# Patient Record
Sex: MALE | Race: WHITE | NOT HISPANIC OR LATINO | Employment: OTHER | URBAN - METROPOLITAN AREA
[De-identification: names, ages, dates, MRNs, and addresses within clinical notes are randomized per-mention and may not be internally consistent; named-entity substitution may affect disease eponyms.]

---

## 2017-01-27 ENCOUNTER — TELEPHONE (OUTPATIENT)
Dept: UROLOGY | Facility: CLINIC | Age: 67
End: 2017-01-27

## 2017-01-27 NOTE — TELEPHONE ENCOUNTER
----- Message from Martha Wolf sent at 1/27/2017 10:32 AM CST -----  Contact: Patient  Patient needs authorization on prescription for his Flomax sent to [facility] on [address]. Any questions call patient at [phone].

## 2017-01-30 RX ORDER — TAMSULOSIN HYDROCHLORIDE 0.4 MG/1
CAPSULE ORAL
Qty: 30 CAPSULE | Refills: 0 | Status: SHIPPED | OUTPATIENT
Start: 2017-01-30 | End: 2017-03-05 | Stop reason: SDUPTHER

## 2017-03-06 RX ORDER — TAMSULOSIN HYDROCHLORIDE 0.4 MG/1
CAPSULE ORAL
Qty: 30 CAPSULE | Refills: 0 | Status: SHIPPED | OUTPATIENT
Start: 2017-03-06 | End: 2017-04-05 | Stop reason: SDUPTHER

## 2017-04-06 RX ORDER — TAMSULOSIN HYDROCHLORIDE 0.4 MG/1
CAPSULE ORAL
Qty: 30 CAPSULE | Refills: 0 | Status: SHIPPED | OUTPATIENT
Start: 2017-04-06 | End: 2017-05-07 | Stop reason: SDUPTHER

## 2017-05-08 RX ORDER — TAMSULOSIN HYDROCHLORIDE 0.4 MG/1
CAPSULE ORAL
Qty: 30 CAPSULE | Refills: 0 | Status: SHIPPED | OUTPATIENT
Start: 2017-05-08 | End: 2017-06-06 | Stop reason: SDUPTHER

## 2017-05-29 ENCOUNTER — OFFICE VISIT (OUTPATIENT)
Dept: UROLOGY | Facility: CLINIC | Age: 67
End: 2017-05-29
Payer: COMMERCIAL

## 2017-05-29 VITALS
HEIGHT: 73 IN | WEIGHT: 254 LBS | SYSTOLIC BLOOD PRESSURE: 169 MMHG | DIASTOLIC BLOOD PRESSURE: 93 MMHG | TEMPERATURE: 99 F | BODY MASS INDEX: 33.66 KG/M2 | HEART RATE: 65 BPM

## 2017-05-29 DIAGNOSIS — N50.819 TESTICLE PAIN: ICD-10-CM

## 2017-05-29 DIAGNOSIS — R82.90 CLOUDY URINE: ICD-10-CM

## 2017-05-29 DIAGNOSIS — N30.01 ACUTE CYSTITIS WITH HEMATURIA: Primary | ICD-10-CM

## 2017-05-29 DIAGNOSIS — R30.0 DYSURIA: ICD-10-CM

## 2017-05-29 LAB
BILIRUB SERPL-MCNC: ABNORMAL MG/DL
BLOOD URINE, POC: ABNORMAL
COLOR, POC UA: ABNORMAL
GLUCOSE UR QL STRIP: ABNORMAL
KETONES UR QL STRIP: ABNORMAL
LEUKOCYTE ESTERASE URINE, POC: ABNORMAL
NITRITE, POC UA: POSITIVE
PH, POC UA: 7
PROTEIN, POC: POSITIVE
SPECIFIC GRAVITY, POC UA: 1.01
UROBILINOGEN, POC UA: ABNORMAL

## 2017-05-29 PROCEDURE — 87086 URINE CULTURE/COLONY COUNT: CPT

## 2017-05-29 PROCEDURE — 99999 PR PBB SHADOW E&M-EST. PATIENT-LVL IV: CPT | Mod: PBBFAC,,, | Performed by: NURSE PRACTITIONER

## 2017-05-29 PROCEDURE — 99214 OFFICE O/P EST MOD 30 MIN: CPT | Mod: 25,S$GLB,, | Performed by: NURSE PRACTITIONER

## 2017-05-29 PROCEDURE — 81001 URINALYSIS AUTO W/SCOPE: CPT | Mod: S$GLB,,, | Performed by: NURSE PRACTITIONER

## 2017-05-29 RX ORDER — SULFAMETHOXAZOLE AND TRIMETHOPRIM 800; 160 MG/1; MG/1
1 TABLET ORAL 2 TIMES DAILY
Qty: 56 TABLET | Refills: 0 | Status: SHIPPED | OUTPATIENT
Start: 2017-05-29 | End: 2017-06-26

## 2017-05-29 RX ORDER — SULFAMETHOXAZOLE AND TRIMETHOPRIM 800; 160 MG/1; MG/1
1 TABLET ORAL 2 TIMES DAILY
Qty: 56 TABLET | Refills: 0 | Status: SHIPPED | OUTPATIENT
Start: 2017-05-29 | End: 2017-05-29 | Stop reason: CLARIF

## 2017-05-29 NOTE — PROGRESS NOTES
[facility] Note  Staff: HEATH Rahman      Chief Complaint: Urinary tract infection    Subjective:        HPI: Ezequiel Escudero is a 66 y.o. male presents with complaints of   cloudy urine, urinary frequency, testicle swelling, pus in urine which began on 05/25/2017 and has progressively worsened.    The patient was last seen by Dr. Sellers on 10/05/2016 for a Cystoscopy procedure for gross hematuria.  Findings were as follows:  FINDINGS:  URETHRA: open with no strictures  PROSTATE: 4 cm with GLASGOW. SP TURP with prostate regrowth   TRABEC: grade 2  BLADDER: severe hemorraghic cystitis all over the bladder wall.  URETERAL ORIFICES : NSSp clear efflux   OTHER FINDINGS: none     Post Procedure Diagnosis: BPH with GLASGOW, severe hemorraghic cystitis    Last PSA Screening:   Lab Results   Component Value Date    PSA 7.50 (H) 10/11/2012     REVIEW OF SYSTEMS:  Review of Systems   Constitutional: Negative for chills, diaphoresis, fever and weight loss.   HENT: Negative for congestion, hearing loss, nosebleeds and sore throat.    Eyes: Negative for blurred vision and pain.   Respiratory: Negative for cough and wheezing.    Cardiovascular: Negative for chest pain, palpitations and leg swelling.   Gastrointestinal: Negative for abdominal pain, heartburn, nausea and vomiting.   Genitourinary: Positive for dysuria, frequency and urgency. Negative for flank pain and hematuria.        Testicle pain   Musculoskeletal: Negative for back pain, joint pain, myalgias and neck pain.   Skin: Negative for itching and rash.   Neurological: Negative for dizziness, tremors, sensory change, seizures, loss of consciousness, weakness and headaches.   Endo/Heme/Allergies: Does not bruise/bleed easily.   Psychiatric/Behavioral: Negative for depression and suicidal ideas. The patient is not nervous/anxious.      Physical Exam    PMHx:  Past Medical History:   Diagnosis Date    A-fib     BPH (benign prostatic hyperplasia)      Rojas catheter in place     Kidney stone     PONV (postoperative nausea and vomiting)     Wears glasses      PSHx:  Past Surgical History:   Procedure Laterality Date    ANKLE DEBRIDEMENT      CARDIOVERSION      THYROIDECTOMY  1970's    TONSILLECTOMY      TONSILLECTOMY, ADENOIDECTOMY       Allergies:  Review of patient's allergies indicates no known allergies.    Medications: reviewed   Objective:     Vitals:    05/29/17 1423   BP: (!) 169/93   Pulse: 65   Temp: 98.7 °F (37.1 °C)     General:WDWN in NAD  Eyes: PERRLA, normal conjunctiva  Respiratory: no increased work on breathing, clear to auscultation  Cardiovascular: regular rate and rhythm. No obvious extremity edema.  GI: palpation of masses. No tenderness. No hepatosplenomegaly to palpation.  Musculoskeletal: normal range of motion of bilateral upper extremities. Normal muscle strength and tone.  Skin: no obvious rashes or lesions. No tightening of skin noted.  Neurologic: CN grossly normal. Normal sensation.   Psychiatric: awake, alert and oriented x 3. Mood and affect normal. Cooperative.    LABS REVIEW:  UA today:  Color, UA  yellow cloudy   Spec Grav UA 1.010   pH, UA 7   WBC, UA  ++ positive   Nitrite, UA  positive   Protein  positive   Glucose, UA neg   Ketones, UA neg   Urobilinogen, UA neg   Bilirubin neg   Blood, UA  ++ Positive     Cr:   Lab Results   Component Value Date    CREATININE 1.4 10/10/2016     Assessment:       1. Acute cystitis with hematuria    2. Testicle pain    3. Cloudy urine    4. Dysuria          Plan:     We will send urine for culture testing today.  In the meantime I will prescribe him Bactrim DS 1 po BID x 28 days.  Pt will continue the Flomax as previously indicated.    F/up:  Pt was instructed that once we treat the current infection that if the testicle pain remains after treatment, to contact the office and we will order a ultrasound for further evaluation.    HEATH Hennessy

## 2017-05-29 NOTE — PATIENT INSTRUCTIONS
Understanding Urinary Tract Infections (UTIs)  Most UTIs are caused by bacteria, although they may also be caused by viruses or fungi. Bacteria from the bowel are the most common source of infection. The infection may begin because of any of the following:  · Sexual activity. During sex, germs can travel from the penis, vagina, or rectum into the urethra.   · Germs on the skin outside the rectum may travel into the urethra. This is more common in women since the rectum and urethra are closer to each other than in men. Wiping from front to back after using the toilet and keeping the area clean can help prevent germs from getting to the urethra.  · Blockage of urine flow through the urinary tract. If urine sits too long, germs may begin to grow out of control.      Parts of the urinary tract  The infection can occur in any part of the urinary tract.  · The kidneys collect and store urine.  · The ureters carry urine from the kidneys to the bladder.  · The bladder holds urine until you are ready to let it out.  · The urethra carries urine from the bladder out of the body. It is shorter in women, so bacteria can move through it more easily. The urethra is longer in men, so a UTI is less likely to reach the bladder or kidneys in men.  Date Last Reviewed: 9/8/2014  © 8724-7809 [employer]. [address] [place] [address], [place] [address]. All rights reserved. This information is not intended as a substitute for professional medical care. Always follow your healthcare professional's instructions.        Anatomy of the Male Urinary Tract  Your urinary tract helps to get rid of your bodys liquid waste. The kidneys constantly filter the blood to collect unneeded chemicals and water, making urine. Urine travels through the ureters to the bladder. The bladder fills with urine, holding it until youre ready to release it. Signals from the brain tell the sphincter (muscles around the opening of the bladder) when to let  urine flow out of the bladder. The urethra is the tube that carries urine from the bladder out of the body. In men, the prostate gland wraps around the urethra near the bladder.     Front view of male urinary tract.     Date Last Reviewed: 8/27/2014  © 0938-9484 [employer]. [address] [place] [address]. All rights reserved. This information is not intended as a substitute for professional medical care. Always follow your healthcare professional's instructions.

## 2017-05-31 LAB — BACTERIA UR CULT: NO GROWTH

## 2017-06-08 RX ORDER — TAMSULOSIN HYDROCHLORIDE 0.4 MG/1
CAPSULE ORAL
Qty: 30 CAPSULE | Refills: 0 | Status: SHIPPED | OUTPATIENT
Start: 2017-06-08 | End: 2017-07-08 | Stop reason: SDUPTHER

## 2017-07-10 RX ORDER — TAMSULOSIN HYDROCHLORIDE 0.4 MG/1
CAPSULE ORAL
Qty: 30 CAPSULE | Refills: 0 | Status: SHIPPED | OUTPATIENT
Start: 2017-07-10 | End: 2017-08-06 | Stop reason: SDUPTHER

## 2017-08-07 RX ORDER — TAMSULOSIN HYDROCHLORIDE 0.4 MG/1
CAPSULE ORAL
Qty: 30 CAPSULE | Refills: 0 | Status: SHIPPED | OUTPATIENT
Start: 2017-08-07 | End: 2017-09-11 | Stop reason: SDUPTHER

## 2017-09-11 RX ORDER — TAMSULOSIN HYDROCHLORIDE 0.4 MG/1
CAPSULE ORAL
Qty: 30 CAPSULE | Refills: 0 | Status: SHIPPED | OUTPATIENT
Start: 2017-09-11 | End: 2017-10-12 | Stop reason: SDUPTHER

## 2017-10-12 RX ORDER — TAMSULOSIN HYDROCHLORIDE 0.4 MG/1
CAPSULE ORAL
Qty: 30 CAPSULE | Refills: 0 | Status: SHIPPED | OUTPATIENT
Start: 2017-10-12 | End: 2019-10-31

## 2017-10-16 RX ORDER — TAMSULOSIN HYDROCHLORIDE 0.4 MG/1
1 CAPSULE ORAL NIGHTLY
Qty: 90 CAPSULE | Refills: 3 | Status: SHIPPED | OUTPATIENT
Start: 2017-10-16 | End: 2018-11-01 | Stop reason: SDUPTHER

## 2018-04-30 ENCOUNTER — TELEPHONE (OUTPATIENT)
Dept: UROLOGY | Facility: CLINIC | Age: 68
End: 2018-04-30

## 2018-04-30 NOTE — TELEPHONE ENCOUNTER
----- Message from Frieda Hong sent at 4/30/2018  4:41 PM CDT -----  Contact: Patient  Patient called and stated that Dr. Clay was supposed to have sent a referral for him to see Dr. Sellers. It was supposed to be sent over on 4/23/18. He is checking to see if it was received. Please call him at [phone].    Thanks,  Frieda

## 2018-10-21 ENCOUNTER — HOSPITAL ENCOUNTER (EMERGENCY)
Facility: HOSPITAL | Age: 68
Discharge: HOME | End: 2018-10-21
Attending: EMERGENCY MEDICINE
Payer: COMMERCIAL

## 2018-10-21 VITALS
WEIGHT: 225 LBS | DIASTOLIC BLOOD PRESSURE: 87 MMHG | SYSTOLIC BLOOD PRESSURE: 172 MMHG | BODY MASS INDEX: 29.82 KG/M2 | RESPIRATION RATE: 18 BRPM | OXYGEN SATURATION: 96 % | HEIGHT: 73 IN | HEART RATE: 64 BPM | TEMPERATURE: 99 F

## 2018-10-21 DIAGNOSIS — S22.42XA CLOSED FRACTURE OF MULTIPLE RIBS OF LEFT SIDE, INITIAL ENCOUNTER: Primary | ICD-10-CM
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
## 2018-10-21 PROCEDURE — 63600175 PHARM REV CODE 636 W HCPCS: Performed by: EMERGENCY MEDICINE

## 2018-10-21 PROCEDURE — 96372 THER/PROPH/DIAG INJ SC/IM: CPT

## 2018-10-21 PROCEDURE — 99284 EMERGENCY DEPT VISIT MOD MDM: CPT | Mod: 25

## 2018-10-21 PROCEDURE — 94799 UNLISTED PULMONARY SVC/PX: CPT

## 2018-10-21 RX ORDER — KETOROLAC TROMETHAMINE 30 MG/ML
10 INJECTION, SOLUTION INTRAMUSCULAR; INTRAVENOUS
Status: COMPLETED | OUTPATIENT
Start: 2018-10-21 | End: 2018-10-21

## 2018-10-21 RX ORDER — HYDROCODONE BITARTRATE AND ACETAMINOPHEN 10; 325 MG/1; MG/1
1 TABLET ORAL EVERY 4 HOURS PRN
Qty: 18 TABLET | Refills: 0 | Status: SHIPPED | OUTPATIENT
Start: 2018-10-21 | End: 2018-10-24 | Stop reason: SDUPTHER

## 2018-10-21 RX ADMIN — KETOROLAC TROMETHAMINE 10 MG: 30 INJECTION, SOLUTION INTRAMUSCULAR at 09:10

## 2018-10-21 NOTE — PLAN OF CARE
10/21/18 0908   Patient Assessment/Suction   Level of Consciousness (AVPU) alert   Respiratory Effort Normal;Unlabored   Incentive Spirometer   $ Incentive Spirometer Charges done with encouragement   Predicted Level (mL) (Incentive Spirometer) 3400   Administration (Incentive Spirometer) done with encouragement   Number of Repetitions (Incentive Spirometer) 12   Level (mL) (Incentive Spirometer) 2500   Patient Tolerance good   Instructed pt to use IS Q4.

## 2018-10-21 NOTE — ED PROVIDER NOTES
"Encounter Date: 10/21/2018    SCRIBE #1 NOTE: I, Milagros Mcgill, am scribing for, and in the presence of, Dr. Lazaro Thomas.       History     Chief Complaint   Patient presents with    Fall     co lt side pain with movement       Time seen by provider: 8:21 AM on 10/21/2018    Ezequiel Escudero is a 67 y.o. male who presents to the ED with complaints of left-sided rib pain s/p a mechanical fall that occurred last night, approximately x13 hours ago. Patient states he "slipped and fell" onto his left side. He endorses taking 2 Ibuprofen's last night after the fall and "slept in my recliner", but woke up with worsening pain. Pain worsens with deep breaths and movements. He states, " it feels like a rib is moving when I move and I have to catch my breath". Patient has no other medical concerns or complaints at this moment. He denies onset of any other new symptoms. PMHx includes kidney stone, BPH, and A-fib. SHx includes thyroidectomy and cardioversion.       The history is provided by the patient.     Review of patient's allergies indicates:  No Known Allergies  Past Medical History:   Diagnosis Date    A-fib     BPH (benign prostatic hyperplasia)     Rojas catheter in place     Kidney stone     PONV (postoperative nausea and vomiting)     Wears glasses      Past Surgical History:   Procedure Laterality Date    ANKLE DEBRIDEMENT      CARDIOVERSION      CYSTOSCOPY N/A 10/5/2016    [facility]    THYROIDECTOMY  1970's    TONSILLECTOMY      TONSILLECTOMY, ADENOIDECTOMY      TURP, USING GREEN LIGHT LASER N/A 11/27/2012    [facility]     Family History   Problem Relation Age of Onset    Benign prostatic hyperplasia Brother     Heart disease Mother     Heart disease Father     Kidney cancer Neg Hx     Prostate cancer Neg Hx     Urolithiasis Neg Hx      Social History     Tobacco Use    Smoking status: Never Smoker    Smokeless tobacco: " Never Used   Substance Use Topics    Alcohol use: Yes     Comment: OCC    Drug use: No     Review of Systems   Constitutional: Negative for fever.   HENT: Negative for facial swelling and trouble swallowing.    Respiratory: Positive for shortness of breath (secondary to pain). Negative for chest tightness and wheezing.    Cardiovascular: Negative for chest pain.   Gastrointestinal: Negative for nausea and vomiting.   Genitourinary: Negative for difficulty urinating.   Musculoskeletal: Negative for arthralgias, back pain, joint swelling, myalgias, neck pain and neck stiffness.        + left-sided rib pain; worsens with deep breaths and movement   Skin: Negative for color change, pallor, rash and wound.   Neurological: Negative for syncope, weakness and numbness.   Hematological: Does not bruise/bleed easily.   Psychiatric/Behavioral: The patient is not nervous/anxious.        Physical Exam     Initial Vitals [10/21/18 0803]   BP Pulse Resp Temp SpO2   (!) 172/87 64 18 98.6 °F (37 °C) 96 %      MAP       --         Physical Exam    Nursing note and vitals reviewed.  Constitutional: He appears well-developed and well-nourished. He is not diaphoretic.  Non-toxic appearance. He does not have a sickly appearance. He does not appear ill. No distress.   HENT:   Head: Normocephalic and atraumatic.   Eyes: EOM are normal. Pupils are equal, round, and reactive to light.   Neck: Normal range of motion. No neck rigidity.   Cardiovascular: Normal rate, regular rhythm, normal heart sounds and intact distal pulses. Exam reveals no gallop and no friction rub.    No murmur heard.  Pulmonary/Chest: Breath sounds normal. No respiratory distress. He has no decreased breath sounds. He has no wheezes. He has no rhonchi. He has no rales.   Abdominal: There is no rigidity.   Musculoskeletal: Normal range of motion. He exhibits no edema.   8/9th rib tenderness over posterior axillary. No bruising or crepitus noted.    Neurological: He is  alert and oriented to person, place, and time. He has normal strength and normal reflexes. No cranial nerve deficit or sensory deficit. He exhibits normal muscle tone. Coordination normal. GCS eye subscore is 4. GCS verbal subscore is 5. GCS motor subscore is 6.   Skin: Skin is warm and dry. Capillary refill takes less than 2 seconds.   Psychiatric: He has a normal mood and affect. His speech is normal and behavior is normal. He is not actively hallucinating.         ED Course   Procedures  Labs Reviewed - No data to display       Imaging Results          X-Ray Ribs 2 View Left (Final result)     Abnormal  Result time 10/21/18 08:48:03    Final result by Adithya Curtis MD (10/21/18 08:48:03)                 Impression:      There are mildly displaced fractures of the left 5th, 6th and 7th ribs.    This report was flagged in Epic as abnormal.      Electronically signed by: Adithya Curtis MD  Date:    10/21/2018  Time:    08:48             Narrative:    EXAMINATION:  XR RIBS 2 VIEW LEFT    CLINICAL HISTORY:  Unspecified fall, initial encounter    TECHNIQUE:  Two views of the left ribs were performed.    COMPARISON:  None.    FINDINGS:  There are mildly displaced fractures of the left 5th, 6th and 7th ribs.  There is no detectable pneumothorax or pleural effusion.                                 Medical Decision Making:   History:   Old Medical Records: I decided to obtain old medical records.  Initial Assessment:   Patient is a 67-year-old man with a slip and fall complaining of left-sided chest wall pain which is worse with breathing or movement.  Equal lung sounds bilaterally.  Chest x-ray shows no evidence of pneumothorax but does reveal rib fractures that are mildly displaced of the left 5th 6th and 7th ribs.  No flail segments.  Patient given IM Toradol in the ED and will be prescribed Norco.  Patient instructed on incentive spirometry.  PCP follow-up.  Return precautions discussed.  Discharged improved  in no acute distress.    Clinical Tests:   Radiological Study: Ordered and Reviewed            Scribe Attestation:   Scribe #1: I performed the above scribed service and the documentation accurately describes the services I performed. I attest to the accuracy of the note.      I, William Amor, personally performed the services described in this documentation. All medical record entries made by the scribe were at my direction and in my presence.  I have reviewed the chart and agree that the record reflects my personal performance and is accurate and complete. Lazaro Thomas MD.  9:00 AM 10/21/2018               Clinical Impression:   The primary encounter diagnosis was Closed fracture of multiple ribs of left side, initial encounter. A diagnosis of Fall was also pertinent to this visit.      Disposition:   Disposition: Discharged  Condition: Stable                        Lazaro Thomas MD  10/21/18 0901

## 2018-10-24 ENCOUNTER — HOSPITAL ENCOUNTER (EMERGENCY)
Facility: HOSPITAL | Age: 68
Discharge: HOME | End: 2018-10-24
Attending: EMERGENCY MEDICINE
Payer: COMMERCIAL

## 2018-10-24 VITALS
DIASTOLIC BLOOD PRESSURE: 71 MMHG | HEART RATE: 63 BPM | BODY MASS INDEX: 31.15 KG/M2 | HEIGHT: 72 IN | WEIGHT: 230 LBS | OXYGEN SATURATION: 96 % | RESPIRATION RATE: 18 BRPM | SYSTOLIC BLOOD PRESSURE: 140 MMHG

## 2018-10-24 DIAGNOSIS — R07.89 LEFT-SIDED CHEST WALL PAIN: ICD-10-CM

## 2018-10-24 DIAGNOSIS — S22.42XA CLOSED FRACTURE OF MULTIPLE RIBS OF LEFT SIDE, INITIAL ENCOUNTER: Primary | ICD-10-CM

## 2018-10-24 PROCEDURE — 25000003 PHARM REV CODE 250: Performed by: EMERGENCY MEDICINE

## 2018-10-24 PROCEDURE — 99284 EMERGENCY DEPT VISIT MOD MDM: CPT | Mod: 25

## 2018-10-24 RX ORDER — LIDOCAINE 50 MG/G
1 PATCH TOPICAL
Status: DISCONTINUED | OUTPATIENT
Start: 2018-10-24 | End: 2018-10-25 | Stop reason: HOSPADM

## 2018-10-24 RX ORDER — HYDRALAZINE HYDROCHLORIDE 100 MG/1
100 TABLET, FILM COATED ORAL 2 TIMES DAILY
COMMUNITY

## 2018-10-24 RX ORDER — DIAZEPAM 5 MG/1
5 TABLET ORAL
Status: COMPLETED | OUTPATIENT
Start: 2018-10-24 | End: 2018-10-24

## 2018-10-24 RX ORDER — MECLOFENAMATE SODIUM 50 MG/1
50 CAPSULE ORAL EVERY 6 HOURS
COMMUNITY
End: 2023-03-28

## 2018-10-24 RX ORDER — DIAZEPAM 5 MG/1
5 TABLET ORAL EVERY 8 HOURS PRN
Qty: 12 TABLET | Refills: 0 | Status: SHIPPED | OUTPATIENT
Start: 2018-10-24 | End: 2021-02-10 | Stop reason: ALTCHOICE

## 2018-10-24 RX ORDER — FUROSEMIDE 20 MG/1
20 TABLET ORAL 2 TIMES DAILY
Status: ON HOLD | COMMUNITY
End: 2021-02-03 | Stop reason: HOSPADM

## 2018-10-24 RX ORDER — HYDROCODONE BITARTRATE AND ACETAMINOPHEN 10; 325 MG/1; MG/1
1 TABLET ORAL
Status: COMPLETED | OUTPATIENT
Start: 2018-10-24 | End: 2018-10-24

## 2018-10-24 RX ORDER — HYDROCODONE BITARTRATE AND ACETAMINOPHEN 10; 325 MG/1; MG/1
1 TABLET ORAL EVERY 6 HOURS PRN
Qty: 18 TABLET | Refills: 0 | Status: SHIPPED | OUTPATIENT
Start: 2018-10-24 | End: 2021-02-10 | Stop reason: ALTCHOICE

## 2018-10-24 RX ORDER — LIDOCAINE 50 MG/G
1 PATCH TOPICAL DAILY
Qty: 10 PATCH | Refills: 0 | Status: SHIPPED | OUTPATIENT
Start: 2018-10-24 | End: 2018-11-03

## 2018-10-24 RX ORDER — LOSARTAN POTASSIUM 100 MG/1
100 TABLET ORAL DAILY
Status: ON HOLD | COMMUNITY
End: 2021-02-03 | Stop reason: HOSPADM

## 2018-10-24 RX ADMIN — LIDOCAINE 1 PATCH: 50 PATCH TOPICAL at 05:10

## 2018-10-24 RX ADMIN — DIAZEPAM 5 MG: 5 TABLET ORAL at 05:10

## 2018-10-24 RX ADMIN — HYDROCODONE BITARTRATE AND ACETAMINOPHEN 1 TABLET: 10; 325 TABLET ORAL at 05:10

## 2018-10-25 NOTE — ED PROVIDER NOTES
Encounter Date: 10/24/2018       History     Chief Complaint   Patient presents with    Rib Injury     Seen here Sunday for fx ribs - having muscle spasms since 2 AM today.      68-year-old male presenting to the emergency department complaints of left sided chest pain.  He reports he was seen in our ER within the past 10 days secondary to a fall where he was noted to have 3 rib fractures on the left.  He reports his pain has been somewhat controlled at home with oral analgesic medication but that he has recently run out of these medications.  He denies accompanying shortness of breath, fever, productive cough, frontal chest pain, overlying skin changes.  He denies additional falls.  He denies he has followed up with his primary care doctor.          Review of patient's allergies indicates:  No Known Allergies  Past Medical History:   Diagnosis Date    A-fib     BPH (benign prostatic hyperplasia)     Rojas catheter in place     Kidney stone     PONV (postoperative nausea and vomiting)     Wears glasses      Past Surgical History:   Procedure Laterality Date    ANKLE DEBRIDEMENT      CARDIOVERSION      CYSTOSCOPY N/A 10/5/2016    [facility] at [facility] OR    THYROIDECTOMY  1970's    TONSILLECTOMY      TONSILLECTOMY, ADENOIDECTOMY      TURP, USING GREEN LIGHT LASER N/A 11/27/2012    [facility] at [facility] OR     Family History   Problem Relation Age of Onset    Benign prostatic hyperplasia Brother     Heart disease Mother     Heart disease Father     Kidney cancer Neg Hx     Prostate cancer Neg Hx     Urolithiasis Neg Hx      Social History     Tobacco Use    Smoking status: Never Smoker    Smokeless tobacco: Never Used   Substance Use Topics    Alcohol use: Yes     Comment: OCC    Drug use: No     Review of Systems   Constitutional: Negative for fever.   Respiratory: Negative for shortness of breath.    Cardiovascular: Positive for chest pain.    Gastrointestinal: Negative for abdominal pain.   Musculoskeletal: Negative for back pain.   Skin: Negative for rash.       Physical Exam     Initial Vitals [10/24/18 0536]   BP Pulse Resp Temp SpO2   (!) 140/71 63 18 -- 96 %      MAP       --         Physical Exam    Nursing note and vitals reviewed.  Constitutional: He appears well-nourished.   HENT:   Head: Normocephalic and atraumatic.   Eyes: Conjunctivae and EOM are normal.   Neck: Normal range of motion. Neck supple.   Cardiovascular: Normal rate and regular rhythm.   Pulmonary/Chest: No respiratory distress. He has no wheezes. He exhibits tenderness.   Tenderness with palpation of the lateral ribs without overlying skin changes or flail segment   Abdominal: He exhibits no distension. There is no tenderness.         ED Course   Procedures  Labs Reviewed - No data to display       Imaging Results          X-Ray Chest AP Portable (Final result)  Result time 10/24/18 08:59:15    Final result by Quoc Desai MD (10/24/18 08:59:15)                 Impression:      Left rib fractures better characterized on recent dedicated radiographs.  Bibasilar atelectasis.      Electronically signed by: Quoc Desai  Date:    10/24/2018  Time:    08:59             Narrative:    EXAMINATION:  XR CHEST AP PORTABLE    CLINICAL HISTORY:  Other chest pain    TECHNIQUE:  Single frontal view of the chest was performed.    COMPARISON:  10/21/2018 and 10/09/2016    FINDINGS:  Linear airspace disease in the left midlung zone and both lower lung zones.  Low lung volumes.  Cardiac silhouette is prominent although likely accentuated by technique.  No large pleural effusion.  Left lateral rib fractures.                                 Medical Decision Making:   ED Management:  Patient was interviewed and examined emergently.  Initial vital signs are stable. There are no additional concerning physical examination findings associated with progressing pneumonia or pulmonary  contusion.  Radiograph does not indicate developing infiltrates.  Patient had significant improvement with antispasmodic medication for his left chest wall pain, specifically spasm type pain that runs along the affected ribs.  He additionally was provided a Lidoderm patch.  He will be discharged with these medications but is asked to rest while taking them as they may cause drowsiness and can induce a fall.  He is asked to follow up with his primary care doctor soon as possible regarding expected improvement.  He is asked to return to the ER for any new, concerning, or worsening symptoms. Patient was agreeable with this plan for follow-up and was discharged in stable condition with his son as a .                      Clinical Impression:   The primary encounter diagnosis was Closed fracture of multiple ribs of left side, initial encounter. A diagnosis of Left-sided chest wall pain was also pertinent to this visit.      Disposition:   Disposition: Discharged  Condition: Stable                        Bradley Metcalf MD  10/25/18 0618

## 2018-11-02 RX ORDER — TAMSULOSIN HYDROCHLORIDE 0.4 MG/1
CAPSULE ORAL
Qty: 90 CAPSULE | Refills: 3 | Status: SHIPPED | OUTPATIENT
Start: 2018-11-02 | End: 2019-10-30 | Stop reason: SDUPTHER

## 2019-10-31 RX ORDER — TAMSULOSIN HYDROCHLORIDE 0.4 MG/1
1 CAPSULE ORAL NIGHTLY
Qty: 30 CAPSULE | Refills: 0 | Status: SHIPPED | OUTPATIENT
Start: 2019-10-31 | End: 2021-08-26

## 2019-10-31 NOTE — TELEPHONE ENCOUNTER
This pt is requesting a refill on Flomax 0.4 mg daily for hx of BPH with LUTS.    He is a patient of Dr. Zaldivar.  We have not seen him since 2017.  Therefore I gave him a one month's supply and until he can get in to get re-established.    Thanks.

## 2019-10-31 NOTE — TELEPHONE ENCOUNTER
Phoned patient informed him of recommendations. Patient verbally voiced understanding and states he will call back to schedule

## 2021-01-27 ENCOUNTER — HOSPITAL ENCOUNTER (INPATIENT)
Facility: HOSPITAL | Age: 71
LOS: 7 days | Discharge: HOME HEALTH | DRG: 674 | End: 2021-02-03
Attending: EMERGENCY MEDICINE | Admitting: INTERNAL MEDICINE
Payer: MEDICARE

## 2021-01-27 DIAGNOSIS — R33.9 URINARY RETENTION: ICD-10-CM

## 2021-01-27 DIAGNOSIS — I95.9 HYPOTENSION: ICD-10-CM

## 2021-01-27 DIAGNOSIS — N17.9 ACUTE RENAL FAILURE: ICD-10-CM

## 2021-01-27 DIAGNOSIS — L97.213 VENOUS STASIS ULCER OF RIGHT CALF WITH NECROSIS OF MUSCLE WITHOUT VARICOSE VEINS: Primary | ICD-10-CM

## 2021-01-27 DIAGNOSIS — I87.2 VENOUS STASIS ULCER OF RIGHT CALF WITH NECROSIS OF MUSCLE WITHOUT VARICOSE VEINS: Primary | ICD-10-CM

## 2021-01-27 DIAGNOSIS — R07.9 CHEST PAIN: ICD-10-CM

## 2021-01-27 DIAGNOSIS — N17.0 ACUTE RENAL FAILURE WITH TUBULAR NECROSIS: ICD-10-CM

## 2021-01-27 PROBLEM — E87.20 METABOLIC ACIDOSIS: Status: ACTIVE | Noted: 2021-01-27

## 2021-01-27 PROBLEM — I95.0 IDIOPATHIC HYPOTENSION: Status: ACTIVE | Noted: 2021-01-27

## 2021-01-27 PROBLEM — N18.2 STAGE 2 CHRONIC KIDNEY DISEASE: Status: ACTIVE | Noted: 2021-01-27

## 2021-01-27 PROBLEM — Z71.89 GOALS OF CARE, COUNSELING/DISCUSSION: Status: ACTIVE | Noted: 2021-01-27

## 2021-01-27 PROBLEM — L03.90 CELLULITIS: Status: ACTIVE | Noted: 2021-01-27

## 2021-01-27 PROBLEM — E87.5 HYPERKALEMIA: Status: ACTIVE | Noted: 2021-01-27

## 2021-01-27 LAB
ALBUMIN SERPL BCP-MCNC: 2.8 G/DL (ref 3.5–5.2)
ALBUMIN SERPL BCP-MCNC: 3.3 G/DL (ref 3.5–5.2)
ALP SERPL-CCNC: 103 U/L (ref 55–135)
ALT SERPL W/O P-5'-P-CCNC: 21 U/L (ref 10–44)
ANION GAP SERPL CALC-SCNC: 17 MMOL/L (ref 8–16)
ANION GAP SERPL CALC-SCNC: 20 MMOL/L (ref 8–16)
AST SERPL-CCNC: 14 U/L (ref 10–40)
BASOPHILS # BLD AUTO: 0.08 K/UL (ref 0–0.2)
BASOPHILS NFR BLD: 0.5 % (ref 0–1.9)
BILIRUB SERPL-MCNC: 0.6 MG/DL (ref 0.1–1)
BILIRUB UR QL STRIP: NEGATIVE
BUN SERPL-MCNC: 164 MG/DL (ref 8–23)
BUN SERPL-MCNC: 176 MG/DL (ref 8–23)
CALCIUM SERPL-MCNC: 8.4 MG/DL (ref 8.7–10.5)
CALCIUM SERPL-MCNC: 9.5 MG/DL (ref 8.7–10.5)
CHLORIDE SERPL-SCNC: 92 MMOL/L (ref 95–110)
CHLORIDE SERPL-SCNC: 98 MMOL/L (ref 95–110)
CK MB SERPL-MCNC: 1.8 NG/ML (ref 0.1–6.5)
CK MB SERPL-RTO: 4.9 % (ref 0–5)
CK SERPL-CCNC: 37 U/L (ref 20–200)
CK SERPL-CCNC: 37 U/L (ref 20–200)
CLARITY UR: CLEAR
CO2 SERPL-SCNC: 15 MMOL/L (ref 23–29)
CO2 SERPL-SCNC: 16 MMOL/L (ref 23–29)
COLOR UR: YELLOW
CREAT SERPL-MCNC: 4.7 MG/DL (ref 0.5–1.4)
CREAT SERPL-MCNC: 5.6 MG/DL (ref 0.5–1.4)
DIFFERENTIAL METHOD: ABNORMAL
EOSINOPHIL # BLD AUTO: 0.2 K/UL (ref 0–0.5)
EOSINOPHIL NFR BLD: 1.1 % (ref 0–8)
ERYTHROCYTE [DISTWIDTH] IN BLOOD BY AUTOMATED COUNT: 13 % (ref 11.5–14.5)
EST. GFR  (AFRICAN AMERICAN): 11 ML/MIN/1.73 M^2
EST. GFR  (AFRICAN AMERICAN): 14 ML/MIN/1.73 M^2
EST. GFR  (NON AFRICAN AMERICAN): 10 ML/MIN/1.73 M^2
EST. GFR  (NON AFRICAN AMERICAN): 12 ML/MIN/1.73 M^2
GLUCOSE SERPL-MCNC: 105 MG/DL (ref 70–110)
GLUCOSE SERPL-MCNC: 114 MG/DL (ref 70–110)
GLUCOSE UR QL STRIP: NEGATIVE
HCT VFR BLD AUTO: 40.9 % (ref 40–54)
HGB BLD-MCNC: 14 G/DL (ref 14–18)
HGB UR QL STRIP: NEGATIVE
IMM GRANULOCYTES # BLD AUTO: 0.23 K/UL (ref 0–0.04)
IMM GRANULOCYTES NFR BLD AUTO: 1.4 % (ref 0–0.5)
KETONES UR QL STRIP: NEGATIVE
LACTATE SERPL-SCNC: 0.5 MMOL/L (ref 0.5–2.2)
LEUKOCYTE ESTERASE UR QL STRIP: NEGATIVE
LYMPHOCYTES # BLD AUTO: 1 K/UL (ref 1–4.8)
LYMPHOCYTES NFR BLD: 6.4 % (ref 18–48)
MCH RBC QN AUTO: 30 PG (ref 27–31)
MCHC RBC AUTO-ENTMCNC: 34.2 G/DL (ref 32–36)
MCV RBC AUTO: 88 FL (ref 82–98)
MONOCYTES # BLD AUTO: 1.6 K/UL (ref 0.3–1)
MONOCYTES NFR BLD: 9.9 % (ref 4–15)
NEUTROPHILS # BLD AUTO: 12.8 K/UL (ref 1.8–7.7)
NEUTROPHILS NFR BLD: 80.7 % (ref 38–73)
NITRITE UR QL STRIP: NEGATIVE
NRBC BLD-RTO: 0 /100 WBC
PH UR STRIP: 6 [PH] (ref 5–8)
PHOSPHATE SERPL-MCNC: 9.1 MG/DL (ref 2.7–4.5)
PLATELET # BLD AUTO: 339 K/UL (ref 150–350)
PMV BLD AUTO: 9.9 FL (ref 9.2–12.9)
POCT GLUCOSE: 138 MG/DL (ref 70–110)
POTASSIUM SERPL-SCNC: 5.2 MMOL/L (ref 3.5–5.1)
POTASSIUM SERPL-SCNC: 5.6 MMOL/L (ref 3.5–5.1)
PROT SERPL-MCNC: 7.3 G/DL (ref 6–8.4)
PROT UR QL STRIP: NEGATIVE
RBC # BLD AUTO: 4.66 M/UL (ref 4.6–6.2)
SARS-COV-2 RDRP RESP QL NAA+PROBE: NEGATIVE
SODIUM SERPL-SCNC: 128 MMOL/L (ref 136–145)
SODIUM SERPL-SCNC: 130 MMOL/L (ref 136–145)
SP GR UR STRIP: 1.02 (ref 1–1.03)
TROPONIN I SERPL DL<=0.01 NG/ML-MCNC: 0.01 NG/ML (ref 0–0.03)
URN SPEC COLLECT METH UR: NORMAL
UROBILINOGEN UR STRIP-ACNC: NEGATIVE EU/DL
WBC # BLD AUTO: 15.93 K/UL (ref 3.9–12.7)

## 2021-01-27 PROCEDURE — 80053 COMPREHEN METABOLIC PANEL: CPT

## 2021-01-27 PROCEDURE — 99285 EMERGENCY DEPT VISIT HI MDM: CPT | Mod: 25

## 2021-01-27 PROCEDURE — U0002 COVID-19 LAB TEST NON-CDC: HCPCS

## 2021-01-27 PROCEDURE — 81003 URINALYSIS AUTO W/O SCOPE: CPT

## 2021-01-27 PROCEDURE — 80069 RENAL FUNCTION PANEL: CPT

## 2021-01-27 PROCEDURE — 84484 ASSAY OF TROPONIN QUANT: CPT

## 2021-01-27 PROCEDURE — 96365 THER/PROPH/DIAG IV INF INIT: CPT

## 2021-01-27 PROCEDURE — 51702 INSERT TEMP BLADDER CATH: CPT

## 2021-01-27 PROCEDURE — 36415 COLL VENOUS BLD VENIPUNCTURE: CPT

## 2021-01-27 PROCEDURE — 82962 GLUCOSE BLOOD TEST: CPT

## 2021-01-27 PROCEDURE — 25000003 PHARM REV CODE 250: Performed by: INTERNAL MEDICINE

## 2021-01-27 PROCEDURE — 85025 COMPLETE CBC W/AUTO DIFF WBC: CPT

## 2021-01-27 PROCEDURE — 82550 ASSAY OF CK (CPK): CPT

## 2021-01-27 PROCEDURE — 25000003 PHARM REV CODE 250: Performed by: EMERGENCY MEDICINE

## 2021-01-27 PROCEDURE — 83605 ASSAY OF LACTIC ACID: CPT

## 2021-01-27 PROCEDURE — 82553 CREATINE MB FRACTION: CPT

## 2021-01-27 PROCEDURE — 12000002 HC ACUTE/MED SURGE SEMI-PRIVATE ROOM

## 2021-01-27 PROCEDURE — 96361 HYDRATE IV INFUSION ADD-ON: CPT

## 2021-01-27 RX ORDER — GLUCAGON 1 MG
1 KIT INJECTION
Status: DISCONTINUED | OUTPATIENT
Start: 2021-01-27 | End: 2021-02-03 | Stop reason: HOSPADM

## 2021-01-27 RX ORDER — LANOLIN ALCOHOL/MO/W.PET/CERES
800 CREAM (GRAM) TOPICAL
Status: DISCONTINUED | OUTPATIENT
Start: 2021-01-27 | End: 2021-02-03 | Stop reason: HOSPADM

## 2021-01-27 RX ORDER — ACETAMINOPHEN 325 MG/1
650 TABLET ORAL EVERY 4 HOURS PRN
Status: DISCONTINUED | OUTPATIENT
Start: 2021-01-27 | End: 2021-02-03 | Stop reason: HOSPADM

## 2021-01-27 RX ORDER — MUPIROCIN 20 MG/G
OINTMENT TOPICAL 2 TIMES DAILY
Status: COMPLETED | OUTPATIENT
Start: 2021-01-27 | End: 2021-02-01

## 2021-01-27 RX ORDER — SODIUM CHLORIDE 0.9 % (FLUSH) 0.9 %
10 SYRINGE (ML) INJECTION
Status: DISCONTINUED | OUTPATIENT
Start: 2021-01-27 | End: 2021-02-03 | Stop reason: HOSPADM

## 2021-01-27 RX ORDER — ONDANSETRON 2 MG/ML
4 INJECTION INTRAMUSCULAR; INTRAVENOUS EVERY 8 HOURS PRN
Status: DISCONTINUED | OUTPATIENT
Start: 2021-01-27 | End: 2021-02-03 | Stop reason: HOSPADM

## 2021-01-27 RX ORDER — ACETAMINOPHEN 325 MG/1
650 TABLET ORAL EVERY 6 HOURS PRN
Status: DISCONTINUED | OUTPATIENT
Start: 2021-01-27 | End: 2021-02-03 | Stop reason: HOSPADM

## 2021-01-27 RX ORDER — CLINDAMYCIN PHOSPHATE 900 MG/50ML
900 INJECTION, SOLUTION INTRAVENOUS
Status: COMPLETED | OUTPATIENT
Start: 2021-01-27 | End: 2021-01-27

## 2021-01-27 RX ORDER — SODIUM CHLORIDE 9 MG/ML
INJECTION, SOLUTION INTRAVENOUS CONTINUOUS
Status: DISCONTINUED | OUTPATIENT
Start: 2021-01-27 | End: 2021-01-27

## 2021-01-27 RX ORDER — HYDROCODONE BITARTRATE AND ACETAMINOPHEN 5; 325 MG/1; MG/1
1 TABLET ORAL EVERY 6 HOURS PRN
Status: DISCONTINUED | OUTPATIENT
Start: 2021-01-27 | End: 2021-02-03 | Stop reason: HOSPADM

## 2021-01-27 RX ORDER — PANTOPRAZOLE SODIUM 40 MG/1
40 TABLET, DELAYED RELEASE ORAL DAILY
Status: DISCONTINUED | OUTPATIENT
Start: 2021-01-28 | End: 2021-02-03 | Stop reason: HOSPADM

## 2021-01-27 RX ORDER — SODIUM,POTASSIUM PHOSPHATES 280-250MG
2 POWDER IN PACKET (EA) ORAL
Status: DISCONTINUED | OUTPATIENT
Start: 2021-01-27 | End: 2021-02-03 | Stop reason: HOSPADM

## 2021-01-27 RX ORDER — IBUPROFEN 200 MG
16 TABLET ORAL
Status: DISCONTINUED | OUTPATIENT
Start: 2021-01-27 | End: 2021-02-03 | Stop reason: HOSPADM

## 2021-01-27 RX ORDER — SODIUM CHLORIDE 9 MG/ML
INJECTION, SOLUTION INTRAVENOUS CONTINUOUS
Status: DISCONTINUED | OUTPATIENT
Start: 2021-01-27 | End: 2021-01-28

## 2021-01-27 RX ORDER — IBUPROFEN 200 MG
24 TABLET ORAL
Status: DISCONTINUED | OUTPATIENT
Start: 2021-01-27 | End: 2021-02-03 | Stop reason: HOSPADM

## 2021-01-27 RX ADMIN — SODIUM CHLORIDE 1000 ML: 0.9 INJECTION, SOLUTION INTRAVENOUS at 04:01

## 2021-01-27 RX ADMIN — SODIUM CHLORIDE: 0.9 INJECTION, SOLUTION INTRAVENOUS at 05:01

## 2021-01-27 RX ADMIN — SODIUM CHLORIDE 500 ML: 0.9 INJECTION, SOLUTION INTRAVENOUS at 02:01

## 2021-01-27 RX ADMIN — HYDROCODONE BITARTRATE AND ACETAMINOPHEN 1 TABLET: 5; 325 TABLET ORAL at 08:01

## 2021-01-27 RX ADMIN — CLINDAMYCIN IN 5 PERCENT DEXTROSE 900 MG: 18 INJECTION, SOLUTION INTRAVENOUS at 04:01

## 2021-01-28 LAB
ALBUMIN SERPL BCP-MCNC: 2.8 G/DL (ref 3.5–5.2)
ALBUMIN SERPL BCP-MCNC: 2.8 G/DL (ref 3.5–5.2)
ALP SERPL-CCNC: 95 U/L (ref 55–135)
ALT SERPL W/O P-5'-P-CCNC: 18 U/L (ref 10–44)
ANION GAP SERPL CALC-SCNC: 18 MMOL/L (ref 8–16)
ANION GAP SERPL CALC-SCNC: 18 MMOL/L (ref 8–16)
AORTIC ROOT ANNULUS: 3.78 CM
AORTIC VALVE CUSP SEPERATION: 2.23 CM
APTT BLDCRRT: 33.5 SEC (ref 21–32)
AST SERPL-CCNC: 14 U/L (ref 10–40)
AV INDEX (PROSTH): 0.73
AV MEAN GRADIENT: 9 MMHG
AV PEAK GRADIENT: 15 MMHG
AV VALVE AREA: 2.52 CM2
AV VELOCITY RATIO: 0.8
BASOPHILS # BLD AUTO: 0.05 K/UL (ref 0–0.2)
BASOPHILS NFR BLD: 0.4 % (ref 0–1.9)
BILIRUB SERPL-MCNC: 0.5 MG/DL (ref 0.1–1)
BSA FOR ECHO PROCEDURE: 2.49 M2
BUN SERPL-MCNC: 162 MG/DL (ref 8–23)
BUN SERPL-MCNC: 162 MG/DL (ref 8–23)
C3 SERPL-MCNC: 113 MG/DL (ref 50–180)
C4 SERPL-MCNC: 37 MG/DL (ref 11–44)
CALCIUM SERPL-MCNC: 8.6 MG/DL (ref 8.7–10.5)
CALCIUM SERPL-MCNC: 8.6 MG/DL (ref 8.7–10.5)
CHLORIDE SERPL-SCNC: 99 MMOL/L (ref 95–110)
CHLORIDE SERPL-SCNC: 99 MMOL/L (ref 95–110)
CHOLEST SERPL-MCNC: 123 MG/DL (ref 120–199)
CHOLEST/HDLC SERPL: 5.3 {RATIO} (ref 2–5)
CK SERPL-CCNC: 74 U/L (ref 20–200)
CO2 SERPL-SCNC: 15 MMOL/L (ref 23–29)
CO2 SERPL-SCNC: 15 MMOL/L (ref 23–29)
CREAT SERPL-MCNC: 3.7 MG/DL (ref 0.5–1.4)
CREAT SERPL-MCNC: 3.7 MG/DL (ref 0.5–1.4)
CV ECHO LV RWT: 0.31 CM
DIFFERENTIAL METHOD: ABNORMAL
DOP CALC AO PEAK VEL: 1.92 M/S
DOP CALC AO VTI: 43.83 CM
DOP CALC LVOT AREA: 3.5 CM2
DOP CALC LVOT DIAMETER: 2.1 CM
DOP CALC LVOT PEAK VEL: 1.54 M/S
DOP CALC LVOT STROKE VOLUME: 110.54 CM3
DOP CALCLVOT PEAK VEL VTI: 31.93 CM
E WAVE DECELERATION TIME: 260.11 MSEC
E/A RATIO: 0.9
E/E' RATIO: 12.3 M/S
ECHO LV POSTERIOR WALL: 1.05 CM (ref 0.6–1.1)
EOSINOPHIL # BLD AUTO: 0.3 K/UL (ref 0–0.5)
EOSINOPHIL NFR BLD: 2 % (ref 0–8)
ERYTHROCYTE [DISTWIDTH] IN BLOOD BY AUTOMATED COUNT: 12.9 % (ref 11.5–14.5)
ERYTHROCYTE [SEDIMENTATION RATE] IN BLOOD BY WESTERGREN METHOD: 67 MM/HR (ref 0–10)
EST. GFR  (AFRICAN AMERICAN): 18 ML/MIN/1.73 M^2
EST. GFR  (AFRICAN AMERICAN): 18 ML/MIN/1.73 M^2
EST. GFR  (NON AFRICAN AMERICAN): 16 ML/MIN/1.73 M^2
EST. GFR  (NON AFRICAN AMERICAN): 16 ML/MIN/1.73 M^2
FRACTIONAL SHORTENING: 26 % (ref 28–44)
GLUCOSE SERPL-MCNC: 103 MG/DL (ref 70–110)
GLUCOSE SERPL-MCNC: 103 MG/DL (ref 70–110)
HCT VFR BLD AUTO: 36.9 % (ref 40–54)
HDLC SERPL-MCNC: 23 MG/DL (ref 40–75)
HDLC SERPL: 18.7 % (ref 20–50)
HGB BLD-MCNC: 12.6 G/DL (ref 14–18)
IMM GRANULOCYTES # BLD AUTO: 0.13 K/UL (ref 0–0.04)
IMM GRANULOCYTES NFR BLD AUTO: 1.1 % (ref 0–0.5)
INR PPP: 1.2 (ref 0.8–1.2)
INTERVENTRICULAR SEPTUM: 0.97 CM (ref 0.6–1.1)
IVRT: 102.76 MSEC
LDLC SERPL CALC-MCNC: 72.8 MG/DL (ref 63–159)
LEFT ATRIUM SIZE: 4.11 CM
LEFT INTERNAL DIMENSION IN SYSTOLE: 5.05 CM (ref 2.1–4)
LEFT VENTRICLE DIASTOLIC VOLUME INDEX: 99.21 ML/M2
LEFT VENTRICLE DIASTOLIC VOLUME: 240.1 ML
LEFT VENTRICLE MASS INDEX: 128 G/M2
LEFT VENTRICLE SYSTOLIC VOLUME INDEX: 49.9 ML/M2
LEFT VENTRICLE SYSTOLIC VOLUME: 120.73 ML
LEFT VENTRICULAR INTERNAL DIMENSION IN DIASTOLE: 6.81 CM (ref 3.5–6)
LEFT VENTRICULAR MASS: 310.64 G
LV LATERAL E/E' RATIO: 12.3 M/S
LV SEPTAL E/E' RATIO: 12.3 M/S
LYMPHOCYTES # BLD AUTO: 1 K/UL (ref 1–4.8)
LYMPHOCYTES NFR BLD: 8.2 % (ref 18–48)
MAGNESIUM SERPL-MCNC: 2.1 MG/DL (ref 1.6–2.6)
MCH RBC QN AUTO: 30.3 PG (ref 27–31)
MCHC RBC AUTO-ENTMCNC: 34.1 G/DL (ref 32–36)
MCV RBC AUTO: 89 FL (ref 82–98)
MONOCYTES # BLD AUTO: 1.4 K/UL (ref 0.3–1)
MONOCYTES NFR BLD: 11.2 % (ref 4–15)
MV PEAK A VEL: 1.36 M/S
MV PEAK E VEL: 1.23 M/S
NEUTROPHILS # BLD AUTO: 9.4 K/UL (ref 1.8–7.7)
NEUTROPHILS NFR BLD: 77.1 % (ref 38–73)
NONHDLC SERPL-MCNC: 100 MG/DL
NRBC BLD-RTO: 0 /100 WBC
PHOSPHATE SERPL-MCNC: 8.8 MG/DL (ref 2.7–4.5)
PHOSPHATE SERPL-MCNC: 8.8 MG/DL (ref 2.7–4.5)
PISA MRMAX VEL: 0.05 M/S
PLATELET # BLD AUTO: 324 K/UL (ref 150–350)
PMV BLD AUTO: 10.2 FL (ref 9.2–12.9)
POCT GLUCOSE: 168 MG/DL (ref 70–110)
POTASSIUM SERPL-SCNC: 4.7 MMOL/L (ref 3.5–5.1)
POTASSIUM SERPL-SCNC: 4.7 MMOL/L (ref 3.5–5.1)
PROT SERPL-MCNC: 6.2 G/DL (ref 6–8.4)
PROTHROMBIN TIME: 12.6 SEC (ref 9–12.5)
PV PEAK VELOCITY: 1.01 CM/S
RA PRESSURE: 3 MMHG
RBC # BLD AUTO: 4.16 M/UL (ref 4.6–6.2)
RIGHT VENTRICULAR END-DIASTOLIC DIMENSION: 3.28 CM
SODIUM SERPL-SCNC: 132 MMOL/L (ref 136–145)
SODIUM SERPL-SCNC: 132 MMOL/L (ref 136–145)
TDI LATERAL: 0.1 M/S
TDI SEPTAL: 0.1 M/S
TDI: 0.1 M/S
TRIGL SERPL-MCNC: 136 MG/DL (ref 30–150)
TSH SERPL DL<=0.005 MIU/L-ACNC: 1.56 UIU/ML (ref 0.4–4)
WBC # BLD AUTO: 12.21 K/UL (ref 3.9–12.7)

## 2021-01-28 PROCEDURE — 80069 RENAL FUNCTION PANEL: CPT

## 2021-01-28 PROCEDURE — 25000003 PHARM REV CODE 250: Performed by: NURSE PRACTITIONER

## 2021-01-28 PROCEDURE — 84165 PROTEIN E-PHORESIS SERUM: CPT

## 2021-01-28 PROCEDURE — 86160 COMPLEMENT ANTIGEN: CPT | Mod: 59

## 2021-01-28 PROCEDURE — 85651 RBC SED RATE NONAUTOMATED: CPT

## 2021-01-28 PROCEDURE — 84165 PATHOLOGIST INTERPRETATION SPE: ICD-10-PCS | Mod: 26,,, | Performed by: PATHOLOGY

## 2021-01-28 PROCEDURE — 85730 THROMBOPLASTIN TIME PARTIAL: CPT

## 2021-01-28 PROCEDURE — 25000003 PHARM REV CODE 250: Performed by: INTERNAL MEDICINE

## 2021-01-28 PROCEDURE — 36415 COLL VENOUS BLD VENIPUNCTURE: CPT

## 2021-01-28 PROCEDURE — 12000002 HC ACUTE/MED SURGE SEMI-PRIVATE ROOM

## 2021-01-28 PROCEDURE — 80053 COMPREHEN METABOLIC PANEL: CPT

## 2021-01-28 PROCEDURE — 86160 COMPLEMENT ANTIGEN: CPT

## 2021-01-28 PROCEDURE — 83735 ASSAY OF MAGNESIUM: CPT

## 2021-01-28 PROCEDURE — 80061 LIPID PANEL: CPT

## 2021-01-28 PROCEDURE — 85610 PROTHROMBIN TIME: CPT

## 2021-01-28 PROCEDURE — 84165 PROTEIN E-PHORESIS SERUM: CPT | Mod: 26,,, | Performed by: PATHOLOGY

## 2021-01-28 PROCEDURE — 82550 ASSAY OF CK (CPK): CPT

## 2021-01-28 PROCEDURE — 84443 ASSAY THYROID STIM HORMONE: CPT

## 2021-01-28 PROCEDURE — 85025 COMPLETE CBC W/AUTO DIFF WBC: CPT

## 2021-01-28 RX ORDER — SODIUM CHLORIDE 9 MG/ML
INJECTION, SOLUTION INTRAVENOUS CONTINUOUS
Status: DISCONTINUED | OUTPATIENT
Start: 2021-01-28 | End: 2021-02-02

## 2021-01-28 RX ORDER — SODIUM BICARBONATE 650 MG/1
650 TABLET ORAL 2 TIMES DAILY
Status: DISCONTINUED | OUTPATIENT
Start: 2021-01-28 | End: 2021-01-29

## 2021-01-28 RX ORDER — TALC
6 POWDER (GRAM) TOPICAL NIGHTLY PRN
Status: DISCONTINUED | OUTPATIENT
Start: 2021-01-28 | End: 2021-02-03 | Stop reason: HOSPADM

## 2021-01-28 RX ADMIN — MELATONIN TAB 3 MG 6 MG: 3 TAB at 09:01

## 2021-01-28 RX ADMIN — SODIUM CHLORIDE: 0.9 INJECTION, SOLUTION INTRAVENOUS at 11:01

## 2021-01-28 RX ADMIN — MUPIROCIN: 20 OINTMENT TOPICAL at 09:01

## 2021-01-28 RX ADMIN — PANTOPRAZOLE SODIUM 40 MG: 40 TABLET, DELAYED RELEASE ORAL at 09:01

## 2021-01-28 RX ADMIN — COLLAGENASE SANTYL: 250 OINTMENT TOPICAL at 11:01

## 2021-01-28 RX ADMIN — DEXTROSE 450 MG: 50 INJECTION, SOLUTION INTRAVENOUS at 09:01

## 2021-01-28 RX ADMIN — SODIUM CHLORIDE: 0.9 INJECTION, SOLUTION INTRAVENOUS at 12:01

## 2021-01-28 RX ADMIN — MELATONIN TAB 3 MG 6 MG: 3 TAB at 02:01

## 2021-01-28 RX ADMIN — DEXTROSE 450 MG: 50 INJECTION, SOLUTION INTRAVENOUS at 12:01

## 2021-01-28 RX ADMIN — SODIUM CHLORIDE: 0.9 INJECTION, SOLUTION INTRAVENOUS at 10:01

## 2021-01-28 RX ADMIN — RIVAROXABAN 15 MG: 15 TABLET, FILM COATED ORAL at 05:01

## 2021-01-28 RX ADMIN — SODIUM BICARBONATE 650 MG TABLET 650 MG: at 09:01

## 2021-01-28 RX ADMIN — SODIUM BICARBONATE 650 MG TABLET 650 MG: at 10:01

## 2021-01-28 RX ADMIN — HYDROCODONE BITARTRATE AND ACETAMINOPHEN 1 TABLET: 5; 325 TABLET ORAL at 02:01

## 2021-01-28 RX ADMIN — DEXTROSE 450 MG: 50 INJECTION, SOLUTION INTRAVENOUS at 05:01

## 2021-01-28 RX ADMIN — MUPIROCIN: 20 OINTMENT TOPICAL at 12:01

## 2021-01-29 LAB
ALBUMIN SERPL BCP-MCNC: 2.8 G/DL (ref 3.5–5.2)
ALP SERPL-CCNC: 93 U/L (ref 55–135)
ALT SERPL W/O P-5'-P-CCNC: 18 U/L (ref 10–44)
ANION GAP SERPL CALC-SCNC: 9 MMOL/L (ref 8–16)
AST SERPL-CCNC: 14 U/L (ref 10–40)
BASOPHILS # BLD AUTO: 0.03 K/UL (ref 0–0.2)
BASOPHILS NFR BLD: 0.2 % (ref 0–1.9)
BILIRUB SERPL-MCNC: 0.6 MG/DL (ref 0.1–1)
BUN SERPL-MCNC: 110 MG/DL (ref 8–23)
CALCIUM SERPL-MCNC: 8.6 MG/DL (ref 8.7–10.5)
CHLORIDE SERPL-SCNC: 109 MMOL/L (ref 95–110)
CO2 SERPL-SCNC: 20 MMOL/L (ref 23–29)
CREAT SERPL-MCNC: 1.7 MG/DL (ref 0.5–1.4)
DIFFERENTIAL METHOD: ABNORMAL
EOSINOPHIL # BLD AUTO: 0.2 K/UL (ref 0–0.5)
EOSINOPHIL NFR BLD: 1.4 % (ref 0–8)
ERYTHROCYTE [DISTWIDTH] IN BLOOD BY AUTOMATED COUNT: 13 % (ref 11.5–14.5)
EST. GFR  (AFRICAN AMERICAN): 47 ML/MIN/1.73 M^2
EST. GFR  (NON AFRICAN AMERICAN): 41 ML/MIN/1.73 M^2
GLUCOSE SERPL-MCNC: 158 MG/DL (ref 70–110)
HCT VFR BLD AUTO: 32.5 % (ref 40–54)
HGB BLD-MCNC: 11.1 G/DL (ref 14–18)
IMM GRANULOCYTES # BLD AUTO: 0.1 K/UL (ref 0–0.04)
IMM GRANULOCYTES NFR BLD AUTO: 0.8 % (ref 0–0.5)
LYMPHOCYTES # BLD AUTO: 0.7 K/UL (ref 1–4.8)
LYMPHOCYTES NFR BLD: 5.3 % (ref 18–48)
MAGNESIUM SERPL-MCNC: 1.7 MG/DL (ref 1.6–2.6)
MCH RBC QN AUTO: 30.4 PG (ref 27–31)
MCHC RBC AUTO-ENTMCNC: 34.2 G/DL (ref 32–36)
MCV RBC AUTO: 89 FL (ref 82–98)
MONOCYTES # BLD AUTO: 1.3 K/UL (ref 0.3–1)
MONOCYTES NFR BLD: 10 % (ref 4–15)
NEUTROPHILS # BLD AUTO: 10.6 K/UL (ref 1.8–7.7)
NEUTROPHILS NFR BLD: 82.3 % (ref 38–73)
NRBC BLD-RTO: 0 /100 WBC
PHOSPHATE SERPL-MCNC: 4 MG/DL (ref 2.7–4.5)
PLATELET # BLD AUTO: 294 K/UL (ref 150–350)
PMV BLD AUTO: 10.2 FL (ref 9.2–12.9)
POCT GLUCOSE: 183 MG/DL (ref 70–110)
POTASSIUM SERPL-SCNC: 4.2 MMOL/L (ref 3.5–5.1)
PROT SERPL-MCNC: 6.5 G/DL (ref 6–8.4)
RBC # BLD AUTO: 3.65 M/UL (ref 4.6–6.2)
SODIUM SERPL-SCNC: 138 MMOL/L (ref 136–145)
WBC # BLD AUTO: 12.87 K/UL (ref 3.9–12.7)

## 2021-01-29 PROCEDURE — 25000003 PHARM REV CODE 250: Performed by: NURSE PRACTITIONER

## 2021-01-29 PROCEDURE — 25000003 PHARM REV CODE 250: Performed by: INTERNAL MEDICINE

## 2021-01-29 PROCEDURE — 97116 GAIT TRAINING THERAPY: CPT

## 2021-01-29 PROCEDURE — 80053 COMPREHEN METABOLIC PANEL: CPT

## 2021-01-29 PROCEDURE — 97162 PT EVAL MOD COMPLEX 30 MIN: CPT

## 2021-01-29 PROCEDURE — 84100 ASSAY OF PHOSPHORUS: CPT

## 2021-01-29 PROCEDURE — 36415 COLL VENOUS BLD VENIPUNCTURE: CPT

## 2021-01-29 PROCEDURE — 99222 1ST HOSP IP/OBS MODERATE 55: CPT | Mod: ,,, | Performed by: FAMILY MEDICINE

## 2021-01-29 PROCEDURE — 85025 COMPLETE CBC W/AUTO DIFF WBC: CPT

## 2021-01-29 PROCEDURE — 83735 ASSAY OF MAGNESIUM: CPT

## 2021-01-29 PROCEDURE — 97530 THERAPEUTIC ACTIVITIES: CPT

## 2021-01-29 PROCEDURE — 12000002 HC ACUTE/MED SURGE SEMI-PRIVATE ROOM

## 2021-01-29 PROCEDURE — 99222 PR INITIAL HOSPITAL CARE,LEVL II: ICD-10-PCS | Mod: ,,, | Performed by: FAMILY MEDICINE

## 2021-01-29 RX ORDER — SODIUM BICARBONATE 650 MG/1
1300 TABLET ORAL 2 TIMES DAILY
Status: DISCONTINUED | OUTPATIENT
Start: 2021-01-29 | End: 2021-02-03 | Stop reason: HOSPADM

## 2021-01-29 RX ORDER — LIDOCAINE HYDROCHLORIDE 20 MG/ML
JELLY TOPICAL
Status: DISCONTINUED | OUTPATIENT
Start: 2021-01-29 | End: 2021-02-03 | Stop reason: HOSPADM

## 2021-01-29 RX ADMIN — SODIUM BICARBONATE 650 MG TABLET 1300 MG: at 08:01

## 2021-01-29 RX ADMIN — RIVAROXABAN 15 MG: 15 TABLET, FILM COATED ORAL at 04:01

## 2021-01-29 RX ADMIN — HYDROCODONE BITARTRATE AND ACETAMINOPHEN 1 TABLET: 5; 325 TABLET ORAL at 03:01

## 2021-01-29 RX ADMIN — DEXTROSE 450 MG: 50 INJECTION, SOLUTION INTRAVENOUS at 02:01

## 2021-01-29 RX ADMIN — SODIUM CHLORIDE: 0.9 INJECTION, SOLUTION INTRAVENOUS at 06:01

## 2021-01-29 RX ADMIN — SODIUM CHLORIDE: 0.9 INJECTION, SOLUTION INTRAVENOUS at 11:01

## 2021-01-29 RX ADMIN — DEXTROSE 450 MG: 50 INJECTION, SOLUTION INTRAVENOUS at 04:01

## 2021-01-29 RX ADMIN — DEXTROSE 450 MG: 50 INJECTION, SOLUTION INTRAVENOUS at 09:01

## 2021-01-29 RX ADMIN — HYDROCODONE BITARTRATE AND ACETAMINOPHEN 1 TABLET: 5; 325 TABLET ORAL at 10:01

## 2021-01-29 RX ADMIN — PANTOPRAZOLE SODIUM 40 MG: 40 TABLET, DELAYED RELEASE ORAL at 09:01

## 2021-01-29 RX ADMIN — SODIUM BICARBONATE 650 MG TABLET 1300 MG: at 09:01

## 2021-01-29 RX ADMIN — MUPIROCIN: 20 OINTMENT TOPICAL at 09:01

## 2021-01-29 RX ADMIN — MELATONIN TAB 3 MG 6 MG: 3 TAB at 08:01

## 2021-01-29 RX ADMIN — COLLAGENASE SANTYL: 250 OINTMENT TOPICAL at 02:01

## 2021-01-29 RX ADMIN — MUPIROCIN: 20 OINTMENT TOPICAL at 08:01

## 2021-01-30 LAB
ALBUMIN SERPL BCP-MCNC: 2.4 G/DL (ref 3.5–5.2)
ALP SERPL-CCNC: 80 U/L (ref 55–135)
ALT SERPL W/O P-5'-P-CCNC: 17 U/L (ref 10–44)
ANION GAP SERPL CALC-SCNC: 10 MMOL/L (ref 8–16)
AST SERPL-CCNC: 17 U/L (ref 10–40)
BASOPHILS # BLD AUTO: 0.08 K/UL (ref 0–0.2)
BASOPHILS NFR BLD: 0.6 % (ref 0–1.9)
BILIRUB SERPL-MCNC: 0.7 MG/DL (ref 0.1–1)
BUN SERPL-MCNC: 69 MG/DL (ref 8–23)
CALCIUM SERPL-MCNC: 8.5 MG/DL (ref 8.7–10.5)
CHLORIDE SERPL-SCNC: 111 MMOL/L (ref 95–110)
CO2 SERPL-SCNC: 20 MMOL/L (ref 23–29)
CREAT SERPL-MCNC: 1.4 MG/DL (ref 0.5–1.4)
DIFFERENTIAL METHOD: ABNORMAL
EOSINOPHIL # BLD AUTO: 0.2 K/UL (ref 0–0.5)
EOSINOPHIL NFR BLD: 1.6 % (ref 0–8)
ERYTHROCYTE [DISTWIDTH] IN BLOOD BY AUTOMATED COUNT: 13.5 % (ref 11.5–14.5)
EST. GFR  (AFRICAN AMERICAN): 59 ML/MIN/1.73 M^2
EST. GFR  (NON AFRICAN AMERICAN): 51 ML/MIN/1.73 M^2
GLUCOSE SERPL-MCNC: 110 MG/DL (ref 70–110)
HCT VFR BLD AUTO: 37.1 % (ref 40–54)
HGB BLD-MCNC: 11.8 G/DL (ref 14–18)
IMM GRANULOCYTES # BLD AUTO: 0.08 K/UL (ref 0–0.04)
IMM GRANULOCYTES NFR BLD AUTO: 0.6 % (ref 0–0.5)
LYMPHOCYTES # BLD AUTO: 1.3 K/UL (ref 1–4.8)
LYMPHOCYTES NFR BLD: 9.5 % (ref 18–48)
MAGNESIUM SERPL-MCNC: 1.4 MG/DL (ref 1.6–2.6)
MCH RBC QN AUTO: 30.3 PG (ref 27–31)
MCHC RBC AUTO-ENTMCNC: 31.8 G/DL (ref 32–36)
MCV RBC AUTO: 95 FL (ref 82–98)
MONOCYTES # BLD AUTO: 1.9 K/UL (ref 0.3–1)
MONOCYTES NFR BLD: 14.5 % (ref 4–15)
NEUTROPHILS # BLD AUTO: 9.6 K/UL (ref 1.8–7.7)
NEUTROPHILS NFR BLD: 73.2 % (ref 38–73)
NRBC BLD-RTO: 0 /100 WBC
PHOSPHATE SERPL-MCNC: 3.4 MG/DL (ref 2.7–4.5)
PHOSPHATE SERPL-MCNC: 3.4 MG/DL (ref 2.7–4.5)
PLATELET # BLD AUTO: 264 K/UL (ref 150–350)
PMV BLD AUTO: 10.8 FL (ref 9.2–12.9)
POCT GLUCOSE: 144 MG/DL (ref 70–110)
POCT GLUCOSE: 166 MG/DL (ref 70–110)
POTASSIUM SERPL-SCNC: 4.9 MMOL/L (ref 3.5–5.1)
PROT SERPL-MCNC: 6.4 G/DL (ref 6–8.4)
RBC # BLD AUTO: 3.9 M/UL (ref 4.6–6.2)
SODIUM SERPL-SCNC: 141 MMOL/L (ref 136–145)
WBC # BLD AUTO: 13.11 K/UL (ref 3.9–12.7)

## 2021-01-30 PROCEDURE — 25000003 PHARM REV CODE 250: Performed by: HOSPITALIST

## 2021-01-30 PROCEDURE — 97535 SELF CARE MNGMENT TRAINING: CPT

## 2021-01-30 PROCEDURE — 25000003 PHARM REV CODE 250: Performed by: INTERNAL MEDICINE

## 2021-01-30 PROCEDURE — 25000003 PHARM REV CODE 250: Performed by: NURSE PRACTITIONER

## 2021-01-30 PROCEDURE — 83735 ASSAY OF MAGNESIUM: CPT

## 2021-01-30 PROCEDURE — 84100 ASSAY OF PHOSPHORUS: CPT

## 2021-01-30 PROCEDURE — 97110 THERAPEUTIC EXERCISES: CPT

## 2021-01-30 PROCEDURE — 97165 OT EVAL LOW COMPLEX 30 MIN: CPT

## 2021-01-30 PROCEDURE — 97116 GAIT TRAINING THERAPY: CPT | Mod: CQ

## 2021-01-30 PROCEDURE — 97530 THERAPEUTIC ACTIVITIES: CPT | Mod: CQ

## 2021-01-30 PROCEDURE — 80053 COMPREHEN METABOLIC PANEL: CPT

## 2021-01-30 PROCEDURE — 85025 COMPLETE CBC W/AUTO DIFF WBC: CPT

## 2021-01-30 PROCEDURE — 36415 COLL VENOUS BLD VENIPUNCTURE: CPT

## 2021-01-30 PROCEDURE — 12000002 HC ACUTE/MED SURGE SEMI-PRIVATE ROOM

## 2021-01-30 PROCEDURE — 99232 SBSQ HOSP IP/OBS MODERATE 35: CPT | Mod: ,,, | Performed by: HOSPITALIST

## 2021-01-30 PROCEDURE — 99232 PR SUBSEQUENT HOSPITAL CARE,LEVL II: ICD-10-PCS | Mod: ,,, | Performed by: HOSPITALIST

## 2021-01-30 RX ORDER — L. ACIDOPHILUS/L.BULGARICUS 100MM CELL
1 GRANULES IN PACKET (EA) ORAL 2 TIMES DAILY
Status: DISCONTINUED | OUTPATIENT
Start: 2021-01-30 | End: 2021-02-03 | Stop reason: HOSPADM

## 2021-01-30 RX ORDER — TAMSULOSIN HYDROCHLORIDE 0.4 MG/1
1 CAPSULE ORAL NIGHTLY
Status: DISCONTINUED | OUTPATIENT
Start: 2021-01-30 | End: 2021-02-03 | Stop reason: HOSPADM

## 2021-01-30 RX ADMIN — SODIUM BICARBONATE 650 MG TABLET 1300 MG: at 09:01

## 2021-01-30 RX ADMIN — PANTOPRAZOLE SODIUM 40 MG: 40 TABLET, DELAYED RELEASE ORAL at 09:01

## 2021-01-30 RX ADMIN — DEXTROSE 450 MG: 50 INJECTION, SOLUTION INTRAVENOUS at 01:01

## 2021-01-30 RX ADMIN — CLINDAMYCIN HYDROCHLORIDE 450 MG: 300 CAPSULE ORAL at 09:01

## 2021-01-30 RX ADMIN — MUPIROCIN: 20 OINTMENT TOPICAL at 09:01

## 2021-01-30 RX ADMIN — DEXTROSE 450 MG: 50 INJECTION, SOLUTION INTRAVENOUS at 09:01

## 2021-01-30 RX ADMIN — SODIUM CHLORIDE: 0.9 INJECTION, SOLUTION INTRAVENOUS at 06:01

## 2021-01-30 RX ADMIN — COLLAGENASE SANTYL: 250 OINTMENT TOPICAL at 02:01

## 2021-01-30 RX ADMIN — HYDROCODONE BITARTRATE AND ACETAMINOPHEN 1 TABLET: 5; 325 TABLET ORAL at 01:01

## 2021-01-30 RX ADMIN — TAMSULOSIN HYDROCHLORIDE 0.4 MG: 0.4 CAPSULE ORAL at 08:01

## 2021-01-30 RX ADMIN — HYDROCODONE BITARTRATE AND ACETAMINOPHEN 1 TABLET: 5; 325 TABLET ORAL at 08:01

## 2021-01-30 RX ADMIN — MUPIROCIN: 20 OINTMENT TOPICAL at 08:01

## 2021-01-30 RX ADMIN — CLINDAMYCIN HYDROCHLORIDE 450 MG: 300 CAPSULE ORAL at 02:01

## 2021-01-30 RX ADMIN — RIVAROXABAN 15 MG: 15 TABLET, FILM COATED ORAL at 05:01

## 2021-01-30 RX ADMIN — ACETAMINOPHEN 650 MG: 325 TABLET ORAL at 09:01

## 2021-01-30 RX ADMIN — SODIUM BICARBONATE 650 MG TABLET 1300 MG: at 08:01

## 2021-01-30 RX ADMIN — LACTOBACILLUS ACIDOPHILUS / LACTOBACILLUS BULGARICUS 1 EACH: 100 MILLION CFU STRENGTH GRANULES at 08:01

## 2021-01-31 LAB
ALBUMIN SERPL BCP-MCNC: 2.3 G/DL (ref 3.5–5.2)
ALP SERPL-CCNC: 81 U/L (ref 55–135)
ALT SERPL W/O P-5'-P-CCNC: 19 U/L (ref 10–44)
ANION GAP SERPL CALC-SCNC: 10 MMOL/L (ref 8–16)
AST SERPL-CCNC: 16 U/L (ref 10–40)
BASOPHILS # BLD AUTO: 0.08 K/UL (ref 0–0.2)
BASOPHILS NFR BLD: 0.6 % (ref 0–1.9)
BILIRUB SERPL-MCNC: 0.7 MG/DL (ref 0.1–1)
BUN SERPL-MCNC: 42 MG/DL (ref 8–23)
CALCIUM SERPL-MCNC: 8.9 MG/DL (ref 8.7–10.5)
CHLORIDE SERPL-SCNC: 107 MMOL/L (ref 95–110)
CO2 SERPL-SCNC: 24 MMOL/L (ref 23–29)
CREAT SERPL-MCNC: 1.2 MG/DL (ref 0.5–1.4)
CRP SERPL-MCNC: 205.2 MG/L (ref 0–8.2)
DIFFERENTIAL METHOD: ABNORMAL
EOSINOPHIL # BLD AUTO: 0.3 K/UL (ref 0–0.5)
EOSINOPHIL NFR BLD: 2.2 % (ref 0–8)
ERYTHROCYTE [DISTWIDTH] IN BLOOD BY AUTOMATED COUNT: 13.2 % (ref 11.5–14.5)
ERYTHROCYTE [SEDIMENTATION RATE] IN BLOOD BY WESTERGREN METHOD: 84 MM/HR (ref 0–10)
EST. GFR  (AFRICAN AMERICAN): >60 ML/MIN/1.73 M^2
EST. GFR  (NON AFRICAN AMERICAN): >60 ML/MIN/1.73 M^2
GLUCOSE SERPL-MCNC: 112 MG/DL (ref 70–110)
HCT VFR BLD AUTO: 36 % (ref 40–54)
HGB BLD-MCNC: 11.8 G/DL (ref 14–18)
IMM GRANULOCYTES # BLD AUTO: 0.09 K/UL (ref 0–0.04)
IMM GRANULOCYTES NFR BLD AUTO: 0.7 % (ref 0–0.5)
LYMPHOCYTES # BLD AUTO: 1 K/UL (ref 1–4.8)
LYMPHOCYTES NFR BLD: 7.8 % (ref 18–48)
MAGNESIUM SERPL-MCNC: 1.3 MG/DL (ref 1.6–2.6)
MCH RBC QN AUTO: 29.6 PG (ref 27–31)
MCHC RBC AUTO-ENTMCNC: 32.8 G/DL (ref 32–36)
MCV RBC AUTO: 91 FL (ref 82–98)
MONOCYTES # BLD AUTO: 1.6 K/UL (ref 0.3–1)
MONOCYTES NFR BLD: 12.1 % (ref 4–15)
NEUTROPHILS # BLD AUTO: 9.8 K/UL (ref 1.8–7.7)
NEUTROPHILS NFR BLD: 76.6 % (ref 38–73)
NRBC BLD-RTO: 0 /100 WBC
PHOSPHATE SERPL-MCNC: 2.5 MG/DL (ref 2.7–4.5)
PLATELET # BLD AUTO: 298 K/UL (ref 150–350)
PMV BLD AUTO: 10.3 FL (ref 9.2–12.9)
POCT GLUCOSE: 118 MG/DL (ref 70–110)
POTASSIUM SERPL-SCNC: 4 MMOL/L (ref 3.5–5.1)
PROCALCITONIN SERPL IA-MCNC: 0.17 NG/ML
PROT SERPL-MCNC: 6.1 G/DL (ref 6–8.4)
RBC # BLD AUTO: 3.98 M/UL (ref 4.6–6.2)
SODIUM SERPL-SCNC: 141 MMOL/L (ref 136–145)
WBC # BLD AUTO: 12.79 K/UL (ref 3.9–12.7)

## 2021-01-31 PROCEDURE — 84100 ASSAY OF PHOSPHORUS: CPT

## 2021-01-31 PROCEDURE — 97116 GAIT TRAINING THERAPY: CPT | Mod: CQ

## 2021-01-31 PROCEDURE — 63600175 PHARM REV CODE 636 W HCPCS: Performed by: INTERNAL MEDICINE

## 2021-01-31 PROCEDURE — 25000003 PHARM REV CODE 250: Performed by: NURSE PRACTITIONER

## 2021-01-31 PROCEDURE — 25000003 PHARM REV CODE 250: Performed by: HOSPITALIST

## 2021-01-31 PROCEDURE — 97530 THERAPEUTIC ACTIVITIES: CPT | Mod: CQ

## 2021-01-31 PROCEDURE — 12000002 HC ACUTE/MED SURGE SEMI-PRIVATE ROOM

## 2021-01-31 PROCEDURE — 99233 PR SUBSEQUENT HOSPITAL CARE,LEVL III: ICD-10-PCS | Mod: ,,, | Performed by: HOSPITALIST

## 2021-01-31 PROCEDURE — 25000003 PHARM REV CODE 250: Performed by: INTERNAL MEDICINE

## 2021-01-31 PROCEDURE — 85025 COMPLETE CBC W/AUTO DIFF WBC: CPT

## 2021-01-31 PROCEDURE — 99233 SBSQ HOSP IP/OBS HIGH 50: CPT | Mod: ,,, | Performed by: HOSPITALIST

## 2021-01-31 PROCEDURE — 86140 C-REACTIVE PROTEIN: CPT

## 2021-01-31 PROCEDURE — 83735 ASSAY OF MAGNESIUM: CPT

## 2021-01-31 PROCEDURE — 85651 RBC SED RATE NONAUTOMATED: CPT

## 2021-01-31 PROCEDURE — 84145 PROCALCITONIN (PCT): CPT

## 2021-01-31 PROCEDURE — 80053 COMPREHEN METABOLIC PANEL: CPT

## 2021-01-31 PROCEDURE — 36415 COLL VENOUS BLD VENIPUNCTURE: CPT

## 2021-01-31 RX ORDER — LINEZOLID 2 MG/ML
600 INJECTION, SOLUTION INTRAVENOUS
Status: DISCONTINUED | OUTPATIENT
Start: 2021-01-31 | End: 2021-02-03 | Stop reason: HOSPADM

## 2021-01-31 RX ORDER — HYOSCYAMINE SULFATE 0.12 MG/1
0.12 TABLET SUBLINGUAL EVERY 4 HOURS PRN
Status: DISCONTINUED | OUTPATIENT
Start: 2021-01-31 | End: 2021-02-03 | Stop reason: HOSPADM

## 2021-01-31 RX ADMIN — LINEZOLID 600 MG: 600 INJECTION, SOLUTION INTRAVENOUS at 11:01

## 2021-01-31 RX ADMIN — SODIUM BICARBONATE 650 MG TABLET 1300 MG: at 08:01

## 2021-01-31 RX ADMIN — SODIUM CHLORIDE: 0.9 INJECTION, SOLUTION INTRAVENOUS at 08:01

## 2021-01-31 RX ADMIN — HYDROCODONE BITARTRATE AND ACETAMINOPHEN 1 TABLET: 5; 325 TABLET ORAL at 08:01

## 2021-01-31 RX ADMIN — PANTOPRAZOLE SODIUM 40 MG: 40 TABLET, DELAYED RELEASE ORAL at 08:01

## 2021-01-31 RX ADMIN — PIPERACILLIN AND TAZOBACTAM 4.5 G: 4; .5 INJECTION, POWDER, LYOPHILIZED, FOR SOLUTION INTRAVENOUS; PARENTERAL at 08:01

## 2021-01-31 RX ADMIN — RIVAROXABAN 15 MG: 15 TABLET, FILM COATED ORAL at 04:01

## 2021-01-31 RX ADMIN — LACTOBACILLUS ACIDOPHILUS / LACTOBACILLUS BULGARICUS 1 EACH: 100 MILLION CFU STRENGTH GRANULES at 08:01

## 2021-01-31 RX ADMIN — MUPIROCIN: 20 OINTMENT TOPICAL at 06:01

## 2021-01-31 RX ADMIN — TAMSULOSIN HYDROCHLORIDE 0.4 MG: 0.4 CAPSULE ORAL at 08:01

## 2021-01-31 RX ADMIN — MUPIROCIN: 20 OINTMENT TOPICAL at 08:01

## 2021-01-31 RX ADMIN — CLINDAMYCIN HYDROCHLORIDE 450 MG: 300 CAPSULE ORAL at 03:01

## 2021-01-31 RX ADMIN — CLINDAMYCIN HYDROCHLORIDE 450 MG: 300 CAPSULE ORAL at 05:01

## 2021-01-31 RX ADMIN — HYOSCYAMINE SULFATE 0.12 MG: 0.12 TABLET ORAL; SUBLINGUAL at 04:01

## 2021-02-01 ENCOUNTER — ANESTHESIA EVENT (OUTPATIENT)
Dept: SURGERY | Facility: HOSPITAL | Age: 71
DRG: 674 | End: 2021-02-01
Payer: MEDICARE

## 2021-02-01 ENCOUNTER — ANESTHESIA (OUTPATIENT)
Dept: SURGERY | Facility: HOSPITAL | Age: 71
DRG: 674 | End: 2021-02-01
Payer: MEDICARE

## 2021-02-01 ENCOUNTER — OUTSIDE PLACE OF SERVICE (OUTPATIENT)
Dept: INFECTIOUS DISEASES | Facility: CLINIC | Age: 71
End: 2021-02-01

## 2021-02-01 LAB
ALBUMIN SERPL BCP-MCNC: 2.1 G/DL (ref 3.5–5.2)
ALBUMIN SERPL ELPH-MCNC: 2.91 G/DL (ref 3.35–5.55)
ALP SERPL-CCNC: 83 U/L (ref 55–135)
ALPHA1 GLOB SERPL ELPH-MCNC: 0.37 G/DL (ref 0.17–0.41)
ALPHA2 GLOB SERPL ELPH-MCNC: 0.89 G/DL (ref 0.43–0.99)
ALT SERPL W/O P-5'-P-CCNC: 22 U/L (ref 10–44)
ANION GAP SERPL CALC-SCNC: 9 MMOL/L (ref 8–16)
AST SERPL-CCNC: 19 U/L (ref 10–40)
B-GLOBULIN SERPL ELPH-MCNC: 0.67 G/DL (ref 0.5–1.1)
BASOPHILS # BLD AUTO: 0.06 K/UL (ref 0–0.2)
BASOPHILS NFR BLD: 0.5 % (ref 0–1.9)
BILIRUB SERPL-MCNC: 0.7 MG/DL (ref 0.1–1)
BUN SERPL-MCNC: 30 MG/DL (ref 8–23)
CALCIUM SERPL-MCNC: 8.6 MG/DL (ref 8.7–10.5)
CHLORIDE SERPL-SCNC: 108 MMOL/L (ref 95–110)
CO2 SERPL-SCNC: 24 MMOL/L (ref 23–29)
CREAT SERPL-MCNC: 1.1 MG/DL (ref 0.5–1.4)
DIFFERENTIAL METHOD: ABNORMAL
EOSINOPHIL # BLD AUTO: 0.5 K/UL (ref 0–0.5)
EOSINOPHIL NFR BLD: 4.2 % (ref 0–8)
ERYTHROCYTE [DISTWIDTH] IN BLOOD BY AUTOMATED COUNT: 13.2 % (ref 11.5–14.5)
EST. GFR  (AFRICAN AMERICAN): >60 ML/MIN/1.73 M^2
EST. GFR  (NON AFRICAN AMERICAN): >60 ML/MIN/1.73 M^2
GAMMA GLOB SERPL ELPH-MCNC: 0.97 G/DL (ref 0.67–1.58)
GLUCOSE SERPL-MCNC: 136 MG/DL (ref 70–110)
HCT VFR BLD AUTO: 35.4 % (ref 40–54)
HGB BLD-MCNC: 11.4 G/DL (ref 14–18)
IMM GRANULOCYTES # BLD AUTO: 0.08 K/UL (ref 0–0.04)
IMM GRANULOCYTES NFR BLD AUTO: 0.7 % (ref 0–0.5)
LYMPHOCYTES # BLD AUTO: 1.2 K/UL (ref 1–4.8)
LYMPHOCYTES NFR BLD: 10 % (ref 18–48)
MAGNESIUM SERPL-MCNC: 1.3 MG/DL (ref 1.6–2.6)
MCH RBC QN AUTO: 29.8 PG (ref 27–31)
MCHC RBC AUTO-ENTMCNC: 32.2 G/DL (ref 32–36)
MCV RBC AUTO: 92 FL (ref 82–98)
MONOCYTES # BLD AUTO: 1.2 K/UL (ref 0.3–1)
MONOCYTES NFR BLD: 10 % (ref 4–15)
NEUTROPHILS # BLD AUTO: 8.8 K/UL (ref 1.8–7.7)
NEUTROPHILS NFR BLD: 74.6 % (ref 38–73)
NRBC BLD-RTO: 0 /100 WBC
PATHOLOGIST INTERPRETATION SPE: NORMAL
PHOSPHATE SERPL-MCNC: 2.8 MG/DL (ref 2.7–4.5)
PHOSPHATE SERPL-MCNC: 2.8 MG/DL (ref 2.7–4.5)
PLATELET # BLD AUTO: 286 K/UL (ref 150–350)
PMV BLD AUTO: 10 FL (ref 9.2–12.9)
POTASSIUM SERPL-SCNC: 3.8 MMOL/L (ref 3.5–5.1)
PROT SERPL-MCNC: 5.8 G/DL (ref 6–8.4)
PROT SERPL-MCNC: 5.9 G/DL (ref 6–8.4)
RBC # BLD AUTO: 3.83 M/UL (ref 4.6–6.2)
SODIUM SERPL-SCNC: 141 MMOL/L (ref 136–145)
WBC # BLD AUTO: 11.79 K/UL (ref 3.9–12.7)

## 2021-02-01 PROCEDURE — 87040 BLOOD CULTURE FOR BACTERIA: CPT

## 2021-02-01 PROCEDURE — 87186 SC STD MICRODIL/AGAR DIL: CPT | Mod: 59

## 2021-02-01 PROCEDURE — 25000003 PHARM REV CODE 250: Performed by: REGISTERED NURSE

## 2021-02-01 PROCEDURE — D9220A PRA ANESTHESIA: Mod: CRNA,,, | Performed by: REGISTERED NURSE

## 2021-02-01 PROCEDURE — 88312 SPECIAL STAINS GROUP 1: CPT | Performed by: DERMATOLOGY

## 2021-02-01 PROCEDURE — 37000008 HC ANESTHESIA 1ST 15 MINUTES: Performed by: SURGERY

## 2021-02-01 PROCEDURE — 25000003 PHARM REV CODE 250: Performed by: ANESTHESIOLOGY

## 2021-02-01 PROCEDURE — 83735 ASSAY OF MAGNESIUM: CPT

## 2021-02-01 PROCEDURE — 88312 PR  SPECIAL STAINS,GROUP I: ICD-10-PCS | Mod: 26,,, | Performed by: DERMATOLOGY

## 2021-02-01 PROCEDURE — 63600175 PHARM REV CODE 636 W HCPCS: Performed by: NURSE PRACTITIONER

## 2021-02-01 PROCEDURE — 87070 CULTURE OTHR SPECIMN AEROBIC: CPT

## 2021-02-01 PROCEDURE — 80053 COMPREHEN METABOLIC PANEL: CPT

## 2021-02-01 PROCEDURE — 97530 THERAPEUTIC ACTIVITIES: CPT | Mod: CQ

## 2021-02-01 PROCEDURE — 71000039 HC RECOVERY, EACH ADD'L HOUR: Performed by: SURGERY

## 2021-02-01 PROCEDURE — 99900103 DSU ONLY-NO CHARGE-INITIAL HR (STAT): Performed by: SURGERY

## 2021-02-01 PROCEDURE — 63600175 PHARM REV CODE 636 W HCPCS: Performed by: INTERNAL MEDICINE

## 2021-02-01 PROCEDURE — 84100 ASSAY OF PHOSPHORUS: CPT

## 2021-02-01 PROCEDURE — 99231 PR SUBSEQUENT HOSPITAL CARE,LEVL I: ICD-10-PCS | Mod: S$GLB,,, | Performed by: INTERNAL MEDICINE

## 2021-02-01 PROCEDURE — 25000003 PHARM REV CODE 250: Performed by: SURGERY

## 2021-02-01 PROCEDURE — 63600175 PHARM REV CODE 636 W HCPCS: Performed by: SURGERY

## 2021-02-01 PROCEDURE — D9220A PRA ANESTHESIA: Mod: ANES,,, | Performed by: ANESTHESIOLOGY

## 2021-02-01 PROCEDURE — 88305 TISSUE EXAM BY PATHOLOGIST: CPT | Mod: 26,,, | Performed by: DERMATOLOGY

## 2021-02-01 PROCEDURE — 36000707: Performed by: SURGERY

## 2021-02-01 PROCEDURE — 99231 SBSQ HOSP IP/OBS SF/LOW 25: CPT | Mod: S$GLB,,, | Performed by: INTERNAL MEDICINE

## 2021-02-01 PROCEDURE — 11042 PR DEBRIDEMENT, SKIN, SUB-Q TISSUE,=<20 SQ CM: ICD-10-PCS | Mod: ,,, | Performed by: SURGERY

## 2021-02-01 PROCEDURE — 71000033 HC RECOVERY, INTIAL HOUR: Performed by: SURGERY

## 2021-02-01 PROCEDURE — 63600175 PHARM REV CODE 636 W HCPCS: Performed by: REGISTERED NURSE

## 2021-02-01 PROCEDURE — 85025 COMPLETE CBC W/AUTO DIFF WBC: CPT

## 2021-02-01 PROCEDURE — 11042 DBRDMT SUBQ TIS 1ST 20SQCM/<: CPT | Mod: ,,, | Performed by: SURGERY

## 2021-02-01 PROCEDURE — 87077 CULTURE AEROBIC IDENTIFY: CPT

## 2021-02-01 PROCEDURE — 25000003 PHARM REV CODE 250: Performed by: INTERNAL MEDICINE

## 2021-02-01 PROCEDURE — 88305 TISSUE EXAM BY PATHOLOGIST: CPT | Performed by: DERMATOLOGY

## 2021-02-01 PROCEDURE — 88312 SPECIAL STAINS GROUP 1: CPT | Mod: 26,,, | Performed by: DERMATOLOGY

## 2021-02-01 PROCEDURE — 97116 GAIT TRAINING THERAPY: CPT | Mod: CQ

## 2021-02-01 PROCEDURE — 87075 CULTR BACTERIA EXCEPT BLOOD: CPT

## 2021-02-01 PROCEDURE — 99223 1ST HOSP IP/OBS HIGH 75: CPT | Mod: ,,, | Performed by: SURGERY

## 2021-02-01 PROCEDURE — D9220A PRA ANESTHESIA: ICD-10-PCS | Mod: ANES,,, | Performed by: ANESTHESIOLOGY

## 2021-02-01 PROCEDURE — 36415 COLL VENOUS BLD VENIPUNCTURE: CPT

## 2021-02-01 PROCEDURE — 12000002 HC ACUTE/MED SURGE SEMI-PRIVATE ROOM

## 2021-02-01 PROCEDURE — 36000706: Performed by: SURGERY

## 2021-02-01 PROCEDURE — 37000009 HC ANESTHESIA EA ADD 15 MINS: Performed by: SURGERY

## 2021-02-01 PROCEDURE — 87076 CULTURE ANAEROBE IDENT EACH: CPT

## 2021-02-01 PROCEDURE — D9220A PRA ANESTHESIA: ICD-10-PCS | Mod: CRNA,,, | Performed by: REGISTERED NURSE

## 2021-02-01 PROCEDURE — 88305 TISSUE EXAM BY PATHOLOGIST: ICD-10-PCS | Mod: 26,,, | Performed by: DERMATOLOGY

## 2021-02-01 PROCEDURE — 99223 PR INITIAL HOSPITAL CARE,LEVL III: ICD-10-PCS | Mod: ,,, | Performed by: SURGERY

## 2021-02-01 RX ORDER — OXYCODONE HYDROCHLORIDE 5 MG/1
5 TABLET ORAL ONCE AS NEEDED
Status: COMPLETED | OUTPATIENT
Start: 2021-02-01 | End: 2021-02-01

## 2021-02-01 RX ORDER — FENTANYL CITRATE 50 UG/ML
INJECTION, SOLUTION INTRAMUSCULAR; INTRAVENOUS
Status: DISCONTINUED | OUTPATIENT
Start: 2021-02-01 | End: 2021-02-01

## 2021-02-01 RX ORDER — KETAMINE HYDROCHLORIDE 10 MG/ML
INJECTION, SOLUTION INTRAMUSCULAR; INTRAVENOUS
Status: DISCONTINUED | OUTPATIENT
Start: 2021-02-01 | End: 2021-02-01

## 2021-02-01 RX ORDER — MIDAZOLAM HYDROCHLORIDE 1 MG/ML
INJECTION, SOLUTION INTRAMUSCULAR; INTRAVENOUS
Status: DISCONTINUED | OUTPATIENT
Start: 2021-02-01 | End: 2021-02-01

## 2021-02-01 RX ORDER — MAGNESIUM SULFATE HEPTAHYDRATE 40 MG/ML
2 INJECTION, SOLUTION INTRAVENOUS ONCE
Status: COMPLETED | OUTPATIENT
Start: 2021-02-01 | End: 2021-02-01

## 2021-02-01 RX ORDER — LIDOCAINE HYDROCHLORIDE 20 MG/ML
INJECTION INTRAVENOUS
Status: DISCONTINUED | OUTPATIENT
Start: 2021-02-01 | End: 2021-02-01

## 2021-02-01 RX ORDER — PROPOFOL 10 MG/ML
VIAL (ML) INTRAVENOUS
Status: DISCONTINUED | OUTPATIENT
Start: 2021-02-01 | End: 2021-02-01

## 2021-02-01 RX ORDER — FENTANYL CITRATE 50 UG/ML
25 INJECTION, SOLUTION INTRAMUSCULAR; INTRAVENOUS EVERY 5 MIN PRN
Status: DISCONTINUED | OUTPATIENT
Start: 2021-02-01 | End: 2021-02-01 | Stop reason: HOSPADM

## 2021-02-01 RX ADMIN — FENTANYL CITRATE 50 MCG: 0.05 INJECTION, SOLUTION INTRAMUSCULAR; INTRAVENOUS at 02:02

## 2021-02-01 RX ADMIN — PROPOFOL 50 MG: 10 INJECTION, EMULSION INTRAVENOUS at 02:02

## 2021-02-01 RX ADMIN — PIPERACILLIN AND TAZOBACTAM 4.5 G: 4; .5 INJECTION, POWDER, LYOPHILIZED, FOR SOLUTION INTRAVENOUS; PARENTERAL at 02:02

## 2021-02-01 RX ADMIN — SODIUM BICARBONATE 650 MG TABLET 1300 MG: at 08:02

## 2021-02-01 RX ADMIN — LIDOCAINE HYDROCHLORIDE 50 MG: 20 INJECTION, SOLUTION INTRAVENOUS at 02:02

## 2021-02-01 RX ADMIN — HYDROCODONE BITARTRATE AND ACETAMINOPHEN 1 TABLET: 5; 325 TABLET ORAL at 08:02

## 2021-02-01 RX ADMIN — SODIUM CHLORIDE: 0.9 INJECTION, SOLUTION INTRAVENOUS at 11:02

## 2021-02-01 RX ADMIN — TAMSULOSIN HYDROCHLORIDE 0.4 MG: 0.4 CAPSULE ORAL at 08:02

## 2021-02-01 RX ADMIN — KETAMINE HYDROCHLORIDE 30 MG: 10 INJECTION, SOLUTION INTRAMUSCULAR; INTRAVENOUS at 02:02

## 2021-02-01 RX ADMIN — LACTOBACILLUS ACIDOPHILUS / LACTOBACILLUS BULGARICUS 1 EACH: 100 MILLION CFU STRENGTH GRANULES at 08:02

## 2021-02-01 RX ADMIN — SODIUM CHLORIDE, SODIUM GLUCONATE, SODIUM ACETATE, POTASSIUM CHLORIDE, MAGNESIUM CHLORIDE, SODIUM PHOSPHATE, DIBASIC, AND POTASSIUM PHOSPHATE: .53; .5; .37; .037; .03; .012; .00082 INJECTION, SOLUTION INTRAVENOUS at 02:02

## 2021-02-01 RX ADMIN — GLYCOPYRROLATE 0.2 MCG: 0.2 INJECTION, SOLUTION INTRAMUSCULAR; INTRAVITREAL at 02:02

## 2021-02-01 RX ADMIN — RIVAROXABAN 15 MG: 15 TABLET, FILM COATED ORAL at 06:02

## 2021-02-01 RX ADMIN — OXYCODONE HYDROCHLORIDE 5 MG: 5 TABLET ORAL at 03:02

## 2021-02-01 RX ADMIN — PIPERACILLIN AND TAZOBACTAM 4.5 G: 4; .5 INJECTION, POWDER, LYOPHILIZED, FOR SOLUTION INTRAVENOUS; PARENTERAL at 11:02

## 2021-02-01 RX ADMIN — MELATONIN TAB 3 MG 6 MG: 3 TAB at 08:02

## 2021-02-01 RX ADMIN — PIPERACILLIN AND TAZOBACTAM 4.5 G: 4; .5 INJECTION, POWDER, LYOPHILIZED, FOR SOLUTION INTRAVENOUS; PARENTERAL at 04:02

## 2021-02-01 RX ADMIN — MIDAZOLAM 2 MG: 1 INJECTION INTRAMUSCULAR; INTRAVENOUS at 02:02

## 2021-02-01 RX ADMIN — MUPIROCIN: 20 OINTMENT TOPICAL at 09:02

## 2021-02-01 RX ADMIN — MAGNESIUM SULFATE 2 G: 2 INJECTION INTRAVENOUS at 11:02

## 2021-02-01 RX ADMIN — LINEZOLID 600 MG: 600 INJECTION, SOLUTION INTRAVENOUS at 02:02

## 2021-02-02 LAB
ALBUMIN SERPL BCP-MCNC: 2 G/DL (ref 3.5–5.2)
ALBUMIN SERPL BCP-MCNC: 2 G/DL (ref 3.5–5.2)
ALP SERPL-CCNC: 87 U/L (ref 55–135)
ALT SERPL W/O P-5'-P-CCNC: 20 U/L (ref 10–44)
ANION GAP SERPL CALC-SCNC: 6 MMOL/L (ref 8–16)
ANION GAP SERPL CALC-SCNC: 6 MMOL/L (ref 8–16)
AST SERPL-CCNC: 22 U/L (ref 10–40)
BASOPHILS # BLD AUTO: 0.05 K/UL (ref 0–0.2)
BASOPHILS NFR BLD: 0.5 % (ref 0–1.9)
BILIRUB SERPL-MCNC: 0.8 MG/DL (ref 0.1–1)
BUN SERPL-MCNC: 24 MG/DL (ref 8–23)
BUN SERPL-MCNC: 24 MG/DL (ref 8–23)
CALCIUM SERPL-MCNC: 8.3 MG/DL (ref 8.7–10.5)
CALCIUM SERPL-MCNC: 8.3 MG/DL (ref 8.7–10.5)
CHLORIDE SERPL-SCNC: 107 MMOL/L (ref 95–110)
CHLORIDE SERPL-SCNC: 107 MMOL/L (ref 95–110)
CO2 SERPL-SCNC: 26 MMOL/L (ref 23–29)
CO2 SERPL-SCNC: 26 MMOL/L (ref 23–29)
CREAT SERPL-MCNC: 1.1 MG/DL (ref 0.5–1.4)
CREAT SERPL-MCNC: 1.1 MG/DL (ref 0.5–1.4)
DIFFERENTIAL METHOD: ABNORMAL
EOSINOPHIL # BLD AUTO: 0.6 K/UL (ref 0–0.5)
EOSINOPHIL NFR BLD: 6.2 % (ref 0–8)
ERYTHROCYTE [DISTWIDTH] IN BLOOD BY AUTOMATED COUNT: 13.2 % (ref 11.5–14.5)
EST. GFR  (AFRICAN AMERICAN): >60 ML/MIN/1.73 M^2
EST. GFR  (AFRICAN AMERICAN): >60 ML/MIN/1.73 M^2
EST. GFR  (NON AFRICAN AMERICAN): >60 ML/MIN/1.73 M^2
EST. GFR  (NON AFRICAN AMERICAN): >60 ML/MIN/1.73 M^2
GLUCOSE SERPL-MCNC: 127 MG/DL (ref 70–110)
GLUCOSE SERPL-MCNC: 127 MG/DL (ref 70–110)
HCT VFR BLD AUTO: 34.9 % (ref 40–54)
HGB BLD-MCNC: 11.2 G/DL (ref 14–18)
IMM GRANULOCYTES # BLD AUTO: 0.05 K/UL (ref 0–0.04)
IMM GRANULOCYTES NFR BLD AUTO: 0.5 % (ref 0–0.5)
LYMPHOCYTES # BLD AUTO: 1 K/UL (ref 1–4.8)
LYMPHOCYTES NFR BLD: 9.7 % (ref 18–48)
MAGNESIUM SERPL-MCNC: 1.6 MG/DL (ref 1.6–2.6)
MCH RBC QN AUTO: 30.2 PG (ref 27–31)
MCHC RBC AUTO-ENTMCNC: 32.1 G/DL (ref 32–36)
MCV RBC AUTO: 94 FL (ref 82–98)
MONOCYTES # BLD AUTO: 0.8 K/UL (ref 0.3–1)
MONOCYTES NFR BLD: 8.2 % (ref 4–15)
NEUTROPHILS # BLD AUTO: 7.6 K/UL (ref 1.8–7.7)
NEUTROPHILS NFR BLD: 74.9 % (ref 38–73)
NRBC BLD-RTO: 0 /100 WBC
PHOSPHATE SERPL-MCNC: 2.9 MG/DL (ref 2.7–4.5)
PHOSPHATE SERPL-MCNC: 2.9 MG/DL (ref 2.7–4.5)
PLATELET # BLD AUTO: 293 K/UL (ref 150–350)
PMV BLD AUTO: 9.4 FL (ref 9.2–12.9)
POTASSIUM SERPL-SCNC: 4.1 MMOL/L (ref 3.5–5.1)
POTASSIUM SERPL-SCNC: 4.1 MMOL/L (ref 3.5–5.1)
PROT SERPL-MCNC: 5.7 G/DL (ref 6–8.4)
RBC # BLD AUTO: 3.71 M/UL (ref 4.6–6.2)
SODIUM SERPL-SCNC: 139 MMOL/L (ref 136–145)
SODIUM SERPL-SCNC: 139 MMOL/L (ref 136–145)
WBC # BLD AUTO: 10.09 K/UL (ref 3.9–12.7)

## 2021-02-02 PROCEDURE — 25000003 PHARM REV CODE 250: Performed by: SURGERY

## 2021-02-02 PROCEDURE — 99231 SBSQ HOSP IP/OBS SF/LOW 25: CPT | Mod: S$GLB,,, | Performed by: INTERNAL MEDICINE

## 2021-02-02 PROCEDURE — 12000002 HC ACUTE/MED SURGE SEMI-PRIVATE ROOM

## 2021-02-02 PROCEDURE — 99231 PR SUBSEQUENT HOSPITAL CARE,LEVL I: ICD-10-PCS | Mod: S$GLB,,, | Performed by: INTERNAL MEDICINE

## 2021-02-02 PROCEDURE — 63600175 PHARM REV CODE 636 W HCPCS: Performed by: SURGERY

## 2021-02-02 PROCEDURE — 97116 GAIT TRAINING THERAPY: CPT | Mod: CQ

## 2021-02-02 PROCEDURE — 84100 ASSAY OF PHOSPHORUS: CPT

## 2021-02-02 PROCEDURE — 80053 COMPREHEN METABOLIC PANEL: CPT

## 2021-02-02 PROCEDURE — 97530 THERAPEUTIC ACTIVITIES: CPT | Mod: CQ

## 2021-02-02 PROCEDURE — 36415 COLL VENOUS BLD VENIPUNCTURE: CPT

## 2021-02-02 PROCEDURE — 99232 PR SUBSEQUENT HOSPITAL CARE,LEVL II: ICD-10-PCS | Mod: ,,, | Performed by: FAMILY MEDICINE

## 2021-02-02 PROCEDURE — 83735 ASSAY OF MAGNESIUM: CPT

## 2021-02-02 PROCEDURE — 99232 SBSQ HOSP IP/OBS MODERATE 35: CPT | Mod: ,,, | Performed by: FAMILY MEDICINE

## 2021-02-02 PROCEDURE — 85025 COMPLETE CBC W/AUTO DIFF WBC: CPT

## 2021-02-02 RX ORDER — HYDRALAZINE HYDROCHLORIDE 25 MG/1
25 TABLET, FILM COATED ORAL EVERY 8 HOURS PRN
Status: DISCONTINUED | OUTPATIENT
Start: 2021-02-02 | End: 2021-02-03 | Stop reason: HOSPADM

## 2021-02-02 RX ADMIN — PIPERACILLIN AND TAZOBACTAM 4.5 G: 4; .5 INJECTION, POWDER, LYOPHILIZED, FOR SOLUTION INTRAVENOUS; PARENTERAL at 11:02

## 2021-02-02 RX ADMIN — SODIUM BICARBONATE 650 MG TABLET 1300 MG: at 09:02

## 2021-02-02 RX ADMIN — LINEZOLID 600 MG: 600 INJECTION, SOLUTION INTRAVENOUS at 02:02

## 2021-02-02 RX ADMIN — COLLAGENASE SANTYL: 250 OINTMENT TOPICAL at 08:02

## 2021-02-02 RX ADMIN — COLLAGENASE SANTYL: 250 OINTMENT TOPICAL at 09:02

## 2021-02-02 RX ADMIN — LACTOBACILLUS ACIDOPHILUS / LACTOBACILLUS BULGARICUS 1 EACH: 100 MILLION CFU STRENGTH GRANULES at 09:02

## 2021-02-02 RX ADMIN — TAMSULOSIN HYDROCHLORIDE 0.4 MG: 0.4 CAPSULE ORAL at 09:02

## 2021-02-02 RX ADMIN — HYDROCODONE BITARTRATE AND ACETAMINOPHEN 1 TABLET: 5; 325 TABLET ORAL at 02:02

## 2021-02-02 RX ADMIN — PIPERACILLIN AND TAZOBACTAM 4.5 G: 4; .5 INJECTION, POWDER, LYOPHILIZED, FOR SOLUTION INTRAVENOUS; PARENTERAL at 05:02

## 2021-02-02 RX ADMIN — PIPERACILLIN AND TAZOBACTAM 4.5 G: 4; .5 INJECTION, POWDER, LYOPHILIZED, FOR SOLUTION INTRAVENOUS; PARENTERAL at 06:02

## 2021-02-02 RX ADMIN — PANTOPRAZOLE SODIUM 40 MG: 40 TABLET, DELAYED RELEASE ORAL at 08:02

## 2021-02-02 RX ADMIN — SODIUM BICARBONATE 650 MG TABLET 1300 MG: at 08:02

## 2021-02-02 RX ADMIN — LACTOBACILLUS ACIDOPHILUS / LACTOBACILLUS BULGARICUS 1 EACH: 100 MILLION CFU STRENGTH GRANULES at 08:02

## 2021-02-02 RX ADMIN — RIVAROXABAN 15 MG: 15 TABLET, FILM COATED ORAL at 05:02

## 2021-02-02 RX ADMIN — HYOSCYAMINE SULFATE 0.12 MG: 0.12 TABLET ORAL; SUBLINGUAL at 10:02

## 2021-02-02 RX ADMIN — SODIUM CHLORIDE: 0.9 INJECTION, SOLUTION INTRAVENOUS at 05:02

## 2021-02-02 RX ADMIN — MELATONIN TAB 3 MG 6 MG: 3 TAB at 09:02

## 2021-02-02 RX ADMIN — HYDROCODONE BITARTRATE AND ACETAMINOPHEN 1 TABLET: 5; 325 TABLET ORAL at 09:02

## 2021-02-03 VITALS
TEMPERATURE: 98 F | OXYGEN SATURATION: 95 % | RESPIRATION RATE: 18 BRPM | SYSTOLIC BLOOD PRESSURE: 135 MMHG | WEIGHT: 270.06 LBS | HEART RATE: 77 BPM | HEIGHT: 72 IN | BODY MASS INDEX: 36.58 KG/M2 | DIASTOLIC BLOOD PRESSURE: 65 MMHG

## 2021-02-03 LAB
ALBUMIN SERPL BCP-MCNC: 2 G/DL (ref 3.5–5.2)
ALBUMIN SERPL BCP-MCNC: 2 G/DL (ref 3.5–5.2)
ALP SERPL-CCNC: 97 U/L (ref 55–135)
ALT SERPL W/O P-5'-P-CCNC: 24 U/L (ref 10–44)
ANION GAP SERPL CALC-SCNC: 9 MMOL/L (ref 8–16)
ANION GAP SERPL CALC-SCNC: 9 MMOL/L (ref 8–16)
AST SERPL-CCNC: 22 U/L (ref 10–40)
BASOPHILS # BLD AUTO: 0.05 K/UL (ref 0–0.2)
BASOPHILS NFR BLD: 0.6 % (ref 0–1.9)
BILIRUB SERPL-MCNC: 0.7 MG/DL (ref 0.1–1)
BUN SERPL-MCNC: 18 MG/DL (ref 8–23)
BUN SERPL-MCNC: 18 MG/DL (ref 8–23)
CALCIUM SERPL-MCNC: 8.2 MG/DL (ref 8.7–10.5)
CALCIUM SERPL-MCNC: 8.2 MG/DL (ref 8.7–10.5)
CHLORIDE SERPL-SCNC: 107 MMOL/L (ref 95–110)
CHLORIDE SERPL-SCNC: 107 MMOL/L (ref 95–110)
CO2 SERPL-SCNC: 24 MMOL/L (ref 23–29)
CO2 SERPL-SCNC: 24 MMOL/L (ref 23–29)
CREAT SERPL-MCNC: 1.1 MG/DL (ref 0.5–1.4)
CREAT SERPL-MCNC: 1.1 MG/DL (ref 0.5–1.4)
DIFFERENTIAL METHOD: ABNORMAL
EOSINOPHIL # BLD AUTO: 0.6 K/UL (ref 0–0.5)
EOSINOPHIL NFR BLD: 7.3 % (ref 0–8)
ERYTHROCYTE [DISTWIDTH] IN BLOOD BY AUTOMATED COUNT: 13.2 % (ref 11.5–14.5)
EST. GFR  (AFRICAN AMERICAN): >60 ML/MIN/1.73 M^2
EST. GFR  (AFRICAN AMERICAN): >60 ML/MIN/1.73 M^2
EST. GFR  (NON AFRICAN AMERICAN): >60 ML/MIN/1.73 M^2
EST. GFR  (NON AFRICAN AMERICAN): >60 ML/MIN/1.73 M^2
GLUCOSE SERPL-MCNC: 141 MG/DL (ref 70–110)
GLUCOSE SERPL-MCNC: 141 MG/DL (ref 70–110)
HCT VFR BLD AUTO: 34.4 % (ref 40–54)
HGB BLD-MCNC: 11.1 G/DL (ref 14–18)
IMM GRANULOCYTES # BLD AUTO: 0.05 K/UL (ref 0–0.04)
IMM GRANULOCYTES NFR BLD AUTO: 0.6 % (ref 0–0.5)
LYMPHOCYTES # BLD AUTO: 1 K/UL (ref 1–4.8)
LYMPHOCYTES NFR BLD: 10.8 % (ref 18–48)
MAGNESIUM SERPL-MCNC: 1.5 MG/DL (ref 1.6–2.6)
MCH RBC QN AUTO: 30 PG (ref 27–31)
MCHC RBC AUTO-ENTMCNC: 32.3 G/DL (ref 32–36)
MCV RBC AUTO: 93 FL (ref 82–98)
MONOCYTES # BLD AUTO: 0.8 K/UL (ref 0.3–1)
MONOCYTES NFR BLD: 8.8 % (ref 4–15)
NEUTROPHILS # BLD AUTO: 6.3 K/UL (ref 1.8–7.7)
NEUTROPHILS NFR BLD: 71.9 % (ref 38–73)
NRBC BLD-RTO: 0 /100 WBC
PHOSPHATE SERPL-MCNC: 2.8 MG/DL (ref 2.7–4.5)
PHOSPHATE SERPL-MCNC: 2.8 MG/DL (ref 2.7–4.5)
PLATELET # BLD AUTO: 262 K/UL (ref 150–350)
PMV BLD AUTO: 9.9 FL (ref 9.2–12.9)
POCT GLUCOSE: 131 MG/DL (ref 70–110)
POTASSIUM SERPL-SCNC: 3.8 MMOL/L (ref 3.5–5.1)
POTASSIUM SERPL-SCNC: 3.8 MMOL/L (ref 3.5–5.1)
PROT SERPL-MCNC: 5.6 G/DL (ref 6–8.4)
RBC # BLD AUTO: 3.7 M/UL (ref 4.6–6.2)
SODIUM SERPL-SCNC: 140 MMOL/L (ref 136–145)
SODIUM SERPL-SCNC: 140 MMOL/L (ref 136–145)
WBC # BLD AUTO: 8.8 K/UL (ref 3.9–12.7)

## 2021-02-03 PROCEDURE — 25000003 PHARM REV CODE 250: Performed by: SURGERY

## 2021-02-03 PROCEDURE — 80053 COMPREHEN METABOLIC PANEL: CPT

## 2021-02-03 PROCEDURE — 63600175 PHARM REV CODE 636 W HCPCS: Performed by: SURGERY

## 2021-02-03 PROCEDURE — 84100 ASSAY OF PHOSPHORUS: CPT

## 2021-02-03 PROCEDURE — 85025 COMPLETE CBC W/AUTO DIFF WBC: CPT

## 2021-02-03 PROCEDURE — 97116 GAIT TRAINING THERAPY: CPT | Mod: CQ

## 2021-02-03 PROCEDURE — 36415 COLL VENOUS BLD VENIPUNCTURE: CPT

## 2021-02-03 PROCEDURE — 83735 ASSAY OF MAGNESIUM: CPT

## 2021-02-03 RX ORDER — DOXYCYCLINE HYCLATE 100 MG
100 TABLET ORAL EVERY 12 HOURS
Qty: 20 TABLET | Refills: 0 | Status: SHIPPED | OUTPATIENT
Start: 2021-02-03 | End: 2021-02-13

## 2021-02-03 RX ORDER — OXYCODONE AND ACETAMINOPHEN 5; 325 MG/1; MG/1
1 TABLET ORAL EVERY 8 HOURS PRN
Qty: 6 EACH | Refills: 0 | Status: SHIPPED | OUTPATIENT
Start: 2021-02-03 | End: 2021-02-05

## 2021-02-03 RX ADMIN — PANTOPRAZOLE SODIUM 40 MG: 40 TABLET, DELAYED RELEASE ORAL at 07:02

## 2021-02-03 RX ADMIN — LINEZOLID 600 MG: 600 INJECTION, SOLUTION INTRAVENOUS at 03:02

## 2021-02-03 RX ADMIN — SODIUM BICARBONATE 650 MG TABLET 1300 MG: at 07:02

## 2021-02-03 RX ADMIN — PIPERACILLIN AND TAZOBACTAM 4.5 G: 4; .5 INJECTION, POWDER, LYOPHILIZED, FOR SOLUTION INTRAVENOUS; PARENTERAL at 06:02

## 2021-02-03 RX ADMIN — LACTOBACILLUS ACIDOPHILUS / LACTOBACILLUS BULGARICUS 1 EACH: 100 MILLION CFU STRENGTH GRANULES at 07:02

## 2021-02-04 ENCOUNTER — PATIENT OUTREACH (OUTPATIENT)
Dept: ADMINISTRATIVE | Facility: CLINIC | Age: 71
End: 2021-02-04

## 2021-02-04 ENCOUNTER — TELEPHONE (OUTPATIENT)
Dept: WOUND CARE | Facility: CLINIC | Age: 71
End: 2021-02-04

## 2021-02-04 PROCEDURE — G0180 PR HOME HEALTH MD CERTIFICATION: ICD-10-PCS | Mod: ,,, | Performed by: SURGERY

## 2021-02-04 PROCEDURE — G0180 MD CERTIFICATION HHA PATIENT: HCPCS | Mod: ,,, | Performed by: SURGERY

## 2021-02-06 LAB
BACTERIA BLD CULT: NORMAL
BACTERIA SPEC AEROBE CULT: ABNORMAL

## 2021-02-08 ENCOUNTER — TELEPHONE (OUTPATIENT)
Dept: WOUND CARE | Facility: CLINIC | Age: 71
End: 2021-02-08

## 2021-02-08 LAB — BACTERIA SPEC ANAEROBE CULT: ABNORMAL

## 2021-02-10 ENCOUNTER — HOSPITAL ENCOUNTER (OUTPATIENT)
Dept: RADIOLOGY | Facility: HOSPITAL | Age: 71
Discharge: HOME | End: 2021-02-10
Attending: UROLOGY
Payer: MEDICARE

## 2021-02-10 ENCOUNTER — OFFICE VISIT (OUTPATIENT)
Dept: UROLOGY | Facility: CLINIC | Age: 71
End: 2021-02-10
Payer: MEDICARE

## 2021-02-10 VITALS
RESPIRATION RATE: 18 BRPM | HEIGHT: 72 IN | DIASTOLIC BLOOD PRESSURE: 72 MMHG | BODY MASS INDEX: 36.58 KG/M2 | HEART RATE: 85 BPM | WEIGHT: 270.06 LBS | SYSTOLIC BLOOD PRESSURE: 133 MMHG

## 2021-02-10 DIAGNOSIS — N40.1 BPH WITH URINARY OBSTRUCTION: ICD-10-CM

## 2021-02-10 DIAGNOSIS — N13.5 UPJ OBSTRUCTION, ACQUIRED: ICD-10-CM

## 2021-02-10 DIAGNOSIS — R33.9 URINARY RETENTION: ICD-10-CM

## 2021-02-10 DIAGNOSIS — N28.89 RENAL MASS: ICD-10-CM

## 2021-02-10 DIAGNOSIS — N13.8 BPH WITH URINARY OBSTRUCTION: ICD-10-CM

## 2021-02-10 DIAGNOSIS — N28.89 RENAL MASS: Primary | ICD-10-CM

## 2021-02-10 PROCEDURE — 1101F PR PT FALLS ASSESS DOC 0-1 FALLS W/OUT INJ PAST YR: ICD-10-PCS | Mod: S$GLB,,, | Performed by: UROLOGY

## 2021-02-10 PROCEDURE — 99205 PR OFFICE/OUTPT VISIT, NEW, LEVL V, 60-74 MIN: ICD-10-PCS | Mod: S$GLB,,, | Performed by: UROLOGY

## 2021-02-10 PROCEDURE — 3288F PR FALLS RISK ASSESSMENT DOCUMENTED: ICD-10-PCS | Mod: S$GLB,,, | Performed by: UROLOGY

## 2021-02-10 PROCEDURE — 3288F FALL RISK ASSESSMENT DOCD: CPT | Mod: S$GLB,,, | Performed by: UROLOGY

## 2021-02-10 PROCEDURE — 1126F PR PAIN SEVERITY QUANTIFIED, NO PAIN PRESENT: ICD-10-PCS | Mod: S$GLB,,, | Performed by: UROLOGY

## 2021-02-10 PROCEDURE — 3008F PR BODY MASS INDEX (BMI) DOCUMENTED: ICD-10-PCS | Mod: S$GLB,,, | Performed by: UROLOGY

## 2021-02-10 PROCEDURE — 99999 PR PBB SHADOW E&M-EST. PATIENT-LVL III: CPT | Mod: PBBFAC,,, | Performed by: UROLOGY

## 2021-02-10 PROCEDURE — 1159F PR MEDICATION LIST DOCUMENTED IN MEDICAL RECORD: ICD-10-PCS | Mod: S$GLB,,, | Performed by: UROLOGY

## 2021-02-10 PROCEDURE — 99999 PR PBB SHADOW E&M-EST. PATIENT-LVL III: ICD-10-PCS | Mod: PBBFAC,,, | Performed by: UROLOGY

## 2021-02-10 PROCEDURE — 74178 CT ABD&PLV WO CNTR FLWD CNTR: CPT | Mod: TC

## 2021-02-10 PROCEDURE — 1126F AMNT PAIN NOTED NONE PRSNT: CPT | Mod: S$GLB,,, | Performed by: UROLOGY

## 2021-02-10 PROCEDURE — 74178 CT ABDOMEN PELVIS W WO CONTRAST: ICD-10-PCS | Mod: 26,,, | Performed by: RADIOLOGY

## 2021-02-10 PROCEDURE — 25500020 PHARM REV CODE 255

## 2021-02-10 PROCEDURE — 1101F PT FALLS ASSESS-DOCD LE1/YR: CPT | Mod: S$GLB,,, | Performed by: UROLOGY

## 2021-02-10 PROCEDURE — 74178 CT ABD&PLV WO CNTR FLWD CNTR: CPT | Mod: 26,,, | Performed by: RADIOLOGY

## 2021-02-10 PROCEDURE — 3008F BODY MASS INDEX DOCD: CPT | Mod: S$GLB,,, | Performed by: UROLOGY

## 2021-02-10 PROCEDURE — 99205 OFFICE O/P NEW HI 60 MIN: CPT | Mod: S$GLB,,, | Performed by: UROLOGY

## 2021-02-10 PROCEDURE — 1159F MED LIST DOCD IN RCRD: CPT | Mod: S$GLB,,, | Performed by: UROLOGY

## 2021-02-10 RX ORDER — HYDROCODONE BITARTRATE AND ACETAMINOPHEN 5; 325 MG/1; MG/1
1 TABLET ORAL EVERY 6 HOURS PRN
COMMUNITY
End: 2021-02-23

## 2021-02-10 RX ORDER — SULFAMETHOXAZOLE AND TRIMETHOPRIM 800; 160 MG/1; MG/1
1 TABLET ORAL 2 TIMES DAILY
Qty: 30 TABLET | Refills: 0 | Status: ON HOLD | OUTPATIENT
Start: 2021-02-10 | End: 2021-03-01 | Stop reason: ALTCHOICE

## 2021-02-10 RX ADMIN — IOHEXOL 100 ML: 350 INJECTION, SOLUTION INTRAVENOUS at 01:02

## 2021-02-11 ENCOUNTER — EXTERNAL HOME HEALTH (OUTPATIENT)
Dept: HOME HEALTH SERVICES | Facility: HOSPITAL | Age: 71
End: 2021-02-11
Payer: MEDICARE

## 2021-02-11 DIAGNOSIS — N40.0 BENIGN PROSTATIC HYPERPLASIA WITHOUT LOWER URINARY TRACT SYMPTOMS: Primary | ICD-10-CM

## 2021-02-11 DIAGNOSIS — Z01.818 PRE-OP TESTING: ICD-10-CM

## 2021-02-11 LAB
COMMENT: NORMAL
FINAL PATHOLOGIC DIAGNOSIS: NORMAL
GROSS: NORMAL
Lab: NORMAL
MICROSCOPIC EXAM: NORMAL

## 2021-02-12 ENCOUNTER — TELEPHONE (OUTPATIENT)
Dept: UROLOGY | Facility: CLINIC | Age: 71
End: 2021-02-12

## 2021-02-17 ENCOUNTER — TELEPHONE (OUTPATIENT)
Dept: WOUND CARE | Facility: CLINIC | Age: 71
End: 2021-02-17

## 2021-02-23 ENCOUNTER — HOSPITAL ENCOUNTER (OUTPATIENT)
Dept: PREADMISSION TESTING | Facility: HOSPITAL | Age: 71
Discharge: HOME | End: 2021-02-23
Attending: UROLOGY
Payer: MEDICARE

## 2021-02-23 VITALS — HEIGHT: 72 IN | BODY MASS INDEX: 36.57 KG/M2 | WEIGHT: 270 LBS

## 2021-02-23 DIAGNOSIS — Z01.818 PREOP TESTING: Primary | ICD-10-CM

## 2021-02-23 DIAGNOSIS — Z01.818 PRE-OP EXAM: ICD-10-CM

## 2021-02-23 LAB
ALBUMIN SERPL BCP-MCNC: 3 G/DL (ref 3.5–5.2)
ALP SERPL-CCNC: 111 U/L (ref 55–135)
ALT SERPL W/O P-5'-P-CCNC: 14 U/L (ref 10–44)
ANION GAP SERPL CALC-SCNC: 9 MMOL/L (ref 8–16)
APTT BLDCRRT: 38.4 SEC (ref 21–32)
AST SERPL-CCNC: 13 U/L (ref 10–40)
BASOPHILS # BLD AUTO: 0.03 K/UL (ref 0–0.2)
BASOPHILS NFR BLD: 0.4 % (ref 0–1.9)
BILIRUB SERPL-MCNC: 0.6 MG/DL (ref 0.1–1)
BUN SERPL-MCNC: 17 MG/DL (ref 8–23)
CALCIUM SERPL-MCNC: 9 MG/DL (ref 8.7–10.5)
CHLORIDE SERPL-SCNC: 103 MMOL/L (ref 95–110)
CO2 SERPL-SCNC: 24 MMOL/L (ref 23–29)
CREAT SERPL-MCNC: 1.3 MG/DL (ref 0.5–1.4)
DIFFERENTIAL METHOD: ABNORMAL
EOSINOPHIL # BLD AUTO: 0.2 K/UL (ref 0–0.5)
EOSINOPHIL NFR BLD: 2.4 % (ref 0–8)
ERYTHROCYTE [DISTWIDTH] IN BLOOD BY AUTOMATED COUNT: 15.2 % (ref 11.5–14.5)
EST. GFR  (AFRICAN AMERICAN): >60 ML/MIN/1.73 M^2
EST. GFR  (NON AFRICAN AMERICAN): 56 ML/MIN/1.73 M^2
GLUCOSE SERPL-MCNC: 121 MG/DL (ref 70–110)
HCT VFR BLD AUTO: 38.9 % (ref 40–54)
HGB BLD-MCNC: 12.2 G/DL (ref 14–18)
IMM GRANULOCYTES # BLD AUTO: 0.07 K/UL (ref 0–0.04)
IMM GRANULOCYTES NFR BLD AUTO: 0.8 % (ref 0–0.5)
INR PPP: 1.3 (ref 0.8–1.2)
LYMPHOCYTES # BLD AUTO: 0.8 K/UL (ref 1–4.8)
LYMPHOCYTES NFR BLD: 9.9 % (ref 18–48)
MCH RBC QN AUTO: 29.4 PG (ref 27–31)
MCHC RBC AUTO-ENTMCNC: 31.4 G/DL (ref 32–36)
MCV RBC AUTO: 94 FL (ref 82–98)
MONOCYTES # BLD AUTO: 0.9 K/UL (ref 0.3–1)
MONOCYTES NFR BLD: 10.3 % (ref 4–15)
NEUTROPHILS # BLD AUTO: 6.4 K/UL (ref 1.8–7.7)
NEUTROPHILS NFR BLD: 76.2 % (ref 38–73)
NRBC BLD-RTO: 0 /100 WBC
PLATELET # BLD AUTO: 299 K/UL (ref 150–350)
PMV BLD AUTO: 10 FL (ref 9.2–12.9)
POTASSIUM SERPL-SCNC: 4.8 MMOL/L (ref 3.5–5.1)
PROT SERPL-MCNC: 6.5 G/DL (ref 6–8.4)
PROTHROMBIN TIME: 13.7 SEC (ref 9–12.5)
RBC # BLD AUTO: 4.15 M/UL (ref 4.6–6.2)
SODIUM SERPL-SCNC: 136 MMOL/L (ref 136–145)
WBC # BLD AUTO: 8.36 K/UL (ref 3.9–12.7)

## 2021-02-23 PROCEDURE — 99900103 DSU ONLY-NO CHARGE-INITIAL HR (STAT)

## 2021-02-23 PROCEDURE — 85730 THROMBOPLASTIN TIME PARTIAL: CPT | Mod: GA

## 2021-02-23 PROCEDURE — 93010 ELECTROCARDIOGRAM REPORT: CPT | Mod: ,,, | Performed by: SPECIALIST

## 2021-02-23 PROCEDURE — 36415 COLL VENOUS BLD VENIPUNCTURE: CPT

## 2021-02-23 PROCEDURE — 85610 PROTHROMBIN TIME: CPT | Mod: GA

## 2021-02-23 PROCEDURE — 80053 COMPREHEN METABOLIC PANEL: CPT

## 2021-02-23 PROCEDURE — 99900104 DSU ONLY-NO CHARGE-EA ADD'L HR (STAT)

## 2021-02-23 PROCEDURE — 93010 EKG 12-LEAD: ICD-10-PCS | Mod: ,,, | Performed by: SPECIALIST

## 2021-02-23 PROCEDURE — 85025 COMPLETE CBC W/AUTO DIFF WBC: CPT

## 2021-02-23 PROCEDURE — 93005 ELECTROCARDIOGRAM TRACING: CPT

## 2021-02-24 DIAGNOSIS — R33.9 URINARY RETENTION: Primary | ICD-10-CM

## 2021-02-25 ENCOUNTER — ANESTHESIA EVENT (OUTPATIENT)
Dept: SURGERY | Facility: HOSPITAL | Age: 71
End: 2021-02-25
Payer: MEDICARE

## 2021-02-25 ENCOUNTER — TELEPHONE (OUTPATIENT)
Dept: UROLOGY | Facility: CLINIC | Age: 71
End: 2021-02-25

## 2021-02-26 ENCOUNTER — LAB VISIT (OUTPATIENT)
Dept: PRIMARY CARE CLINIC | Facility: CLINIC | Age: 71
End: 2021-02-26
Payer: MEDICARE

## 2021-02-26 DIAGNOSIS — Z01.818 PRE-OP TESTING: ICD-10-CM

## 2021-02-26 PROCEDURE — U0003 INFECTIOUS AGENT DETECTION BY NUCLEIC ACID (DNA OR RNA); SEVERE ACUTE RESPIRATORY SYNDROME CORONAVIRUS 2 (SARS-COV-2) (CORONAVIRUS DISEASE [COVID-19]), AMPLIFIED PROBE TECHNIQUE, MAKING USE OF HIGH THROUGHPUT TECHNOLOGIES AS DESCRIBED BY CMS-2020-01-R: HCPCS

## 2021-02-26 PROCEDURE — U0005 INFEC AGEN DETEC AMPLI PROBE: HCPCS

## 2021-02-27 LAB — SARS-COV-2 RNA RESP QL NAA+PROBE: NOT DETECTED

## 2021-03-01 ENCOUNTER — HOSPITAL ENCOUNTER (OUTPATIENT)
Facility: HOSPITAL | Age: 71
Discharge: HOME | End: 2021-03-01
Attending: UROLOGY | Admitting: UROLOGY
Payer: MEDICARE

## 2021-03-01 ENCOUNTER — ANESTHESIA (OUTPATIENT)
Dept: SURGERY | Facility: HOSPITAL | Age: 71
End: 2021-03-01
Payer: MEDICARE

## 2021-03-01 DIAGNOSIS — Z01.818 PRE-OP EXAM: ICD-10-CM

## 2021-03-01 DIAGNOSIS — R33.9 URINARY RETENTION: ICD-10-CM

## 2021-03-01 LAB
APTT BLDCRRT: 29.6 SEC (ref 21–32)
INR PPP: 1 (ref 0.8–1.2)
PROTHROMBIN TIME: 10.7 SEC (ref 9–12.5)

## 2021-03-01 PROCEDURE — D9220A PRA ANESTHESIA: ICD-10-PCS | Mod: ANES,,, | Performed by: ANESTHESIOLOGY

## 2021-03-01 PROCEDURE — D9220A PRA ANESTHESIA: Mod: CRNA,,, | Performed by: NURSE ANESTHETIST, CERTIFIED REGISTERED

## 2021-03-01 PROCEDURE — 71000016 HC POSTOP RECOV ADDL HR: Performed by: UROLOGY

## 2021-03-01 PROCEDURE — 88305 TISSUE EXAM BY PATHOLOGIST: ICD-10-PCS | Mod: 26,,, | Performed by: PATHOLOGY

## 2021-03-01 PROCEDURE — 37000008 HC ANESTHESIA 1ST 15 MINUTES: Performed by: UROLOGY

## 2021-03-01 PROCEDURE — 74420 UROGRAPHY RTRGR +-KUB: CPT | Mod: 26,,, | Performed by: UROLOGY

## 2021-03-01 PROCEDURE — 99900103 DSU ONLY-NO CHARGE-INITIAL HR (STAT): Performed by: UROLOGY

## 2021-03-01 PROCEDURE — 85610 PROTHROMBIN TIME: CPT

## 2021-03-01 PROCEDURE — C1758 CATHETER, URETERAL: HCPCS | Performed by: UROLOGY

## 2021-03-01 PROCEDURE — 88305 TISSUE EXAM BY PATHOLOGIST: CPT | Performed by: PATHOLOGY

## 2021-03-01 PROCEDURE — 25000003 PHARM REV CODE 250: Performed by: NURSE ANESTHETIST, CERTIFIED REGISTERED

## 2021-03-01 PROCEDURE — 63600175 PHARM REV CODE 636 W HCPCS: Performed by: UROLOGY

## 2021-03-01 PROCEDURE — 71000033 HC RECOVERY, INTIAL HOUR: Performed by: UROLOGY

## 2021-03-01 PROCEDURE — 37000009 HC ANESTHESIA EA ADD 15 MINS: Performed by: UROLOGY

## 2021-03-01 PROCEDURE — 51798 US URINE CAPACITY MEASURE: CPT | Performed by: UROLOGY

## 2021-03-01 PROCEDURE — D9220A PRA ANESTHESIA: ICD-10-PCS | Mod: CRNA,,, | Performed by: NURSE ANESTHETIST, CERTIFIED REGISTERED

## 2021-03-01 PROCEDURE — 63600175 PHARM REV CODE 636 W HCPCS: Performed by: NURSE ANESTHETIST, CERTIFIED REGISTERED

## 2021-03-01 PROCEDURE — 71000015 HC POSTOP RECOV 1ST HR: Performed by: UROLOGY

## 2021-03-01 PROCEDURE — 52005 CYSTO W/URTRL CATHJ: CPT | Mod: 59,,, | Performed by: UROLOGY

## 2021-03-01 PROCEDURE — 25000003 PHARM REV CODE 250: Performed by: ANESTHESIOLOGY

## 2021-03-01 PROCEDURE — 88305 TISSUE EXAM BY PATHOLOGIST: CPT | Mod: 26,,, | Performed by: PATHOLOGY

## 2021-03-01 PROCEDURE — 52204 PR CYSTOURETHROSCOPY,BIOPSY: ICD-10-PCS | Mod: ,,, | Performed by: UROLOGY

## 2021-03-01 PROCEDURE — 99900104 DSU ONLY-NO CHARGE-EA ADD'L HR (STAT): Performed by: UROLOGY

## 2021-03-01 PROCEDURE — 27201204 HC TRAY CATH URET (IN & OUT): Performed by: UROLOGY

## 2021-03-01 PROCEDURE — 36000707: Performed by: UROLOGY

## 2021-03-01 PROCEDURE — D9220A PRA ANESTHESIA: Mod: ANES,,, | Performed by: ANESTHESIOLOGY

## 2021-03-01 PROCEDURE — 52204 CYSTOSCOPY W/BIOPSY(S): CPT | Mod: ,,, | Performed by: UROLOGY

## 2021-03-01 PROCEDURE — 85730 THROMBOPLASTIN TIME PARTIAL: CPT

## 2021-03-01 PROCEDURE — 52005 PR CYSTOURETHROSCOPY,URETER CATHETER: ICD-10-PCS | Mod: 59,,, | Performed by: UROLOGY

## 2021-03-01 PROCEDURE — 36415 COLL VENOUS BLD VENIPUNCTURE: CPT

## 2021-03-01 PROCEDURE — 74420 PR  X-RAY RETROGRADE PYELOGRAM: ICD-10-PCS | Mod: 26,,, | Performed by: UROLOGY

## 2021-03-01 PROCEDURE — 36000706: Performed by: UROLOGY

## 2021-03-01 PROCEDURE — 25500020 PHARM REV CODE 255: Performed by: UROLOGY

## 2021-03-01 PROCEDURE — 71000039 HC RECOVERY, EACH ADD'L HOUR: Performed by: UROLOGY

## 2021-03-01 PROCEDURE — 25000003 PHARM REV CODE 250: Performed by: UROLOGY

## 2021-03-01 RX ORDER — PHENAZOPYRIDINE HYDROCHLORIDE 100 MG/1
200 TABLET, FILM COATED ORAL 3 TIMES DAILY PRN
Qty: 20 TABLET | Refills: 0 | Status: SHIPPED | OUTPATIENT
Start: 2021-03-01 | End: 2021-08-26

## 2021-03-01 RX ORDER — PROPOFOL 10 MG/ML
VIAL (ML) INTRAVENOUS
Status: DISCONTINUED | OUTPATIENT
Start: 2021-03-01 | End: 2021-03-01

## 2021-03-01 RX ORDER — HYDROMORPHONE HYDROCHLORIDE 2 MG/ML
0.2 INJECTION, SOLUTION INTRAMUSCULAR; INTRAVENOUS; SUBCUTANEOUS EVERY 5 MIN PRN
Status: ACTIVE | OUTPATIENT
Start: 2021-03-01

## 2021-03-01 RX ORDER — LIDOCAINE HCL/PF 100 MG/5ML
SYRINGE (ML) INTRAVENOUS
Status: DISCONTINUED | OUTPATIENT
Start: 2021-03-01 | End: 2021-03-01

## 2021-03-01 RX ORDER — OXYCODONE HYDROCHLORIDE 5 MG/1
5 TABLET ORAL
Status: DISCONTINUED | OUTPATIENT
Start: 2021-03-01 | End: 2023-03-31 | Stop reason: SDUPTHER

## 2021-03-01 RX ORDER — CEFAZOLIN SODIUM 2 G/50ML
2 SOLUTION INTRAVENOUS
Status: COMPLETED | OUTPATIENT
Start: 2021-03-01 | End: 2021-03-01

## 2021-03-01 RX ORDER — DEXAMETHASONE SODIUM PHOSPHATE 4 MG/ML
INJECTION, SOLUTION INTRA-ARTICULAR; INTRALESIONAL; INTRAMUSCULAR; INTRAVENOUS; SOFT TISSUE
Status: DISCONTINUED | OUTPATIENT
Start: 2021-03-01 | End: 2021-03-01

## 2021-03-01 RX ORDER — LIDOCAINE HYDROCHLORIDE 10 MG/ML
1 INJECTION, SOLUTION EPIDURAL; INFILTRATION; INTRACAUDAL; PERINEURAL ONCE
Status: ACTIVE | OUTPATIENT
Start: 2021-03-01

## 2021-03-01 RX ORDER — SULFAMETHOXAZOLE AND TRIMETHOPRIM 800; 160 MG/1; MG/1
1 TABLET ORAL 2 TIMES DAILY
Qty: 20 TABLET | Refills: 0 | Status: SHIPPED | OUTPATIENT
Start: 2021-03-01 | End: 2021-03-11

## 2021-03-01 RX ORDER — HYDROCODONE BITARTRATE AND ACETAMINOPHEN 5; 325 MG/1; MG/1
1 TABLET ORAL EVERY 4 HOURS PRN
Status: DISCONTINUED | OUTPATIENT
Start: 2021-03-01 | End: 2021-03-01 | Stop reason: HOSPADM

## 2021-03-01 RX ORDER — FENTANYL CITRATE 50 UG/ML
INJECTION, SOLUTION INTRAMUSCULAR; INTRAVENOUS
Status: DISCONTINUED | OUTPATIENT
Start: 2021-03-01 | End: 2021-03-01

## 2021-03-01 RX ORDER — PHENAZOPYRIDINE HYDROCHLORIDE 200 MG/1
200 TABLET, FILM COATED ORAL ONCE
Status: COMPLETED | OUTPATIENT
Start: 2021-03-01 | End: 2021-03-01

## 2021-03-01 RX ORDER — LIDOCAINE HYDROCHLORIDE 20 MG/ML
JELLY TOPICAL
Status: DISCONTINUED | OUTPATIENT
Start: 2021-03-01 | End: 2021-03-01 | Stop reason: HOSPADM

## 2021-03-01 RX ORDER — FENTANYL CITRATE 50 UG/ML
25 INJECTION, SOLUTION INTRAMUSCULAR; INTRAVENOUS EVERY 5 MIN PRN
Status: ACTIVE | OUTPATIENT
Start: 2021-03-01

## 2021-03-01 RX ORDER — ONDANSETRON HYDROCHLORIDE 2 MG/ML
INJECTION, SOLUTION INTRAMUSCULAR; INTRAVENOUS
Status: DISCONTINUED | OUTPATIENT
Start: 2021-03-01 | End: 2021-03-01

## 2021-03-01 RX ADMIN — GLYCOPYRROLATE 0.2 MG: 0.2 INJECTION, SOLUTION INTRAMUSCULAR; INTRAVITREAL at 02:03

## 2021-03-01 RX ADMIN — LIDOCAINE HYDROCHLORIDE 100 MG: 20 INJECTION, SOLUTION INTRAVENOUS at 02:03

## 2021-03-01 RX ADMIN — PROPOFOL 200 MG: 10 INJECTION, EMULSION INTRAVENOUS at 02:03

## 2021-03-01 RX ADMIN — FENTANYL CITRATE 50 MCG: 50 INJECTION, SOLUTION INTRAMUSCULAR; INTRAVENOUS at 02:03

## 2021-03-01 RX ADMIN — DEXAMETHASONE SODIUM PHOSPHATE 4 MG: 4 INJECTION, SOLUTION INTRA-ARTICULAR; INTRALESIONAL; INTRAMUSCULAR; INTRAVENOUS; SOFT TISSUE at 02:03

## 2021-03-01 RX ADMIN — PHENAZOPYRIDINE HYDROCHLORIDE 200 MG: 200 TABLET ORAL at 03:03

## 2021-03-01 RX ADMIN — SODIUM CHLORIDE, SODIUM GLUCONATE, SODIUM ACETATE, POTASSIUM CHLORIDE, MAGNESIUM CHLORIDE, SODIUM PHOSPHATE, DIBASIC, AND POTASSIUM PHOSPHATE: .53; .5; .37; .037; .03; .012; .00082 INJECTION, SOLUTION INTRAVENOUS at 02:03

## 2021-03-01 RX ADMIN — ONDANSETRON 4 MG: 2 INJECTION, SOLUTION INTRAMUSCULAR; INTRAVENOUS at 02:03

## 2021-03-01 RX ADMIN — CEFAZOLIN SODIUM 2 G: 2 SOLUTION INTRAVENOUS at 02:03

## 2021-03-02 VITALS
HEIGHT: 73 IN | HEART RATE: 82 BPM | OXYGEN SATURATION: 98 % | RESPIRATION RATE: 16 BRPM | DIASTOLIC BLOOD PRESSURE: 83 MMHG | WEIGHT: 220 LBS | SYSTOLIC BLOOD PRESSURE: 151 MMHG | TEMPERATURE: 98 F | BODY MASS INDEX: 29.16 KG/M2

## 2021-03-03 ENCOUNTER — TELEPHONE (OUTPATIENT)
Dept: UROLOGY | Facility: CLINIC | Age: 71
End: 2021-03-03

## 2021-03-03 LAB
FINAL PATHOLOGIC DIAGNOSIS: NORMAL
GROSS: NORMAL
Lab: NORMAL

## 2021-03-04 ENCOUNTER — TELEPHONE (OUTPATIENT)
Dept: WOUND CARE | Facility: CLINIC | Age: 71
End: 2021-03-04

## 2021-03-04 ENCOUNTER — OFFICE VISIT (OUTPATIENT)
Dept: WOUND CARE | Facility: CLINIC | Age: 71
End: 2021-03-04
Payer: MEDICARE

## 2021-03-04 VITALS — BODY MASS INDEX: 29.16 KG/M2 | RESPIRATION RATE: 20 BRPM | HEIGHT: 73 IN | WEIGHT: 220 LBS

## 2021-03-04 DIAGNOSIS — I87.2 VENOUS STASIS DERMATITIS OF BOTH LOWER EXTREMITIES: Primary | ICD-10-CM

## 2021-03-04 DIAGNOSIS — I87.2 VENOUS STASIS ULCER OF RIGHT CALF WITH NECROSIS OF MUSCLE WITHOUT VARICOSE VEINS: ICD-10-CM

## 2021-03-04 DIAGNOSIS — L97.213 VENOUS STASIS ULCER OF RIGHT CALF WITH NECROSIS OF MUSCLE WITHOUT VARICOSE VEINS: ICD-10-CM

## 2021-03-04 DIAGNOSIS — I87.8 VENOUS STASIS: ICD-10-CM

## 2021-03-04 PROCEDURE — 1101F PT FALLS ASSESS-DOCD LE1/YR: CPT | Mod: S$GLB,,, | Performed by: FAMILY MEDICINE

## 2021-03-04 PROCEDURE — 1159F MED LIST DOCD IN RCRD: CPT | Mod: S$GLB,,, | Performed by: FAMILY MEDICINE

## 2021-03-04 PROCEDURE — 99213 OFFICE O/P EST LOW 20 MIN: CPT | Mod: S$GLB,,, | Performed by: FAMILY MEDICINE

## 2021-03-04 PROCEDURE — 1126F AMNT PAIN NOTED NONE PRSNT: CPT | Mod: S$GLB,,, | Performed by: FAMILY MEDICINE

## 2021-03-04 PROCEDURE — 3288F PR FALLS RISK ASSESSMENT DOCUMENTED: ICD-10-PCS | Mod: S$GLB,,, | Performed by: FAMILY MEDICINE

## 2021-03-04 PROCEDURE — 1126F PR PAIN SEVERITY QUANTIFIED, NO PAIN PRESENT: ICD-10-PCS | Mod: S$GLB,,, | Performed by: FAMILY MEDICINE

## 2021-03-04 PROCEDURE — 99213 PR OFFICE/OUTPT VISIT, EST, LEVL III, 20-29 MIN: ICD-10-PCS | Mod: S$GLB,,, | Performed by: FAMILY MEDICINE

## 2021-03-04 PROCEDURE — 3008F PR BODY MASS INDEX (BMI) DOCUMENTED: ICD-10-PCS | Mod: S$GLB,,, | Performed by: FAMILY MEDICINE

## 2021-03-04 PROCEDURE — 3288F FALL RISK ASSESSMENT DOCD: CPT | Mod: S$GLB,,, | Performed by: FAMILY MEDICINE

## 2021-03-04 PROCEDURE — 1101F PR PT FALLS ASSESS DOC 0-1 FALLS W/OUT INJ PAST YR: ICD-10-PCS | Mod: S$GLB,,, | Performed by: FAMILY MEDICINE

## 2021-03-04 PROCEDURE — 3008F BODY MASS INDEX DOCD: CPT | Mod: S$GLB,,, | Performed by: FAMILY MEDICINE

## 2021-03-04 PROCEDURE — 1159F PR MEDICATION LIST DOCUMENTED IN MEDICAL RECORD: ICD-10-PCS | Mod: S$GLB,,, | Performed by: FAMILY MEDICINE

## 2021-03-04 PROCEDURE — 99999 PR PBB SHADOW E&M-EST. PATIENT-LVL IV: CPT | Mod: PBBFAC,,, | Performed by: FAMILY MEDICINE

## 2021-03-04 PROCEDURE — 99999 PR PBB SHADOW E&M-EST. PATIENT-LVL IV: ICD-10-PCS | Mod: PBBFAC,,, | Performed by: FAMILY MEDICINE

## 2021-03-04 RX ORDER — COLLAGENASE SANTYL 250 [ARB'U]/G
OINTMENT TOPICAL DAILY
Qty: 30 G | Refills: 2 | Status: SHIPPED | OUTPATIENT
Start: 2021-03-04 | End: 2023-03-28

## 2021-03-05 ENCOUNTER — DOCUMENT SCAN (OUTPATIENT)
Dept: HOME HEALTH SERVICES | Facility: HOSPITAL | Age: 71
End: 2021-03-05
Payer: MEDICARE

## 2021-03-08 ENCOUNTER — TELEPHONE (OUTPATIENT)
Dept: WOUND CARE | Facility: CLINIC | Age: 71
End: 2021-03-08

## 2021-03-09 ENCOUNTER — OFFICE VISIT (OUTPATIENT)
Dept: UROLOGY | Facility: CLINIC | Age: 71
End: 2021-03-09
Payer: MEDICARE

## 2021-03-09 VITALS
BODY MASS INDEX: 29.16 KG/M2 | WEIGHT: 220 LBS | DIASTOLIC BLOOD PRESSURE: 70 MMHG | RESPIRATION RATE: 18 BRPM | HEART RATE: 63 BPM | HEIGHT: 73 IN | SYSTOLIC BLOOD PRESSURE: 135 MMHG

## 2021-03-09 DIAGNOSIS — R33.9 INCOMPLETE EMPTYING OF BLADDER: ICD-10-CM

## 2021-03-09 DIAGNOSIS — N40.0 BENIGN PROSTATIC HYPERPLASIA WITHOUT LOWER URINARY TRACT SYMPTOMS: ICD-10-CM

## 2021-03-09 DIAGNOSIS — R33.9 URINARY RETENTION: Primary | ICD-10-CM

## 2021-03-09 PROCEDURE — 99214 PR OFFICE/OUTPT VISIT, EST, LEVL IV, 30-39 MIN: ICD-10-PCS | Mod: 25,S$GLB,, | Performed by: UROLOGY

## 2021-03-09 PROCEDURE — 51798 US URINE CAPACITY MEASURE: CPT | Mod: S$GLB,,, | Performed by: UROLOGY

## 2021-03-09 PROCEDURE — 99214 OFFICE O/P EST MOD 30 MIN: CPT | Mod: 25,S$GLB,, | Performed by: UROLOGY

## 2021-03-09 PROCEDURE — 99999 PR PBB SHADOW E&M-EST. PATIENT-LVL III: CPT | Mod: PBBFAC,,, | Performed by: UROLOGY

## 2021-03-09 PROCEDURE — 3008F PR BODY MASS INDEX (BMI) DOCUMENTED: ICD-10-PCS | Mod: S$GLB,,, | Performed by: UROLOGY

## 2021-03-09 PROCEDURE — 3008F BODY MASS INDEX DOCD: CPT | Mod: S$GLB,,, | Performed by: UROLOGY

## 2021-03-09 PROCEDURE — 1159F PR MEDICATION LIST DOCUMENTED IN MEDICAL RECORD: ICD-10-PCS | Mod: S$GLB,,, | Performed by: UROLOGY

## 2021-03-09 PROCEDURE — 1126F AMNT PAIN NOTED NONE PRSNT: CPT | Mod: S$GLB,,, | Performed by: UROLOGY

## 2021-03-09 PROCEDURE — 99999 PR PBB SHADOW E&M-EST. PATIENT-LVL III: ICD-10-PCS | Mod: PBBFAC,,, | Performed by: UROLOGY

## 2021-03-09 PROCEDURE — 1126F PR PAIN SEVERITY QUANTIFIED, NO PAIN PRESENT: ICD-10-PCS | Mod: S$GLB,,, | Performed by: UROLOGY

## 2021-03-09 PROCEDURE — 51798 PR MEAS,POST-VOID RES,US,NON-IMAGING: ICD-10-PCS | Mod: S$GLB,,, | Performed by: UROLOGY

## 2021-03-09 PROCEDURE — 1159F MED LIST DOCD IN RCRD: CPT | Mod: S$GLB,,, | Performed by: UROLOGY

## 2021-03-09 PROCEDURE — 1101F PR PT FALLS ASSESS DOC 0-1 FALLS W/OUT INJ PAST YR: ICD-10-PCS | Mod: S$GLB,,, | Performed by: UROLOGY

## 2021-03-09 PROCEDURE — 3288F FALL RISK ASSESSMENT DOCD: CPT | Mod: S$GLB,,, | Performed by: UROLOGY

## 2021-03-09 PROCEDURE — 1101F PT FALLS ASSESS-DOCD LE1/YR: CPT | Mod: S$GLB,,, | Performed by: UROLOGY

## 2021-03-09 PROCEDURE — 3288F PR FALLS RISK ASSESSMENT DOCUMENTED: ICD-10-PCS | Mod: S$GLB,,, | Performed by: UROLOGY

## 2021-03-09 RX ORDER — ALFUZOSIN HYDROCHLORIDE 10 MG/1
10 TABLET, EXTENDED RELEASE ORAL
Qty: 30 TABLET | Refills: 11 | Status: SHIPPED | OUTPATIENT
Start: 2021-03-09 | End: 2021-08-26 | Stop reason: SDUPTHER

## 2021-03-11 ENCOUNTER — OFFICE VISIT (OUTPATIENT)
Dept: WOUND CARE | Facility: CLINIC | Age: 71
End: 2021-03-11
Payer: MEDICARE

## 2021-03-11 VITALS — SYSTOLIC BLOOD PRESSURE: 117 MMHG | HEART RATE: 69 BPM | RESPIRATION RATE: 20 BRPM | DIASTOLIC BLOOD PRESSURE: 70 MMHG

## 2021-03-11 DIAGNOSIS — I87.2 VENOUS STASIS ULCER OF RIGHT CALF WITH NECROSIS OF MUSCLE WITHOUT VARICOSE VEINS: Primary | ICD-10-CM

## 2021-03-11 DIAGNOSIS — L97.213 VENOUS STASIS ULCER OF RIGHT CALF WITH NECROSIS OF MUSCLE WITHOUT VARICOSE VEINS: Primary | ICD-10-CM

## 2021-03-11 DIAGNOSIS — I87.2 VENOUS STASIS DERMATITIS OF BOTH LOWER EXTREMITIES: ICD-10-CM

## 2021-03-11 PROCEDURE — 99999 PR PBB SHADOW E&M-EST. PATIENT-LVL IV: CPT | Mod: PBBFAC,,, | Performed by: FAMILY MEDICINE

## 2021-03-11 PROCEDURE — 1126F AMNT PAIN NOTED NONE PRSNT: CPT | Mod: S$GLB,,, | Performed by: FAMILY MEDICINE

## 2021-03-11 PROCEDURE — 99213 OFFICE O/P EST LOW 20 MIN: CPT | Mod: S$GLB,,, | Performed by: FAMILY MEDICINE

## 2021-03-11 PROCEDURE — 1159F MED LIST DOCD IN RCRD: CPT | Mod: S$GLB,,, | Performed by: FAMILY MEDICINE

## 2021-03-11 PROCEDURE — 99213 PR OFFICE/OUTPT VISIT, EST, LEVL III, 20-29 MIN: ICD-10-PCS | Mod: S$GLB,,, | Performed by: FAMILY MEDICINE

## 2021-03-11 PROCEDURE — 1159F PR MEDICATION LIST DOCUMENTED IN MEDICAL RECORD: ICD-10-PCS | Mod: S$GLB,,, | Performed by: FAMILY MEDICINE

## 2021-03-11 PROCEDURE — 1126F PR PAIN SEVERITY QUANTIFIED, NO PAIN PRESENT: ICD-10-PCS | Mod: S$GLB,,, | Performed by: FAMILY MEDICINE

## 2021-03-11 PROCEDURE — 99999 PR PBB SHADOW E&M-EST. PATIENT-LVL IV: ICD-10-PCS | Mod: PBBFAC,,, | Performed by: FAMILY MEDICINE

## 2021-03-18 ENCOUNTER — TELEPHONE (OUTPATIENT)
Dept: UROLOGY | Facility: CLINIC | Age: 71
End: 2021-03-18

## 2021-03-23 ENCOUNTER — OFFICE VISIT (OUTPATIENT)
Dept: WOUND CARE | Facility: CLINIC | Age: 71
End: 2021-03-23
Payer: MEDICARE

## 2021-03-23 VITALS — SYSTOLIC BLOOD PRESSURE: 134 MMHG | HEART RATE: 65 BPM | DIASTOLIC BLOOD PRESSURE: 69 MMHG | RESPIRATION RATE: 20 BRPM

## 2021-03-23 DIAGNOSIS — I87.2 VENOUS STASIS ULCER OF RIGHT CALF WITH NECROSIS OF MUSCLE WITHOUT VARICOSE VEINS: Primary | ICD-10-CM

## 2021-03-23 DIAGNOSIS — L97.213 VENOUS STASIS ULCER OF RIGHT CALF WITH NECROSIS OF MUSCLE WITHOUT VARICOSE VEINS: Primary | ICD-10-CM

## 2021-03-23 PROCEDURE — 99999 PR PBB SHADOW E&M-EST. PATIENT-LVL III: CPT | Mod: PBBFAC,,, | Performed by: FAMILY MEDICINE

## 2021-03-23 PROCEDURE — 1126F AMNT PAIN NOTED NONE PRSNT: CPT | Mod: S$GLB,,, | Performed by: FAMILY MEDICINE

## 2021-03-23 PROCEDURE — 1126F PR PAIN SEVERITY QUANTIFIED, NO PAIN PRESENT: ICD-10-PCS | Mod: S$GLB,,, | Performed by: FAMILY MEDICINE

## 2021-03-23 PROCEDURE — 99213 OFFICE O/P EST LOW 20 MIN: CPT | Mod: S$GLB,,, | Performed by: FAMILY MEDICINE

## 2021-03-23 PROCEDURE — 1159F PR MEDICATION LIST DOCUMENTED IN MEDICAL RECORD: ICD-10-PCS | Mod: S$GLB,,, | Performed by: FAMILY MEDICINE

## 2021-03-23 PROCEDURE — 99999 PR PBB SHADOW E&M-EST. PATIENT-LVL III: ICD-10-PCS | Mod: PBBFAC,,, | Performed by: FAMILY MEDICINE

## 2021-03-23 PROCEDURE — 99213 PR OFFICE/OUTPT VISIT, EST, LEVL III, 20-29 MIN: ICD-10-PCS | Mod: S$GLB,,, | Performed by: FAMILY MEDICINE

## 2021-03-23 PROCEDURE — 1159F MED LIST DOCD IN RCRD: CPT | Mod: S$GLB,,, | Performed by: FAMILY MEDICINE

## 2021-03-24 ENCOUNTER — TELEPHONE (OUTPATIENT)
Dept: UROLOGY | Facility: CLINIC | Age: 71
End: 2021-03-24

## 2021-03-24 ENCOUNTER — CLINICAL SUPPORT (OUTPATIENT)
Dept: UROLOGY | Facility: CLINIC | Age: 71
End: 2021-03-24
Payer: MEDICARE

## 2021-03-24 DIAGNOSIS — R33.9 INCOMPLETE EMPTYING OF BLADDER: Primary | ICD-10-CM

## 2021-03-24 LAB — POC RESIDUAL URINE VOLUME: 155 ML (ref 0–100)

## 2021-03-24 PROCEDURE — 99499 NO LOS: ICD-10-PCS | Mod: S$GLB,,, | Performed by: STUDENT IN AN ORGANIZED HEALTH CARE EDUCATION/TRAINING PROGRAM

## 2021-03-24 PROCEDURE — 51798 POCT BLADDER SCAN: ICD-10-PCS | Mod: S$GLB,,, | Performed by: STUDENT IN AN ORGANIZED HEALTH CARE EDUCATION/TRAINING PROGRAM

## 2021-03-24 PROCEDURE — 51798 US URINE CAPACITY MEASURE: CPT | Mod: S$GLB,,, | Performed by: STUDENT IN AN ORGANIZED HEALTH CARE EDUCATION/TRAINING PROGRAM

## 2021-03-24 PROCEDURE — 99499 UNLISTED E&M SERVICE: CPT | Mod: S$GLB,,, | Performed by: STUDENT IN AN ORGANIZED HEALTH CARE EDUCATION/TRAINING PROGRAM

## 2021-04-05 PROCEDURE — G0179 MD RECERTIFICATION HHA PT: HCPCS | Mod: ,,, | Performed by: SURGERY

## 2021-04-05 PROCEDURE — G0179 PR HOME HEALTH MD RECERTIFICATION: ICD-10-PCS | Mod: ,,, | Performed by: SURGERY

## 2021-04-08 ENCOUNTER — EXTERNAL HOME HEALTH (OUTPATIENT)
Dept: HOME HEALTH SERVICES | Facility: HOSPITAL | Age: 71
End: 2021-04-08
Payer: MEDICARE

## 2021-04-14 ENCOUNTER — OFFICE VISIT (OUTPATIENT)
Dept: WOUND CARE | Facility: CLINIC | Age: 71
End: 2021-04-14
Payer: MEDICARE

## 2021-04-14 VITALS
HEART RATE: 66 BPM | DIASTOLIC BLOOD PRESSURE: 64 MMHG | TEMPERATURE: 99 F | SYSTOLIC BLOOD PRESSURE: 163 MMHG | RESPIRATION RATE: 20 BRPM

## 2021-04-14 DIAGNOSIS — I87.2 VENOUS STASIS ULCER OF RIGHT CALF WITH NECROSIS OF MUSCLE WITHOUT VARICOSE VEINS: Primary | ICD-10-CM

## 2021-04-14 DIAGNOSIS — I87.8 VENOUS STASIS: ICD-10-CM

## 2021-04-14 DIAGNOSIS — I87.2 VENOUS STASIS DERMATITIS OF BOTH LOWER EXTREMITIES: ICD-10-CM

## 2021-04-14 DIAGNOSIS — L97.213 VENOUS STASIS ULCER OF RIGHT CALF WITH NECROSIS OF MUSCLE WITHOUT VARICOSE VEINS: Primary | ICD-10-CM

## 2021-04-14 PROCEDURE — 99213 PR OFFICE/OUTPT VISIT, EST, LEVL III, 20-29 MIN: ICD-10-PCS | Mod: S$GLB,,, | Performed by: FAMILY MEDICINE

## 2021-04-14 PROCEDURE — 99999 PR PBB SHADOW E&M-EST. PATIENT-LVL III: ICD-10-PCS | Mod: PBBFAC,,, | Performed by: FAMILY MEDICINE

## 2021-04-14 PROCEDURE — 99213 OFFICE O/P EST LOW 20 MIN: CPT | Mod: S$GLB,,, | Performed by: FAMILY MEDICINE

## 2021-04-14 PROCEDURE — 1159F PR MEDICATION LIST DOCUMENTED IN MEDICAL RECORD: ICD-10-PCS | Mod: S$GLB,,, | Performed by: FAMILY MEDICINE

## 2021-04-14 PROCEDURE — 99999 PR PBB SHADOW E&M-EST. PATIENT-LVL III: CPT | Mod: PBBFAC,,, | Performed by: FAMILY MEDICINE

## 2021-04-14 PROCEDURE — 1126F AMNT PAIN NOTED NONE PRSNT: CPT | Mod: S$GLB,,, | Performed by: FAMILY MEDICINE

## 2021-04-14 PROCEDURE — 1126F PR PAIN SEVERITY QUANTIFIED, NO PAIN PRESENT: ICD-10-PCS | Mod: S$GLB,,, | Performed by: FAMILY MEDICINE

## 2021-04-14 PROCEDURE — 1159F MED LIST DOCD IN RCRD: CPT | Mod: S$GLB,,, | Performed by: FAMILY MEDICINE

## 2021-05-05 ENCOUNTER — TELEPHONE (OUTPATIENT)
Dept: WOUND CARE | Facility: CLINIC | Age: 71
End: 2021-05-05

## 2021-05-12 ENCOUNTER — OFFICE VISIT (OUTPATIENT)
Dept: WOUND CARE | Facility: CLINIC | Age: 71
End: 2021-05-12
Payer: MEDICARE

## 2021-05-12 VITALS
DIASTOLIC BLOOD PRESSURE: 78 MMHG | RESPIRATION RATE: 20 BRPM | TEMPERATURE: 99 F | HEART RATE: 73 BPM | SYSTOLIC BLOOD PRESSURE: 143 MMHG

## 2021-05-12 DIAGNOSIS — I87.2 VENOUS STASIS DERMATITIS OF BOTH LOWER EXTREMITIES: ICD-10-CM

## 2021-05-12 DIAGNOSIS — I87.2 VENOUS STASIS ULCER OF RIGHT CALF WITH NECROSIS OF MUSCLE WITHOUT VARICOSE VEINS: Primary | ICD-10-CM

## 2021-05-12 DIAGNOSIS — L97.213 VENOUS STASIS ULCER OF RIGHT CALF WITH NECROSIS OF MUSCLE WITHOUT VARICOSE VEINS: Primary | ICD-10-CM

## 2021-05-12 DIAGNOSIS — I87.8 VENOUS STASIS: ICD-10-CM

## 2021-05-12 PROCEDURE — 1126F PR PAIN SEVERITY QUANTIFIED, NO PAIN PRESENT: ICD-10-PCS | Mod: S$GLB,,, | Performed by: FAMILY MEDICINE

## 2021-05-12 PROCEDURE — 1159F PR MEDICATION LIST DOCUMENTED IN MEDICAL RECORD: ICD-10-PCS | Mod: S$GLB,,, | Performed by: FAMILY MEDICINE

## 2021-05-12 PROCEDURE — 1101F PT FALLS ASSESS-DOCD LE1/YR: CPT | Mod: S$GLB,,, | Performed by: FAMILY MEDICINE

## 2021-05-12 PROCEDURE — 1126F AMNT PAIN NOTED NONE PRSNT: CPT | Mod: S$GLB,,, | Performed by: FAMILY MEDICINE

## 2021-05-12 PROCEDURE — 99999 PR PBB SHADOW E&M-EST. PATIENT-LVL III: CPT | Mod: PBBFAC,,, | Performed by: FAMILY MEDICINE

## 2021-05-12 PROCEDURE — 99213 PR OFFICE/OUTPT VISIT, EST, LEVL III, 20-29 MIN: ICD-10-PCS | Mod: S$GLB,,, | Performed by: FAMILY MEDICINE

## 2021-05-12 PROCEDURE — 1159F MED LIST DOCD IN RCRD: CPT | Mod: S$GLB,,, | Performed by: FAMILY MEDICINE

## 2021-05-12 PROCEDURE — 3288F PR FALLS RISK ASSESSMENT DOCUMENTED: ICD-10-PCS | Mod: S$GLB,,, | Performed by: FAMILY MEDICINE

## 2021-05-12 PROCEDURE — 99213 OFFICE O/P EST LOW 20 MIN: CPT | Mod: S$GLB,,, | Performed by: FAMILY MEDICINE

## 2021-05-12 PROCEDURE — 3288F FALL RISK ASSESSMENT DOCD: CPT | Mod: S$GLB,,, | Performed by: FAMILY MEDICINE

## 2021-05-12 PROCEDURE — 1101F PR PT FALLS ASSESS DOC 0-1 FALLS W/OUT INJ PAST YR: ICD-10-PCS | Mod: S$GLB,,, | Performed by: FAMILY MEDICINE

## 2021-05-12 PROCEDURE — 99999 PR PBB SHADOW E&M-EST. PATIENT-LVL III: ICD-10-PCS | Mod: PBBFAC,,, | Performed by: FAMILY MEDICINE

## 2021-05-26 ENCOUNTER — DOCUMENT SCAN (OUTPATIENT)
Dept: HOME HEALTH SERVICES | Facility: HOSPITAL | Age: 71
End: 2021-05-26
Payer: MEDICARE

## 2021-06-04 PROCEDURE — G0179 PR HOME HEALTH MD RECERTIFICATION: ICD-10-PCS | Mod: ,,, | Performed by: SURGERY

## 2021-06-04 PROCEDURE — G0179 MD RECERTIFICATION HHA PT: HCPCS | Mod: ,,, | Performed by: SURGERY

## 2021-06-08 ENCOUNTER — OFFICE VISIT (OUTPATIENT)
Dept: WOUND CARE | Facility: CLINIC | Age: 71
End: 2021-06-08
Payer: MEDICARE

## 2021-06-08 VITALS
HEART RATE: 60 BPM | TEMPERATURE: 98 F | RESPIRATION RATE: 20 BRPM | DIASTOLIC BLOOD PRESSURE: 80 MMHG | SYSTOLIC BLOOD PRESSURE: 155 MMHG

## 2021-06-08 DIAGNOSIS — L97.213 VENOUS STASIS ULCER OF RIGHT CALF WITH NECROSIS OF MUSCLE WITHOUT VARICOSE VEINS: Primary | ICD-10-CM

## 2021-06-08 DIAGNOSIS — I87.2 VENOUS STASIS DERMATITIS OF BOTH LOWER EXTREMITIES: ICD-10-CM

## 2021-06-08 DIAGNOSIS — I87.8 VENOUS STASIS: ICD-10-CM

## 2021-06-08 DIAGNOSIS — I87.2 VENOUS STASIS ULCER OF RIGHT CALF WITH NECROSIS OF MUSCLE WITHOUT VARICOSE VEINS: Primary | ICD-10-CM

## 2021-06-08 PROCEDURE — 3288F PR FALLS RISK ASSESSMENT DOCUMENTED: ICD-10-PCS | Mod: S$GLB,,, | Performed by: FAMILY MEDICINE

## 2021-06-08 PROCEDURE — 3288F FALL RISK ASSESSMENT DOCD: CPT | Mod: S$GLB,,, | Performed by: FAMILY MEDICINE

## 2021-06-08 PROCEDURE — 1159F MED LIST DOCD IN RCRD: CPT | Mod: S$GLB,,, | Performed by: FAMILY MEDICINE

## 2021-06-08 PROCEDURE — 1126F PR PAIN SEVERITY QUANTIFIED, NO PAIN PRESENT: ICD-10-PCS | Mod: S$GLB,,, | Performed by: FAMILY MEDICINE

## 2021-06-08 PROCEDURE — 1126F AMNT PAIN NOTED NONE PRSNT: CPT | Mod: S$GLB,,, | Performed by: FAMILY MEDICINE

## 2021-06-08 PROCEDURE — 99999 PR PBB SHADOW E&M-EST. PATIENT-LVL IV: CPT | Mod: PBBFAC,,, | Performed by: FAMILY MEDICINE

## 2021-06-08 PROCEDURE — 1101F PT FALLS ASSESS-DOCD LE1/YR: CPT | Mod: S$GLB,,, | Performed by: FAMILY MEDICINE

## 2021-06-08 PROCEDURE — 99213 OFFICE O/P EST LOW 20 MIN: CPT | Mod: S$GLB,,, | Performed by: FAMILY MEDICINE

## 2021-06-08 PROCEDURE — 99999 PR PBB SHADOW E&M-EST. PATIENT-LVL IV: ICD-10-PCS | Mod: PBBFAC,,, | Performed by: FAMILY MEDICINE

## 2021-06-08 PROCEDURE — 1101F PR PT FALLS ASSESS DOC 0-1 FALLS W/OUT INJ PAST YR: ICD-10-PCS | Mod: S$GLB,,, | Performed by: FAMILY MEDICINE

## 2021-06-08 PROCEDURE — 1159F PR MEDICATION LIST DOCUMENTED IN MEDICAL RECORD: ICD-10-PCS | Mod: S$GLB,,, | Performed by: FAMILY MEDICINE

## 2021-06-08 PROCEDURE — 99213 PR OFFICE/OUTPT VISIT, EST, LEVL III, 20-29 MIN: ICD-10-PCS | Mod: S$GLB,,, | Performed by: FAMILY MEDICINE

## 2021-06-09 ENCOUNTER — EXTERNAL HOME HEALTH (OUTPATIENT)
Dept: HOME HEALTH SERVICES | Facility: HOSPITAL | Age: 71
End: 2021-06-09
Payer: MEDICARE

## 2021-06-29 ENCOUNTER — OFFICE VISIT (OUTPATIENT)
Dept: WOUND CARE | Facility: CLINIC | Age: 71
End: 2021-06-29
Payer: MEDICARE

## 2021-06-29 VITALS
SYSTOLIC BLOOD PRESSURE: 159 MMHG | DIASTOLIC BLOOD PRESSURE: 83 MMHG | RESPIRATION RATE: 20 BRPM | TEMPERATURE: 98 F | HEART RATE: 64 BPM

## 2021-06-29 DIAGNOSIS — I87.2 VENOUS STASIS DERMATITIS OF BOTH LOWER EXTREMITIES: ICD-10-CM

## 2021-06-29 DIAGNOSIS — L97.213 VENOUS STASIS ULCER OF RIGHT CALF WITH NECROSIS OF MUSCLE WITHOUT VARICOSE VEINS: ICD-10-CM

## 2021-06-29 DIAGNOSIS — I87.2 VENOUS STASIS ULCER OF RIGHT CALF WITH NECROSIS OF MUSCLE WITHOUT VARICOSE VEINS: ICD-10-CM

## 2021-06-29 DIAGNOSIS — I87.8 VENOUS STASIS: Primary | ICD-10-CM

## 2021-06-29 PROCEDURE — 99999 PR PBB SHADOW E&M-EST. PATIENT-LVL III: CPT | Mod: PBBFAC,,, | Performed by: FAMILY MEDICINE

## 2021-06-29 PROCEDURE — 99213 OFFICE O/P EST LOW 20 MIN: CPT | Mod: S$GLB,,, | Performed by: FAMILY MEDICINE

## 2021-06-29 PROCEDURE — 1101F PT FALLS ASSESS-DOCD LE1/YR: CPT | Mod: S$GLB,,, | Performed by: FAMILY MEDICINE

## 2021-06-29 PROCEDURE — 1159F MED LIST DOCD IN RCRD: CPT | Mod: S$GLB,,, | Performed by: FAMILY MEDICINE

## 2021-06-29 PROCEDURE — 3288F PR FALLS RISK ASSESSMENT DOCUMENTED: ICD-10-PCS | Mod: S$GLB,,, | Performed by: FAMILY MEDICINE

## 2021-06-29 PROCEDURE — 1126F AMNT PAIN NOTED NONE PRSNT: CPT | Mod: S$GLB,,, | Performed by: FAMILY MEDICINE

## 2021-06-29 PROCEDURE — 1126F PR PAIN SEVERITY QUANTIFIED, NO PAIN PRESENT: ICD-10-PCS | Mod: S$GLB,,, | Performed by: FAMILY MEDICINE

## 2021-06-29 PROCEDURE — 1101F PR PT FALLS ASSESS DOC 0-1 FALLS W/OUT INJ PAST YR: ICD-10-PCS | Mod: S$GLB,,, | Performed by: FAMILY MEDICINE

## 2021-06-29 PROCEDURE — 3288F FALL RISK ASSESSMENT DOCD: CPT | Mod: S$GLB,,, | Performed by: FAMILY MEDICINE

## 2021-06-29 PROCEDURE — 99999 PR PBB SHADOW E&M-EST. PATIENT-LVL III: ICD-10-PCS | Mod: PBBFAC,,, | Performed by: FAMILY MEDICINE

## 2021-06-29 PROCEDURE — 99213 PR OFFICE/OUTPT VISIT, EST, LEVL III, 20-29 MIN: ICD-10-PCS | Mod: S$GLB,,, | Performed by: FAMILY MEDICINE

## 2021-06-29 PROCEDURE — 1159F PR MEDICATION LIST DOCUMENTED IN MEDICAL RECORD: ICD-10-PCS | Mod: S$GLB,,, | Performed by: FAMILY MEDICINE

## 2021-06-29 RX ORDER — TRIAMCINOLONE ACETONIDE 1 MG/G
CREAM TOPICAL 2 TIMES DAILY
Qty: 80 G | Refills: 3 | Status: SHIPPED | OUTPATIENT
Start: 2021-06-29 | End: 2023-03-28

## 2021-07-26 ENCOUNTER — OFFICE VISIT (OUTPATIENT)
Dept: WOUND CARE | Facility: HOSPITAL | Age: 71
End: 2021-07-26
Attending: FAMILY MEDICINE
Payer: MEDICARE

## 2021-07-26 DIAGNOSIS — I87.2 VENOUS STASIS ULCER OF RIGHT CALF WITH NECROSIS OF MUSCLE WITHOUT VARICOSE VEINS: ICD-10-CM

## 2021-07-26 DIAGNOSIS — L97.213 VENOUS STASIS ULCER OF RIGHT CALF WITH NECROSIS OF MUSCLE WITHOUT VARICOSE VEINS: ICD-10-CM

## 2021-07-26 DIAGNOSIS — L97.919 IDIOPATHIC CHRONIC VENOUS HYPERTENSION OF RIGHT LOWER EXTREMITY WITH ULCER: ICD-10-CM

## 2021-07-26 DIAGNOSIS — I87.311 IDIOPATHIC CHRONIC VENOUS HYPERTENSION OF RIGHT LOWER EXTREMITY WITH ULCER: ICD-10-CM

## 2021-07-26 DIAGNOSIS — L97.812 NON-PRESSURE CHRONIC ULCER OF OTHER PART OF RIGHT LOWER LEG WITH FAT LAYER EXPOSED: ICD-10-CM

## 2021-07-26 DIAGNOSIS — I87.2 VENOUS STASIS DERMATITIS OF BOTH LOWER EXTREMITIES: Primary | ICD-10-CM

## 2021-07-26 PROCEDURE — 99213 OFFICE O/P EST LOW 20 MIN: CPT | Mod: 25,PN | Performed by: FAMILY MEDICINE

## 2021-07-26 PROCEDURE — 11042 DBRDMT SUBQ TIS 1ST 20SQCM/<: CPT | Mod: ,,, | Performed by: FAMILY MEDICINE

## 2021-07-26 PROCEDURE — 1160F RVW MEDS BY RX/DR IN RCRD: CPT | Mod: ,,, | Performed by: FAMILY MEDICINE

## 2021-07-26 PROCEDURE — 99499 UNLISTED E&M SERVICE: CPT | Mod: ,,, | Performed by: FAMILY MEDICINE

## 2021-07-26 PROCEDURE — 11042 PR DEBRIDEMENT, SKIN, SUB-Q TISSUE,=<20 SQ CM: ICD-10-PCS | Mod: ,,, | Performed by: FAMILY MEDICINE

## 2021-07-26 PROCEDURE — 1160F PR REVIEW ALL MEDS BY PRESCRIBER/CLIN PHARMACIST DOCUMENTED: ICD-10-PCS | Mod: ,,, | Performed by: FAMILY MEDICINE

## 2021-07-26 PROCEDURE — 99499 NO LOS: ICD-10-PCS | Mod: ,,, | Performed by: FAMILY MEDICINE

## 2021-07-26 PROCEDURE — 1159F MED LIST DOCD IN RCRD: CPT | Mod: ,,, | Performed by: FAMILY MEDICINE

## 2021-07-26 PROCEDURE — 1159F PR MEDICATION LIST DOCUMENTED IN MEDICAL RECORD: ICD-10-PCS | Mod: ,,, | Performed by: FAMILY MEDICINE

## 2021-07-26 PROCEDURE — 11042 DBRDMT SUBQ TIS 1ST 20SQCM/<: CPT | Mod: PN | Performed by: FAMILY MEDICINE

## 2021-08-03 PROCEDURE — G0179 PR HOME HEALTH MD RECERTIFICATION: ICD-10-PCS | Mod: ,,, | Performed by: SURGERY

## 2021-08-03 PROCEDURE — G0179 MD RECERTIFICATION HHA PT: HCPCS | Mod: ,,, | Performed by: SURGERY

## 2021-08-11 ENCOUNTER — EXTERNAL HOME HEALTH (OUTPATIENT)
Dept: HOME HEALTH SERVICES | Facility: HOSPITAL | Age: 71
End: 2021-08-11
Payer: MEDICARE

## 2021-08-23 ENCOUNTER — TELEPHONE (OUTPATIENT)
Dept: UROLOGY | Facility: CLINIC | Age: 71
End: 2021-08-23

## 2021-08-26 ENCOUNTER — OFFICE VISIT (OUTPATIENT)
Dept: UROLOGY | Facility: CLINIC | Age: 71
End: 2021-08-26
Payer: MEDICARE

## 2021-08-26 VITALS — TEMPERATURE: 99 F | HEART RATE: 73 BPM | DIASTOLIC BLOOD PRESSURE: 79 MMHG | SYSTOLIC BLOOD PRESSURE: 163 MMHG

## 2021-08-26 DIAGNOSIS — N32.0 BLADDER OUTLET OBSTRUCTION: ICD-10-CM

## 2021-08-26 DIAGNOSIS — R33.9 INCOMPLETE EMPTYING OF BLADDER: ICD-10-CM

## 2021-08-26 DIAGNOSIS — R31.9 HEMATURIA, UNSPECIFIED TYPE: Primary | ICD-10-CM

## 2021-08-26 DIAGNOSIS — N40.1 BENIGN PROSTATIC HYPERPLASIA WITH LOWER URINARY TRACT SYMPTOMS, SYMPTOM DETAILS UNSPECIFIED: ICD-10-CM

## 2021-08-26 LAB
BILIRUB SERPL-MCNC: NEGATIVE MG/DL
BLOOD URINE, POC: ABNORMAL
CLARITY, POC UA: CLEAR
COLOR, POC UA: YELLOW
GLUCOSE UR QL STRIP: NEGATIVE
KETONES UR QL STRIP: NEGATIVE
LEUKOCYTE ESTERASE URINE, POC: NEGATIVE
MICROSCOPIC COMMENT: ABNORMAL
NITRITE, POC UA: NEGATIVE
PH, POC UA: 6
POC RESIDUAL URINE VOLUME: 470 ML (ref 0–100)
PROTEIN, POC: ABNORMAL
RBC #/AREA URNS HPF: 10 /HPF (ref 0–4)
SPECIFIC GRAVITY, POC UA: 1.02
UROBILINOGEN, POC UA: 1

## 2021-08-26 PROCEDURE — 99999 PR PBB SHADOW E&M-EST. PATIENT-LVL IV: ICD-10-PCS | Mod: PBBFAC,,, | Performed by: NURSE PRACTITIONER

## 2021-08-26 PROCEDURE — 1126F AMNT PAIN NOTED NONE PRSNT: CPT | Mod: S$GLB,,, | Performed by: NURSE PRACTITIONER

## 2021-08-26 PROCEDURE — 1126F PR PAIN SEVERITY QUANTIFIED, NO PAIN PRESENT: ICD-10-PCS | Mod: S$GLB,,, | Performed by: NURSE PRACTITIONER

## 2021-08-26 PROCEDURE — 1159F MED LIST DOCD IN RCRD: CPT | Mod: S$GLB,,, | Performed by: NURSE PRACTITIONER

## 2021-08-26 PROCEDURE — 3077F PR MOST RECENT SYSTOLIC BLOOD PRESSURE >= 140 MM HG: ICD-10-PCS | Mod: S$GLB,,, | Performed by: NURSE PRACTITIONER

## 2021-08-26 PROCEDURE — 81000 URINALYSIS NONAUTO W/SCOPE: CPT | Performed by: NURSE PRACTITIONER

## 2021-08-26 PROCEDURE — 99214 OFFICE O/P EST MOD 30 MIN: CPT | Mod: 25,S$GLB,, | Performed by: NURSE PRACTITIONER

## 2021-08-26 PROCEDURE — 81002 URINALYSIS NONAUTO W/O SCOPE: CPT | Mod: S$GLB,,, | Performed by: NURSE PRACTITIONER

## 2021-08-26 PROCEDURE — 1160F RVW MEDS BY RX/DR IN RCRD: CPT | Mod: S$GLB,,, | Performed by: NURSE PRACTITIONER

## 2021-08-26 PROCEDURE — 81002 POCT URINE DIPSTICK WITHOUT MICROSCOPE: ICD-10-PCS | Mod: S$GLB,,, | Performed by: NURSE PRACTITIONER

## 2021-08-26 PROCEDURE — 99999 PR PBB SHADOW E&M-EST. PATIENT-LVL IV: CPT | Mod: PBBFAC,,, | Performed by: NURSE PRACTITIONER

## 2021-08-26 PROCEDURE — 3077F SYST BP >= 140 MM HG: CPT | Mod: S$GLB,,, | Performed by: NURSE PRACTITIONER

## 2021-08-26 PROCEDURE — 3078F DIAST BP <80 MM HG: CPT | Mod: S$GLB,,, | Performed by: NURSE PRACTITIONER

## 2021-08-26 PROCEDURE — 87086 URINE CULTURE/COLONY COUNT: CPT | Performed by: NURSE PRACTITIONER

## 2021-08-26 PROCEDURE — 51798 US URINE CAPACITY MEASURE: CPT | Mod: S$GLB,,, | Performed by: NURSE PRACTITIONER

## 2021-08-26 PROCEDURE — 51798 PR MEAS,POST-VOID RES,US,NON-IMAGING: ICD-10-PCS | Mod: S$GLB,,, | Performed by: NURSE PRACTITIONER

## 2021-08-26 PROCEDURE — 99214 PR OFFICE/OUTPT VISIT, EST, LEVL IV, 30-39 MIN: ICD-10-PCS | Mod: 25,S$GLB,, | Performed by: NURSE PRACTITIONER

## 2021-08-26 PROCEDURE — 3078F PR MOST RECENT DIASTOLIC BLOOD PRESSURE < 80 MM HG: ICD-10-PCS | Mod: S$GLB,,, | Performed by: NURSE PRACTITIONER

## 2021-08-26 PROCEDURE — 1159F PR MEDICATION LIST DOCUMENTED IN MEDICAL RECORD: ICD-10-PCS | Mod: S$GLB,,, | Performed by: NURSE PRACTITIONER

## 2021-08-26 PROCEDURE — 1160F PR REVIEW ALL MEDS BY PRESCRIBER/CLIN PHARMACIST DOCUMENTED: ICD-10-PCS | Mod: S$GLB,,, | Performed by: NURSE PRACTITIONER

## 2021-08-26 RX ORDER — ALFUZOSIN HYDROCHLORIDE 10 MG/1
10 TABLET, EXTENDED RELEASE ORAL
Qty: 30 TABLET | Refills: 11 | Status: SHIPPED | OUTPATIENT
Start: 2021-08-26 | End: 2021-08-26

## 2021-08-26 RX ORDER — FINASTERIDE 5 MG/1
5 TABLET, FILM COATED ORAL DAILY
Qty: 30 TABLET | Refills: 12 | Status: SHIPPED | OUTPATIENT
Start: 2021-08-26 | End: 2022-09-01 | Stop reason: SDUPTHER

## 2021-08-26 RX ORDER — TAMSULOSIN HYDROCHLORIDE 0.4 MG/1
0.8 CAPSULE ORAL DAILY
Qty: 180 CAPSULE | Refills: 3 | Status: SHIPPED | OUTPATIENT
Start: 2021-08-26 | End: 2022-08-23

## 2021-08-27 LAB — BACTERIA UR CULT: NO GROWTH

## 2021-09-13 ENCOUNTER — OFFICE VISIT (OUTPATIENT)
Dept: WOUND CARE | Facility: HOSPITAL | Age: 71
End: 2021-09-13
Attending: FAMILY MEDICINE
Payer: MEDICARE

## 2021-09-13 VITALS
RESPIRATION RATE: 18 BRPM | TEMPERATURE: 98 F | DIASTOLIC BLOOD PRESSURE: 84 MMHG | SYSTOLIC BLOOD PRESSURE: 151 MMHG | HEART RATE: 66 BPM

## 2021-09-13 DIAGNOSIS — L97.812 NON-PRESSURE CHRONIC ULCER OF OTHER PART OF RIGHT LOWER LEG WITH FAT LAYER EXPOSED: ICD-10-CM

## 2021-09-13 DIAGNOSIS — I87.2 VENOUS STASIS DERMATITIS OF BOTH LOWER EXTREMITIES: Primary | ICD-10-CM

## 2021-09-13 PROCEDURE — 1159F PR MEDICATION LIST DOCUMENTED IN MEDICAL RECORD: ICD-10-PCS | Mod: ,,, | Performed by: FAMILY MEDICINE

## 2021-09-13 PROCEDURE — 3079F DIAST BP 80-89 MM HG: CPT | Mod: ,,, | Performed by: FAMILY MEDICINE

## 2021-09-13 PROCEDURE — 1159F MED LIST DOCD IN RCRD: CPT | Mod: ,,, | Performed by: FAMILY MEDICINE

## 2021-09-13 PROCEDURE — 3077F PR MOST RECENT SYSTOLIC BLOOD PRESSURE >= 140 MM HG: ICD-10-PCS | Mod: ,,, | Performed by: FAMILY MEDICINE

## 2021-09-13 PROCEDURE — 1160F RVW MEDS BY RX/DR IN RCRD: CPT | Mod: ,,, | Performed by: FAMILY MEDICINE

## 2021-09-13 PROCEDURE — 3077F SYST BP >= 140 MM HG: CPT | Mod: ,,, | Performed by: FAMILY MEDICINE

## 2021-09-13 PROCEDURE — 1160F PR REVIEW ALL MEDS BY PRESCRIBER/CLIN PHARMACIST DOCUMENTED: ICD-10-PCS | Mod: ,,, | Performed by: FAMILY MEDICINE

## 2021-09-13 PROCEDURE — 99213 PR OFFICE/OUTPT VISIT, EST, LEVL III, 20-29 MIN: ICD-10-PCS | Mod: ,,, | Performed by: FAMILY MEDICINE

## 2021-09-13 PROCEDURE — 1126F PR PAIN SEVERITY QUANTIFIED, NO PAIN PRESENT: ICD-10-PCS | Mod: ,,, | Performed by: FAMILY MEDICINE

## 2021-09-13 PROCEDURE — 1126F AMNT PAIN NOTED NONE PRSNT: CPT | Mod: ,,, | Performed by: FAMILY MEDICINE

## 2021-09-13 PROCEDURE — 99213 OFFICE O/P EST LOW 20 MIN: CPT | Mod: PN | Performed by: FAMILY MEDICINE

## 2021-09-13 PROCEDURE — 99213 OFFICE O/P EST LOW 20 MIN: CPT | Mod: ,,, | Performed by: FAMILY MEDICINE

## 2021-09-13 PROCEDURE — 3079F PR MOST RECENT DIASTOLIC BLOOD PRESSURE 80-89 MM HG: ICD-10-PCS | Mod: ,,, | Performed by: FAMILY MEDICINE

## 2021-09-13 RX ORDER — DOXYCYCLINE HYCLATE 100 MG
100 TABLET ORAL 2 TIMES DAILY
Qty: 20 TABLET | Refills: 0 | Status: SHIPPED | OUTPATIENT
Start: 2021-09-13 | End: 2021-09-23

## 2021-09-16 ENCOUNTER — DOCUMENT SCAN (OUTPATIENT)
Dept: HOME HEALTH SERVICES | Facility: HOSPITAL | Age: 71
End: 2021-09-16
Payer: MEDICARE

## 2021-10-02 PROCEDURE — G0179 MD RECERTIFICATION HHA PT: HCPCS | Mod: ,,, | Performed by: SURGERY

## 2021-10-02 PROCEDURE — G0179 PR HOME HEALTH MD RECERTIFICATION: ICD-10-PCS | Mod: ,,, | Performed by: SURGERY

## 2021-10-08 ENCOUNTER — DOCUMENT SCAN (OUTPATIENT)
Dept: HOME HEALTH SERVICES | Facility: HOSPITAL | Age: 71
End: 2021-10-08
Payer: MEDICARE

## 2021-10-11 ENCOUNTER — EXTERNAL HOME HEALTH (OUTPATIENT)
Dept: HOME HEALTH SERVICES | Facility: HOSPITAL | Age: 71
End: 2021-10-11
Payer: MEDICARE

## 2021-10-12 ENCOUNTER — TELEPHONE (OUTPATIENT)
Dept: UROLOGY | Facility: CLINIC | Age: 71
End: 2021-10-12

## 2021-10-12 ENCOUNTER — TELEPHONE (OUTPATIENT)
Dept: VASCULAR SURGERY | Facility: CLINIC | Age: 71
End: 2021-10-12

## 2021-10-16 ENCOUNTER — DOCUMENT SCAN (OUTPATIENT)
Dept: HOME HEALTH SERVICES | Facility: HOSPITAL | Age: 71
End: 2021-10-16
Payer: MEDICARE

## 2021-11-01 ENCOUNTER — OFFICE VISIT (OUTPATIENT)
Dept: WOUND CARE | Facility: HOSPITAL | Age: 71
End: 2021-11-01
Attending: FAMILY MEDICINE
Payer: MEDICARE

## 2021-11-01 VITALS
WEIGHT: 242 LBS | TEMPERATURE: 99 F | SYSTOLIC BLOOD PRESSURE: 153 MMHG | DIASTOLIC BLOOD PRESSURE: 84 MMHG | BODY MASS INDEX: 32.78 KG/M2 | HEART RATE: 64 BPM | HEIGHT: 72 IN | RESPIRATION RATE: 18 BRPM

## 2021-11-01 DIAGNOSIS — L97.812 NON-PRESSURE CHRONIC ULCER OF OTHER PART OF RIGHT LOWER LEG WITH FAT LAYER EXPOSED: Primary | ICD-10-CM

## 2021-11-01 DIAGNOSIS — I87.8 VENOUS STASIS: ICD-10-CM

## 2021-11-01 PROCEDURE — 1160F RVW MEDS BY RX/DR IN RCRD: CPT | Mod: ,,, | Performed by: FAMILY MEDICINE

## 2021-11-01 PROCEDURE — 99499 UNLISTED E&M SERVICE: CPT | Mod: ,,, | Performed by: FAMILY MEDICINE

## 2021-11-01 PROCEDURE — 3079F DIAST BP 80-89 MM HG: CPT | Mod: ,,, | Performed by: FAMILY MEDICINE

## 2021-11-01 PROCEDURE — 3077F PR MOST RECENT SYSTOLIC BLOOD PRESSURE >= 140 MM HG: ICD-10-PCS | Mod: ,,, | Performed by: FAMILY MEDICINE

## 2021-11-01 PROCEDURE — 3077F SYST BP >= 140 MM HG: CPT | Mod: ,,, | Performed by: FAMILY MEDICINE

## 2021-11-01 PROCEDURE — 1159F PR MEDICATION LIST DOCUMENTED IN MEDICAL RECORD: ICD-10-PCS | Mod: ,,, | Performed by: FAMILY MEDICINE

## 2021-11-01 PROCEDURE — 99499 NO LOS: ICD-10-PCS | Mod: ,,, | Performed by: FAMILY MEDICINE

## 2021-11-01 PROCEDURE — 1159F MED LIST DOCD IN RCRD: CPT | Mod: ,,, | Performed by: FAMILY MEDICINE

## 2021-11-01 PROCEDURE — 1126F PR PAIN SEVERITY QUANTIFIED, NO PAIN PRESENT: ICD-10-PCS | Mod: ,,, | Performed by: FAMILY MEDICINE

## 2021-11-01 PROCEDURE — 1160F PR REVIEW ALL MEDS BY PRESCRIBER/CLIN PHARMACIST DOCUMENTED: ICD-10-PCS | Mod: ,,, | Performed by: FAMILY MEDICINE

## 2021-11-01 PROCEDURE — 3008F BODY MASS INDEX DOCD: CPT | Mod: ,,, | Performed by: FAMILY MEDICINE

## 2021-11-01 PROCEDURE — 3008F PR BODY MASS INDEX (BMI) DOCUMENTED: ICD-10-PCS | Mod: ,,, | Performed by: FAMILY MEDICINE

## 2021-11-01 PROCEDURE — 99213 PR OFFICE/OUTPT VISIT, EST, LEVL III, 20-29 MIN: ICD-10-PCS | Mod: ,,, | Performed by: FAMILY MEDICINE

## 2021-11-01 PROCEDURE — 3079F PR MOST RECENT DIASTOLIC BLOOD PRESSURE 80-89 MM HG: ICD-10-PCS | Mod: ,,, | Performed by: FAMILY MEDICINE

## 2021-11-01 PROCEDURE — 99213 OFFICE O/P EST LOW 20 MIN: CPT | Mod: PN | Performed by: FAMILY MEDICINE

## 2021-11-01 PROCEDURE — 99213 OFFICE O/P EST LOW 20 MIN: CPT | Mod: ,,, | Performed by: FAMILY MEDICINE

## 2021-11-01 PROCEDURE — 1126F AMNT PAIN NOTED NONE PRSNT: CPT | Mod: ,,, | Performed by: FAMILY MEDICINE

## 2021-12-01 PROCEDURE — G0179 MD RECERTIFICATION HHA PT: HCPCS | Mod: ,,, | Performed by: SURGERY

## 2021-12-01 PROCEDURE — G0179 PR HOME HEALTH MD RECERTIFICATION: ICD-10-PCS | Mod: ,,, | Performed by: SURGERY

## 2021-12-03 ENCOUNTER — EXTERNAL HOME HEALTH (OUTPATIENT)
Dept: HOME HEALTH SERVICES | Facility: HOSPITAL | Age: 71
End: 2021-12-03
Payer: MEDICARE

## 2021-12-30 ENCOUNTER — OFFICE VISIT (OUTPATIENT)
Dept: WOUND CARE | Facility: HOSPITAL | Age: 71
End: 2021-12-30
Attending: FAMILY MEDICINE
Payer: MEDICARE

## 2021-12-30 VITALS
TEMPERATURE: 99 F | RESPIRATION RATE: 17 BRPM | DIASTOLIC BLOOD PRESSURE: 77 MMHG | HEART RATE: 93 BPM | SYSTOLIC BLOOD PRESSURE: 160 MMHG

## 2021-12-30 DIAGNOSIS — L97.812 NON-PRESSURE CHRONIC ULCER OF OTHER PART OF RIGHT LOWER LEG WITH FAT LAYER EXPOSED: Primary | ICD-10-CM

## 2021-12-30 PROCEDURE — 1160F RVW MEDS BY RX/DR IN RCRD: CPT | Mod: CPTII,,, | Performed by: FAMILY MEDICINE

## 2021-12-30 PROCEDURE — 1159F MED LIST DOCD IN RCRD: CPT | Mod: CPTII,,, | Performed by: FAMILY MEDICINE

## 2021-12-30 PROCEDURE — 1126F AMNT PAIN NOTED NONE PRSNT: CPT | Mod: CPTII,,, | Performed by: FAMILY MEDICINE

## 2021-12-30 PROCEDURE — 3077F PR MOST RECENT SYSTOLIC BLOOD PRESSURE >= 140 MM HG: ICD-10-PCS | Mod: CPTII,,, | Performed by: FAMILY MEDICINE

## 2021-12-30 PROCEDURE — 3078F PR MOST RECENT DIASTOLIC BLOOD PRESSURE < 80 MM HG: ICD-10-PCS | Mod: CPTII,,, | Performed by: FAMILY MEDICINE

## 2021-12-30 PROCEDURE — 99212 OFFICE O/P EST SF 10 MIN: CPT | Mod: ,,, | Performed by: FAMILY MEDICINE

## 2021-12-30 PROCEDURE — 99213 OFFICE O/P EST LOW 20 MIN: CPT | Performed by: FAMILY MEDICINE

## 2021-12-30 PROCEDURE — 1160F PR REVIEW ALL MEDS BY PRESCRIBER/CLIN PHARMACIST DOCUMENTED: ICD-10-PCS | Mod: CPTII,,, | Performed by: FAMILY MEDICINE

## 2021-12-30 PROCEDURE — 1126F PR PAIN SEVERITY QUANTIFIED, NO PAIN PRESENT: ICD-10-PCS | Mod: CPTII,,, | Performed by: FAMILY MEDICINE

## 2021-12-30 PROCEDURE — 1159F PR MEDICATION LIST DOCUMENTED IN MEDICAL RECORD: ICD-10-PCS | Mod: CPTII,,, | Performed by: FAMILY MEDICINE

## 2021-12-30 PROCEDURE — 99212 PR OFFICE/OUTPT VISIT, EST, LEVL II, 10-19 MIN: ICD-10-PCS | Mod: ,,, | Performed by: FAMILY MEDICINE

## 2021-12-30 PROCEDURE — 3078F DIAST BP <80 MM HG: CPT | Mod: CPTII,,, | Performed by: FAMILY MEDICINE

## 2021-12-30 PROCEDURE — 3077F SYST BP >= 140 MM HG: CPT | Mod: CPTII,,, | Performed by: FAMILY MEDICINE

## 2022-08-23 RX ORDER — TAMSULOSIN HYDROCHLORIDE 0.4 MG/1
0.8 CAPSULE ORAL DAILY
Qty: 60 CAPSULE | Refills: 0 | Status: SHIPPED | OUTPATIENT
Start: 2022-08-23 | End: 2022-09-01 | Stop reason: SDUPTHER

## 2022-08-23 NOTE — TELEPHONE ENCOUNTER
Please call and advise pt of the following:    I have sent in a 30-day supply to his pharmacy this morning regarding his Flomax (Tamsulosin) prescription.  However pt needs to schedule an annual visit in order to get anymore prescriptions in the future.  Has not been evaluated in one year and cancelled 2 scheduled office visits with Dr. Chahal.    Thanks.

## 2022-09-01 ENCOUNTER — OFFICE VISIT (OUTPATIENT)
Dept: UROLOGY | Facility: CLINIC | Age: 72
End: 2022-09-01
Payer: MEDICARE

## 2022-09-01 VITALS
HEIGHT: 72 IN | HEART RATE: 65 BPM | WEIGHT: 242.06 LBS | DIASTOLIC BLOOD PRESSURE: 74 MMHG | SYSTOLIC BLOOD PRESSURE: 151 MMHG | BODY MASS INDEX: 32.79 KG/M2

## 2022-09-01 DIAGNOSIS — N40.1 BENIGN PROSTATIC HYPERPLASIA WITH LOWER URINARY TRACT SYMPTOMS, SYMPTOM DETAILS UNSPECIFIED: Primary | ICD-10-CM

## 2022-09-01 DIAGNOSIS — N39.0 URINARY TRACT INFECTION WITHOUT HEMATURIA, SITE UNSPECIFIED: ICD-10-CM

## 2022-09-01 LAB
BILIRUB SERPL-MCNC: ABNORMAL MG/DL
BLOOD URINE, POC: ABNORMAL
CLARITY, POC UA: ABNORMAL
COLOR, POC UA: YELLOW
GLUCOSE UR QL STRIP: ABNORMAL
KETONES UR QL STRIP: ABNORMAL
LEUKOCYTE ESTERASE URINE, POC: ABNORMAL
NITRITE, POC UA: POSITIVE
PH, POC UA: 8.5
POC RESIDUAL URINE VOLUME: 0 ML (ref 0–100)
PROTEIN, POC: 100
SPECIFIC GRAVITY, POC UA: 1.01
UROBILINOGEN, POC UA: 1

## 2022-09-01 PROCEDURE — 87086 URINE CULTURE/COLONY COUNT: CPT | Performed by: NURSE PRACTITIONER

## 2022-09-01 PROCEDURE — 1159F PR MEDICATION LIST DOCUMENTED IN MEDICAL RECORD: ICD-10-PCS | Mod: CPTII,S$GLB,, | Performed by: NURSE PRACTITIONER

## 2022-09-01 PROCEDURE — 99999 PR PBB SHADOW E&M-EST. PATIENT-LVL III: ICD-10-PCS | Mod: PBBFAC,,, | Performed by: NURSE PRACTITIONER

## 2022-09-01 PROCEDURE — 87088 URINE BACTERIA CULTURE: CPT | Performed by: NURSE PRACTITIONER

## 2022-09-01 PROCEDURE — 99214 OFFICE O/P EST MOD 30 MIN: CPT | Mod: S$GLB,,, | Performed by: NURSE PRACTITIONER

## 2022-09-01 PROCEDURE — 51798 POCT BLADDER SCAN: ICD-10-PCS | Mod: S$GLB,,, | Performed by: NURSE PRACTITIONER

## 2022-09-01 PROCEDURE — 81002 POCT URINE DIPSTICK WITHOUT MICROSCOPE: ICD-10-PCS | Mod: S$GLB,,, | Performed by: NURSE PRACTITIONER

## 2022-09-01 PROCEDURE — 51798 US URINE CAPACITY MEASURE: CPT | Mod: S$GLB,,, | Performed by: NURSE PRACTITIONER

## 2022-09-01 PROCEDURE — 3078F DIAST BP <80 MM HG: CPT | Mod: CPTII,S$GLB,, | Performed by: NURSE PRACTITIONER

## 2022-09-01 PROCEDURE — 99214 PR OFFICE/OUTPT VISIT, EST, LEVL IV, 30-39 MIN: ICD-10-PCS | Mod: S$GLB,,, | Performed by: NURSE PRACTITIONER

## 2022-09-01 PROCEDURE — 3008F PR BODY MASS INDEX (BMI) DOCUMENTED: ICD-10-PCS | Mod: CPTII,S$GLB,, | Performed by: NURSE PRACTITIONER

## 2022-09-01 PROCEDURE — 3078F PR MOST RECENT DIASTOLIC BLOOD PRESSURE < 80 MM HG: ICD-10-PCS | Mod: CPTII,S$GLB,, | Performed by: NURSE PRACTITIONER

## 2022-09-01 PROCEDURE — 1160F RVW MEDS BY RX/DR IN RCRD: CPT | Mod: CPTII,S$GLB,, | Performed by: NURSE PRACTITIONER

## 2022-09-01 PROCEDURE — 99999 PR PBB SHADOW E&M-EST. PATIENT-LVL III: CPT | Mod: PBBFAC,,, | Performed by: NURSE PRACTITIONER

## 2022-09-01 PROCEDURE — 3077F PR MOST RECENT SYSTOLIC BLOOD PRESSURE >= 140 MM HG: ICD-10-PCS | Mod: CPTII,S$GLB,, | Performed by: NURSE PRACTITIONER

## 2022-09-01 PROCEDURE — 81002 URINALYSIS NONAUTO W/O SCOPE: CPT | Mod: S$GLB,,, | Performed by: NURSE PRACTITIONER

## 2022-09-01 PROCEDURE — 1159F MED LIST DOCD IN RCRD: CPT | Mod: CPTII,S$GLB,, | Performed by: NURSE PRACTITIONER

## 2022-09-01 PROCEDURE — 3008F BODY MASS INDEX DOCD: CPT | Mod: CPTII,S$GLB,, | Performed by: NURSE PRACTITIONER

## 2022-09-01 PROCEDURE — 3077F SYST BP >= 140 MM HG: CPT | Mod: CPTII,S$GLB,, | Performed by: NURSE PRACTITIONER

## 2022-09-01 PROCEDURE — 1160F PR REVIEW ALL MEDS BY PRESCRIBER/CLIN PHARMACIST DOCUMENTED: ICD-10-PCS | Mod: CPTII,S$GLB,, | Performed by: NURSE PRACTITIONER

## 2022-09-01 RX ORDER — DOXYCYCLINE 100 MG/1
100 CAPSULE ORAL 2 TIMES DAILY
Qty: 42 CAPSULE | Refills: 0 | Status: SHIPPED | OUTPATIENT
Start: 2022-09-01 | End: 2022-09-22

## 2022-09-01 RX ORDER — FINASTERIDE 5 MG/1
5 TABLET, FILM COATED ORAL DAILY
Qty: 90 TABLET | Refills: 4 | Status: SHIPPED | OUTPATIENT
Start: 2022-09-01 | End: 2022-09-21

## 2022-09-01 RX ORDER — TAMSULOSIN HYDROCHLORIDE 0.4 MG/1
0.8 CAPSULE ORAL DAILY
Qty: 180 CAPSULE | Refills: 4 | Status: SHIPPED | OUTPATIENT
Start: 2022-09-01 | End: 2024-03-26

## 2022-09-01 RX ORDER — MECLIZINE HYDROCHLORIDE 25 MG/1
25 TABLET ORAL 3 TIMES DAILY PRN
COMMUNITY
Start: 2022-08-24

## 2022-09-01 NOTE — PROGRESS NOTES
[facility] Note  Staff: PIETER Rahman-ISMAEL    PCP: MD Abiel    Chief Complaint: Annual F/UP    Subjective:        HPI: Ezequiel Escudero is a 71 y.o. male presents today for annual follow-up.  Pt with hx of BPH with LUTS, Hx of GH, renal cysts.    Pt was last evaluated by me on 8/26/2021 for intermittent GH x several weeks prior to last office visit.  After last ov with me pt was scheduled to f/up and establish with Dr. Dolly Chahal for further intervention, but all appointments were cancelled by pt.    The pt was last evaluated by Dr. Flores on 3/9/2021.  This 70-year-old male returns routine recheck.  He had undergone cystoscopy in 03/01/2021 for history of urinary retention, BPH and questionable right renal pelvic cyst versus lesion which proved to be a renal cyst.  He was found have trilobar prostatic hyperplasia and evidence of having undergone a TURP SLV in the past (2012) which he had undergone approximately 6 years ago by Dr. Sellers.  It appears he has a regrowth of the BPH as of last procedure noted by MD.     Current  meds:   Uroxatral 10 mg one tablet daily.   Pt does take Xarelto 20 mg daily for hx atrial fib.    OV 08/2021:  PVR by bladder scan performed:  470 mL*  Pt refused to be cath'd during OV.  Benefits and risks in regards to this were reviewed with pt during ov today and he accepts the risks in regards to not having catheter placed at this time.     Past Imaging:  CT Abdomen Pelvis with and without Contrast  2/10/2021:  IMPRESSION:  1. Two simple cysts in the right kidney, including a large lesion correlating to findings on recent ultrasound.  2. Atrophy of the left kidney with Bosniak class 2 cyst at the midpole.  3. Urinary bladder wall thickening could relate to under distension or cystitis.  4. Prostatomegaly.  Correlate with PSA.  5. Cholelithiasis.     TODAY:  UA in office showed abnormal findings.  PVR today was 0 mL  Pt denies gross hematuria/dysuria  at this time.      PMHx:  Past Medical History:   Diagnosis Date    A-fib     Anticoagulant long-term use     BPH (benign prostatic hyperplasia)     Rojas catheter in place     Kidney stone     PONV (postoperative nausea and vomiting)     Thyroid disease     Wears glasses      PSHx:  Past Surgical History:   Procedure Laterality Date    ANKLE DEBRIDEMENT      CARDIOVERSION      CYSTOSCOPY W/ RETROGRADES Bilateral 3/1/2021    Procedure: CYSTOSCOPY, WITH RETROGRADE PYELOGRAM;  Surgeon: Lior Flores MD;  Location: [facility] OR;  Service: Urology;  Laterality: Bilateral;    CYSTOSCOPY WITH BIOPSY OF BLADDER N/A 3/1/2021    Procedure: CYSTOSCOPY, WITH BLADDER BIOPSY, WITH FULGURATION IF INDICATED;  Surgeon: Lior Flores MD;  Location: [facility] OR;  Service: Urology;  Laterality: N/A;    DEBRIDEMENT OF LOWER EXTREMITY Right 2/1/2021    Procedure: DEBRIDEMENT, LOWER EXTREMITY;  Surgeon: Pete Cardenas MD;  Location: [facility] OR;  Service: General;  Laterality: Right;    INCISION AND DRAINAGE Right 2/1/2021    Procedure: Incision and Drainage;  Surgeon: Pete Cardenas MD;  Location: [facility] OR;  Service: General;  Laterality: Right;    THYROIDECTOMY  1970's    TONSILLECTOMY      TONSILLECTOMY, ADENOIDECTOMY       Allergies:  Patient has no known allergies.    Medications: reviewed   Objective:     Vitals:    09/01/22 1324   BP: (!) 151/74   Pulse: 65     General:WDWN in NAD  Eyes: PERRLA, normal conjunctiva  Respiratory: no increased work on breathing, clear to auscultation  Cardiovascular: regular rate and rhythm. No obvious extremity edema.  GI: palpation of masses. No tenderness. No hepatosplenomegaly to palpation.  Musculoskeletal: normal range of motion of bilateral upper extremities. Normal muscle strength and tone.  Skin: no obvious rashes or lesions. No tightening ofrinter skin noted.  Neurologic: CN grossly normal. Normal sensation.   Psychiatric: awake, alert and oriented x 3. Mood and affect normal.  Cooperative.    Assessment:       1. Benign prostatic hyperplasia with lower urinary tract symptoms, symptom details unspecified    2. Urinary tract infection without hematuria, site unspecified          Plan:   BPH with LUTS; UTI-abnormal urine today:    Urine culture to be processed today.  In the meantime will prescribe Vibra-tabs 100 mg BID.  Pt will continue to take Finasteride 5 mg daily in the am and Flomax 0.8 mg daily in the pm.    Discussed conservative measures to control urgency and frequency including avoiding bladder irritants, timed voiding, not postponing voiding, and bowel regimen (as distended bowel has extrinsic compressive effect on bladder. Discussed bladder irritants include coffe (even decaf), tea, alcohol, soda, spicy foods, acidic juices (orange, tomato), vinegar, and artificial sweeteners.     F/u-Our office will contact this pt after we receive and review ALL results to determine best POC for this patient.  Pt verbalized understanding at conclusion of appointment today.  Otherwise f/up in one year for recheck annual.    Vivian Fulton, JOSE LUISC

## 2022-09-03 LAB — BACTERIA UR CULT: ABNORMAL

## 2022-10-11 LAB — PSA SERPL-MCNC: 0.9 NG/ML (ref 0–4)

## 2022-10-25 ENCOUNTER — CLINICAL SUPPORT (OUTPATIENT)
Dept: UROLOGY | Facility: CLINIC | Age: 72
End: 2022-10-25
Payer: MEDICARE

## 2022-10-25 DIAGNOSIS — N39.0 URINARY TRACT INFECTION WITHOUT HEMATURIA, SITE UNSPECIFIED: Primary | ICD-10-CM

## 2022-10-25 PROCEDURE — 99499 NO LOS: ICD-10-PCS | Mod: S$GLB,,, | Performed by: NURSE PRACTITIONER

## 2022-10-25 PROCEDURE — 99499 UNLISTED E&M SERVICE: CPT | Mod: S$GLB,,, | Performed by: NURSE PRACTITIONER

## 2022-10-25 PROCEDURE — 87086 URINE CULTURE/COLONY COUNT: CPT | Performed by: NURSE PRACTITIONER

## 2022-10-25 NOTE — PROGRESS NOTES
Pt arrived in clinic for repeat culture.  Pt voided 75ml clear yellow urine in specimen cup. Specimen cup labeled and sent to lab for testing. Pt left clinic in satisfactory condition.

## 2022-10-26 LAB — BACTERIA UR CULT: NORMAL

## 2023-03-25 ENCOUNTER — HOSPITAL ENCOUNTER (EMERGENCY)
Facility: HOSPITAL | Age: 73
Discharge: HOME | End: 2023-03-25
Attending: EMERGENCY MEDICINE
Payer: MEDICARE

## 2023-03-25 VITALS
BODY MASS INDEX: 32.78 KG/M2 | HEIGHT: 72 IN | SYSTOLIC BLOOD PRESSURE: 126 MMHG | RESPIRATION RATE: 20 BRPM | DIASTOLIC BLOOD PRESSURE: 79 MMHG | TEMPERATURE: 98 F | HEART RATE: 85 BPM | WEIGHT: 242 LBS | OXYGEN SATURATION: 95 %

## 2023-03-25 DIAGNOSIS — I87.2 VENOUS STASIS ULCER OF RIGHT CALF WITH NECROSIS OF MUSCLE WITHOUT VARICOSE VEINS: ICD-10-CM
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
## 2023-03-25 DIAGNOSIS — I87.2 VENOUS STASIS DERMATITIS OF BOTH LOWER EXTREMITIES: Primary | ICD-10-CM

## 2023-03-25 DIAGNOSIS — L97.209 VENOUS STASIS ULCER OF CALF, UNSPECIFIED LATERALITY, UNSPECIFIED ULCER STAGE, UNSPECIFIED WHETHER VARICOSE VEINS PRESENT: ICD-10-CM

## 2023-03-25 DIAGNOSIS — I83.002 VENOUS STASIS ULCER OF CALF, UNSPECIFIED LATERALITY, UNSPECIFIED ULCER STAGE, UNSPECIFIED WHETHER VARICOSE VEINS PRESENT: ICD-10-CM

## 2023-03-25 DIAGNOSIS — I87.8 VENOUS STASIS: ICD-10-CM

## 2023-03-25 DIAGNOSIS — L97.213 VENOUS STASIS ULCER OF RIGHT CALF WITH NECROSIS OF MUSCLE WITHOUT VARICOSE VEINS: ICD-10-CM

## 2023-03-25 PROCEDURE — 99284 EMERGENCY DEPT VISIT MOD MDM: CPT

## 2023-03-25 NOTE — PLAN OF CARE
In basket message sent to Dr Chaidez's staff requesting appt.  Pt was referred to [facility] NP at home and outpatient CM.  Pt was accepted with [facility] for home health.   CM to inform pt of above and sign off

## 2023-03-25 NOTE — ED PROVIDER NOTES
Encounter Date: 3/25/2023    SCRIBE #1 NOTE: IPrice am scribing for, and in the presence of,  Quoc Parmar MD.     History     Chief Complaint   Patient presents with    Fall    Cellulitis     Time seen by provider: 10:40 AM on 03/25/2023    Ezequiel Escudero is a 72 y.o. male who presents to the ED via EMS with an onset of ulcers and skin loss on the legs since 2 weeks ago and instability of his right hip PTA. The patient states that he was standing while brushing his teeth when he felt instability in his right hip. The patient reports sitting down before any fall could happen. The patient states his right leg is sore. The patient denies any injuries. The patient reports having blisters on his legs. The patient describes nonpainful raw tissue being exposed when the blisters pop. The patient last saw Gilberto Chaidez DO for wound care on 12/20/2021. The patient reports always having redness in his legs. The patient states he can eat and drink normally. The patient denies pain, fever, N/V, SOB, abdominal pain, or any other symptoms at this time. The patient has a PMHx of venous stasis dermatitis, frequent falls, venous ulcer, chronic A-fib, chronic kidney disease, and HTN. The patient has a PSHx of ankle debridement and debridement of right lower extremity.    The history is provided by the patient.   Review of patient's allergies indicates:  No Known Allergies  Past Medical History:   Diagnosis Date    A-fib     Anticoagulant long-term use     BPH (benign prostatic hyperplasia)     Rojas catheter in place     Kidney stone     PONV (postoperative nausea and vomiting)     Thyroid disease     Wears glasses      Past Surgical History:   Procedure Laterality Date    ANKLE DEBRIDEMENT      CARDIOVERSION      CYSTOSCOPY W/ RETROGRADES Bilateral 3/1/2021    Procedure: CYSTOSCOPY, WITH RETROGRADE PYELOGRAM;  Surgeon: Lior Flores MD;  [facility]  Service: Urology;  Laterality: Bilateral;     CYSTOSCOPY WITH BIOPSY OF BLADDER N/A 3/1/2021    Procedure: CYSTOSCOPY, WITH BLADDER BIOPSY, WITH FULGURATION IF INDICATED;  Surgeon: Lior Flores MD;  Location: [facility] OR;  Service: Urology;  Laterality: N/A;    DEBRIDEMENT OF LOWER EXTREMITY Right 2/1/2021    Procedure: DEBRIDEMENT, LOWER EXTREMITY;  Surgeon: Pete Cardenas MD;  Location: [facility] OR;  Service: General;  Laterality: Right;    INCISION AND DRAINAGE Right 2/1/2021    Procedure: Incision and Drainage;  Surgeon: Pete Cardenas MD;  Location: [facility] OR;  Service: General;  Laterality: Right;    THYROIDECTOMY  1970's    TONSILLECTOMY      TONSILLECTOMY, ADENOIDECTOMY       Family History   Problem Relation Age of Onset    Benign prostatic hyperplasia Brother     Heart disease Mother     Heart disease Father     Kidney cancer Neg Hx     Prostate cancer Neg Hx     Urolithiasis Neg Hx      Social History     Tobacco Use    Smoking status: Never    Smokeless tobacco: Never   Substance Use Topics    Alcohol use: Yes     Comment: OCC    Drug use: No     Review of Systems   Constitutional:  Negative for fever.   HENT:  Negative for sore throat.    Respiratory:  Negative for shortness of breath.    Cardiovascular:  Negative for chest pain.   Gastrointestinal:  Negative for abdominal pain, nausea and vomiting.   Genitourinary:  Negative for dysuria.   Musculoskeletal:  Negative for back pain.        Positive for instability of right hip.   Skin:  Positive for color change (redness of legs) and wound (blisters to legs). Negative for rash.   Neurological:  Negative for weakness.   Hematological:  Does not bruise/bleed easily.     Physical Exam     Initial Vitals [03/25/23 1037]   BP Pulse Resp Temp SpO2   (!) 154/74 93 20 97.7 °F (36.5 °C) 96 %      MAP       --         Physical Exam    Nursing note and vitals reviewed.  Constitutional: He appears well-developed and well-nourished. He is not diaphoretic. No distress.   HENT:   Head: Normocephalic and atraumatic.    Mouth/Throat: Oropharynx is clear and moist.   Eyes: Conjunctivae are normal.   Neck: Neck supple.   Cardiovascular:  Normal rate, regular rhythm, normal heart sounds and intact distal pulses.     Exam reveals no gallop and no friction rub.       No murmur heard.  Pulses:       Dorsalis pedis pulses are 1+ on the right side and 1+ on the left side.   Pulmonary/Chest: Breath sounds normal. He has no wheezes. He has no rhonchi. He has no rales.   Abdominal: Abdomen is soft. He exhibits no distension. There is no abdominal tenderness.   Musculoskeletal:         General: Normal range of motion.      Cervical back: Neck supple.      Right lower leg: No tenderness. 1+ Pitting Edema present.      Left lower leg: No tenderness. 1+ Pitting Edema present.      Comments: Both legs are beefy and non tender with erythema. No leg tenderness to palpation. Large superficial ulcers to anterior tibial regions as shown in the images.     Neurological: He is alert and oriented to person, place, and time. He has normal strength.   5/5 strength of legs distally.   Skin: No rash noted. No erythema.                 ED Course   Procedures  Labs Reviewed - No data to display       Imaging Results              X-Ray Hip 2 or 3 views Right (with Pelvis when performed) (Final result)  Result time 03/25/23 11:38:42      Final result by Ezequiel Sher Jr., MD (03/25/23 11:38:42)                   Impression:      Status post bilateral hip arthroplasties.  On the right fracture dislocation or lucency around the prosthesis is not seen.      Electronically signed by: Ezequiel Sher MD  Date:    03/25/2023  Time:    11:38               Narrative:    EXAMINATION:  XR HIP WITH PELVIS WHEN PERFORMED, 2 OR 3  VIEWS RIGHT    CLINICAL HISTORY:  R leg pain;    TECHNIQUE:  AP view of the pelvis and frog leg lateral view of the right hip were performed.    COMPARISON:  None    FINDINGS:  The patient has undergone a right hip arthroplasty with metallic  acetabular and femoral components in position.  Fracture or dislocation is not seen.  Lucency around the prosthesis consistent with osteomyelitis or loosening is not seen.  The patient has also had a left hip arthroplasty without lucency around the prosthesis.                                  (Rad read)     Medications - No data to display  Medical Decision Making:   History:   Old Medical Records: I decided to obtain old medical records.  Clinical Tests:   Radiological Study: Ordered and Reviewed        Scribe Attestation:   Scribe #1: I performed the above scribed service and the documentation accurately describes the services I performed. I attest to the accuracy of the note.                 I, Dr. Quoc Parmar, personally performed the services described in this documentation. All medical record entries made by the scribe were at my direction and in my presence.  I have reviewed the chart and agree that the record reflects my personal performance and is accurate and complete. Quoc Parmar MD.  1:25 PM 03/25/2023    Ezequiel Escudero is a 72 y.o. male presenting with chronic venous stasis dermatitis with recurrent venous stasis ulcers for several weeks.  I do think the patient would benefit from reinitiating home health and wound care.  Referrals for both placed.  I have very low suspicion for cellulitis.  I do not think antibiotics are indicated.  I doubt systemic infection such as sepsis.  I do not think other ED diagnostic studies are indicated.  In regard to fall, he sat himself down on the ground with x-ray requested as this is something previously planned prior to orthopedic follow-up.  This was performed as requested.  I have low suspicion for fracture or dislocation with normal appearing x-ray.  I do not think further CT or MRI are indicated.  I doubt other acute medical process requiring separate ED workup.  He does have chronic functional disability with PCP follow-up in conjunction with home  health discussed in detail with patient.  Detailed return precautions reviewed.      Clinical Impression:   Final diagnoses:  [I87.2] Venous stasis dermatitis of both lower extremities (Primary)  [I83.002, L97.209] Venous stasis ulcer of calf, unspecified laterality, unspecified ulcer stage, unspecified whether varicose veins present  [W19.XXXA] Fall, initial encounter  [I87.2, L97.213] Venous stasis ulcer of right calf with necrosis of muscle without varicose veins        ED Disposition Condition    Discharge Stable          ED Prescriptions    None       Follow-up Information       Follow up With Specialties Details Why Contact Info    Gilberto Chaidez, DO Wound Care  Wound care, 1 week [address] [facility]  [address]  [facility] [address]  [phone]      Santiago Beebe MD Cardiology In 1 week  [address]  [facility]  [address]  [facility] [address]  [phone]               Quoc Parmar MD  03/25/23 6288

## 2023-03-25 NOTE — PLAN OF CARE
[facility] Emergency Dept  Initial Discharge Assessment    CM met with pt at bedside per ED request. Pt currently lives alone and has been frequently falling. DME listed below. Pt reports he does not need any DME. Pt asked for HH. 1st choice [facility]. Pt reports he sees Dr Chaidez.        Payor: [employer] MEDICARE / [facility] / Product Type: Medicare Advantage /     Extended Emergency Contact Information  Primary Emergency Contact: Quoc Escudero  Mobile Phone: [phone]  Relation: Son  Preferred language: English   needed? No  Secondary Emergency Contact: Syd Costello  Mobile Phone: [phone]  Relation: Friend  Preferred language: English   needed? No    Discharge Plan A: Home Health  Discharge Plan B: Home with family      [facility] [address] [place] [address] [address]  [address]  [facility] [address]  Phone: [phone] Fax: [phone]      Initial Assessment (most recent)       Adult Discharge Assessment - 03/25/23 1331          Discharge Assessment    Assessment Type Discharge Planning Brief Assessment     Confirmed/corrected address, phone number and insurance Yes     Confirmed Demographics Correct on Facesheet     Source of Information patient     Do you expect to return to your current living situation? Yes     Prior to hospitilization cognitive status: Alert/Oriented     Current cognitive status: Alert/Oriented     Walking or Climbing Stairs ambulation difficulty, requires equipment     Dressing/Bathing bathing difficulty, requires equipment     Equipment Currently Used at Home wheelchair;bedside commode;shower chair;walker, rolling     Readmission within 30 days? No     Patient currently being followed by outpatient case management? No     Do you currently have service(s) that help you manage your care at home? No     Do you take prescription medications? Yes     Do you have prescription coverage? Yes     Do you have any problems  affording any of your prescribed medications? No     Is the patient taking medications as prescribed? yes     Are you on dialysis? No     Do you take coumadin? No     Discharge Plan A Home Health     Discharge Plan B Home with family     DME Needed Upon Discharge  none     Discharge Plan discussed with: Patient

## 2023-03-25 NOTE — DISCHARGE INSTRUCTIONS
You have venous stasis dermatitis with recurrent venous stasis ulcers with follow-up with wound care strongly recommended. As we discussed, return to the emergency department for new or worsening symptoms including fever, new leg pain or swelling, or vomiting.

## 2023-03-25 NOTE — PLAN OF CARE
Referral sent to [facility] for home health       03/25/23 7595   Post-Acute Status   Post-Acute Authorization Home Health   Home Health Status Referrals Sent

## 2023-03-28 ENCOUNTER — HOSPITAL ENCOUNTER (INPATIENT)
Facility: HOSPITAL | Age: 73
LOS: 4 days | Discharge: HOME HEALTH | DRG: 354 | End: 2023-04-01
Attending: EMERGENCY MEDICINE | Admitting: HOSPITALIST
Payer: MEDICARE

## 2023-03-28 DIAGNOSIS — R33.9 URINARY RETENTION: ICD-10-CM

## 2023-03-28 DIAGNOSIS — K42.0 INCARCERATED UMBILICAL HERNIA: Primary | ICD-10-CM

## 2023-03-28 DIAGNOSIS — I48.91 ATRIAL FIBRILLATION: ICD-10-CM

## 2023-03-28 DIAGNOSIS — L08.9 WOUND INFECTION: ICD-10-CM

## 2023-03-28 DIAGNOSIS — M79.89 SWELLING OF LOWER EXTREMITY: ICD-10-CM

## 2023-03-28 DIAGNOSIS — T14.8XXA WOUND INFECTION: ICD-10-CM

## 2023-03-28 DIAGNOSIS — R07.9 CHEST PAIN: ICD-10-CM

## 2023-03-28 DIAGNOSIS — N30.01 ACUTE CYSTITIS WITH HEMATURIA: ICD-10-CM

## 2023-03-28 PROBLEM — R53.81 PHYSICAL DEBILITY: Status: ACTIVE | Noted: 2023-03-28

## 2023-03-28 PROBLEM — L03.311 CELLULITIS OF ABDOMINAL WALL: Status: ACTIVE | Noted: 2023-03-28

## 2023-03-28 PROBLEM — N29 NEPHROCALCINOSIS: Status: ACTIVE | Noted: 2023-03-28

## 2023-03-28 PROBLEM — B37.2 CUTANEOUS CANDIDIASIS: Status: ACTIVE | Noted: 2023-03-28

## 2023-03-28 PROBLEM — I10 HYPERTENSION: Status: ACTIVE | Noted: 2023-03-28

## 2023-03-28 PROBLEM — E66.9 OBESITY (BMI 30-39.9): Status: ACTIVE | Noted: 2023-03-28

## 2023-03-28 PROBLEM — L97.922 CHRONIC ULCER OF LEFT LEG WITH FAT LAYER EXPOSED: Status: ACTIVE | Noted: 2021-09-13

## 2023-03-28 PROBLEM — R73.09 ELEVATED GLUCOSE: Status: ACTIVE | Noted: 2023-03-28

## 2023-03-28 PROBLEM — E83.59 NEPHROCALCINOSIS: Status: ACTIVE | Noted: 2023-03-28

## 2023-03-28 PROBLEM — R79.9 ELEVATED BUN: Status: ACTIVE | Noted: 2023-03-28

## 2023-03-28 LAB
ALBUMIN SERPL BCP-MCNC: 2.9 G/DL (ref 3.5–5.2)
ALP SERPL-CCNC: 122 U/L (ref 55–135)
ALT SERPL W/O P-5'-P-CCNC: 27 U/L (ref 10–44)
ANION GAP SERPL CALC-SCNC: 9 MMOL/L (ref 8–16)
AST SERPL-CCNC: 28 U/L (ref 10–40)
BACTERIA #/AREA URNS HPF: ABNORMAL /HPF
BASOPHILS # BLD AUTO: 0.08 K/UL (ref 0–0.2)
BASOPHILS NFR BLD: 0.7 % (ref 0–1.9)
BILIRUB SERPL-MCNC: 1 MG/DL (ref 0.1–1)
BILIRUB UR QL STRIP: NEGATIVE
BNP SERPL-MCNC: 23 PG/ML (ref 0–99)
BUN SERPL-MCNC: 66 MG/DL (ref 8–23)
CALCIUM SERPL-MCNC: 8.9 MG/DL (ref 8.7–10.5)
CHLORIDE SERPL-SCNC: 104 MMOL/L (ref 95–110)
CLARITY UR: CLEAR
CO2 SERPL-SCNC: 21 MMOL/L (ref 23–29)
COLOR UR: YELLOW
CREAT SERPL-MCNC: 1.3 MG/DL (ref 0.5–1.4)
CREAT SERPL-MCNC: 1.5 MG/DL (ref 0.5–1.4)
CRP SERPL-MCNC: 8.68 MG/DL
DIFFERENTIAL METHOD: ABNORMAL
EOSINOPHIL # BLD AUTO: 0.3 K/UL (ref 0–0.5)
EOSINOPHIL NFR BLD: 3.1 % (ref 0–8)
ERYTHROCYTE [DISTWIDTH] IN BLOOD BY AUTOMATED COUNT: 16 % (ref 11.5–14.5)
ERYTHROCYTE [SEDIMENTATION RATE] IN BLOOD BY WESTERGREN METHOD: 49 MM/HR (ref 0–10)
EST. GFR  (NO RACE VARIABLE): 59.1 ML/MIN/1.73 M^2
GLUCOSE SERPL-MCNC: 111 MG/DL (ref 70–110)
GLUCOSE UR QL STRIP: NEGATIVE
HCT VFR BLD AUTO: 51.9 % (ref 40–54)
HGB BLD-MCNC: 17 G/DL (ref 14–18)
HGB UR QL STRIP: ABNORMAL
HYALINE CASTS #/AREA URNS LPF: 1 /LPF
IMM GRANULOCYTES # BLD AUTO: 0.14 K/UL (ref 0–0.04)
IMM GRANULOCYTES NFR BLD AUTO: 1.3 % (ref 0–0.5)
KETONES UR QL STRIP: NEGATIVE
LACTATE SERPL-SCNC: 1.5 MMOL/L (ref 0.5–1.9)
LEUKOCYTE ESTERASE UR QL STRIP: ABNORMAL
LIPASE SERPL-CCNC: 41 U/L (ref 4–60)
LYMPHOCYTES # BLD AUTO: 0.9 K/UL (ref 1–4.8)
LYMPHOCYTES NFR BLD: 8.1 % (ref 18–48)
MAGNESIUM SERPL-MCNC: 2.4 MG/DL (ref 1.6–2.6)
MCH RBC QN AUTO: 28.9 PG (ref 27–31)
MCHC RBC AUTO-ENTMCNC: 32.8 G/DL (ref 32–36)
MCV RBC AUTO: 88 FL (ref 82–98)
MICROSCOPIC COMMENT: ABNORMAL
MONOCYTES # BLD AUTO: 1.3 K/UL (ref 0.3–1)
MONOCYTES NFR BLD: 11.6 % (ref 4–15)
NEUTROPHILS # BLD AUTO: 8.1 K/UL (ref 1.8–7.7)
NEUTROPHILS NFR BLD: 75.2 % (ref 38–73)
NITRITE UR QL STRIP: NEGATIVE
NRBC BLD-RTO: 0 /100 WBC
PH UR STRIP: 6 [PH] (ref 5–8)
PHOSPHATE SERPL-MCNC: 3.7 MG/DL (ref 2.7–4.5)
PLATELET # BLD AUTO: 362 K/UL (ref 150–450)
PMV BLD AUTO: 9.4 FL (ref 9.2–12.9)
POTASSIUM SERPL-SCNC: 4.9 MMOL/L (ref 3.5–5.1)
PROT SERPL-MCNC: 7.1 G/DL (ref 6–8.4)
PROT UR QL STRIP: ABNORMAL
RBC # BLD AUTO: 5.88 M/UL (ref 4.6–6.2)
RBC #/AREA URNS HPF: 4 /HPF (ref 0–4)
SAMPLE: ABNORMAL
SODIUM SERPL-SCNC: 134 MMOL/L (ref 136–145)
SP GR UR STRIP: 1.02 (ref 1–1.03)
SQUAMOUS #/AREA URNS HPF: 0 /HPF
URN SPEC COLLECT METH UR: ABNORMAL
UROBILINOGEN UR STRIP-ACNC: NEGATIVE EU/DL
WBC # BLD AUTO: 10.8 K/UL (ref 3.9–12.7)
WBC #/AREA URNS HPF: 86 /HPF (ref 0–5)

## 2023-03-28 PROCEDURE — 36415 COLL VENOUS BLD VENIPUNCTURE: CPT | Performed by: HOSPITALIST

## 2023-03-28 PROCEDURE — 81001 URINALYSIS AUTO W/SCOPE: CPT | Performed by: NURSE PRACTITIONER

## 2023-03-28 PROCEDURE — 83880 ASSAY OF NATRIURETIC PEPTIDE: CPT | Performed by: STUDENT IN AN ORGANIZED HEALTH CARE EDUCATION/TRAINING PROGRAM

## 2023-03-28 PROCEDURE — 99285 EMERGENCY DEPT VISIT HI MDM: CPT | Mod: 25

## 2023-03-28 PROCEDURE — 85025 COMPLETE CBC W/AUTO DIFF WBC: CPT | Performed by: STUDENT IN AN ORGANIZED HEALTH CARE EDUCATION/TRAINING PROGRAM

## 2023-03-28 PROCEDURE — 90471 IMMUNIZATION ADMIN: CPT | Performed by: EMERGENCY MEDICINE

## 2023-03-28 PROCEDURE — 12000002 HC ACUTE/MED SURGE SEMI-PRIVATE ROOM

## 2023-03-28 PROCEDURE — 63600175 PHARM REV CODE 636 W HCPCS: Performed by: NURSE PRACTITIONER

## 2023-03-28 PROCEDURE — 25500020 PHARM REV CODE 255: Performed by: EMERGENCY MEDICINE

## 2023-03-28 PROCEDURE — 87086 URINE CULTURE/COLONY COUNT: CPT | Performed by: NURSE PRACTITIONER

## 2023-03-28 PROCEDURE — 80053 COMPREHEN METABOLIC PANEL: CPT | Performed by: STUDENT IN AN ORGANIZED HEALTH CARE EDUCATION/TRAINING PROGRAM

## 2023-03-28 PROCEDURE — 83605 ASSAY OF LACTIC ACID: CPT | Performed by: STUDENT IN AN ORGANIZED HEALTH CARE EDUCATION/TRAINING PROGRAM

## 2023-03-28 PROCEDURE — 83690 ASSAY OF LIPASE: CPT | Performed by: STUDENT IN AN ORGANIZED HEALTH CARE EDUCATION/TRAINING PROGRAM

## 2023-03-28 PROCEDURE — 96365 THER/PROPH/DIAG IV INF INIT: CPT

## 2023-03-28 PROCEDURE — 51702 INSERT TEMP BLADDER CATH: CPT

## 2023-03-28 PROCEDURE — 84100 ASSAY OF PHOSPHORUS: CPT | Performed by: HOSPITALIST

## 2023-03-28 PROCEDURE — 85651 RBC SED RATE NONAUTOMATED: CPT | Performed by: STUDENT IN AN ORGANIZED HEALTH CARE EDUCATION/TRAINING PROGRAM

## 2023-03-28 PROCEDURE — 25000003 PHARM REV CODE 250: Performed by: EMERGENCY MEDICINE

## 2023-03-28 PROCEDURE — 83735 ASSAY OF MAGNESIUM: CPT | Performed by: HOSPITALIST

## 2023-03-28 PROCEDURE — 90714 TD VACC NO PRESV 7 YRS+ IM: CPT | Performed by: EMERGENCY MEDICINE

## 2023-03-28 PROCEDURE — 87186 SC STD MICRODIL/AGAR DIL: CPT | Performed by: NURSE PRACTITIONER

## 2023-03-28 PROCEDURE — 87077 CULTURE AEROBIC IDENTIFY: CPT | Performed by: NURSE PRACTITIONER

## 2023-03-28 PROCEDURE — 86140 C-REACTIVE PROTEIN: CPT | Performed by: STUDENT IN AN ORGANIZED HEALTH CARE EDUCATION/TRAINING PROGRAM

## 2023-03-28 PROCEDURE — 63600175 PHARM REV CODE 636 W HCPCS: Mod: TB,JG | Performed by: EMERGENCY MEDICINE

## 2023-03-28 RX ORDER — GABAPENTIN 300 MG/1
300 CAPSULE ORAL 2 TIMES DAILY
Status: DISCONTINUED | OUTPATIENT
Start: 2023-03-28 | End: 2023-04-01 | Stop reason: HOSPADM

## 2023-03-28 RX ORDER — SODIUM CHLORIDE 9 MG/ML
INJECTION, SOLUTION INTRAVENOUS CONTINUOUS
Status: ACTIVE | OUTPATIENT
Start: 2023-03-28 | End: 2023-03-30

## 2023-03-28 RX ORDER — OXYCODONE AND ACETAMINOPHEN 7.5; 325 MG/1; MG/1
1 TABLET ORAL EVERY 6 HOURS PRN
COMMUNITY
Start: 2023-02-22 | End: 2023-03-28

## 2023-03-28 RX ORDER — SOTALOL HYDROCHLORIDE 80 MG/1
80 TABLET ORAL 2 TIMES DAILY
Status: DISCONTINUED | OUTPATIENT
Start: 2023-03-28 | End: 2023-04-01 | Stop reason: HOSPADM

## 2023-03-28 RX ORDER — HYDROCODONE BITARTRATE AND ACETAMINOPHEN 10; 325 MG/1; MG/1
1 TABLET ORAL EVERY 6 HOURS PRN
Status: DISCONTINUED | OUTPATIENT
Start: 2023-03-28 | End: 2023-04-01 | Stop reason: HOSPADM

## 2023-03-28 RX ORDER — SODIUM CHLORIDE 0.9 % (FLUSH) 0.9 %
10 SYRINGE (ML) INJECTION
Status: DISCONTINUED | OUTPATIENT
Start: 2023-03-28 | End: 2023-04-01 | Stop reason: HOSPADM

## 2023-03-28 RX ORDER — VANCOMYCIN HCL IN 5 % DEXTROSE 1G/250ML
1000 PLASTIC BAG, INJECTION (ML) INTRAVENOUS ONCE
Status: COMPLETED | OUTPATIENT
Start: 2023-03-28 | End: 2023-03-28

## 2023-03-28 RX ORDER — GABAPENTIN 300 MG/1
300 CAPSULE ORAL 2 TIMES DAILY
Status: ON HOLD | COMMUNITY
Start: 2023-02-14 | End: 2023-05-02 | Stop reason: ALTCHOICE

## 2023-03-28 RX ORDER — FLUCONAZOLE 100 MG/1
200 TABLET ORAL DAILY
Status: DISCONTINUED | OUTPATIENT
Start: 2023-03-29 | End: 2023-03-28

## 2023-03-28 RX ORDER — HYDRALAZINE HYDROCHLORIDE 25 MG/1
100 TABLET, FILM COATED ORAL 2 TIMES DAILY
Status: DISCONTINUED | OUTPATIENT
Start: 2023-03-28 | End: 2023-04-01 | Stop reason: HOSPADM

## 2023-03-28 RX ORDER — MICONAZOLE NITRATE 2 %
POWDER (GRAM) TOPICAL 2 TIMES DAILY
Status: DISCONTINUED | OUTPATIENT
Start: 2023-03-28 | End: 2023-04-01 | Stop reason: HOSPADM

## 2023-03-28 RX ORDER — MECLIZINE HCL 12.5 MG 12.5 MG/1
25 TABLET ORAL 3 TIMES DAILY PRN
Status: DISCONTINUED | OUTPATIENT
Start: 2023-03-28 | End: 2023-04-01 | Stop reason: HOSPADM

## 2023-03-28 RX ORDER — PROCHLORPERAZINE EDISYLATE 5 MG/ML
5 INJECTION INTRAMUSCULAR; INTRAVENOUS EVERY 6 HOURS PRN
Status: DISCONTINUED | OUTPATIENT
Start: 2023-03-28 | End: 2023-04-01 | Stop reason: HOSPADM

## 2023-03-28 RX ORDER — HYDROCODONE BITARTRATE AND ACETAMINOPHEN 5; 325 MG/1; MG/1
1 TABLET ORAL EVERY 6 HOURS PRN
Status: DISCONTINUED | OUTPATIENT
Start: 2023-03-28 | End: 2023-04-01 | Stop reason: HOSPADM

## 2023-03-28 RX ORDER — ONDANSETRON 2 MG/ML
4 INJECTION INTRAMUSCULAR; INTRAVENOUS EVERY 6 HOURS PRN
Status: DISCONTINUED | OUTPATIENT
Start: 2023-03-28 | End: 2023-04-01 | Stop reason: HOSPADM

## 2023-03-28 RX ORDER — FINASTERIDE 5 MG/1
5 TABLET, FILM COATED ORAL DAILY
Status: DISCONTINUED | OUTPATIENT
Start: 2023-03-29 | End: 2023-04-01 | Stop reason: HOSPADM

## 2023-03-28 RX ORDER — ACETAMINOPHEN 325 MG/1
650 TABLET ORAL EVERY 4 HOURS PRN
Status: DISCONTINUED | OUTPATIENT
Start: 2023-03-28 | End: 2023-04-01 | Stop reason: HOSPADM

## 2023-03-28 RX ORDER — NALOXONE HCL 0.4 MG/ML
0.02 VIAL (ML) INJECTION
Status: DISCONTINUED | OUTPATIENT
Start: 2023-03-28 | End: 2023-04-01 | Stop reason: HOSPADM

## 2023-03-28 RX ORDER — POLYETHYLENE GLYCOL 3350 17 G/17G
17 POWDER, FOR SOLUTION ORAL 2 TIMES DAILY PRN
Status: DISCONTINUED | OUTPATIENT
Start: 2023-03-28 | End: 2023-04-01 | Stop reason: HOSPADM

## 2023-03-28 RX ORDER — TAMSULOSIN HYDROCHLORIDE 0.4 MG/1
0.8 CAPSULE ORAL DAILY
Status: DISCONTINUED | OUTPATIENT
Start: 2023-03-29 | End: 2023-04-01 | Stop reason: HOSPADM

## 2023-03-28 RX ORDER — TALC
6 POWDER (GRAM) TOPICAL NIGHTLY PRN
Status: DISCONTINUED | OUTPATIENT
Start: 2023-03-28 | End: 2023-04-01 | Stop reason: HOSPADM

## 2023-03-28 RX ORDER — SIMETHICONE 80 MG
1 TABLET,CHEWABLE ORAL 4 TIMES DAILY PRN
Status: DISCONTINUED | OUTPATIENT
Start: 2023-03-28 | End: 2023-04-01 | Stop reason: HOSPADM

## 2023-03-28 RX ADMIN — VANCOMYCIN HYDROCHLORIDE 1000 MG: 1 INJECTION, POWDER, LYOPHILIZED, FOR SOLUTION INTRAVENOUS at 09:03

## 2023-03-28 RX ADMIN — IOHEXOL 100 ML: 350 INJECTION, SOLUTION INTRAVENOUS at 06:03

## 2023-03-28 RX ADMIN — VANCOMYCIN HYDROCHLORIDE 1000 MG: 1 INJECTION, POWDER, LYOPHILIZED, FOR SOLUTION INTRAVENOUS at 08:03

## 2023-03-28 RX ADMIN — TETANUS AND DIPHTHERIA TOXOIDS ADSORBED 0.5 ML: 2; 2 INJECTION INTRAMUSCULAR at 08:03

## 2023-03-28 NOTE — FIRST PROVIDER EVALUATION
Medical screening examination initiated.  I have conducted a focused provider triage encounter, findings are as follows:    Brief history of present illness:  Presents with redness to his umbilical hernia.  Patient reports he was aware he had a hernia but he has not had the redness.  He is not sure but he thinks he has had the redness for about 6 days.  Patient's daughter is at bedside.  Denies fever.  He reports he had a hip replacement to the right hip on February the 6.  The daughter states have you seen his legs.  I pulled the sheet back older to these dressings is very strong.  She shows me a picture of his legs.  They are necrotic swollen and weeping.  She states home Health was there today.  Denies fever.  He was seen at [facility] yesterday at sent home.    Vitals:    03/28/23 1224 03/28/23 1230   BP: 130/62 133/69   BP Location: Right arm    Patient Position: Sitting    Pulse: 68 70   Resp: 18    Temp: 97.9 °F (36.6 °C)    TempSrc: Oral    SpO2: 99% 98%   Weight: 131.5 kg (290 lb)    Height: 6' (1.829 m)        Pertinent physical exam:  He is awake alert and oriented.  There is redness to his umbilical hernia.  There is some eschar present also.  The picture of his lower legs reveal cellulitis.  Swelling and weeping.  The odor from the bandages that are currently on his legs is very strong.  He is afebrile here in the ED.    Brief workup plan:  Labs.  This patient will probably be admitted.    Preliminary workup initiated; this workup will be continued and followed by the physician or advanced practice provider that is assigned to the patient when roomed.

## 2023-03-28 NOTE — ED PROVIDER NOTES
"Encounter Date: 3/28/2023       History     Chief Complaint   Patient presents with    Leg Swelling    Abdominal Mass     Seen at [facility] for bilateral lower leg cellulitis. States that they did not do much for him. Pt has mass on lower abdomen that is protruding and appears to have eschar.       HPI    Mr. Kota Nieves is a 72 year old male with a PMH of A.fib (on xarelto), BPH, nephrolithiasis, chronic venous stasis of lower extremities, who has presented for lower extremity edema/weeping, as well as abdominal hernia infection. Patient reports that his lower extremities have become severely swollen over the last few weeks, worsening from prior, with skin blistering and sloughing, and severe oozing/weeping of fluid. He reports his dressings become soaked frequently. He was seen in the ER on 3/25 and was diagnosed with venous stasis dermatitis, and discharged with home health / wound care referral. Patient reports that the legs have not improved and have worsened since this time. He also describes a severe odor coming from both. He report redness. Denies any pain. Denies any fever or chills. He also reports that he has an umbilical hernia that has been present for "a while" however his family noticed the skin around the hernia becoming red/infected appearing. He reports a scab/wound at the top of the hernia, and says the surrounding area is red and irritated. He reports minimal pain with palpation of the hernia. Denies any nausea or vomiting. Denies any significant abdominal pain. Of note, patient has had 2 recent falls at home as well. All other ROS negative.     Review of patient's allergies indicates:  No Known Allergies  Past Medical History:   Diagnosis Date    A-fib     Anticoagulant long-term use     BPH (benign prostatic hyperplasia)     Rojas catheter in place     Kidney stone     PONV (postoperative nausea and vomiting)     Thyroid disease     Wears glasses      Past Surgical History:   Procedure " Laterality Date    ANKLE DEBRIDEMENT      CARDIOVERSION      CYSTOSCOPY W/ RETROGRADES Bilateral 3/1/2021    Procedure: CYSTOSCOPY, WITH RETROGRADE PYELOGRAM;  Surgeon: Lior Flores MD;  Location: [facility] OR;  Service: Urology;  Laterality: Bilateral;    CYSTOSCOPY WITH BIOPSY OF BLADDER N/A 3/1/2021    Procedure: CYSTOSCOPY, WITH BLADDER BIOPSY, WITH FULGURATION IF INDICATED;  Surgeon: Lior Flores MD;  Location: [facility] OR;  Service: Urology;  Laterality: N/A;    DEBRIDEMENT OF LOWER EXTREMITY Right 2/1/2021    Procedure: DEBRIDEMENT, LOWER EXTREMITY;  Surgeon: Pete Cardenas MD;  Location: [facility] OR;  Service: General;  Laterality: Right;    INCISION AND DRAINAGE Right 2/1/2021    Procedure: Incision and Drainage;  Surgeon: Pete Cardenas MD;  Location: [facility] OR;  Service: General;  Laterality: Right;    THYROIDECTOMY  1970's    TONSILLECTOMY      TONSILLECTOMY, ADENOIDECTOMY       Family History   Problem Relation Age of Onset    Benign prostatic hyperplasia Brother     Heart disease Mother     Heart disease Father     Kidney cancer Neg Hx     Prostate cancer Neg Hx     Urolithiasis Neg Hx      Social History     Tobacco Use    Smoking status: Never    Smokeless tobacco: Never   Substance Use Topics    Alcohol use: Yes     Comment: OCC    Drug use: No     Review of Systems   Constitutional:  Negative for activity change, appetite change, diaphoresis, fatigue and fever.   HENT:  Negative for congestion, rhinorrhea, sneezing and sore throat.    Eyes:  Negative for photophobia, redness and visual disturbance.   Respiratory:  Negative for cough, chest tightness, shortness of breath and wheezing.    Cardiovascular:  Positive for leg swelling. Negative for chest pain and palpitations.   Gastrointestinal:  Positive for abdominal distention and abdominal pain. Negative for diarrhea, nausea and vomiting.   Genitourinary:  Negative for difficulty urinating, dysuria, frequency and urgency.   Musculoskeletal:  Negative  for arthralgias, back pain and myalgias.   Skin:  Positive for pallor and wound. Negative for color change and rash.   Neurological:  Negative for facial asymmetry, speech difficulty, weakness, light-headedness, numbness and headaches.   Hematological:  Does not bruise/bleed easily.   Psychiatric/Behavioral:  Negative for agitation, behavioral problems and confusion.    All other systems reviewed and are negative.    Physical Exam     Initial Vitals [03/28/23 1224]   BP Pulse Resp Temp SpO2   130/62 68 18 97.9 °F (36.6 °C) 99 %      MAP       --         Physical Exam    Nursing note and vitals reviewed.  Constitutional: He appears well-developed and well-nourished. He is not diaphoretic. No distress.   HENT:   Head: Normocephalic and atraumatic.   Right Ear: External ear normal.   Left Ear: External ear normal.   Nose: Nose normal.   Mouth/Throat: Oropharynx is clear and moist. No oropharyngeal exudate.   Eyes: Conjunctivae and EOM are normal. Pupils are equal, round, and reactive to light. No scleral icterus.   Neck: Neck supple. No tracheal deviation present.   Normal range of motion.  Cardiovascular:  Normal rate, regular rhythm, normal heart sounds and intact distal pulses.           No murmur heard.  Pulmonary/Chest: Breath sounds normal. No stridor. No respiratory distress. He has no wheezes. He exhibits no tenderness.   Abdominal: Abdomen is soft. Bowel sounds are normal. He exhibits distension. There is no abdominal tenderness.   Patient has umbilical hernia that has erythema of skin and scab/eschar on most anterior aspect. Tender with deep palpation, however soft. Concern for overlying infection.  There is no rebound and no guarding.   Musculoskeletal:         General: Edema present. No tenderness. Normal range of motion.      Cervical back: Normal range of motion and neck supple.      Right lower leg: Swelling present. Edema present.      Left lower leg: Swelling present. Edema present.      Comments:  Lower extremities with chronic venous stasis dermatitis and ulcers; malodorous. Severe swelling/edema of bilateral lower extremities with erythema. Blistering and sloughing of skin. Severe oozing/weeping of fluid.      Neurological: He is alert and oriented to person, place, and time. He has normal strength. GCS score is 15. GCS eye subscore is 4. GCS verbal subscore is 5. GCS motor subscore is 6.   Skin: Skin is warm and dry. Capillary refill takes less than 2 seconds. There is erythema. No pallor.   Psychiatric: He has a normal mood and affect. Thought content normal.       ED Course   Procedures  Labs Reviewed   CBC W/ AUTO DIFFERENTIAL - Abnormal; Notable for the following components:       Result Value    RDW 16.0 (*)     Immature Granulocytes 1.3 (*)     Gran # (ANC) 8.1 (*)     Immature Grans (Abs) 0.14 (*)     Lymph # 0.9 (*)     Mono # 1.3 (*)     Gran % 75.2 (*)     Lymph % 8.1 (*)     All other components within normal limits   COMPREHENSIVE METABOLIC PANEL - Abnormal; Notable for the following components:    Sodium 134 (*)     CO2 21 (*)     Glucose 111 (*)     BUN 66 (*)     Albumin 2.9 (*)     eGFR 59.1 (*)     All other components within normal limits    Narrative:     Recoll. [id number] by MS1 at 03/28/2023 19:17, reason: Specimen   hemolyzed   SEDIMENTATION RATE - Abnormal; Notable for the following components:    Sed Rate 49 (*)     All other components within normal limits   C-REACTIVE PROTEIN - Abnormal; Notable for the following components:    CRP 8.68 (*)     All other components within normal limits    Narrative:     Recoll. [id number] by MS1 at 03/28/2023 19:17, reason: Specimen   hemolyzed   URINALYSIS, REFLEX TO URINE CULTURE - Abnormal; Notable for the following components:    Protein, UA 1+ (*)     Occult Blood UA 2+ (*)     Leukocytes, UA 3+ (*)     All other components within normal limits    Narrative:     Specimen Source->Urine   URINALYSIS MICROSCOPIC - Abnormal; Notable for the  following components:    WBC, UA 86 (*)     All other components within normal limits    Narrative:     Specimen Source->Urine   ISTAT CREATININE - Abnormal; Notable for the following components:    POC Creatinine 1.5 (*)     All other components within normal limits   LIPASE    Narrative:     Recoll. [id number] by MS1 at 03/28/2023 19:17, reason: Specimen   hemolyzed   LACTIC ACID, PLASMA   B-TYPE NATRIURETIC PEPTIDE   MAGNESIUM   PHOSPHORUS   MAGNESIUM   PHOSPHORUS   POCT CREATININE          Imaging Results              CT Abdomen Pelvis With Contrast (Final result)  Result time 03/28/23 19:05:22      Final result by Jj Rice MD (03/28/23 19:05:22)                   Narrative:    EXAM DESCRIPTION: CT ABDOMEN PELVIS WITH CONTRAST    CLINICAL HISTORY: 72 years Male, Abdominal abscess/infection suspected; umbilical hernia with overlying cellulitis/infection    COMPARISON: February 10, 2021.    TECHNIQUE: Images of the abdomen and pelvis were obtained after the administration of 100 mL of Omni 350. All CT scans at this facility utilized dose modulation, iterative reconstruction, and/or weightbase dosing when appropriate to reduce the radiation dose to his low as reasonably achievable. CTDI 25.10. DLP 1327.70.    FINDINGS: The lower lung fields are unremarkable. Both the liver and spleen are normal in size, and no focal abnormalities or ascites can be seen. Gallstones are present. The pancreas is normal in size, as are the adrenal glands and the abdominal aorta. The large cyst on the right kidney is again noted. Nonobstructing calcifications are noted in the left kidney.    The small bowel is normal in size. A moderate amount of gas and stool is seen throughout the colon. The appendix is visualized and appears normal. The urinary bladder is unopacified but is unremarkable. A fat-containing hernia is noted in the anterior abdominal wall, and this appears larger than on the previous examination. Bilateral hip  arthroplasties have been performed.    IMPRESSION:    1. Cholelithiasis.  2. A fat-containing hernia is noted in the anterior abdominal wall.  3. Left nephrocalcinosis.  4. Renal cysts.    Electronically signed by:  Jj Rice MD  3/28/2023 7:05 PM CDT Workstation: [id number]                                     US Lower Extremity Veins Bilateral (Final result)  Result time 03/28/23 14:37:50      Final result by Marv Wheat MD (03/28/23 14:37:50)                   Narrative:    VENOUS DOPPLER ULTRASOUND BILATERAL LOWER EXTREMITIES    CLINICAL DATA: Cellulitis    FINDINGS: Color and duplex Doppler sonographic assessment with spectral analysis of the bilateral lower extremities demonstrates no evidence of deep vein thrombosis. Normal color Doppler flow, augmentation, and compressibility are demonstrated at all levels.    There is mild nonspecific subcutaneous edema in the bilateral popliteal regions.    IMPRESSION:  1. No evidence of DVT in the bilateral lower extremities.  2. Mild bilateral nonspecific subcutaneous edema.    Electronically signed by:  Marv Wheat MD  3/28/2023 2:37 PM CDT Workstation: [id number]                                     Medications   vancomycin - pharmacy to dose (has no administration in time range)   miconazole NITRATE 2 % top powder ( Topical (Top) Given 3/29/23 2049)   sodium chloride 0.9% flush 10 mL (has no administration in time range)   melatonin tablet 6 mg (has no administration in time range)   ondansetron injection 4 mg (has no administration in time range)   prochlorperazine injection Soln 5 mg (has no administration in time range)   polyethylene glycol packet 17 g (has no administration in time range)   simethicone chewable tablet 80 mg (has no administration in time range)   acetaminophen tablet 650 mg (has no administration in time range)   HYDROcodone-acetaminophen 5-325 mg per tablet 1 tablet (has no administration in time range)   HYDROcodone-acetaminophen   mg per tablet 1 tablet (has no administration in time range)   naloxone 0.4 mg/mL injection 0.02 mg (has no administration in time range)   finasteride tablet 5 mg (5 mg Oral Given 3/29/23 0838)   hydrALAZINE tablet 100 mg (100 mg Oral Given 3/29/23 2049)   gabapentin capsule 300 mg (300 mg Oral Given 3/29/23 2049)   meclizine tablet 25 mg (has no administration in time range)   sotaloL tablet 80 mg (80 mg Oral Given 3/29/23 2049)   tamsulosin 24 hr capsule 0.8 mg (0.8 mg Oral Given 3/29/23 0838)   0.9%  NaCl infusion ( Intravenous New Bag 3/29/23 0023)   piperacillin-tazobactam (ZOSYN) 3.375 g in dextrose 5 % (D5W) 100 mL (3.375 g Intravenous New Bag 3/29/23 1908)   vancomycin (VANCOCIN) 2,000 mg in dextrose 5 % (D5W) 500 mL IVPB (2,000 mg Intravenous Trough Due As Scheduled Before Dose 3/31/23 0200)   iohexoL (OMNIPAQUE 350) injection 100 mL (100 mLs Intravenous Given 3/28/23 1843)   diptheria-tetanus toxoids 2-2 Lf unit/0.5 mL injection 0.5 mL (0.5 mLs Intramuscular Given 3/28/23 2048)   vancomycin in dextrose 5 % 1 gram/250 mL IVPB 1,000 mg (0 mg Intravenous Stopped 3/28/23 2350)     And   vancomycin in dextrose 5 % 1 gram/250 mL IVPB 1,000 mg (0 mg Intravenous Stopped 3/28/23 2230)     Medical Decision Making:   History:   Old Medical Records: I decided to obtain old medical records.  Old Records Summarized: records from clinic visits and records from previous admission(s).  Initial Assessment:   72 year old male with a PMH of A.fib (on xarelto), BPH, nephrolithiasis, chronic venous stasis of lower extremities, who has presented for lower extremity edema/weeping, as well as abdominal hernia infection. Patient with worsening lower extremity venous stasis dermatitis; with blistering and sloughing of skin, ulcers present, erythema, and significant weeping of fluid (soaking bandages). Patient additionally with umbilical hernia, with overlying skin changes such as erythema and a scab/eschar on anterior  aspect.   Patient is afebrile, with stable vitals.   Differential Diagnosis:   Chronic venous stasis of lower extremities with overlying cellulitis.  DVT of lower extremities.   Cellulitis of umbilical hernia overlying skin.  Incarcerated/strangulated umbilical hernia.   Clinical Tests:   Lab Tests: Ordered  Radiological Study: Ordered  ED Management:  Will obtain labs, CT abdo/pelvis, and bilateral lower extremity DVT U/S. Will continue to reassess and update.    Update:  CBC within normal limits.  ESR elevated 49.  CMP significant for creatinine of 1.3.  Glucose 111.  CRP elevated 8.68.  BNP within normal limits.  Lactate 1.5.  Ultrasound of lower extremities with edema, no evidence of DVT.  CT abdomen pelvis with fat containing abdominal hernia, cholelithiasis, nephro calcinosis.  No acute findings.  Ordered vancomycin for presumptive cellulitis of lower extremities.  Patient consulted and admitted to Hospital Medicine for further treatment of lower extremity cellulitis as well as overlying abdominal umbilical hernia cellulitis.    Janessa Dacosta, PGY4  [employer] [facility]          Attending Attestation:   Physician Attestation Statement for Resident:  As the supervising MD   Physician Attestation Statement: I have personally seen and examined this patient.   I agree with the above history.  -:   As the supervising MD I agree with the above PE.     As the supervising MD I agree with the above treatment, course, plan, and disposition.    I have reviewed and agree with the residents interpretation of the following: lab data, x-rays, CT scans and EKG.               ED Course as of 03/29/23 2055   Tue Mar 28, 2023   1437 Sed Rate(!): 49 [HM]      ED Course User Index  [HM] Janessa Dacosta MD                 Clinical Impression:   Final diagnoses:  [M79.89] Swelling of lower extremity  [T14.8XXA, L08.9] Wound infection        ED Disposition Condition    Admit                 Janessa Dacosta MD  Resident  03/28/23  2128       Tiffanie Patel MD  03/29/23 2055

## 2023-03-29 ENCOUNTER — CLINICAL SUPPORT (OUTPATIENT)
Dept: CARDIOLOGY | Facility: HOSPITAL | Age: 73
DRG: 354 | End: 2023-03-29
Payer: MEDICARE

## 2023-03-29 VITALS — BODY MASS INDEX: 39.14 KG/M2 | WEIGHT: 289 LBS | HEIGHT: 72 IN

## 2023-03-29 LAB
ALBUMIN SERPL BCP-MCNC: 2.8 G/DL (ref 3.5–5.2)
ANION GAP SERPL CALC-SCNC: 7 MMOL/L (ref 8–16)
AORTIC ROOT ANNULUS: 3.4 CM
AORTIC VALVE CUSP SEPERATION: 2.4 CM
AV INDEX (PROSTH): 0.72
AV MEAN GRADIENT: 5 MMHG
AV PEAK GRADIENT: 8 MMHG
AV VALVE AREA: 3.01 CM2
AV VELOCITY RATIO: 0.72
BASOPHILS # BLD AUTO: 0.06 K/UL (ref 0–0.2)
BASOPHILS NFR BLD: 0.6 % (ref 0–1.9)
BSA FOR ECHO PROCEDURE: 2.58 M2
BUN SERPL-MCNC: 53 MG/DL (ref 8–23)
CALCIUM SERPL-MCNC: 8.8 MG/DL (ref 8.7–10.5)
CHLORIDE SERPL-SCNC: 107 MMOL/L (ref 95–110)
CO2 SERPL-SCNC: 23 MMOL/L (ref 23–29)
CREAT SERPL-MCNC: 1.2 MG/DL (ref 0.5–1.4)
CV ECHO LV RWT: 0.43 CM
DIFFERENTIAL METHOD: ABNORMAL
DOP CALC AO PEAK VEL: 1.45 M/S
DOP CALC AO VTI: 29 CM
DOP CALC LVOT AREA: 4.2 CM2
DOP CALC LVOT DIAMETER: 2.3 CM
DOP CALC LVOT PEAK VEL: 1.05 M/S
DOP CALC LVOT STROKE VOLUME: 87.21 CM3
DOP CALC MV VTI: 37.2 CM
DOP CALCLVOT PEAK VEL VTI: 21 CM
E WAVE DECELERATION TIME: 302 MSEC
E/A RATIO: 0.86
E/E' RATIO: 10.59 M/S
ECHO LV POSTERIOR WALL: 1.26 CM (ref 0.6–1.1)
EOSINOPHIL # BLD AUTO: 0.3 K/UL (ref 0–0.5)
EOSINOPHIL NFR BLD: 3.6 % (ref 0–8)
ERYTHROCYTE [DISTWIDTH] IN BLOOD BY AUTOMATED COUNT: 14.9 % (ref 11.5–14.5)
EST. GFR  (NO RACE VARIABLE): >60 ML/MIN/1.73 M^2
ESTIMATED AVG GLUCOSE: 126 MG/DL (ref 68–131)
FRACTIONAL SHORTENING: 33 % (ref 28–44)
GLUCOSE SERPL-MCNC: 142 MG/DL (ref 70–110)
HBA1C MFR BLD: 6 % (ref 4.5–6.2)
HCT VFR BLD AUTO: 46.8 % (ref 40–54)
HGB BLD-MCNC: 15.8 G/DL (ref 14–18)
IMM GRANULOCYTES # BLD AUTO: 0.14 K/UL (ref 0–0.04)
IMM GRANULOCYTES NFR BLD AUTO: 1.5 % (ref 0–0.5)
INTERVENTRICULAR SEPTUM: 0.98 CM (ref 0.6–1.1)
IVC DIAMETER: 1.79 CM
LEFT ATRIUM VOLUME INDEX MOD: 29.1 ML/M2
LEFT ATRIUM VOLUME MOD: 72.5 CM3
LEFT INTERNAL DIMENSION IN SYSTOLE: 3.99 CM (ref 2.1–4)
LEFT VENTRICLE DIASTOLIC VOLUME INDEX: 55.02 ML/M2
LEFT VENTRICLE DIASTOLIC VOLUME: 137 ML
LEFT VENTRICLE MASS INDEX: 112 G/M2
LEFT VENTRICLE SYSTOLIC VOLUME INDEX: 22.4 ML/M2
LEFT VENTRICLE SYSTOLIC VOLUME: 55.9 ML
LEFT VENTRICULAR INTERNAL DIMENSION IN DIASTOLE: 5.92 CM (ref 3.5–6)
LEFT VENTRICULAR MASS: 280.04 G
LV LATERAL E/E' RATIO: 8.18 M/S
LV SEPTAL E/E' RATIO: 15 M/S
LVOT MG: 2 MMHG
LVOT MV: 0.67 CM/S
LYMPHOCYTES # BLD AUTO: 0.9 K/UL (ref 1–4.8)
LYMPHOCYTES NFR BLD: 9 % (ref 18–48)
MAGNESIUM SERPL-MCNC: 2.2 MG/DL (ref 1.6–2.6)
MCH RBC QN AUTO: 29.7 PG (ref 27–31)
MCHC RBC AUTO-ENTMCNC: 33.8 G/DL (ref 32–36)
MCV RBC AUTO: 88 FL (ref 82–98)
MONOCYTES # BLD AUTO: 1.4 K/UL (ref 0.3–1)
MONOCYTES NFR BLD: 14.4 % (ref 4–15)
MV MEAN GRADIENT: 4 MMHG
MV PEAK A VEL: 1.05 M/S
MV PEAK E VEL: 0.9 M/S
MV PEAK GRADIENT: 6 MMHG
MV STENOSIS PRESSURE HALF TIME: 90 MS
MV VALVE AREA BY CONTINUITY EQUATION: 2.34 CM2
MV VALVE AREA P 1/2 METHOD: 2.44 CM2
NEUTROPHILS # BLD AUTO: 6.7 K/UL (ref 1.8–7.7)
NEUTROPHILS NFR BLD: 70.9 % (ref 38–73)
NRBC BLD-RTO: 0 /100 WBC
PHOSPHATE SERPL-MCNC: 3 MG/DL (ref 2.7–4.5)
PLATELET # BLD AUTO: 350 K/UL (ref 150–450)
PMV BLD AUTO: 9.2 FL (ref 9.2–12.9)
POTASSIUM SERPL-SCNC: 4.2 MMOL/L (ref 3.5–5.1)
PV MV: 0.59 M/S
PV PEAK VELOCITY: 0.86 CM/S
RA PRESSURE: 3 MMHG
RBC # BLD AUTO: 5.32 M/UL (ref 4.6–6.2)
RV TISSUE DOPPLER FREE WALL SYSTOLIC VELOCITY 1 (APICAL 4 CHAMBER VIEW): 0.01 CM/S
SINUS: 3.56 CM
SODIUM SERPL-SCNC: 137 MMOL/L (ref 136–145)
TDI LATERAL: 0.11 M/S
TDI SEPTAL: 0.06 M/S
TDI: 0.09 M/S
TRICUSPID ANNULAR PLANE SYSTOLIC EXCURSION: 2.93 CM
WBC # BLD AUTO: 9.46 K/UL (ref 3.9–12.7)

## 2023-03-29 PROCEDURE — 99223 1ST HOSP IP/OBS HIGH 75: CPT | Mod: ,,, | Performed by: FAMILY MEDICINE

## 2023-03-29 PROCEDURE — 83036 HEMOGLOBIN GLYCOSYLATED A1C: CPT | Performed by: NURSE PRACTITIONER

## 2023-03-29 PROCEDURE — 25000003 PHARM REV CODE 250: Performed by: HOSPITALIST

## 2023-03-29 PROCEDURE — 99223 PR INITIAL HOSPITAL CARE,LEVL III: ICD-10-PCS | Mod: ,,, | Performed by: FAMILY MEDICINE

## 2023-03-29 PROCEDURE — 12000002 HC ACUTE/MED SURGE SEMI-PRIVATE ROOM

## 2023-03-29 PROCEDURE — 83735 ASSAY OF MAGNESIUM: CPT | Performed by: NURSE PRACTITIONER

## 2023-03-29 PROCEDURE — 93306 TTE W/DOPPLER COMPLETE: CPT | Mod: 26,,, | Performed by: SPECIALIST

## 2023-03-29 PROCEDURE — 99223 1ST HOSP IP/OBS HIGH 75: CPT | Mod: ,,, | Performed by: SURGERY

## 2023-03-29 PROCEDURE — 36415 COLL VENOUS BLD VENIPUNCTURE: CPT | Performed by: NURSE PRACTITIONER

## 2023-03-29 PROCEDURE — 63600175 PHARM REV CODE 636 W HCPCS: Performed by: HOSPITALIST

## 2023-03-29 PROCEDURE — 93306 TTE W/DOPPLER COMPLETE: CPT

## 2023-03-29 PROCEDURE — 87040 BLOOD CULTURE FOR BACTERIA: CPT | Performed by: NURSE PRACTITIONER

## 2023-03-29 PROCEDURE — 99223 PR INITIAL HOSPITAL CARE,LEVL III: ICD-10-PCS | Mod: ,,, | Performed by: SURGERY

## 2023-03-29 PROCEDURE — 97166 OT EVAL MOD COMPLEX 45 MIN: CPT

## 2023-03-29 PROCEDURE — 85025 COMPLETE CBC W/AUTO DIFF WBC: CPT | Performed by: NURSE PRACTITIONER

## 2023-03-29 PROCEDURE — 97116 GAIT TRAINING THERAPY: CPT

## 2023-03-29 PROCEDURE — 80069 RENAL FUNCTION PANEL: CPT | Performed by: NURSE PRACTITIONER

## 2023-03-29 PROCEDURE — 25000003 PHARM REV CODE 250: Performed by: NURSE PRACTITIONER

## 2023-03-29 PROCEDURE — 93306 ECHO (CUPID ONLY): ICD-10-PCS | Mod: 26,,, | Performed by: SPECIALIST

## 2023-03-29 PROCEDURE — 97161 PT EVAL LOW COMPLEX 20 MIN: CPT

## 2023-03-29 RX ADMIN — SOTALOL HYDROCHLORIDE 80 MG: 80 TABLET ORAL at 08:03

## 2023-03-29 RX ADMIN — MICONAZOLE NITRATE 2 % TOPICAL POWDER: at 01:03

## 2023-03-29 RX ADMIN — SOTALOL HYDROCHLORIDE 80 MG: 80 TABLET ORAL at 12:03

## 2023-03-29 RX ADMIN — HYDRALAZINE HYDROCHLORIDE 100 MG: 25 TABLET, FILM COATED ORAL at 08:03

## 2023-03-29 RX ADMIN — PIPERACILLIN SODIUM AND TAZOBACTAM SODIUM 3.38 G: 3; .375 INJECTION, POWDER, LYOPHILIZED, FOR SOLUTION INTRAVENOUS at 01:03

## 2023-03-29 RX ADMIN — GABAPENTIN 300 MG: 300 CAPSULE ORAL at 08:03

## 2023-03-29 RX ADMIN — VANCOMYCIN HYDROCHLORIDE 2000 MG: 500 INJECTION, POWDER, LYOPHILIZED, FOR SOLUTION INTRAVENOUS at 02:03

## 2023-03-29 RX ADMIN — PIPERACILLIN SODIUM AND TAZOBACTAM SODIUM 3.38 G: 3; .375 INJECTION, POWDER, LYOPHILIZED, FOR SOLUTION INTRAVENOUS at 09:03

## 2023-03-29 RX ADMIN — PIPERACILLIN SODIUM AND TAZOBACTAM SODIUM 3.38 G: 3; .375 INJECTION, POWDER, LYOPHILIZED, FOR SOLUTION INTRAVENOUS at 07:03

## 2023-03-29 RX ADMIN — MICONAZOLE NITRATE 2 % TOPICAL POWDER: at 08:03

## 2023-03-29 RX ADMIN — GABAPENTIN 300 MG: 300 CAPSULE ORAL at 12:03

## 2023-03-29 RX ADMIN — RIVAROXABAN 20 MG: 15 TABLET, FILM COATED ORAL at 08:03

## 2023-03-29 RX ADMIN — HYDRALAZINE HYDROCHLORIDE 100 MG: 25 TABLET, FILM COATED ORAL at 12:03

## 2023-03-29 RX ADMIN — TAMSULOSIN HYDROCHLORIDE 0.8 MG: 0.4 CAPSULE ORAL at 08:03

## 2023-03-29 RX ADMIN — SODIUM CHLORIDE: 0.9 INJECTION, SOLUTION INTRAVENOUS at 12:03

## 2023-03-29 RX ADMIN — FINASTERIDE 5 MG: 5 TABLET, FILM COATED ORAL at 08:03

## 2023-03-29 NOTE — PLAN OF CARE
Goals to be met by: 23     Patient will increase functional independence with mobility by performin. Supine to sit with Supervision  2. Sit to stand transfer with Supervision  3. Bed to chair transfer with Supervision using Rolling Walker  4. Gait  x 150 feet with Supervision using Rolling Walker.

## 2023-03-29 NOTE — CONSULTS
[facility]  Adult Nutrition   Consult Note (Initial Assessment)     SUMMARY     Recommendations  1.) Continue cardiac diet restrictions.    Will add diabetic diet restrictions (HbA1C 6.0%)  2.) Will add Javad BID x14 days to aid in wound healing.   3.) Will add ProTGold pkt TID to meet protein requirements.   4.) Monitor renal function with increased protein intake for tolerance.   5.) Suggest lactobacillus acidophilus to protect gut health while receiving antibiotics.  6.) Suggest long-term wound supplements: vitamin C, zinc, and MVI.   7.)  to aid in food preferences.    Goals:   1.) Pt to consume/tolerate >75% of meals and ONS.   2.) Progression of wound healing.  Nutrition Goal Status: new    Dietitian Rounds Brief  Pt presents to the emergency room for increased lower extremity swelling and weeping.  He says it has been ongoing for several weeks and has gotten progressively worse over time.  He has had an increase in weeping and dressings getting soaked constantly.  He was in the emergency room on 03/25 at [place] they referred him to home health and wound care. Pt reports fair appetite of meals. He reports consuming mainly ready to eat meals d/t sedentary lifestyle. Educated him on cardiac diet and encouraged <500mg sodium in eat ready to eat meal per serving. He denies chew/swallowing issues. No GI distress. LBM 3/29. Skin: Bilateral leg ulcers, open weeping, foul odor, slough loose black gray and yellow necrotic tissue. Wt reviewed. Partial NFPE completed to upper extremities with no fat loss or wasting. No malnutrition identified.    Educated pt on significance of A1c and how it is affected and goal ranges. Educated on carb choices and serving sizes of 1 choice. Pairing carbohydrates with a fat, fiber, or protein to prevent increase in blood glucose. Educated pt on MyPlate and what a plate should consist of. Educated pt on consistent carbohydrate intake throughout the day with  appropriate insulin coverage. Recommended checking blood glucose daily. Educated pt on heart healthy diet. Education focused on including healthy fats- gave examples and lowering LDL levels by excluding saturated/trans fat in the diet. Went over types of foods that have saturated/trans fat. Encouraged cooking with olive oil instead of butter. Choosing whole grains over refined grains, choosing canned foods with no salt added and plan frozen veggies instead of veggies cooked in butter and cream sauces. Encouraged patient to choose leaner cuts of meat and decreasing high fat meats such as green, sausage, hot dogs, etc. Educated pt on decreasing sodium in diet. To try not to cook with salt and use herbs and spices to make food more flavorful. To limit eating out-if patient chooses to eat out, informed patient to discuss with  to cook meats and veggies with no salt and olive oil instead of butter. Provided handouts on all of these topics for pt to use in the future. Also provided RD contact information for pt to call with future questions.       Diet order:   Current Diet Order: cardiac      Evaluation of Received Nutrient/Fluid Intake  Energy Calories Required: meeting needs  Protein Required: not meeting needs  Fluid Required: not meeting needs  Tolerance: tolerating     % Intake of Estimated Energy Needs: 50%  % Meal Intake: 50 - 75 %      Intake/Output Summary (Last 24 hours) at 3/29/2023 1741  Last data filed at 3/29/2023 1616  Gross per 24 hour   Intake 990 ml   Output 4531 ml   Net -3541 ml        Anthropometrics  Temp: 98.6 °F (37 °C)  Height Method: Stated  Height: 6' (182.9 cm)  Height (inches): 72 in  Weight Method: Bed Scale  Weight: 131.5 kg (289 lb 14.5 oz)  Weight (lb): 289.91 lb  Ideal Body Weight (IBW), Male: 178 lb  % Ideal Body Weight, Male (lb): 162.87 %  BMI (Calculated): 39.3  BMI Grade: 35 - 39.9 - obesity - grade II       Estimated/Assessed Needs  Weight Used For Calorie Calculations:  131.1 kg (289 lb 0.4 oz)  Energy Calorie Requirements (kcal): 2109  Energy Need Method: Weld-St Jeor (no AF)  Protein Requirements:  (1.2-1.5)  Weight Used For Protein Calculations: 81 kg (178 lb 9.2 oz) (IBW)  Fluid Requirements (mL): 2109     RDA Method (mL): 2109       Reason for Assessment  Reason For Assessment: consult, identified at risk by screening criteria  Diagnosis: infection/sepsis  Relevant Medical History: morbid obesity, atrial fibrillation on Xarelto, BPH with urinary retention, chronic venous stasis and chronic leg wounds    Nutrition/Diet History  Spiritual, Cultural Beliefs, Caodaism Practices, Values that Affect Care: no  Food Allergies: NKFA  Factors Affecting Nutritional Intake: None identified at this time    Nutrition Risk Screen  Nutrition Risk Screen: no indicators present       Altered Skin Integrity 03/29/23 0019 Right anterior;lower;proximal;posterior;medial;lateral;distal Leg #1 Venous Ulcer-Wound Image: Images linked       Altered Skin Integrity 03/29/23 0019 Left anterior;lower;posterior;medial;lateral;proximal;distal Leg #2 Venous Ulcer-Wound Image: Images linked  MST Score: 0  Have you recently lost weight without trying?: No  Weight loss score: 0  Have you been eating poorly because of a decreased appetite?: No  Appetite score: 0       Weight History:  Wt Readings from Last 5 Encounters:   03/29/23 131.5 kg (289 lb 14.5 oz)   03/29/23 131.1 kg (289 lb)   03/25/23 109.8 kg (242 lb)   09/01/22 109.8 kg (242 lb 1 oz)   11/01/21 109.8 kg (242 lb)        Lab/Procedures/Meds: Pertinent Labs/Meds Reviewed    Medications:Pertinent Medications Reviewed  Scheduled Meds:   finasteride  5 mg Oral Daily    gabapentin  300 mg Oral BID    hydrALAZINE  100 mg Oral BID    miconazole NITRATE 2 %   Topical (Top) BID    piperacillin-tazobactam (ZOSYN) IVPB  3.375 g Intravenous Q8H    rivaroxaban  20 mg Oral Daily    sotaloL  80 mg Oral BID    tamsulosin  0.8 mg Oral Daily    vancomycin  (VANCOCIN) IVPB  2,000 mg Intravenous Q18H     Continuous Infusions:   sodium chloride 0.9% 100 mL/hr at 03/29/23 0023     PRN Meds:.acetaminophen, HYDROcodone-acetaminophen, HYDROcodone-acetaminophen, meclizine, melatonin, naloxone, ondansetron, polyethylene glycol, prochlorperazine, simethicone, sodium chloride 0.9%, Pharmacy to dose Vancomycin consult **AND** vancomycin - pharmacy to dose    Labs: Pertinent Labs Reviewed  Clinical Chemistry:  Recent Labs   Lab 03/28/23 1929 03/29/23 0456   * 137   K 4.9 4.2    107   CO2 21* 23   * 142*   BUN 66* 53*   CREATININE 1.3 1.2   CALCIUM 8.9 8.8   PROT 7.1  --    ALBUMIN 2.9* 2.8*   BILITOT 1.0  --    ALKPHOS 122  --    AST 28  --    ALT 27  --    ANIONGAP 9 7*   MG 2.4 2.2   PHOS 3.7 3.0   LIPASE 41  --      CBC:   Recent Labs   Lab 03/29/23 0456   WBC 9.46   RBC 5.32   HGB 15.8   HCT 46.8      MCV 88   MCH 29.7   MCHC 33.8     Cardiac Profile:  Recent Labs   Lab 03/28/23 1423   BNP 23     Inflammatory Labs:  Recent Labs   Lab 03/28/23 1929   CRP 8.68*     Diabetes:  Recent Labs   Lab 03/29/23 0456   HGBA1C 6.0     Monitor and Evaluation  Food and Nutrient Intake: energy intake, food and beverage intake  Food and Nutrient Adminstration: diet order  Knowledge/Beliefs/Attitudes: food and nutrition knowledge/skill  Physical Activity and Function: nutrition-related ADLs and IADLs  Anthropometric Measurements: weight, weight change  Biochemical Data, Medical Tests and Procedures: electrolyte and renal panel, gastrointestinal profile, glucose/endocrine profile, other (specify) (renal profile)  Nutrition-Focused Physical Findings: overall appearance     Nutrition Risk  Level of Risk/Frequency of Follow-up: moderate     Nutrition Follow-Up  RD Follow-up?: Yes      Olivia Fam RD 03/29/2023 5:41 PM

## 2023-03-29 NOTE — SUBJECTIVE & OBJECTIVE
Past Medical History:   Diagnosis Date    A-fib     Anticoagulant long-term use     BPH (benign prostatic hyperplasia)     Rojas catheter in place     Kidney stone     PONV (postoperative nausea and vomiting)     Thyroid disease     Wears glasses        Past Surgical History:   Procedure Laterality Date    ANKLE DEBRIDEMENT      CARDIOVERSION      CYSTOSCOPY W/ RETROGRADES Bilateral 3/1/2021    Procedure: CYSTOSCOPY, WITH RETROGRADE PYELOGRAM;  Surgeon: Lior Flores MD;  Location: [facility] OR;  Service: Urology;  Laterality: Bilateral;    CYSTOSCOPY WITH BIOPSY OF BLADDER N/A 3/1/2021    Procedure: CYSTOSCOPY, WITH BLADDER BIOPSY, WITH FULGURATION IF INDICATED;  Surgeon: Lior Flores MD;  Location: [facility] OR;  Service: Urology;  Laterality: N/A;    DEBRIDEMENT OF LOWER EXTREMITY Right 2/1/2021    Procedure: DEBRIDEMENT, LOWER EXTREMITY;  Surgeon: Pete Cardenas MD;  Location: [facility] OR;  Service: General;  Laterality: Right;    INCISION AND DRAINAGE Right 2/1/2021    Procedure: Incision and Drainage;  Surgeon: Pete Cardenas MD;  Location: [facility] OR;  Service: General;  Laterality: Right;    THYROIDECTOMY  1970's    TONSILLECTOMY      TONSILLECTOMY, ADENOIDECTOMY         Review of patient's allergies indicates:  No Known Allergies    Current Facility-Administered Medications on File Prior to Encounter   Medication    electrolyte-S (ISOLYTE)    fentaNYL injection 25 mcg    HYDROmorphone (PF) injection 0.2 mg    LIDOcaine (PF) 10 mg/ml (1%) injection 10 mg    oxyCODONE immediate release tablet 5 mg     Current Outpatient Medications on File Prior to Encounter   Medication Sig    finasteride (PROSCAR) 5 mg tablet Take 1 tablet by mouth once daily (Patient taking differently: Take 5 mg by mouth once daily.)    gabapentin (NEURONTIN) 300 MG capsule Take 300 mg by mouth 2 (two) times daily.    hydrALAZINE (APRESOLINE) 100 MG tablet Take 100 mg by mouth 2 (two) times daily.    rivaroxaban (XARELTO) 20 mg Tab Take 20 mg  by mouth once daily.     sotalol (BETAPACE) 80 MG tablet Take 80 mg by mouth 2 (two) times daily.    tamsulosin (FLOMAX) 0.4 mg Cap Take 2 capsules (0.8 mg total) by mouth once daily.    vit A/vit C/vit E/zinc/copper (OCUVITE PRESERVISION ORAL) Take 1 tablet by mouth 2 (two) times a day.    meclizine (ANTIVERT) 25 mg tablet Take 25 mg by mouth 3 (three) times daily as needed.    [DISCONTINUED] collagenase (SANTYL) ointment Apply topically once daily.    [DISCONTINUED] meclofenamate (MECLOMEN) 50 MG capsule Take 50 mg by mouth every 6 (six) hours. PRN    [DISCONTINUED] oxyCODONE-acetaminophen (PERCOCET) 7.5-325 mg per tablet Take 1 tablet by mouth every 6 (six) hours as needed.    [DISCONTINUED] triamcinolone acetonide 0.1% (KENALOG) 0.1 % cream Apply topically 2 (two) times daily.     Family History       Problem Relation (Age of Onset)    Benign prostatic hyperplasia Brother    Heart disease Mother, Father          Tobacco Use    Smoking status: Never    Smokeless tobacco: Never   Substance and Sexual Activity    Alcohol use: Yes     Comment: OCC    Drug use: No    Sexual activity: Not Currently     Review of Systems   All other systems reviewed and are negative.  Twelve point review of systems obtained and negative except as stated above in HPI      Objective:     Vital Signs (Most Recent):  Temp: 97.9 °F (36.6 °C) (03/28/23 1224)  Pulse: 72 (03/28/23 1629)  Resp: 19 (03/28/23 1629)  BP: 133/66 (03/28/23 1629)  SpO2: 97 % (03/28/23 1629) Vital Signs (24h Range):  Temp:  [97.9 °F (36.6 °C)] 97.9 °F (36.6 °C)  Pulse:  [68-72] 72  Resp:  [18-19] 19  SpO2:  [97 %-99 %] 97 %  BP: (124-144)/(62-90) 133/66     Weight: 131.5 kg (290 lb)  Body mass index is 39.33 kg/m².    Physical Exam  Vitals and nursing note reviewed.   Constitutional:       General: He is awake. He is not in acute distress.     Appearance: He is morbidly obese. He is not ill-appearing.   HENT:      Head: Normocephalic.      Right Ear: Hearing and  external ear normal.      Left Ear: Hearing and external ear normal.      Nose: Nose normal.      Mouth/Throat:      Lips: Pink.      Mouth: Mucous membranes are moist.      Pharynx: Oropharynx is clear.   Eyes:      General: Lids are normal. Vision grossly intact. Gaze aligned appropriately.   Cardiovascular:      Rate and Rhythm: Normal rate and regular rhythm.      Pulses: Normal pulses.      Heart sounds: No murmur heard.  Pulmonary:      Effort: Pulmonary effort is normal.      Breath sounds: Normal breath sounds. No wheezing, rhonchi or rales.   Chest:      Chest wall: No tenderness.   Abdominal:      General: Abdomen is protuberant. Bowel sounds are normal. There is no distension.      Palpations: Abdomen is soft.      Tenderness: There is no abdominal tenderness.      Comments: No tenderness to palpation, umbilical hernia with erythema and eschar, soft To palpation   Genitourinary:     Comments: Bilateral groin areas with erythema and apparent yeast rash.  No tenderness to palpation though patient says he has been incontinent and can not feel like he has to go to the bathroom but has not voided significantly since he has been here.  Musculoskeletal:         General: No swelling or deformity. Normal range of motion.      Cervical back: Normal range of motion.      Comments: Moves all extremities but generally weak.   Skin:     General: Skin is warm and dry.      Capillary Refill: Capillary refill takes less than 2 seconds.      Comments: Bilateral lower extremities with thick erythematous skin up to knees, right lower extremity with a few ulcerations, left lower extremity with large left lateral ulceration, both lower extremities have a musty foul odor and dressings were removed with large amounts of thin brown exudates on Telfa pads and clear yellow drainage soaked Kerlix.  Thickened toenails.  Unable to turn patient over to view posterior.   Neurological:      General: No focal deficit present.      Mental  Status: He is alert and oriented to person, place, and time.      GCS: GCS eye subscore is 4. GCS verbal subscore is 5. GCS motor subscore is 6.      Comments: He has no pain to lower extremities.  Seems generally weak.   Psychiatric:         Attention and Perception: Attention and perception normal.         Mood and Affect: Mood and affect normal.         Speech: Speech normal.         Behavior: Behavior normal. Behavior is cooperative.         Thought Content: Thought content normal.         Cognition and Memory: Cognition and memory normal.         Judgment: Judgment normal.                            Significant Labs: All pertinent labs within the past 24 hours have been reviewed.    Bilirubin:   Recent Labs   Lab 03/28/23 1929   BILITOT 1.0     BMP:   Recent Labs   Lab 03/28/23 1929   *   *   K 4.9      CO2 21*   BUN 66*   CREATININE 1.3   CALCIUM 8.9     CBC:   Recent Labs   Lab 03/28/23  1423   WBC 10.80   HGB 17.0   HCT 51.9        CMP:   Recent Labs   Lab 03/28/23 1929   *   K 4.9      CO2 21*   *   BUN 66*   CREATININE 1.3   CALCIUM 8.9   PROT 7.1   ALBUMIN 2.9*   BILITOT 1.0   ALKPHOS 122   AST 28   ALT 27   ANIONGAP 9     Cardiac Markers:   Recent Labs   Lab 03/28/23  1423   BNP 23     Lactic Acid:   Recent Labs   Lab 03/28/23  1423   LACTATE 1.5     Lipase:   Recent Labs   Lab 03/28/23 1929   LIPASE 41         Significant Imaging: I have reviewed all pertinent imaging results/findings within the past 24 hours.    12 Lead EKG 2013  Vent. Rate : 065 BPM     Atrial Rate : 065 BPM      P-R Int : 216 ms          QRS Dur : 110 ms       QT Int : 438 ms       P-R-T Axes : 014 059 021 degrees      QTc Int : 455 ms     Sinus rhythm with 1st degree A-V block   Otherwise normal ECG   When compared with ECG of 09-OCT-2016 21:16,   Minimal criteria for Anterior infarct are no longer Present   Criteria for Inferior infarct are no longer Present      Lower Extremity  Veins Bilateral [[id number]]Collected: 03/28/23 8339     FINDINGS: Color and duplex Doppler sonographic assessment with spectral analysis of the bilateral lower extremities demonstrates no evidence of deep vein thrombosis. Normal color Doppler flow, augmentation, and compressibility are demonstrated at all levels.     There is mild nonspecific subcutaneous edema in the bilateral popliteal regions.     IMPRESSION:   1. No evidence of DVT in the bilateral lower extremities.   2. Mild bilateral nonspecific subcutaneous edema.     CT Abdomen Pelvis With Contrast [[id number]]Collected: 03/28/23 1812     FINDINGS: The lower lung fields are unremarkable. Both the liver and spleen are normal in size, and no focal abnormalities or ascites can be seen. Gallstones are present. The pancreas is normal in size, as are the adrenal glands and the abdominal aorta. The large cyst on the right kidney is again noted. Nonobstructing calcifications are noted in the left kidney.     The small bowel is normal in size. A moderate amount of gas and stool is seen throughout the colon. The appendix is visualized and appears normal. The urinary bladder is unopacified but is unremarkable. A fat-containing hernia is noted in the anterior abdominal wall, and this appears larger than on the previous examination. Bilateral hip arthroplasties have been performed.     IMPRESSION:     1. Cholelithiasis.   2. A fat-containing hernia is noted in the anterior abdominal wall.   3. Left nephrocalcinosis.   4. Renal cysts.

## 2023-03-29 NOTE — H&P
[facility] - Emergency Dept  Hospital Medicine  History & Physical    Patient Name: Ezequiel Escudero  MRN: [id number]  Patient Class: IP- Inpatient  Admission Date: 3/28/2023  Attending Physician: Frieda Sparks MD   Primary Care Provider: Santiago Beebe MD         Patient information was obtained from patient, past medical records and ER records.     Subjective:     Principal Problem:Wound infection    Chief Complaint:   Chief Complaint   Patient presents with    Leg Swelling    Abdominal Mass     Seen at [facility] for bilateral lower leg cellulitis. States that they did not do much for him. Pt has mass on lower abdomen that is protruding and appears to have eschar.          HPI: Ezequiel Escudero is a 72-year-old male with chronic medical problems including morbid obesity, atrial fibrillation on Xarelto, BPH with urinary retention, chronic venous stasis and chronic leg wounds who presents to the emergency room for increased lower extremity swelling and weeping.  He says it has been ongoing for several weeks and has gotten progressively worse over time.  He has had an increase in weeping and dressings getting soaked constantly.  He was in the emergency room on 03/25 at [place] they referred him to home health and wound care.  He said home health came yesterday and changed his dressings knee was supposed to see wound care tomorrow.  He reportedly had blisters on the right leg but reports today with a left lateral leg wound that has exposed fat and right ulceration that are both foul smelling with copious weeping fluid.  He also has a hernia in the umbilicus that has gotten red and tender.  He said this last happened a couple of years ago when he was in the hospital for cellulitis.  He said it has been an ongoing problem for several years but the wounds are only there occasionally.  He is having great difficulty walking and getting around and said his children check on him but he lives alone.  He is not  currently on any antibiotics.  He is also having urinary retention and incontinence and says he does not know when he has weighed and uses an incontinence brief and has to change them frequently and has rash to his buttocks and groin.  He denies any systemic symptoms such as fevers or chills, nausea vomiting, diarrhea or constipation, shortness a breath, coughing or chest pain.    In the ED, CTA abdomen and pelvis with contrast showed cholelithiasis, fat containing hernia in the anterior abdominal wall that appears larger than previous scan, left nephrocalcinosis and renal cysts.  The bladder appears distended.  Lower extremity ultrasound with no evidence of DVT and mild bilateral nonspecific subcutaneous edema.  Lab work with WBC 10.080, H&H 17.0/51.9, platelets 362, sed rate 49, CRP 8.68, lactic acid 1.5, sodium 134, CO2 21, BUN elevated at 66, anion gap 9, creatinine 1.3, estimated GFR 59.1, estimated creatinine clearance 74.2, albumin 2.9, albumin decreased at 2.9, BNP 23, urinalysis is pending, blood cultures are pending.  Vital signs with temperature 97.9°, heart rate 68-72, blood pressure 130/62, respiratory rate 18-19 and O2 sat 97-99% on room air.  Was given a tetanus and IV vancomycin.  He will be admitted to the hospitalist service for further evaluation and treatment with a consult to Wound Care and General surgery to evaluate abdominal wall hernia for abscess.      Past Medical History:   Diagnosis Date    A-fib     Anticoagulant long-term use     BPH (benign prostatic hyperplasia)     Rojas catheter in place     Kidney stone     PONV (postoperative nausea and vomiting)     Thyroid disease     Wears glasses        Past Surgical History:   Procedure Laterality Date    ANKLE DEBRIDEMENT      CARDIOVERSION      CYSTOSCOPY W/ RETROGRADES Bilateral 3/1/2021    Procedure: CYSTOSCOPY, WITH RETROGRADE PYELOGRAM;  Surgeon: Lior Flores MD;  Location: [facility] OR;  Service: Urology;  Laterality: Bilateral;     CYSTOSCOPY WITH BIOPSY OF BLADDER N/A 3/1/2021    Procedure: CYSTOSCOPY, WITH BLADDER BIOPSY, WITH FULGURATION IF INDICATED;  Surgeon: Lior Flores MD;  Location: [facility] OR;  Service: Urology;  Laterality: N/A;    DEBRIDEMENT OF LOWER EXTREMITY Right 2/1/2021    Procedure: DEBRIDEMENT, LOWER EXTREMITY;  Surgeon: Pete Cardenas MD;  Location: [facility] OR;  Service: General;  Laterality: Right;    INCISION AND DRAINAGE Right 2/1/2021    Procedure: Incision and Drainage;  Surgeon: Pete Cardenas MD;  Location: [facility] OR;  Service: General;  Laterality: Right;    THYROIDECTOMY  1970's    TONSILLECTOMY      TONSILLECTOMY, ADENOIDECTOMY         Review of patient's allergies indicates:  No Known Allergies      Scheduled Meds:   [START ON 3/29/2023] finasteride  5 mg Oral Daily    gabapentin  300 mg Oral BID    hydrALAZINE  100 mg Oral BID    miconazole NITRATE 2 %   Topical (Top) BID    piperacillin-tazobactam (ZOSYN) IVPB  3.375 g Intravenous Q8H    [START ON 3/29/2023] rivaroxaban  20 mg Oral Daily    sotaloL  80 mg Oral BID    [START ON 3/29/2023] tamsulosin  0.8 mg Oral Daily    vancomycin (VANCOCIN) IVPB  1,000 mg Intravenous Once     Continuous Infusions:   sodium chloride 0.9%       PRN Meds:.acetaminophen, HYDROcodone-acetaminophen, HYDROcodone-acetaminophen, meclizine, melatonin, naloxone, ondansetron, polyethylene glycol, prochlorperazine, simethicone, sodium chloride 0.9%, Pharmacy to dose Vancomycin consult **AND** vancomycin - pharmacy to dose      Current Outpatient Medications on File Prior to Encounter   Medication Sig    finasteride (PROSCAR) 5 mg tablet Take 1 tablet by mouth once daily (Patient taking differently: Take 5 mg by mouth once daily.)    gabapentin (NEURONTIN) 300 MG capsule Take 300 mg by mouth 2 (two) times daily.    hydrALAZINE (APRESOLINE) 100 MG tablet Take 100 mg by mouth 2 (two) times daily.    rivaroxaban (XARELTO) 20 mg Tab Take 20 mg by mouth once daily.     sotalol (BETAPACE) 80 MG  tablet Take 80 mg by mouth 2 (two) times daily.    tamsulosin (FLOMAX) 0.4 mg Cap Take 2 capsules (0.8 mg total) by mouth once daily.    vit A/vit C/vit E/zinc/copper (OCUVITE PRESERVISION ORAL) Take 1 tablet by mouth 2 (two) times a day.    meclizine (ANTIVERT) 25 mg tablet Take 25 mg by mouth 3 (three) times daily as needed.    [DISCONTINUED] collagenase (SANTYL) ointment Apply topically once daily.    [DISCONTINUED] meclofenamate (MECLOMEN) 50 MG capsule Take 50 mg by mouth every 6 (six) hours. PRN    [DISCONTINUED] oxyCODONE-acetaminophen (PERCOCET) 7.5-325 mg per tablet Take 1 tablet by mouth every 6 (six) hours as needed.    [DISCONTINUED] triamcinolone acetonide 0.1% (KENALOG) 0.1 % cream Apply topically 2 (two) times daily.     Family History       Problem Relation (Age of Onset)    Benign prostatic hyperplasia Brother    Heart disease Mother, Father          Tobacco Use    Smoking status: Never    Smokeless tobacco: Never   Substance and Sexual Activity    Alcohol use: Yes     Comment: OCC    Drug use: No    Sexual activity: Not Currently     Review of Systems   All other systems reviewed and are negative.  Twelve point review of systems obtained and negative except as stated above in HPI      Objective:     Vital Signs (Most Recent):  Temp: 97.9 °F (36.6 °C) (03/28/23 1224)  Pulse: 72 (03/28/23 1629)  Resp: 19 (03/28/23 1629)  BP: 133/66 (03/28/23 1629)  SpO2: 97 % (03/28/23 1629) Vital Signs (24h Range):  Temp:  [97.9 °F (36.6 °C)] 97.9 °F (36.6 °C)  Pulse:  [68-72] 72  Resp:  [18-19] 19  SpO2:  [97 %-99 %] 97 %  BP: (124-144)/(62-90) 133/66     Weight: 131.5 kg (290 lb)  Body mass index is 39.33 kg/m².    Physical Exam  Vitals and nursing note reviewed.   Constitutional:       General: He is awake. He is not in acute distress.     Appearance: He is morbidly obese. He is not ill-appearing.   HENT:      Head: Normocephalic.      Right Ear: Hearing and external ear normal.      Left Ear: Hearing and  external ear normal.      Nose: Nose normal.      Mouth/Throat:      Lips: Pink.      Mouth: Mucous membranes are moist.      Pharynx: Oropharynx is clear.   Eyes:      General: Lids are normal. Vision grossly intact. Gaze aligned appropriately.   Cardiovascular:      Rate and Rhythm: Normal rate and regular rhythm.      Pulses: Normal pulses.      Heart sounds: + soft murmur heard.  Pulmonary:      Effort: Pulmonary effort is normal.      Breath sounds: Normal breath sounds. No wheezing, rhonchi or rales.   Chest:      Chest wall: No tenderness.   Abdominal:      General: Abdomen is protuberant. Bowel sounds are normal. There is no distension.      Palpations: Abdomen is soft.      Tenderness: There is no abdominal tenderness.      Comments: No tenderness to palpation, umbilical hernia with erythema and eschar, soft To palpation   Genitourinary:     Comments: Bilateral groin areas with erythema and apparent yeast rash.  No tenderness to palpation though patient says he has been incontinent and can not feel like he has to go to the bathroom but has not voided significantly since he has been here.  Musculoskeletal:         General: No swelling or deformity. Normal range of motion.      Cervical back: Normal range of motion.      Comments: Moves all extremities but generally weak.   Skin:     General: Skin is warm and dry.      Capillary Refill: Capillary refill takes less than 2 seconds.      Comments: Bilateral lower extremities with thick erythematous skin up to knees, right lower extremity with a few ulcerations, left lower extremity with large left lateral ulceration, both lower extremities have a musty foul odor and dressings were removed with large amounts of thin brown exudates on Telfa pads and clear yellow drainage soaked Kerlix.  Thickened toenails.  Unable to turn patient over to view posterior.   Neurological:      General: No focal deficit present.      Mental Status: He is alert and oriented to person,  place, and time.      GCS: GCS eye subscore is 4. GCS verbal subscore is 5. GCS motor subscore is 6.      Comments: He has no pain to lower extremities.  Seems generally weak.   Psychiatric:         Attention and Perception: Attention and perception normal.         Mood and Affect: Mood and affect normal.         Speech: Speech normal.         Behavior: Behavior normal. Behavior is cooperative.         Thought Content: Thought content normal.         Cognition and Memory: Cognition and memory normal.         Judgment: Judgment normal.                            Significant Labs: All pertinent labs within the past 24 hours have been reviewed.    Bilirubin:   Recent Labs   Lab 03/28/23 1929   BILITOT 1.0     BMP:   Recent Labs   Lab 03/28/23 1929   *   *   K 4.9      CO2 21*   BUN 66*   CREATININE 1.3   CALCIUM 8.9     CBC:   Recent Labs   Lab 03/28/23  1423   WBC 10.80   HGB 17.0   HCT 51.9        CMP:   Recent Labs   Lab 03/28/23 1929   *   K 4.9      CO2 21*   *   BUN 66*   CREATININE 1.3   CALCIUM 8.9   PROT 7.1   ALBUMIN 2.9*   BILITOT 1.0   ALKPHOS 122   AST 28   ALT 27   ANIONGAP 9     Cardiac Markers:   Recent Labs   Lab 03/28/23  1423   BNP 23     Lactic Acid:   Recent Labs   Lab 03/28/23  1423   LACTATE 1.5     Lipase:   Recent Labs   Lab 03/28/23 1929   LIPASE 41         Significant Imaging: I have reviewed all pertinent imaging results/findings within the past 24 hours.    12 Lead EKG 2013  Vent. Rate : 065 BPM     Atrial Rate : 065 BPM      P-R Int : 216 ms          QRS Dur : 110 ms       QT Int : 438 ms       P-R-T Axes : 014 059 021 degrees      QTc Int : 455 ms     Sinus rhythm with 1st degree A-V block   Otherwise normal ECG   When compared with ECG of 09-OCT-2016 21:16,   Minimal criteria for Anterior infarct are no longer Present   Criteria for Inferior infarct are no longer Present      Lower Extremity Veins Bilateral [[id number]]Collected: 03/28/23  133     FINDINGS: Color and duplex Doppler sonographic assessment with spectral analysis of the bilateral lower extremities demonstrates no evidence of deep vein thrombosis. Normal color Doppler flow, augmentation, and compressibility are demonstrated at all levels.     There is mild nonspecific subcutaneous edema in the bilateral popliteal regions.     IMPRESSION:   1. No evidence of DVT in the bilateral lower extremities.   2. Mild bilateral nonspecific subcutaneous edema.     CT Abdomen Pelvis With Contrast [[id number]]Collected: 03/28/23 1812     FINDINGS: The lower lung fields are unremarkable. Both the liver and spleen are normal in size, and no focal abnormalities or ascites can be seen. Gallstones are present. The pancreas is normal in size, as are the adrenal glands and the abdominal aorta. The large cyst on the right kidney is again noted. Nonobstructing calcifications are noted in the left kidney.     The small bowel is normal in size. A moderate amount of gas and stool is seen throughout the colon. The appendix is visualized and appears normal. The urinary bladder is unopacified but is unremarkable. A fat-containing hernia is noted in the anterior abdominal wall, and this appears larger than on the previous examination. Bilateral hip arthroplasties have been performed.     IMPRESSION:     1. Cholelithiasis.   2. A fat-containing hernia is noted in the anterior abdominal wall.   3. Left nephrocalcinosis.   4. Renal cysts.       Assessment/Plan:     * Wound infection  Patient with chronic leg wounds, WBC 10.80, CRP 8.68 and ESR 49, wounds with copious exudates in foul smelling, do have a foul odor and copious drainage, we will consult Wound Care, also consult General surgery for evaluation of left lateral leg wound, continue vancomycin and add Zosyn, obtain blood cultures      Elevated glucose  Glucose 111, obtain hemoglobin A1c in a.m.      Obesity (BMI 30-39.9)  Body mass index is 39.33 kg/m². Morbid  obesity complicates all aspects of disease management from diagnostic modalities to treatment. Weight loss encouraged and health benefits explained to patient. Dietitian consult cardiac diet        Physical debility  Her to increase activity, consult PT OT for evaluation, may need skilled nursing facility/rehab placement for wound care and improving independence      Nephrocalcinosis  Calcium 8.9, albumin 2.9, corrected calcium 9.8, phosphorus and magnesium pending, nephrocalcinosis on CT scan, previous CT scan does not mention this, consider Nephrology consult      Elevated BUN  Patient with elevated BUN at 66, creatinine 1.3, baseline is around 1.1-1.4, 5 days ago BUN was 17, today BUN 66, possible mild dehydration usual GFR is greater than 60, today around baseline at 59.1, creatinine clearance estimated at 74.2, repeat renal function panel in am, resume diet, normal saline at 100 cc an hour for 1 day, repeat renal function panel daily      Hypertension  Blood pressure currently under control, most recent blood pressure 130/62, resume home meds, monitor for hypotension      Cellulitis of abdominal wall  Patient was cellulitis and possible abscess of umbilical hernia, continue vancomycin add Zosyn, consult General surgery for evaluation for possible abscess, CTA abdomen shows some enlargement in size      Cutaneous candidiasis  Add nystatin powder, could benefit from oral fluconazole but is on sotalol for AFib      Chronic ulcer of left leg with fat layer exposed  Consult General surgery for possible debridement and Wound Care, vancomycin and Zosyn      Venous stasis dermatitis of both lower extremities  Chronic venous stasis dermatitis both lower extremities, consult Wound Care, ultrasound of bilateral lower extremities negative      A-fib  Currently on Xarelto for AFib, 12 lead EKG with normal sinus rhythm with first-degree block ventricular rate 65 and ; most recent echocardiogram from 2021 with ejection  fraction 55%, BNP 23, repeat echocardiogram      Urine retention  Unable to void today, place urinary catheter, continue tamsulosin, monitor I&O, remove Rojas catheter as able, consult Urology if necessary      BPH (benign prostatic hyperplasia)  History of BPH, continue finasteride, we will place urinary catheter for urinary retention        VTE Risk Mitigation (From admission, onward)           Ordered     rivaroxaban tablet 20 mg  Daily         03/28/23 2113     Reason for No Pharmacological VTE Prophylaxis  Once        Question:  Reasons:  Answer:  Already adequately anticoagulated on oral Anticoagulants    03/28/23 2113     IP VTE HIGH RISK PATIENT  Once         03/28/23 2113                     Patient was examined at 2015      Code Status: FULL CODE         Cande Botello NP  Department of Hospital Medicine  [facility] - Emergency Dept

## 2023-03-29 NOTE — ASSESSMENT & PLAN NOTE
Unable to void today, place urinary catheter, continue tamsulosin, monitor I&O, remove Rojas catheter as able, consult Urology if necessary

## 2023-03-29 NOTE — NURSING
Patient to floor from ED via stretcher. AA&O x 4 on RA in no apparent distress.Skin tear to right elbow, scabs to left forearm, inguinal redness, scrotal redness, bilateral buttock and posterior thigh redness, bilateral lower extremity wounds and redness with moderate drainage. Umbilicus red, swollen with eschar. Rojas in place.

## 2023-03-29 NOTE — HPI
Ezequiel Escudero is a 72-year-old male with chronic medical problems including morbid obesity, atrial fibrillation on Xarelto, BPH with urinary retention, chronic venous stasis and chronic leg wounds who presents to the emergency room for increased lower extremity swelling and weeping.  He says it has been ongoing for several weeks and has gotten progressively worse over time.  He has had an increase in weeping and dressings getting soaked constantly.  He was in the emergency room on 03/25 at [facility] they referred him to home health and wound care.  He said home health came yesterday and changed his dressings knee was supposed to see wound care tomorrow.  He reportedly had blisters on the right leg but reports today with a left lateral leg wound that has exposed fat and right ulceration that are both foul smelling with copious weeping fluid.  He also has a hernia in the umbilicus that has gotten red and tender.  He said this last happened a couple of years ago when he was in the hospital for cellulitis.  He said it has been an ongoing problem for several years but the wounds are only there occasionally.  He is having great difficulty walking and getting around and said his children check on him but he lives alone.  He is not currently on any antibiotics.  He is also having urinary retention and incontinence and says he does not know when he has weighed and uses an incontinence brief and has to change them frequently and has rash to his buttocks and groin.  He denies any systemic symptoms such as fevers or chills, nausea vomiting, diarrhea or constipation, shortness a breath, coughing or chest pain.    In the ED, CTA abdomen and pelvis with contrast showed cholelithiasis, fat containing hernia in the anterior abdominal wall that appears larger than previous scan, left nephrocalcinosis and renal cysts.  The bladder appears distended.  Lower extremity ultrasound with no evidence of DVT and mild bilateral nonspecific  subcutaneous edema.  Lab work with WBC 10.080, H&H 17.0/51.9, platelets 362, sed rate 49, CRP 8.68, lactic acid 1.5, sodium 134, CO2 21, BUN elevated at 66, anion gap 9, creatinine 1.3, estimated GFR 59.1, estimated creatinine clearance 74.2, albumin 2.9, albumin decreased at 2.9, BNP 23, urinalysis is pending, blood cultures are pending.  Vital signs with temperature 97.9°, heart rate 68-72, blood pressure 130/62, respiratory rate 18-19 and O2 sat 97-99% on room air.  Was given a tetanus and IV vancomycin.  He will be admitted to the hospitalist service for further evaluation and treatment with a consult to Wound Care and General surgery to evaluate abdominal wall hernia for abscess.

## 2023-03-29 NOTE — ASSESSMENT & PLAN NOTE
Blood pressure currently under control, most recent blood pressure 130/62, resume home meds, monitor for hypotension

## 2023-03-29 NOTE — ED NOTES
Placed tele monitor #[id number] on pt.  Will not work as reported per monitor tech x3 attempts at trouble shooting.  Tele  monitor sent back to monitor room.

## 2023-03-29 NOTE — CONSULTS
Bilateral leg ulcers, open weeping, foul odor, slough loose black gray and yellow necrotic tissue. Red maroon hemosiderin staining .some bleeding when cleaned  Cleaned and dried and changed under pads.   coming to see patient this afternoon.  Patient has redness groin, scrotum and buttock.  Nursing staff using miconizole 2% powder. Surgery consult for abdominal hernia.

## 2023-03-29 NOTE — PROGRESS NOTES
VANCOMYCIN PHARMACOKINETIC NOTE:  Vancomycin Day # 1    Objective/Assessment:    Diagnosis/Indication for Vancomycin:Skin & Soft Tissue infection      72 y.o., male; Actual Body Weight = 131.5 kg (290 lb).    The patient has the following labs:  3/29/2023 Estimated Creatinine Clearance: 74.2 mL/min (based on SCr of 1.3 mg/dL). Lab Results   Component Value Date    BUN 66 (H) 03/28/2023     Lab Results   Component Value Date    WBC 10.80 03/28/2023          Plan:  Adjust vancomycin dose and/or frequency based on the patient's actual weight and renal function:  Initiate Vancomycin 2000 mg IV every 18 hours.  Orders have been entered into patient's chart.        Vancomycin trough level has been ordered for 02:00 on 3/31.    Pharmacy will manage vancomycin therapy, monitor serum vancomycin levels, monitor renal function and adjust regimen as necessary.    Thank you for allowing us to participate in this patient's care.     Samara Wiggins 3/29/2023   Department of Pharmacy  Ext [phone]

## 2023-03-29 NOTE — ASSESSMENT & PLAN NOTE
Body mass index is 39.33 kg/m². Morbid obesity complicates all aspects of disease management from diagnostic modalities to treatment. Weight loss encouraged and health benefits explained to patient. Dietitian consult cardiac diet

## 2023-03-29 NOTE — ASSESSMENT & PLAN NOTE
Chronic venous stasis dermatitis both lower extremities, consult Wound Care, ultrasound of bilateral lower extremities negative

## 2023-03-29 NOTE — PLAN OF CARE
Problem: Skin Injury Risk Increased  Goal: Skin Health and Integrity  Intervention: Promote and Optimize Oral Intake  Flowsheets (Taken 3/29/2023 1743)  Oral Nutrition Promotion:   calorie-dense foods provided   calorie-dense liquids provided   other (see comments)     Problem: Oral Intake Inadequate  Goal: Improved Oral Intake  Outcome: Ongoing, Progressing  Intervention: Promote and Optimize Oral Intake  Flowsheets (Taken 3/29/2023 1743)  Oral Nutrition Promotion:   calorie-dense foods provided   calorie-dense liquids provided   other (see comments)     Recommendations  1.) Continue cardiac diet restrictions.               Will add diabetic diet restrictions (HbA1C 6.0%)  2.) Will add Javad BID x14 days to aid in wound healing.   3.) Will add ProTGold pkt TID to meet protein requirements.   4.) Monitor renal function with increased protein intake for tolerance.   5.) Suggest lactobacillus acidophilus to protect gut health while receiving antibiotics.  6.) Suggest long-term wound supplements: vitamin C, zinc, and MVI.   7.)  to aid in food preferences.     Goals:   1.) Pt to consume/tolerate >75% of meals and ONS.   2.) Progression of wound healing.  Nutrition Goal Status: new

## 2023-03-29 NOTE — ASSESSMENT & PLAN NOTE
Her to increase activity, consult PT OT for evaluation, may need skilled nursing facility/rehab placement for wound care and improving independence

## 2023-03-29 NOTE — PROGRESS NOTES
Subjective: Patient seen.  Says there has been no change in his wounds if anything they look better.  Says that the swelling on his abdomen has not been painful but was red prior to presentation.  Says it is less red now.  Denies any fever, chills, nausea or any other complaints overnight.    Physical Exam  HENT:      Head: Normocephalic and atraumatic.      Mouth/Throat:      Mouth: Mucous membranes are moist.   Eyes:      Pupils: Pupils are equal, round, and reactive to light.   Cardiovascular:      Rate and Rhythm: Normal rate.   Pulmonary:      Effort: Pulmonary effort is normal.   Abdominal:      Hernia: A hernia is present.      Comments: Non tender to palplation. Very minimal erythema noted(patient notes it has improved)   Musculoskeletal:      Cervical back: Normal range of motion.   Skin:     Comments: Bilateral venous stasis changes, chronic leg wounds, no purulence noted.    Neurological:      Mental Status: He is alert.          Abdominal cellulitis, umbilical hernia   -patient with erythema and swelling around umbilical hernia site.  Appears to have improved overnight.  There is no tenderness to palpation.  No concern for strangulation at this time however will await General surgery recommendations.  Currently on IV antibiotics.    Chronic leg wounds, venous stasis dermatitis   -there appears to be discrepancy in the chart as patient notes that the wounds have improved in the last few weeks.  Do not appear to be infected however Will await wound care recommendations.    Obesity (BMI 30-39.9)  Body mass index is 39.33 kg/m². Morbid obesity complicates all aspects of disease management from diagnostic modalities to treatment. Weight loss encouraged and health benefits explained to patient.        Dietitian consult cardiac diet        Physical debility  Her to increase activity, consult PT OT for evaluation, may need skilled nursing facility/rehab placement for wound care and improving  independence    A-fib  Currently on Xarelto for AFib

## 2023-03-29 NOTE — ASSESSMENT & PLAN NOTE
Calcium 8.9, albumin 2.9, corrected calcium 9.8, phosphorus and magnesium pending, nephrocalcinosis on CT scan, previous CT scan does not mention this, consider Nephrology consult

## 2023-03-29 NOTE — ASSESSMENT & PLAN NOTE
Patient was cellulitis and possible abscess of umbilical hernia, continue vancomycin add Zosyn, consult General surgery for evaluation for possible abscess, CTA abdomen shows some enlargement in size

## 2023-03-29 NOTE — PT/OT/SLP EVAL
"Occupational Therapy   Evaluation    Name: Ezequiel Escudero  MRN: [id number]  Admitting Diagnosis: Wound infection  Recent Surgery: * No surgery found *      Recommendations:     Discharge Recommendations: nursing facility, skilled  Discharge Equipment Recommendations:  other (see comments) (TBD)  Barriers to discharge:  Decreased caregiver support; increased assistance needed for care    Assessment:     Ezequiel Escudero is a 72 y.o. male with a medical diagnosis of Wound infection.  Performance deficits affecting function: weakness, edema, impaired skin, impaired self care skills, impaired cardiopulmonary response to activity, decreased lower extremity function, gait instability, impaired functional mobility.  Patient was unsure of why he is having all these problems with weeping legs and wounds.     Rehab Prognosis: Fair; patient would benefit from acute skilled OT services to address these deficits and reach maximum level of function.       Plan:     Patient to be seen 5 x/week to address the above listed problems via self-care/home management, therapeutic activities, therapeutic exercises  Plan of Care Expires:    Plan of Care Reviewed with: patient    Subjective     Chief Complaint: "having hard time moving"  Patient/Family Comments/goals: return home    Occupational Profile:  Living Environment: lives alone with family checking on him  Previous level of function: patient reports increased weakness and the need for RW  Equipment Used at Home: walker, rolling; standard cane  Assistance upon Discharge: family is available for assistance.     Pain/Comfort:  Pain Rating 1: 0/10  Pain Rating Post-Intervention 1: 0/10    Patients cultural, spiritual, Denominational conflicts given the current situation: no    Objective:     Communicated with: nurse prior to session.  Patient found HOB elevated with peripheral IV, centeno catheter upon OT entry to room.    General Precautions: Standard, fall  Orthopedic Precautions: N/A  Braces: " N/A  Respiratory Status: Room air    Occupational Performance:    Bed Mobility:    Patient completed Rolling/Turning to Right with moderate assistance  Patient completed Scooting/Bridging with moderate assistance  Patient completed Supine to Sit with moderate assistance      Activities of Daily Living:  Grooming: stand by assistance/setup for oral care while seated at edge of bed    Cognitive/Visual Perceptual:  Cognitive/Psychosocial Skills:     -       Oriented to: Person, Place, Time, and Situation   -       Follows Commands/attention:Follows multistep  commands  -       Communication: clear/fluent  -       Memory: No Deficits noted  -       Safety awareness/insight to disability: intact   -       Mood/Affect/Coping skills/emotional control: Appropriate to situation and Cooperative    Physical Exam:  Upper Extremity Range of Motion:     -       Right Upper Extremity: WNL  -       Left Upper Extremity: WNL  Upper Extremity Strength:    -       Right Upper Extremity: WNL  -       Left Upper Extremity: WNL   Strength:    -       Right Upper Extremity: WNL  -       Left Upper Extremity: WNL  Fine Motor Coordination:    -       Intact    AMPAC 6 Click ADL:  AMPAC Total Score: 20    Treatment & Education:  Patient was educate on role of OT/POC, discharge planning, importance of getting out of bed during recovery, safety during transfers and ADLs and reviewed use of call bell for assistance.     Patient left HOB elevated with all lines intact, call button in reach, and nurse present    GOALS:   Multidisciplinary Problems       Occupational Therapy Goals          Problem: Occupational Therapy    Goal Priority Disciplines Outcome Interventions   Occupational Therapy Goal     OT, PT/OT     Description: Goals to be met by: 4/29/23     Patient will increase functional independence with ADLs by performing:    UE Dressing with Supervision.  LE Dressing with Supervision.  Grooming while standing with Supervision.  Toileting  from toilet with Supervision for hygiene and clothing management.   Toilet transfer to toilet with Supervision.                         History:     Past Medical History:   Diagnosis Date    A-fib     Anticoagulant long-term use     BPH (benign prostatic hyperplasia)     Rojas catheter in place     Kidney stone     PONV (postoperative nausea and vomiting)     Thyroid disease     Wears glasses          Past Surgical History:   Procedure Laterality Date    ANKLE DEBRIDEMENT      CARDIOVERSION      CYSTOSCOPY W/ RETROGRADES Bilateral 3/1/2021    Procedure: CYSTOSCOPY, WITH RETROGRADE PYELOGRAM;  Surgeon: Lior Flores MD;  Location: [facility] OR;  Service: Urology;  Laterality: Bilateral;    CYSTOSCOPY WITH BIOPSY OF BLADDER N/A 3/1/2021    Procedure: CYSTOSCOPY, WITH BLADDER BIOPSY, WITH FULGURATION IF INDICATED;  Surgeon: Lior Flores MD;  Location: [facility] OR;  Service: Urology;  Laterality: N/A;    DEBRIDEMENT OF LOWER EXTREMITY Right 2/1/2021    Procedure: DEBRIDEMENT, LOWER EXTREMITY;  Surgeon: Pete Cardenas MD;  Location: [facility] OR;  Service: General;  Laterality: Right;    INCISION AND DRAINAGE Right 2/1/2021    Procedure: Incision and Drainage;  Surgeon: Pete Cardenas MD;  Location: [facility] OR;  Service: General;  Laterality: Right;    THYROIDECTOMY  1970's    TONSILLECTOMY      TONSILLECTOMY, ADENOIDECTOMY         Time Tracking:     OT Date of Treatment: 03/29/23  OT Start Time: 0955  OT Stop Time: 1010  OT Total Time (min): 15 min    Billable Minutes:Evaluation 15    3/29/2023

## 2023-03-29 NOTE — ASSESSMENT & PLAN NOTE
Patient with chronic leg wounds, WBC 10.80, CRP 8.68 and ESR 49, wounds with copious exudates in foul smelling, do have a foul odor and copious drainage, we will consult Wound Care, also consult General surgery for evaluation of left lateral leg wound, continue vancomycin and add Zosyn

## 2023-03-29 NOTE — ASSESSMENT & PLAN NOTE
Patient with elevated BUN at 66, creatinine 1.3, baseline is around 1.1-1.4, 5 days ago BUN was 17, today BUN 66, possible mild dehydration usual GFR is greater than 60, today around baseline at 59.1, creatinine clearance estimated at 74.2, repeat renal function panel in am, resume diet, normal saline at 100 cc an hour for 1 day, repeat renal function panel daily

## 2023-03-29 NOTE — ASSESSMENT & PLAN NOTE
Currently on Xarelto for AFib, 12 lead EKG with normal sinus rhythm with first-degree block ventricular rate 65 and ; most recent echocardiogram from 2021 with ejection fraction 55%, BNP 23, repeat echocardiogram

## 2023-03-29 NOTE — PT/OT/SLP EVAL
"Physical Therapy Evaluation    Patient Name:  Ezequiel Escudero   MRN:  [id number]    Recommendations:     Discharge Recommendations: nursing facility, skilled   Discharge Equipment Recommendations:  (TBD)   Barriers to discharge: Decreased caregiver support    Assessment:     Ezequiel Escudero is a 72 y.o. male admitted with a medical diagnosis of Wound infection.  He presents with the following impairments/functional limitations: weakness, impaired endurance, impaired functional mobility, gait instability, impaired balance, decreased lower extremity function, impaired skin, edema, impaired cardiopulmonary response to activity.    Pt found HOB elevated and pt agreeable to working with PT. Pt A & O x  4 and has the following co-morbidities: HTN, A-Fib, BPH, Cellulitis.  Pt tolerated session fairly and required moderate to minimal A for safe mobilization during session today for skin protection while exiting the bed. Pt would benefit from acute PT during hospitalization to increase strength, endurance and safety with mobility and would benefit from SNF prior to discharge home.      Rehab Prognosis: Fair; patient would benefit from acute skilled PT services to address these deficits and reach maximum level of function.    Recent Surgery: * No surgery found *      Plan:     During this hospitalization, patient to be seen 6 x/week to address the identified rehab impairments via gait training, therapeutic activities, therapeutic exercises, neuromuscular re-education and progress toward the following goals:    Plan of Care Expires:  04/29/23    Subjective     Chief Complaint: Pt reported "I understand the sores on my leg, but I don't know why they are like this."  Patient/Family Comments/goals: SNF  Pain/Comfort:  Pain Rating 1: 0/10  Pain Rating Post-Intervention 1: 0/10    Patients cultural, spiritual, Yazidism conflicts given the current situation:      Living Environment:  Pt lives alone in a [facility] with no steps to " enter.  Prior to admission, patients level of function was Independent to Modified Independent w/ cane/RW until a few days ago.  Equipment used at home: walker, rolling, cane, straight, wheelchair.  DME owned (not currently used): none.  Upon discharge, patient will have assistance from facility staff.    Objective:     Communicated with [place] NENA Omalley prior to session.  Patient found HOB elevated with bed alarm, centeno catheter, peripheral IV  upon PT entry to room.    General Precautions: Standard, fall  Orthopedic Precautions:N/A   Braces: N/A  Respiratory Status: Room air    Exams:  Cognitive Exam:  Patient is oriented to Person, Place, Time, and Situation  Skin Integrity/Edema:      -       Skin integrity: Wound present B lower legs, large, open to air with serious, bloody and purulent weeping noted. Pt waiting for wound care assessment.     Functional Mobility:  Bed Mobility:     Scooting: minimum assistance, moderate assistance, and with B LEs to prevent further skin injury while moving LEs to side of bed for long sitting to sitting EOB  Supine to Sit: minimum assistance  Transfers:     Sit to Stand:  minimum assistance with rolling walker  Toilet Transfer: minimum assistance with  rolling walker  using  Step Transfer  Gait: x 16 feet with rolling walker and minimal A to steady for safety.       AM-PAC 6 CLICK MOBILITY  Total Score:16       Treatment & Education:  Pt was educated on the following: call light use, importance of OOB activity and functional mobility to negate the negative effects of prolonged bed rest during this hospitalization, safe transfers/ambulation and discharge planning recommendations/options.      Patient left  sitting up on BSC   with all lines intact, call button in reach, and RN notified and nursing staff to assist pt back into bed after toileting.    GOALS:   Multidisciplinary Problems       Physical Therapy Goals          Problem: Physical Therapy    Goal Priority Disciplines Outcome  Goal Variances Interventions   Physical Therapy Goal     PT, PT/OT      Description: Goals to be met by: 23     Patient will increase functional independence with mobility by performin. Supine to sit with Supervision  2. Sit to stand transfer with Supervision  3. Bed to chair transfer with Supervision using Rolling Walker  4. Gait  x 150 feet with Supervision using Rolling Walker.                              History:     Past Medical History:   Diagnosis Date    A-fib     Anticoagulant long-term use     BPH (benign prostatic hyperplasia)     Rojas catheter in place     Kidney stone     PONV (postoperative nausea and vomiting)     Thyroid disease     Wears glasses        Past Surgical History:   Procedure Laterality Date    ANKLE DEBRIDEMENT      CARDIOVERSION      CYSTOSCOPY W/ RETROGRADES Bilateral 3/1/2021    Procedure: CYSTOSCOPY, WITH RETROGRADE PYELOGRAM;  Surgeon: Lior Flores MD;  Location: [facility] OR;  Service: Urology;  Laterality: Bilateral;    CYSTOSCOPY WITH BIOPSY OF BLADDER N/A 3/1/2021    Procedure: CYSTOSCOPY, WITH BLADDER BIOPSY, WITH FULGURATION IF INDICATED;  Surgeon: Lior Flores MD;  Location: [facility] OR;  Service: Urology;  Laterality: N/A;    DEBRIDEMENT OF LOWER EXTREMITY Right 2021    Procedure: DEBRIDEMENT, LOWER EXTREMITY;  Surgeon: Pete Cardenas MD;  Location: [facility] OR;  Service: General;  Laterality: Right;    INCISION AND DRAINAGE Right 2021    Procedure: Incision and Drainage;  Surgeon: Pete Cardenas MD;  Location: [facility] OR;  Service: General;  Laterality: Right;    THYROIDECTOMY  1970's    TONSILLECTOMY      TONSILLECTOMY, ADENOIDECTOMY         Time Tracking:     PT Received On: 23  PT Start Time: 1000     PT Stop Time: 1025  PT Total Time (min): 25 min     Billable Minutes: Evaluation 10 and Gait Training 15      2023

## 2023-03-30 LAB
ALBUMIN SERPL BCP-MCNC: 2.5 G/DL (ref 3.5–5.2)
ANION GAP SERPL CALC-SCNC: 6 MMOL/L (ref 8–16)
BASOPHILS # BLD AUTO: 0.07 K/UL (ref 0–0.2)
BASOPHILS NFR BLD: 0.7 % (ref 0–1.9)
BUN SERPL-MCNC: 35 MG/DL (ref 8–23)
CALCIUM SERPL-MCNC: 8.2 MG/DL (ref 8.7–10.5)
CHLORIDE SERPL-SCNC: 104 MMOL/L (ref 95–110)
CO2 SERPL-SCNC: 24 MMOL/L (ref 23–29)
CREAT SERPL-MCNC: 1.1 MG/DL (ref 0.5–1.4)
DIFFERENTIAL METHOD: ABNORMAL
EOSINOPHIL # BLD AUTO: 0.4 K/UL (ref 0–0.5)
EOSINOPHIL NFR BLD: 3.8 % (ref 0–8)
ERYTHROCYTE [DISTWIDTH] IN BLOOD BY AUTOMATED COUNT: 15.1 % (ref 11.5–14.5)
EST. GFR  (NO RACE VARIABLE): >60 ML/MIN/1.73 M^2
GLUCOSE SERPL-MCNC: 143 MG/DL (ref 70–110)
HCT VFR BLD AUTO: 45.6 % (ref 40–54)
HGB BLD-MCNC: 14.4 G/DL (ref 14–18)
IMM GRANULOCYTES # BLD AUTO: 0.16 K/UL (ref 0–0.04)
IMM GRANULOCYTES NFR BLD AUTO: 1.7 % (ref 0–0.5)
LYMPHOCYTES # BLD AUTO: 1.2 K/UL (ref 1–4.8)
LYMPHOCYTES NFR BLD: 12.9 % (ref 18–48)
MAGNESIUM SERPL-MCNC: 2 MG/DL (ref 1.6–2.6)
MCH RBC QN AUTO: 28.6 PG (ref 27–31)
MCHC RBC AUTO-ENTMCNC: 31.6 G/DL (ref 32–36)
MCV RBC AUTO: 91 FL (ref 82–98)
MONOCYTES # BLD AUTO: 1.2 K/UL (ref 0.3–1)
MONOCYTES NFR BLD: 12.8 % (ref 4–15)
NEUTROPHILS # BLD AUTO: 6.4 K/UL (ref 1.8–7.7)
NEUTROPHILS NFR BLD: 68.1 % (ref 38–73)
NRBC BLD-RTO: 0 /100 WBC
PHOSPHATE SERPL-MCNC: 3.3 MG/DL (ref 2.7–4.5)
PLATELET # BLD AUTO: 289 K/UL (ref 150–450)
PMV BLD AUTO: 9.5 FL (ref 9.2–12.9)
POTASSIUM SERPL-SCNC: 4.3 MMOL/L (ref 3.5–5.1)
RBC # BLD AUTO: 5.03 M/UL (ref 4.6–6.2)
SODIUM SERPL-SCNC: 134 MMOL/L (ref 136–145)
WBC # BLD AUTO: 9.37 K/UL (ref 3.9–12.7)

## 2023-03-30 PROCEDURE — 63600175 PHARM REV CODE 636 W HCPCS: Performed by: INTERNAL MEDICINE

## 2023-03-30 PROCEDURE — 12000002 HC ACUTE/MED SURGE SEMI-PRIVATE ROOM

## 2023-03-30 PROCEDURE — 83735 ASSAY OF MAGNESIUM: CPT | Performed by: NURSE PRACTITIONER

## 2023-03-30 PROCEDURE — 63600175 PHARM REV CODE 636 W HCPCS: Performed by: HOSPITALIST

## 2023-03-30 PROCEDURE — 25000003 PHARM REV CODE 250: Performed by: NURSE PRACTITIONER

## 2023-03-30 PROCEDURE — 36415 COLL VENOUS BLD VENIPUNCTURE: CPT | Performed by: NURSE PRACTITIONER

## 2023-03-30 PROCEDURE — 63600175 PHARM REV CODE 636 W HCPCS: Performed by: NURSE PRACTITIONER

## 2023-03-30 PROCEDURE — 25000003 PHARM REV CODE 250: Performed by: HOSPITALIST

## 2023-03-30 PROCEDURE — 85025 COMPLETE CBC W/AUTO DIFF WBC: CPT | Performed by: NURSE PRACTITIONER

## 2023-03-30 PROCEDURE — 97535 SELF CARE MNGMENT TRAINING: CPT | Mod: CO

## 2023-03-30 PROCEDURE — 25000003 PHARM REV CODE 250: Performed by: INTERNAL MEDICINE

## 2023-03-30 PROCEDURE — 80069 RENAL FUNCTION PANEL: CPT | Performed by: NURSE PRACTITIONER

## 2023-03-30 RX ORDER — OXYBUTYNIN CHLORIDE 5 MG/1
5 TABLET ORAL 3 TIMES DAILY
Status: DISCONTINUED | OUTPATIENT
Start: 2023-03-30 | End: 2023-04-01 | Stop reason: HOSPADM

## 2023-03-30 RX ORDER — CEFEPIME HYDROCHLORIDE 1 G/50ML
1 INJECTION, SOLUTION INTRAVENOUS
Status: DISCONTINUED | OUTPATIENT
Start: 2023-03-30 | End: 2023-03-30

## 2023-03-30 RX ADMIN — TAMSULOSIN HYDROCHLORIDE 0.8 MG: 0.4 CAPSULE ORAL at 10:03

## 2023-03-30 RX ADMIN — GABAPENTIN 300 MG: 300 CAPSULE ORAL at 09:03

## 2023-03-30 RX ADMIN — HYDRALAZINE HYDROCHLORIDE 100 MG: 25 TABLET, FILM COATED ORAL at 09:03

## 2023-03-30 RX ADMIN — FINASTERIDE 5 MG: 5 TABLET, FILM COATED ORAL at 10:03

## 2023-03-30 RX ADMIN — OXYBUTYNIN CHLORIDE 5 MG: 5 TABLET ORAL at 02:03

## 2023-03-30 RX ADMIN — PIPERACILLIN SODIUM AND TAZOBACTAM SODIUM 3.38 G: 3; .375 INJECTION, POWDER, LYOPHILIZED, FOR SOLUTION INTRAVENOUS at 12:03

## 2023-03-30 RX ADMIN — HYDRALAZINE HYDROCHLORIDE 100 MG: 25 TABLET, FILM COATED ORAL at 10:03

## 2023-03-30 RX ADMIN — SOTALOL HYDROCHLORIDE 80 MG: 80 TABLET ORAL at 10:03

## 2023-03-30 RX ADMIN — CEFEPIME 1 G: 1 INJECTION, POWDER, FOR SOLUTION INTRAMUSCULAR; INTRAVENOUS at 04:03

## 2023-03-30 RX ADMIN — SOTALOL HYDROCHLORIDE 80 MG: 80 TABLET ORAL at 09:03

## 2023-03-30 RX ADMIN — GABAPENTIN 300 MG: 300 CAPSULE ORAL at 10:03

## 2023-03-30 RX ADMIN — ONDANSETRON HYDROCHLORIDE 4 MG: 2 INJECTION, SOLUTION INTRAMUSCULAR; INTRAVENOUS at 09:03

## 2023-03-30 RX ADMIN — MICONAZOLE NITRATE 2 % TOPICAL POWDER: at 11:03

## 2023-03-30 RX ADMIN — MICONAZOLE NITRATE 2 % TOPICAL POWDER: at 09:03

## 2023-03-30 RX ADMIN — VANCOMYCIN HYDROCHLORIDE 2000 MG: 500 INJECTION, POWDER, LYOPHILIZED, FOR SOLUTION INTRAVENOUS at 10:03

## 2023-03-30 RX ADMIN — OXYBUTYNIN CHLORIDE 5 MG: 5 TABLET ORAL at 09:03

## 2023-03-30 NOTE — PLAN OF CARE
Problem: Adult Inpatient Plan of Care  Goal: Plan of Care Review  Outcome: Ongoing, Progressing  Goal: Patient-Specific Goal (Individualized)  Outcome: Ongoing, Progressing  Goal: Absence of Hospital-Acquired Illness or Injury  Outcome: Ongoing, Progressing  Goal: Optimal Comfort and Wellbeing  Outcome: Ongoing, Progressing  Goal: Readiness for Transition of Care  Outcome: Ongoing, Progressing     Problem: Infection  Goal: Absence of Infection Signs and Symptoms  Outcome: Ongoing, Progressing     Problem: Renal Function Impairment (Acute Kidney Injury/Impairment)  Goal: Effective Renal Function  Outcome: Ongoing, Progressing     Problem: Skin Injury Risk Increased  Goal: Skin Health and Integrity  Outcome: Ongoing, Progressing     Problem: Fall Injury Risk  Goal: Absence of Fall and Fall-Related Injury  Outcome: Ongoing, Progressing

## 2023-03-30 NOTE — PROGRESS NOTES
Will plan for open umbilical hernia repair with removal of the overlying ulcerated skin tomorrow around noon.  Continue to hold Xarelto.  Will make NPO at midnight.

## 2023-03-30 NOTE — PT/OT/SLP PROGRESS
Occupational Therapy   Treatment    Name: Ezequiel Escudero  MRN: [id number]  Admitting Diagnosis:  Wound infection       Recommendations:     Discharge Recommendations: rehabilitation facility, nursing facility, skilled  Discharge Equipment Recommendations:  other (see comments) (bariatric bedside commode)  Barriers to discharge:  Decreased caregiver support    Assessment:     Ezequiel Escudero is a 72 y.o. male with a medical diagnosis of Wound infection.  He presents with bladder spasms causing limited mobility this date and agreeable to DME and home set up discussion as well as detailed information regarding difference between IRF and SNF. Pt believes he could tolerate 3hrs therapy/day at IRF; aggressive therapy will provide better foundation for home.  Performance deficits affecting function are weakness, impaired endurance, impaired self care skills, impaired functional mobility, gait instability, impaired balance, decreased lower extremity function, pain, impaired skin, edema, impaired cardiopulmonary response to activity.     Rehab Prognosis:  Good; patient would benefit from acute skilled OT services to address these deficits and reach maximum level of function.       Plan:     Patient to be seen 5 x/week to address the above listed problems via self-care/home management, therapeutic activities, therapeutic exercises  Plan of Care Expires:    Plan of Care Reviewed with: patient    Subjective     Chief Complaint: bladder spasms  Patient/Family Comments/goals: to go to inpatient rehab before returning home.  Pain/Comfort:  Pain Rating 1: 5/10 (during bladder spasms)  Location - Orientation 1: lower  Location 1: abdomen    Objective:     Communicated with: RNFlorina prior to session.  Patient found HOB elevated with bed alarm, centeno catheter, peripheral IV upon OT entry to room.    General Precautions: Standard, fall    Orthopedic Precautions:N/A  Braces: N/A  Respiratory Status: Room air     Occupational  Performance:     Activities of Daily Living:  Toileting: was done this morning on this date and pt explained his assist level was minimal and that the nurse extender mostly stood by for supervision. REYNA engaged in more detailed planning of BSC transfer for improved independence and decreased pain post surgery tomorrow.  Bariatric BSC needed for pt at home, MARIBELL quintana's BSC proximity to bed and correct angle for transfer without RW. Pt v/u and will use technique.      [facility] 6 Click ADL:      Treatment & Education:  -REYNA providing listening ear and resolving some of pt's concerns with going to SNF/IRF.    Patient left HOB elevated with all lines intact, call button in reach, and bed alarm on    GOALS:   Multidisciplinary Problems       Occupational Therapy Goals          Problem: Occupational Therapy    Goal Priority Disciplines Outcome Interventions   Occupational Therapy Goal     OT, PT/OT     Description: Goals to be met by: 4/29/23     Patient will increase functional independence with ADLs by performing:    UE Dressing with Supervision.  LE Dressing with Supervision.  Grooming while standing with Supervision.  Toileting from toilet with Supervision for hygiene and clothing management.   Toilet transfer to toilet with Supervision.                         Time Tracking:     OT Date of Treatment: 03/30/23  OT Start Time: 1553  OT Stop Time: 1618  OT Total Time (min): 25 min    Billable Minutes:Self Care/Home Management 25 min    OT/STEPHANI: STEPHANI     Number of STEPHANI visits since last OT visit: 1    3/30/2023

## 2023-03-30 NOTE — PLAN OF CARE
Patient signed patient choice disclosure choosing to resume home health with [facility] home [facility].         03/30/23 1714   Post-Acute Status   Post-Acute Authorization Home Health   Patient choice form signed by patient/caregiver List with quality metrics by geographic area provided

## 2023-03-30 NOTE — PLAN OF CARE
[facility]  Initial Discharge Assessment       Primary Care Provider: Santiago Beebe MD    Admission Diagnosis: Wound infection [T14.8XXA, L08.9]    Admission Date: 3/28/2023  Expected Discharge Date: 4/2/2023    Discharge Barriers Identified: None    Payor: [employer]BS [place] MEDICARE / Plan: [employer] [place] / Product Type: Medicare Advantage /     Extended Emergency Contact Information  Primary Emergency Contact: Quoc Escudero  Mobile Phone: [phone]  Relation: Son  Preferred language: English   needed? No  Secondary Emergency Contact: Syd Costello  Mobile Phone: [phone]  Relation: Friend  Preferred language: English   needed? No    Discharge Plan A: Home Health  Discharge Plan B: Home with family      [facility] [address] [place], [place] [address]ROSLYN [place]OW - [address]  [address]  [place] [address]  Phone: [phone] Fax: [phone]    SW met with patient at bedside to complete discharge planning assessment.  Patient alert and oriented xs 4.  Patient verified all demographic information on facesheet is correct.  Patient verified PCP is Dr Beebe.  Patient verified primary health insurance is [employer].  Patient active with [facility] home health and listed DME.  Patient with NO POA or Living Will.  Patient not on dialysis or medication coumadin.  Patient with no 30 day admission.  Patient with no financial issues at this time.  Patient family will provide transportation upon discharge from facility.  Patient independent with ADLs, live alone, drives self.      Initial Assessment (most recent)       Adult Discharge Assessment - 03/29/23 1035          Discharge Assessment    Assessment Type Discharge Planning Assessment     Confirmed/corrected address, phone number and insurance Yes     Confirmed Demographics Correct on Facesheet     Source of Information patient     Communicated GORDON with patient/caregiver Date not available/Unable to determine     People in Home  alone     Facility Arrived From: home     Do you expect to return to your current living situation? Yes     Do you have help at home or someone to help you manage your care at home? Yes     Who are your caregiver(s) and their phone number(s)? self     Prior to hospitilization cognitive status: Alert/Oriented     Current cognitive status: Alert/Oriented     Walking or Climbing Stairs ambulation difficulty, requires equipment;stair climbing difficulty, requires equipment     Dressing/Bathing bathing difficulty, requires equipment;dressing difficulty, requires equipment     Equipment Currently Used at Home walker, rolling;wheelchair;bedside commode;shower chair     Readmission within 30 days? Yes     Patient currently being followed by outpatient case management? Yes     If yes, name of outpatient case management following: other (comments)   NP at home    Do you currently have service(s) that help you manage your care at home? Yes     Name and Contact number of agency [facility] home [facility]     Is the pt/caregiver preference to resume services with current agency Yes     Do you take prescription medications? Yes     Do you have prescription coverage? Yes     Do you have any problems affording any of your prescribed medications? No     Is the patient taking medications as prescribed? yes     Who is going to help you get home at discharge? self     How do you get to doctors appointments? car, drives self     Are you on dialysis? No     Do you take coumadin? No     Discharge Plan A Home Health     Discharge Plan B Home with family     DME Needed Upon Discharge  none     Discharge Plan discussed with: Patient     Discharge Barriers Identified None

## 2023-03-30 NOTE — PLAN OF CARE
Problem: Adult Inpatient Plan of Care  Goal: Plan of Care Review  Outcome: Ongoing, Progressing  Goal: Patient-Specific Goal (Individualized)  Outcome: Ongoing, Progressing  Goal: Absence of Hospital-Acquired Illness or Injury  Outcome: Ongoing, Progressing  Goal: Optimal Comfort and Wellbeing  Outcome: Ongoing, Progressing  Goal: Readiness for Transition of Care  Outcome: Ongoing, Progressing     Problem: Infection  Goal: Absence of Infection Signs and Symptoms  Outcome: Ongoing, Progressing     Problem: Fluid and Electrolyte Imbalance (Acute Kidney Injury/Impairment)  Goal: Fluid and Electrolyte Balance  Outcome: Ongoing, Progressing     Problem: Oral Intake Inadequate (Acute Kidney Injury/Impairment)  Goal: Optimal Nutrition Intake  Outcome: Ongoing, Progressing     Problem: Renal Function Impairment (Acute Kidney Injury/Impairment)  Goal: Effective Renal Function  Outcome: Ongoing, Progressing     Problem: Skin Injury Risk Increased  Goal: Skin Health and Integrity  Outcome: Ongoing, Progressing     Problem: Impaired Wound Healing  Goal: Optimal Wound Healing  Outcome: Ongoing, Progressing     Problem: Fall Injury Risk  Goal: Absence of Fall and Fall-Related Injury  Outcome: Ongoing, Progressing     Problem: Oral Intake Inadequate  Goal: Improved Oral Intake  Outcome: Ongoing, Progressing

## 2023-03-30 NOTE — CONSULTS
GENERAL SURGERY  INPATIENT CONSULT    REASON FOR CONSULT: Abdominal wall wound, hernia    HPI: Ezequiel Escudero is a 72 y.o. male with numerous medical comorbidities including atrial fibrillation on Xarelto, obesity, BPH and chronic venous stasis wounds who presents with increased swelling in weeping from his bilateral lower extremity wounds. He is admitted for wound care evaluation.  At time also noted relatively recent onset of ulceration and redness surrounding an umbilical hernia.  Underwent CT scan which showed a fat containing hernia. Apparently a consult for General surgery was placed however I am uncertain if anybody was contacted as I just am seeing this consult on our list today. Patient currently is on vancomycin. Receiving his Xarelto for AFib though recent echo shows no atrial fibrillation at the time of the study. No previous abdominal surgeries.    I have reviewed the patient's chart including prior progress notes, procedures and testing.     ROS:   Review of Systems   All other systems reviewed and are negative.    PROBLEM LIST:  Patient Active Problem List   Diagnosis    Acute cystitis with hematuria    BPH (benign prostatic hyperplasia)    Prostatitis    Nocturia    Urine retention    Hematuria of undiagnosed cause    GABRIEL (acute kidney injury)    Hyponatremia    A-fib    Acute renal failure    Stage 2 chronic kidney disease    Cellulitis, bilateral legs with superficial sores    Hyperkalemia    Metabolic acidosis    Idiopathic hypotension    Goals of care, counseling/discussion    Urinary retention    Venous stasis ulcer of right calf with necrosis of muscle without varicose veins    Venous stasis    Venous stasis dermatitis of both lower extremities    Chronic ulcer of left leg with fat layer exposed    Cutaneous candidiasis    Cellulitis of abdominal wall    Hypertension    Wound infection    Elevated BUN    Nephrocalcinosis    Physical debility    Obesity (BMI 30-39.9)    Elevated glucose          HISTORY  Past Medical History:   Diagnosis Date    A-fib     Anticoagulant long-term use     BPH (benign prostatic hyperplasia)     Rojas catheter in place     Kidney stone     PONV (postoperative nausea and vomiting)     Thyroid disease     Wears glasses        Past Surgical History:   Procedure Laterality Date    ANKLE DEBRIDEMENT      CARDIOVERSION      CYSTOSCOPY W/ RETROGRADES Bilateral 3/1/2021    Procedure: CYSTOSCOPY, WITH RETROGRADE PYELOGRAM;  Surgeon: Lior Flores MD;  Location: [facility];  Service: Urology;  Laterality: Bilateral;    CYSTOSCOPY WITH BIOPSY OF BLADDER N/A 3/1/2021    Procedure: CYSTOSCOPY, WITH BLADDER BIOPSY, WITH FULGURATION IF INDICATED;  Surgeon: Lior Flores MD;  Location: Health system OR;  Service: Urology;  Laterality: N/A;    DEBRIDEMENT OF LOWER EXTREMITY Right 2/1/2021    Procedure: DEBRIDEMENT, LOWER EXTREMITY;  Surgeon: Pete Cardenas MD;  Location: Health system OR;  Service: General;  Laterality: Right;    INCISION AND DRAINAGE Right 2/1/2021    Procedure: Incision and Drainage;  Surgeon: Pete Cardenas MD;  Location: Health system OR;  Service: General;  Laterality: Right;    THYROIDECTOMY  1970's    TONSILLECTOMY      TONSILLECTOMY, ADENOIDECTOMY         Social History     Tobacco Use    Smoking status: Never    Smokeless tobacco: Never   Substance Use Topics    Alcohol use: Yes     Comment: OCC    Drug use: No       Family History   Problem Relation Age of Onset    Benign prostatic hyperplasia Brother     Heart disease Mother     Heart disease Father     Kidney cancer Neg Hx     Prostate cancer Neg Hx     Urolithiasis Neg Hx          MEDS:  Current Facility-Administered Medications on File Prior to Encounter   Medication Dose Route Frequency Provider Last Rate Last Admin    electrolyte-S (ISOLYTE)   Intravenous Continuous Amada Sher MD 75 mL/hr at 03/01/21 1436 Rate Change at 03/01/21 1436    fentaNYL injection 25 mcg  25 mcg Intravenous Q5 Min PRN Amada Sher  MD        HYDROmorphone (PF) injection 0.2 mg  0.2 mg Intravenous Q5 Min PRN Amada Sher MD        LIDOcaine (PF) 10 mg/ml (1%) injection 10 mg  1 mL Intradermal Once Amada Sher MD        oxyCODONE immediate release tablet 5 mg  5 mg Oral Q3H PRN Amada Sher MD         Current Outpatient Medications on File Prior to Encounter   Medication Sig Dispense Refill    finasteride (PROSCAR) 5 mg tablet Take 1 tablet by mouth once daily (Patient taking differently: Take 5 mg by mouth once daily.) 30 tablet 0    gabapentin (NEURONTIN) 300 MG capsule Take 300 mg by mouth 2 (two) times daily.      hydrALAZINE (APRESOLINE) 100 MG tablet Take 100 mg by mouth 2 (two) times daily.      rivaroxaban (XARELTO) 20 mg Tab Take 20 mg by mouth once daily.       sotalol (BETAPACE) 80 MG tablet Take 80 mg by mouth 2 (two) times daily.      tamsulosin (FLOMAX) 0.4 mg Cap Take 2 capsules (0.8 mg total) by mouth once daily. 180 capsule 4    vit A/vit C/vit E/zinc/copper (OCUVITE PRESERVISION ORAL) Take 1 tablet by mouth 2 (two) times a day.      meclizine (ANTIVERT) 25 mg tablet Take 25 mg by mouth 3 (three) times daily as needed.         ALLERGIES:  Review of patient's allergies indicates:  No Known Allergies      VITALS:  Temp:  [97.8 °F (36.6 °C)-99 °F (37.2 °C)] 98.6 °F (37 °C)  Pulse:  [67-85] 70  Resp:  [17-18] 18  SpO2:  [96 %-99 %] 99 %  BP: (118-147)/(56-73) 118/59    I/O last 3 completed shifts:  In: 1990 [P.O.:990; I.V.:400; IV Piggyback:600]  Out: 4531 [Urine:4530; Stool:1]      PHYSICAL EXAM:  Physical Exam  Vitals reviewed.   Constitutional:       General: He is not in acute distress.     Appearance: Normal appearance. He is obese.   Eyes:      General: No scleral icterus.  Cardiovascular:      Rate and Rhythm: Normal rate and regular rhythm.      Pulses: Normal pulses.   Pulmonary:      Effort: Pulmonary effort is normal. No respiratory distress.      Breath sounds: Normal breath sounds.   Abdominal:       General: There is no distension.      Palpations: Abdomen is soft.      Tenderness: There is no abdominal tenderness.      Hernia: A hernia is present.      Comments: Ulceration of the umbilicus with underlying hernia and localized erythema   Musculoskeletal:         General: Swelling present.      Cervical back: Normal range of motion and neck supple. No rigidity.      Right lower leg: Edema present.      Left lower leg: Edema present.   Skin:     General: Skin is warm.      Coloration: Skin is not jaundiced.   Neurological:      General: No focal deficit present.      Mental Status: He is alert and oriented to person, place, and time.      Motor: No weakness.   Psychiatric:         Mood and Affect: Mood normal.         Behavior: Behavior normal.         Thought Content: Thought content normal.         Judgment: Judgment normal.               LABS:  Lab Results   Component Value Date    WBC 9.46 03/29/2023    RBC 5.32 03/29/2023    HGB 15.8 03/29/2023    HGB 16.8 11/13/2012    HCT 46.8 03/29/2023     03/29/2023     Lab Results   Component Value Date     (H) 03/29/2023     03/29/2023    K 4.2 03/29/2023     03/29/2023    CO2 23 03/29/2023    BUN 53 (H) 03/29/2023    CREATININE 1.2 03/29/2023    CREATININE 1.3 11/13/2012    CALCIUM 8.8 03/29/2023    CALCIUM 9.8 11/13/2012     Lab Results   Component Value Date    ALT 27 03/28/2023    AST 28 03/28/2023    ALKPHOS 122 03/28/2023    BILITOT 1.0 03/28/2023     Lab Results   Component Value Date    MG 2.2 03/29/2023    PHOS 3.0 03/29/2023       STUDIES:  Images and reports were personally reviewed.    CT abdomen pelvis  FINDINGS: The lower lung fields are unremarkable. Both the liver and spleen are normal in size, and no focal abnormalities or ascites can be seen. Gallstones are present. The pancreas is normal in size, as are the adrenal glands and the abdominal aorta. The large cyst on the right kidney is again noted. Nonobstructing  calcifications are noted in the left kidney.     The small bowel is normal in size. A moderate amount of gas and stool is seen throughout the colon. The appendix is visualized and appears normal. The urinary bladder is unopacified but is unremarkable. A fat-containing hernia is noted in the anterior abdominal wall, and this appears larger than on the previous examination. Bilateral hip arthroplasties have been performed.     IMPRESSION:     1. Cholelithiasis.  2. A fat-containing hernia is noted in the anterior abdominal wall.  3. Left nephrocalcinosis.  4. Renal cysts.    ASSESSMENT & PLAN:  72 y.o. male with an incarcerated fat containing umbilical hernia with overlying skin ulceration, bilateral lower extremity wounds, AFib on Xarelto  -wound on anterior abdominal wall is pressure wound from underlying incarcerated hernia  -recommend urgent repair to prevent complete breakdown of the skin and evisceration of fatty hernia contents  -patient is currently on Xarelto, will hold this with anticipation of surgery either Friday mid day or possibly Monday  -bilateral lower extremity wound care per wound care recommendations

## 2023-03-30 NOTE — CONSULTS
Chief complaint:  Leg Swelling and Abdominal Mass (Seen at [facility] for bilateral lower leg cellulitis. States that they did not do much for him. Pt has mass on lower abdomen that is protruding and appears to have eschar.  )      HPI:  Ezequiel Escudero is a 72 y.o. male presenting with BLE venous ulcers. Pt is known to me from OP clinic. Pt was last seen in 12/21 and his venous ulcers were resolved. Pt seen today for consult for BLE venous ulcers. Pt not sure how long he has been self treating his wounds, but had an appointment at the wound center for Monday next week. Seen today in the hospital for the  wounds. He has a hernia which surgery has been consulted for. Pt also has MAD of the scrotum and buttock and nursing is using antifungal powder on the areas. No other complaints at this visit.    PMH:  As per HPI and below:  Past Medical History:   Diagnosis Date    A-fib     Anticoagulant long-term use     BPH (benign prostatic hyperplasia)     Rojas catheter in place     Kidney stone     PONV (postoperative nausea and vomiting)     Thyroid disease     Wears glasses        Social History     Socioeconomic History    Marital status:    Tobacco Use    Smoking status: Never    Smokeless tobacco: Never   Substance and Sexual Activity    Alcohol use: Yes     Comment: OCC    Drug use: No    Sexual activity: Not Currently       Past Surgical History:   Procedure Laterality Date    ANKLE DEBRIDEMENT      CARDIOVERSION      CYSTOSCOPY W/ RETROGRADES Bilateral 3/1/2021    Procedure: CYSTOSCOPY, WITH RETROGRADE PYELOGRAM;  Surgeon: Lior Flores MD;  Location: [facility] OR;  Service: Urology;  Laterality: Bilateral;    CYSTOSCOPY WITH BIOPSY OF BLADDER N/A 3/1/2021    Procedure: CYSTOSCOPY, WITH BLADDER BIOPSY, WITH FULGURATION IF INDICATED;  Surgeon: Lior Flores MD;  Location: [facility] OR;  Service: Urology;  Laterality: N/A;    DEBRIDEMENT OF LOWER EXTREMITY Right 2/1/2021    Procedure: DEBRIDEMENT, LOWER EXTREMITY;   Surgeon: Pete Cardenas MD;  Location: [facility] OR;  Service: General;  Laterality: Right;    INCISION AND DRAINAGE Right 2/1/2021    Procedure: Incision and Drainage;  Surgeon: Pete Cardenas MD;  Location: [facility] OR;  Service: General;  Laterality: Right;    THYROIDECTOMY  1970's    TONSILLECTOMY      TONSILLECTOMY, ADENOIDECTOMY         Family History   Problem Relation Age of Onset    Benign prostatic hyperplasia Brother     Heart disease Mother     Heart disease Father     Kidney cancer Neg Hx     Prostate cancer Neg Hx     Urolithiasis Neg Hx        Review of patient's allergies indicates:  No Known Allergies    Current Facility-Administered Medications on File Prior to Encounter   Medication Dose Route Frequency Provider Last Rate Last Admin    electrolyte-S (ISOLYTE)   Intravenous Continuous Amada Sher MD 75 mL/hr at 03/01/21 1436 Rate Change at 03/01/21 1436    fentaNYL injection 25 mcg  25 mcg Intravenous Q5 Min PRN Amada Sher MD        HYDROmorphone (PF) injection 0.2 mg  0.2 mg Intravenous Q5 Min PRN Amada Sher MD        LIDOcaine (PF) 10 mg/ml (1%) injection 10 mg  1 mL Intradermal Once Amada Sher MD        oxyCODONE immediate release tablet 5 mg  5 mg Oral Q3H PRN Amada Sher MD         Current Outpatient Medications on File Prior to Encounter   Medication Sig Dispense Refill    finasteride (PROSCAR) 5 mg tablet Take 1 tablet by mouth once daily (Patient taking differently: Take 5 mg by mouth once daily.) 30 tablet 0    gabapentin (NEURONTIN) 300 MG capsule Take 300 mg by mouth 2 (two) times daily.      hydrALAZINE (APRESOLINE) 100 MG tablet Take 100 mg by mouth 2 (two) times daily.      rivaroxaban (XARELTO) 20 mg Tab Take 20 mg by mouth once daily.       sotalol (BETAPACE) 80 MG tablet Take 80 mg by mouth 2 (two) times daily.      tamsulosin (FLOMAX) 0.4 mg Cap Take 2 capsules (0.8 mg total) by mouth once daily. 180 capsule 4    vit A/vit C/vit  E/zinc/copper (OCUVITE PRESERVISION ORAL) Take 1 tablet by mouth 2 (two) times a day.      meclizine (ANTIVERT) 25 mg tablet Take 25 mg by mouth 3 (three) times daily as needed.         ROS: As per HPI and below:  Pertinent items are noted in HPI.      Physical Exam:     Vitals:    23 1145 23 1615 23 1916 23 2049   BP: 139/63 (!) 124/56 (!) 118/59 120/67   BP Location:   Left arm    Pulse: 79 70 70    Resp: 17 18 18    Temp: 98.3 °F (36.8 °C) 98.6 °F (37 °C) 98.6 °F (37 °C)    TempSrc:   Oral    SpO2: 96% 99% 99%    Weight:       Height:           BP  Min: 118/59  Max: 147/73  Temp  Av.4 °F (36.9 °C)  Min: 97.8 °F (36.6 °C)  Max: 99 °F (37.2 °C)  Pulse  Av.3  Min: 67  Max: 85  Resp  Av  Min: 17  Max: 19  SpO2  Av.8 %  Min: 96 %  Max: 99 %  Height  Av' (182.9 cm)  Min: 6' (182.9 cm)  Max: 6' (182.9 cm)  Weight  Av.4 kg (289 lb 10.2 oz)  Min: 131.1 kg (289 lb)  Max: 131.5 kg (290 lb)    Body mass index is 39.32 kg/m².          General:             Well developed, well nourished, no apparent distress  HEENT:              NCAT, no JVD, mucous membranes moist, EOM intact  Cardiovascular:  Regular rate and rhythm, normal S1, normal S2, No murmurs, rubs, or gallops  Respiratory:        Normal breath sounds, no wheezes, no rales, no rhonchi  Abdomen:           Bowel sounds present, non tender, non distended, no masses, no hepatojugular reflux  Extremities:        No clubbing, no cyanosis, no edema  Neurological:      No focal deficits  Skin:                   No obvious rashes or erythema, venous ulcers of the BLE, stasis dermatitis    Lab Results   Component Value Date    WBC 9.46 2023    HGB 15.8 2023    HCT 46.8 2023    MCV 88 2023     2023     Lab Results   Component Value Date    CHOL 123 2021    CHOL 171 10/10/2016     Lab Results   Component Value Date    HDL 23 (L) 2021    HDL 46 10/10/2016     Lab Results    Component Value Date    LDLCALC 72.8 01/28/2021    LDLCALC 94.4 10/10/2016     Lab Results   Component Value Date    TRIG 136 01/28/2021    TRIG 153 (H) 10/10/2016     Lab Results   Component Value Date    CHOLHDL 18.7 (L) 01/28/2021    CHOLHDL 26.9 10/10/2016     CMP  Recent Labs   Lab 03/29/23  0456   *   CALCIUM 8.8   ALBUMIN 2.8*      K 4.2   CO2 23      BUN 53*   CREATININE 1.2      Lab Results   Component Value Date    TSH 1.561 01/28/2021       Assessment and Recommendations       Diagnoses:    1. Venous ulcers of the BLE  2. Stasis dermatitis of the BLE  3. MAD of the buttocks and scrotum    Plan:  1. Continue the antifungal to the scrotum and buttocks  2. Dopplers ordered  3. Xeroform + kerlex to the BLE  4. FU visit to wound clinic on DC    Complexity:    medium

## 2023-03-30 NOTE — PT/OT/SLP PROGRESS
Physical Therapy      Patient Name:  Ezequiel Escudero   MRN:  [id number]    Patient not seen today secondary to Patient unwilling to participate, Pain, Other (Comment) (PT unavailable in am, pt having bladder spasms in pm.). Will follow-up 3/31/23.

## 2023-03-30 NOTE — PROGRESS NOTES
Subjective: Patient seen and examined today.  Complaining of painful bladder spasms.  Physical Exam  HENT:      Head: Normocephalic and atraumatic.      Mouth/Throat:      Mouth: Mucous membranes are moist.   Eyes:      Pupils: Pupils are equal, round, and reactive to light.   Cardiovascular:      Rate and Rhythm: Normal rate.   Pulmonary:      Effort: Pulmonary effort is normal.   Abdominal:      Hernia: A hernia is present.      Comments: Non tender to palplation. Very minimal erythema noted(patient notes it has improved)   Musculoskeletal:      Cervical back: Normal range of motion.   Skin:     Comments: Bilateral venous stasis changes, chronic leg wounds, no purulence noted.    Neurological:      Mental Status: He is alert.          Umbilical hernia   Pressure wound   -scheduled to go to the OR tomorrow for umbilical hernia repair.  Hold Xarelto.  NPO after midnight.    -patient started on broad-spectrum antibiotics however he has no systemic symptoms.  Spoke with ID who recommended cefepime for now.     Chronic leg wounds, venous stasis dermatitis   -wound care.     Painful bladder spasms  UTI, culture positive for Enterococcus   -started patient on oxybutynin   -currently on cefepime    Obesity (BMI 30-39.9)  Body mass index is 39.33 kg/m². Morbid obesity complicates all aspects of disease management from diagnostic modalities to treatment. Weight loss encouraged and health benefits explained to patient.        Dietitian consult cardiac diet        Physical debility  Her to increase activity, consult PT OT for evaluation, may need skilled nursing facility/rehab placement for wound care and improving independence    A-fib  Currently on Xarelto for AFib

## 2023-03-31 ENCOUNTER — ANESTHESIA EVENT (OUTPATIENT)
Dept: SURGERY | Facility: HOSPITAL | Age: 73
DRG: 354 | End: 2023-03-31
Payer: MEDICARE

## 2023-03-31 ENCOUNTER — ANESTHESIA (OUTPATIENT)
Dept: SURGERY | Facility: HOSPITAL | Age: 73
DRG: 354 | End: 2023-03-31
Payer: MEDICARE

## 2023-03-31 PROBLEM — T14.8XXA WOUND INFECTION: Status: RESOLVED | Noted: 2023-03-28 | Resolved: 2023-03-31

## 2023-03-31 PROBLEM — B37.2 CUTANEOUS CANDIDIASIS: Status: RESOLVED | Noted: 2023-03-28 | Resolved: 2023-03-31

## 2023-03-31 PROBLEM — R53.81 PHYSICAL DEBILITY: Status: RESOLVED | Noted: 2023-03-28 | Resolved: 2023-03-31

## 2023-03-31 PROBLEM — L08.9 WOUND INFECTION: Status: RESOLVED | Noted: 2023-03-28 | Resolved: 2023-03-31

## 2023-03-31 PROBLEM — R79.9 ELEVATED BUN: Status: RESOLVED | Noted: 2023-03-28 | Resolved: 2023-03-31

## 2023-03-31 PROBLEM — L03.311 CELLULITIS OF ABDOMINAL WALL: Status: RESOLVED | Noted: 2023-03-28 | Resolved: 2023-03-31

## 2023-03-31 PROBLEM — N39.0 UTI (URINARY TRACT INFECTION): Status: ACTIVE | Noted: 2023-03-31

## 2023-03-31 PROBLEM — K42.0 UMBILICAL HERNIA, INCARCERATED: Status: ACTIVE | Noted: 2023-03-31

## 2023-03-31 PROBLEM — R73.09 ELEVATED GLUCOSE: Status: RESOLVED | Noted: 2023-03-28 | Resolved: 2023-03-31

## 2023-03-31 PROBLEM — L97.922 CHRONIC ULCER OF LEFT LEG WITH FAT LAYER EXPOSED: Status: RESOLVED | Noted: 2021-09-13 | Resolved: 2023-03-31

## 2023-03-31 PROBLEM — E83.59 NEPHROCALCINOSIS: Status: RESOLVED | Noted: 2023-03-28 | Resolved: 2023-03-31

## 2023-03-31 PROBLEM — N29 NEPHROCALCINOSIS: Status: RESOLVED | Noted: 2023-03-28 | Resolved: 2023-03-31

## 2023-03-31 LAB
ALBUMIN SERPL BCP-MCNC: 2.9 G/DL (ref 3.5–5.2)
ANION GAP SERPL CALC-SCNC: 10 MMOL/L (ref 8–16)
BACTERIA UR CULT: ABNORMAL
BASOPHILS # BLD AUTO: 0.08 K/UL (ref 0–0.2)
BASOPHILS NFR BLD: 0.7 % (ref 0–1.9)
BUN SERPL-MCNC: 27 MG/DL (ref 8–23)
CALCIUM SERPL-MCNC: 8.8 MG/DL (ref 8.7–10.5)
CHLORIDE SERPL-SCNC: 100 MMOL/L (ref 95–110)
CO2 SERPL-SCNC: 26 MMOL/L (ref 23–29)
CREAT SERPL-MCNC: 1.2 MG/DL (ref 0.5–1.4)
DIFFERENTIAL METHOD: ABNORMAL
EOSINOPHIL # BLD AUTO: 0.3 K/UL (ref 0–0.5)
EOSINOPHIL NFR BLD: 2.3 % (ref 0–8)
ERYTHROCYTE [DISTWIDTH] IN BLOOD BY AUTOMATED COUNT: 15 % (ref 11.5–14.5)
EST. GFR  (NO RACE VARIABLE): >60 ML/MIN/1.73 M^2
GLUCOSE SERPL-MCNC: 131 MG/DL (ref 70–110)
HCT VFR BLD AUTO: 47 % (ref 40–54)
HGB BLD-MCNC: 15.2 G/DL (ref 14–18)
IMM GRANULOCYTES # BLD AUTO: 0.18 K/UL (ref 0–0.04)
IMM GRANULOCYTES NFR BLD AUTO: 1.6 % (ref 0–0.5)
LYMPHOCYTES # BLD AUTO: 0.7 K/UL (ref 1–4.8)
LYMPHOCYTES NFR BLD: 6.1 % (ref 18–48)
MAGNESIUM SERPL-MCNC: 1.9 MG/DL (ref 1.6–2.6)
MCH RBC QN AUTO: 29.2 PG (ref 27–31)
MCHC RBC AUTO-ENTMCNC: 32.3 G/DL (ref 32–36)
MCV RBC AUTO: 90 FL (ref 82–98)
MONOCYTES # BLD AUTO: 0.8 K/UL (ref 0.3–1)
MONOCYTES NFR BLD: 7.1 % (ref 4–15)
NEUTROPHILS # BLD AUTO: 9.5 K/UL (ref 1.8–7.7)
NEUTROPHILS NFR BLD: 82.2 % (ref 38–73)
NRBC BLD-RTO: 0 /100 WBC
PHOSPHATE SERPL-MCNC: 3.1 MG/DL (ref 2.7–4.5)
PLATELET # BLD AUTO: 286 K/UL (ref 150–450)
PMV BLD AUTO: 9.3 FL (ref 9.2–12.9)
POTASSIUM SERPL-SCNC: 4.7 MMOL/L (ref 3.5–5.1)
RBC # BLD AUTO: 5.21 M/UL (ref 4.6–6.2)
SODIUM SERPL-SCNC: 136 MMOL/L (ref 136–145)
WBC # BLD AUTO: 11.57 K/UL (ref 3.9–12.7)

## 2023-03-31 PROCEDURE — 12000002 HC ACUTE/MED SURGE SEMI-PRIVATE ROOM

## 2023-03-31 PROCEDURE — 63600175 PHARM REV CODE 636 W HCPCS: Performed by: INTERNAL MEDICINE

## 2023-03-31 PROCEDURE — 87205 SMEAR GRAM STAIN: CPT | Performed by: SURGERY

## 2023-03-31 PROCEDURE — 83735 ASSAY OF MAGNESIUM: CPT | Performed by: NURSE PRACTITIONER

## 2023-03-31 PROCEDURE — D9220A PRA ANESTHESIA: ICD-10-PCS | Mod: CRNA,,, | Performed by: NURSE ANESTHETIST, CERTIFIED REGISTERED

## 2023-03-31 PROCEDURE — 87147 CULTURE TYPE IMMUNOLOGIC: CPT | Mod: 59 | Performed by: SURGERY

## 2023-03-31 PROCEDURE — 36000706: Performed by: SURGERY

## 2023-03-31 PROCEDURE — 63600175 PHARM REV CODE 636 W HCPCS: Performed by: NURSE ANESTHETIST, CERTIFIED REGISTERED

## 2023-03-31 PROCEDURE — 25000003 PHARM REV CODE 250: Performed by: ANESTHESIOLOGY

## 2023-03-31 PROCEDURE — 80069 RENAL FUNCTION PANEL: CPT | Performed by: NURSE PRACTITIONER

## 2023-03-31 PROCEDURE — 87206 SMEAR FLUORESCENT/ACID STAI: CPT | Performed by: SURGERY

## 2023-03-31 PROCEDURE — 49594 PR REPAIR ANT ABD HERNIA INITIAL 3-10 CM INCARC/STRANG: ICD-10-PCS | Mod: ,,, | Performed by: SURGERY

## 2023-03-31 PROCEDURE — 37000008 HC ANESTHESIA 1ST 15 MINUTES: Performed by: SURGERY

## 2023-03-31 PROCEDURE — 25000003 PHARM REV CODE 250: Performed by: INTERNAL MEDICINE

## 2023-03-31 PROCEDURE — 63600175 PHARM REV CODE 636 W HCPCS: Performed by: SURGERY

## 2023-03-31 PROCEDURE — 71000033 HC RECOVERY, INTIAL HOUR: Performed by: SURGERY

## 2023-03-31 PROCEDURE — D9220A PRA ANESTHESIA: Mod: ANES,,, | Performed by: ANESTHESIOLOGY

## 2023-03-31 PROCEDURE — 94761 N-INVAS EAR/PLS OXIMETRY MLT: CPT

## 2023-03-31 PROCEDURE — 25000003 PHARM REV CODE 250: Performed by: SURGERY

## 2023-03-31 PROCEDURE — 71000039 HC RECOVERY, EACH ADD'L HOUR: Performed by: SURGERY

## 2023-03-31 PROCEDURE — D9220A PRA ANESTHESIA: ICD-10-PCS | Mod: ANES,,, | Performed by: ANESTHESIOLOGY

## 2023-03-31 PROCEDURE — D9220A PRA ANESTHESIA: Mod: CRNA,,, | Performed by: NURSE ANESTHETIST, CERTIFIED REGISTERED

## 2023-03-31 PROCEDURE — 37000009 HC ANESTHESIA EA ADD 15 MINS: Performed by: SURGERY

## 2023-03-31 PROCEDURE — 87077 CULTURE AEROBIC IDENTIFY: CPT | Performed by: SURGERY

## 2023-03-31 PROCEDURE — 87075 CULTR BACTERIA EXCEPT BLOOD: CPT | Performed by: SURGERY

## 2023-03-31 PROCEDURE — 87070 CULTURE OTHR SPECIMN AEROBIC: CPT | Performed by: SURGERY

## 2023-03-31 PROCEDURE — 49594 RPR AA HRN 1ST 3-10 NCR/STRN: CPT | Mod: ,,, | Performed by: SURGERY

## 2023-03-31 PROCEDURE — 63600175 PHARM REV CODE 636 W HCPCS: Performed by: NURSE PRACTITIONER

## 2023-03-31 PROCEDURE — C9290 INJ, BUPIVACAINE LIPOSOME: HCPCS | Performed by: SURGERY

## 2023-03-31 PROCEDURE — 27201423 OPTIME MED/SURG SUP & DEVICES STERILE SUPPLY: Performed by: SURGERY

## 2023-03-31 PROCEDURE — 36000707: Performed by: SURGERY

## 2023-03-31 PROCEDURE — 87102 FUNGUS ISOLATION CULTURE: CPT | Performed by: SURGERY

## 2023-03-31 PROCEDURE — 87116 MYCOBACTERIA CULTURE: CPT | Performed by: SURGERY

## 2023-03-31 PROCEDURE — 25000003 PHARM REV CODE 250: Performed by: NURSE ANESTHETIST, CERTIFIED REGISTERED

## 2023-03-31 PROCEDURE — 85025 COMPLETE CBC W/AUTO DIFF WBC: CPT | Performed by: NURSE PRACTITIONER

## 2023-03-31 PROCEDURE — 87186 SC STD MICRODIL/AGAR DIL: CPT | Performed by: SURGERY

## 2023-03-31 PROCEDURE — 36415 COLL VENOUS BLD VENIPUNCTURE: CPT | Performed by: NURSE PRACTITIONER

## 2023-03-31 PROCEDURE — C1781 MESH (IMPLANTABLE): HCPCS | Performed by: SURGERY

## 2023-03-31 DEVICE — IMPLANTABLE DEVICE: Type: IMPLANTABLE DEVICE | Site: ABDOMEN | Status: FUNCTIONAL

## 2023-03-31 RX ORDER — OXYBUTYNIN CHLORIDE 5 MG/1
5 TABLET ORAL 3 TIMES DAILY
Qty: 21 TABLET | Refills: 0 | Status: SHIPPED | OUTPATIENT
Start: 2023-03-31 | End: 2023-04-07

## 2023-03-31 RX ORDER — LIDOCAINE HYDROCHLORIDE 20 MG/ML
INJECTION, SOLUTION EPIDURAL; INFILTRATION; INTRACAUDAL; PERINEURAL
Status: DISCONTINUED | OUTPATIENT
Start: 2023-03-31 | End: 2023-03-31

## 2023-03-31 RX ORDER — FAMOTIDINE 10 MG/ML
INJECTION INTRAVENOUS
Status: DISCONTINUED | OUTPATIENT
Start: 2023-03-31 | End: 2023-03-31

## 2023-03-31 RX ORDER — SUCCINYLCHOLINE CHLORIDE 20 MG/ML
INJECTION INTRAMUSCULAR; INTRAVENOUS
Status: DISCONTINUED | OUTPATIENT
Start: 2023-03-31 | End: 2023-03-31

## 2023-03-31 RX ORDER — KETAMINE HYDROCHLORIDE 10 MG/ML
INJECTION, SOLUTION INTRAMUSCULAR; INTRAVENOUS
Status: DISCONTINUED | OUTPATIENT
Start: 2023-03-31 | End: 2023-03-31

## 2023-03-31 RX ORDER — PROPOFOL 10 MG/ML
VIAL (ML) INTRAVENOUS
Status: DISCONTINUED | OUTPATIENT
Start: 2023-03-31 | End: 2023-03-31

## 2023-03-31 RX ORDER — ONDANSETRON 2 MG/ML
4 INJECTION INTRAMUSCULAR; INTRAVENOUS DAILY PRN
Status: DISCONTINUED | OUTPATIENT
Start: 2023-03-31 | End: 2023-03-31

## 2023-03-31 RX ORDER — HYDROCODONE BITARTRATE AND ACETAMINOPHEN 5; 325 MG/1; MG/1
1 TABLET ORAL EVERY 12 HOURS PRN
Qty: 12 TABLET | Refills: 0 | Status: SHIPPED | OUTPATIENT
Start: 2023-03-31 | End: 2023-04-12

## 2023-03-31 RX ORDER — DIPHENHYDRAMINE HYDROCHLORIDE 50 MG/ML
12.5 INJECTION INTRAMUSCULAR; INTRAVENOUS
Status: DISCONTINUED | OUTPATIENT
Start: 2023-03-31 | End: 2023-03-31

## 2023-03-31 RX ORDER — MIDAZOLAM HYDROCHLORIDE 1 MG/ML
INJECTION INTRAMUSCULAR; INTRAVENOUS
Status: DISCONTINUED | OUTPATIENT
Start: 2023-03-31 | End: 2023-03-31

## 2023-03-31 RX ORDER — DOXYCYCLINE 100 MG/1
100 CAPSULE ORAL EVERY 12 HOURS
Status: DISCONTINUED | OUTPATIENT
Start: 2023-03-31 | End: 2023-03-31

## 2023-03-31 RX ORDER — DOXYCYCLINE 100 MG/1
100 CAPSULE ORAL EVERY 12 HOURS
Qty: 14 CAPSULE | Refills: 0 | Status: SHIPPED | OUTPATIENT
Start: 2023-03-31 | End: 2023-04-07

## 2023-03-31 RX ORDER — CALCIUM CARBONATE 200(500)MG
500 TABLET,CHEWABLE ORAL 2 TIMES DAILY PRN
Status: DISCONTINUED | OUTPATIENT
Start: 2023-03-31 | End: 2023-04-01 | Stop reason: HOSPADM

## 2023-03-31 RX ORDER — BUPIVACAINE HYDROCHLORIDE 2.5 MG/ML
INJECTION, SOLUTION EPIDURAL; INFILTRATION; INTRACAUDAL
Status: DISCONTINUED | OUTPATIENT
Start: 2023-03-31 | End: 2023-03-31 | Stop reason: HOSPADM

## 2023-03-31 RX ORDER — NITROFURANTOIN 25; 75 MG/1; MG/1
100 CAPSULE ORAL EVERY 12 HOURS
Status: DISCONTINUED | OUTPATIENT
Start: 2023-03-31 | End: 2023-03-31

## 2023-03-31 RX ORDER — DOXYCYCLINE 100 MG/1
100 CAPSULE ORAL EVERY 12 HOURS
Status: DISCONTINUED | OUTPATIENT
Start: 2023-03-31 | End: 2023-04-01 | Stop reason: HOSPADM

## 2023-03-31 RX ORDER — NITROFURANTOIN 25; 75 MG/1; MG/1
100 CAPSULE ORAL EVERY 12 HOURS
Status: DISCONTINUED | OUTPATIENT
Start: 2023-03-31 | End: 2023-04-01 | Stop reason: HOSPADM

## 2023-03-31 RX ORDER — ACETAMINOPHEN 10 MG/ML
INJECTION, SOLUTION INTRAVENOUS
Status: DISCONTINUED | OUTPATIENT
Start: 2023-03-31 | End: 2023-03-31

## 2023-03-31 RX ORDER — FENTANYL CITRATE 50 UG/ML
INJECTION, SOLUTION INTRAMUSCULAR; INTRAVENOUS
Status: DISCONTINUED | OUTPATIENT
Start: 2023-03-31 | End: 2023-03-31

## 2023-03-31 RX ORDER — ONDANSETRON 2 MG/ML
INJECTION INTRAMUSCULAR; INTRAVENOUS
Status: DISCONTINUED | OUTPATIENT
Start: 2023-03-31 | End: 2023-03-31

## 2023-03-31 RX ORDER — HYDROMORPHONE HYDROCHLORIDE 1 MG/ML
0.2 INJECTION, SOLUTION INTRAMUSCULAR; INTRAVENOUS; SUBCUTANEOUS EVERY 5 MIN PRN
Status: DISCONTINUED | OUTPATIENT
Start: 2023-03-31 | End: 2023-03-31

## 2023-03-31 RX ORDER — OXYCODONE HYDROCHLORIDE 5 MG/1
5 TABLET ORAL
Status: DISCONTINUED | OUTPATIENT
Start: 2023-03-31 | End: 2023-03-31

## 2023-03-31 RX ORDER — NITROFURANTOIN 25; 75 MG/1; MG/1
100 CAPSULE ORAL EVERY 12 HOURS
Qty: 14 CAPSULE | Refills: 0 | Status: SHIPPED | OUTPATIENT
Start: 2023-03-31 | End: 2023-04-07

## 2023-03-31 RX ADMIN — SODIUM CHLORIDE, SODIUM LACTATE, POTASSIUM CHLORIDE, AND CALCIUM CHLORIDE: .6; .31; .03; .02 INJECTION, SOLUTION INTRAVENOUS at 12:03

## 2023-03-31 RX ADMIN — LIDOCAINE HYDROCHLORIDE 100 MG: 20 INJECTION, SOLUTION INTRAVENOUS at 12:03

## 2023-03-31 RX ADMIN — FAMOTIDINE 20 MG: 10 INJECTION, SOLUTION INTRAVENOUS at 12:03

## 2023-03-31 RX ADMIN — ONDANSETRON HYDROCHLORIDE 4 MG: 2 INJECTION, SOLUTION INTRAMUSCULAR; INTRAVENOUS at 05:03

## 2023-03-31 RX ADMIN — HYDROCODONE BITARTRATE AND ACETAMINOPHEN 1 TABLET: 5; 325 TABLET ORAL at 03:03

## 2023-03-31 RX ADMIN — GABAPENTIN 300 MG: 300 CAPSULE ORAL at 08:03

## 2023-03-31 RX ADMIN — Medication 100 MG: at 12:03

## 2023-03-31 RX ADMIN — NITROFURANTOIN (MONOHYDRATE/MACROCRYSTALS) 100 MG: 75; 25 CAPSULE ORAL at 03:03

## 2023-03-31 RX ADMIN — OXYBUTYNIN CHLORIDE 5 MG: 5 TABLET ORAL at 03:03

## 2023-03-31 RX ADMIN — PROCHLORPERAZINE EDISYLATE 5 MG: 5 INJECTION INTRAMUSCULAR; INTRAVENOUS at 08:03

## 2023-03-31 RX ADMIN — MIDAZOLAM HYDROCHLORIDE 2 MG: 1 INJECTION, SOLUTION INTRAMUSCULAR; INTRAVENOUS at 12:03

## 2023-03-31 RX ADMIN — CALCIUM CARBONATE (ANTACID) CHEW TAB 500 MG 500 MG: 500 CHEW TAB at 05:03

## 2023-03-31 RX ADMIN — MICONAZOLE NITRATE 2 % TOPICAL POWDER: at 08:03

## 2023-03-31 RX ADMIN — PROCHLORPERAZINE EDISYLATE 5 MG: 5 INJECTION INTRAMUSCULAR; INTRAVENOUS at 12:03

## 2023-03-31 RX ADMIN — OXYBUTYNIN CHLORIDE 5 MG: 5 TABLET ORAL at 08:03

## 2023-03-31 RX ADMIN — FENTANYL CITRATE 100 MCG: 50 INJECTION, SOLUTION INTRAMUSCULAR; INTRAVENOUS at 12:03

## 2023-03-31 RX ADMIN — PROPOFOL 100 MG: 10 INJECTION, EMULSION INTRAVENOUS at 12:03

## 2023-03-31 RX ADMIN — CEFEPIME 1 G: 1 INJECTION, POWDER, FOR SOLUTION INTRAMUSCULAR; INTRAVENOUS at 12:03

## 2023-03-31 RX ADMIN — KETAMINE HYDROCHLORIDE 20 MG: 10 INJECTION, SOLUTION INTRAMUSCULAR; INTRAVENOUS at 12:03

## 2023-03-31 RX ADMIN — HYDRALAZINE HYDROCHLORIDE 100 MG: 25 TABLET, FILM COATED ORAL at 08:03

## 2023-03-31 RX ADMIN — DEXTROSE 2 G: 50 INJECTION, SOLUTION INTRAVENOUS at 12:03

## 2023-03-31 RX ADMIN — CEFEPIME 1 G: 1 INJECTION, POWDER, FOR SOLUTION INTRAMUSCULAR; INTRAVENOUS at 08:03

## 2023-03-31 RX ADMIN — SOTALOL HYDROCHLORIDE 80 MG: 80 TABLET ORAL at 08:03

## 2023-03-31 RX ADMIN — ONDANSETRON 4 MG: 2 INJECTION INTRAMUSCULAR; INTRAVENOUS at 12:03

## 2023-03-31 RX ADMIN — ACETAMINOPHEN 1000 MG: 10 INJECTION, SOLUTION INTRAVENOUS at 12:03

## 2023-03-31 RX ADMIN — OXYCODONE HYDROCHLORIDE 5 MG: 5 TABLET ORAL at 01:03

## 2023-03-31 RX ADMIN — DOXYCYCLINE HYCLATE 100 MG: 100 CAPSULE ORAL at 03:03

## 2023-03-31 NOTE — PT/OT/SLP PROGRESS
Physical Therapy      Patient Name:  Ezequiel Escudero   MRN:  [id number]    Patient not seen today secondary to Other (Comment) (Pt having Umbilical repair surgery today. Will need a new order to resume therapy when appropriate to participate after surgery.). See discharge summary.

## 2023-03-31 NOTE — PT/OT/SLP PROGRESS
Occupational Therapy      Patient Name:  Ezequiel Escudero   MRN:  [id number]    Patient not seen today secondary to Patient unwilling to participate (awaiting surgery). Will follow-up 4/1/23.    3/31/2023

## 2023-03-31 NOTE — PLAN OF CARE
Problem: Infection  Goal: Absence of Infection Signs and Symptoms  Outcome: Ongoing, Progressing     Problem: Fluid and Electrolyte Imbalance (Acute Kidney Injury/Impairment)  Goal: Fluid and Electrolyte Balance  Outcome: Ongoing, Progressing     Problem: Impaired Wound Healing  Goal: Optimal Wound Healing  Outcome: Ongoing, Progressing     Problem: Fall Injury Risk  Goal: Absence of Fall and Fall-Related Injury  Outcome: Ongoing, Progressing

## 2023-03-31 NOTE — TRANSFER OF CARE
Anesthesia Transfer of Care Note    Patient: Ezequiel Escudero    Procedure(s) Performed: Procedure(s) (LRB):  REPAIR, HERNIA, UMBILICAL (N/A)    Patient location: PACU    Anesthesia Type: general    Transport from OR: Transported from OR on room air with adequate spontaneous ventilation    Post pain: adequate analgesia    Post assessment: no apparent anesthetic complications    Post vital signs: stable    Level of consciousness: awake and alert    Nausea/Vomiting: no nausea/vomiting    Complications: none    Transfer of care protocol was followedComments: Advised PACU of pt current problems with N/V preop.      Last vitals:   Visit Vitals  BP (!) 164/78   Pulse 65   Temp 36.5 °C (97.7 °F) (Oral)   Resp 18   Ht 6' (1.829 m)   Wt 131.5 kg (289 lb 14.5 oz)   SpO2 97%   BMI 39.32 kg/m²

## 2023-03-31 NOTE — ANESTHESIA POSTPROCEDURE EVALUATION
Anesthesia Post Evaluation    Patient: Ezequiel Escudero    Procedure(s) Performed: Procedure(s) (LRB):  REPAIR, HERNIA, UMBILICAL (N/A)    Final Anesthesia Type: general      Patient location during evaluation: PACU  Patient participation: Yes- Able to Participate  Level of consciousness: awake and alert  Post-procedure vital signs: reviewed and stable  Pain management: adequate  Airway patency: patent    PONV status at discharge: No PONV  Anesthetic complications: no      Cardiovascular status: blood pressure returned to baseline and stable  Respiratory status: unassisted and room air  Hydration status: euvolemic  Follow-up not needed.          Vitals Value Taken Time   /74 03/31/23 1345   Temp 36.6 °C (97.9 °F) 03/31/23 1330   Pulse 69 03/31/23 1353   Resp 18 03/31/23 1353   SpO2 98 % 03/31/23 1353   Vitals shown include unvalidated device data.      No case tracking events are documented in the log.      Pain/Daly Score: Pain Rating Prior to Med Admin: 3 (3/31/2023  1:42 PM)  Daly Score: 9 (3/31/2023  1:30 PM)

## 2023-03-31 NOTE — PLAN OF CARE
Patient accepted by [facility] via [employer] system.       03/31/23 1445   Post-Acute Status   Post-Acute Authorization Home Health   Home Health Status Set-up Complete/Auth obtained

## 2023-03-31 NOTE — ANESTHESIA PREPROCEDURE EVALUATION
03/31/2023  Ezequiel Escudero is a 72 y.o., male.    Patient Active Problem List   Diagnosis    Acute cystitis with hematuria    BPH (benign prostatic hyperplasia)    Prostatitis    Nocturia    Urine retention    Hematuria of undiagnosed cause    GABRIEL (acute kidney injury)    Hyponatremia    A-fib    Acute renal failure    Stage 2 chronic kidney disease    Cellulitis, bilateral legs with superficial sores    Hyperkalemia    Metabolic acidosis    Idiopathic hypotension    Goals of care, counseling/discussion    Urinary retention    Venous stasis ulcer of right calf with necrosis of muscle without varicose veins    Venous stasis    Venous stasis dermatitis of both lower extremities    Chronic ulcer of left leg with fat layer exposed    Cutaneous candidiasis    Cellulitis of abdominal wall    Hypertension    Wound infection    Elevated BUN    Nephrocalcinosis    Physical debility    Obesity (BMI 30-39.9)    Elevated glucose       Past Surgical History:   Procedure Laterality Date    ANKLE DEBRIDEMENT      CARDIOVERSION      CYSTOSCOPY W/ RETROGRADES Bilateral 3/1/2021    Procedure: CYSTOSCOPY, WITH RETROGRADE PYELOGRAM;  Surgeon: Lior Flores MD;  Location: [facility] OR;  Service: Urology;  Laterality: Bilateral;    CYSTOSCOPY WITH BIOPSY OF BLADDER N/A 3/1/2021    Procedure: CYSTOSCOPY, WITH BLADDER BIOPSY, WITH FULGURATION IF INDICATED;  Surgeon: Lior Flores MD;  Location: [facility] OR;  Service: Urology;  Laterality: N/A;    DEBRIDEMENT OF LOWER EXTREMITY Right 2/1/2021    Procedure: DEBRIDEMENT, LOWER EXTREMITY;  Surgeon: Pete Cardenas MD;  Location: [facility] OR;  Service: General;  Laterality: Right;    INCISION AND DRAINAGE Right 2/1/2021    Procedure: Incision and Drainage;  Surgeon: Pete Cardenas MD;  Location: [facility] OR;  Service: General;  Laterality: Right;     THYROIDECTOMY  1970's    TONSILLECTOMY      TONSILLECTOMY, ADENOIDECTOMY          Tobacco Use:  The patient  reports that he has never smoked. He has never used smokeless tobacco.     Results for orders placed or performed during the hospital encounter of 02/23/21   EKG 12-lead    Collection Time: 02/23/21 10:26 AM    Narrative    Test Reason : Z01.818,Z01.818,    Vent. Rate : 065 BPM     Atrial Rate : 065 BPM     P-R Int : 216 ms          QRS Dur : 110 ms      QT Int : 438 ms       P-R-T Axes : 014 059 021 degrees     QTc Int : 455 ms    Sinus rhythm with 1st degree A-V block  Otherwise normal ECG  When compared with ECG of 09-OCT-2016 21:16,  Minimal criteria for Anterior infarct are no longer Present  Criteria for Inferior infarct are no longer Present  Confirmed by Tye Agarwal MD ([id number]) on 2/26/2021 8:13:07 PM    Referred By: ALEX KING           Confirmed By:Tye Agarwal MD        Imaging Results          CT Abdomen Pelvis With Contrast (Final result)  Result time 03/28/23 19:05:22    Final result by Jj Rice MD (03/28/23 19:05:22)                 Narrative:    EXAM DESCRIPTION: CT ABDOMEN PELVIS WITH CONTRAST    CLINICAL HISTORY: 72 years Male, Abdominal abscess/infection suspected; umbilical hernia with overlying cellulitis/infection    COMPARISON: February 10, 2021.    TECHNIQUE: Images of the abdomen and pelvis were obtained after the administration of 100 mL of Omni 350. All CT scans at this facility utilized dose modulation, iterative reconstruction, and/or weightbase dosing when appropriate to reduce the radiation dose to his low as reasonably achievable. CTDI 25.10. DLP 1327.70.    FINDINGS: The lower lung fields are unremarkable. Both the liver and spleen are normal in size, and no focal abnormalities or ascites can be seen. Gallstones are present. The pancreas is normal in size, as are the adrenal glands and the abdominal aorta. The large cyst on the right kidney is again noted.  Nonobstructing calcifications are noted in the left kidney.    The small bowel is normal in size. A moderate amount of gas and stool is seen throughout the colon. The appendix is visualized and appears normal. The urinary bladder is unopacified but is unremarkable. A fat-containing hernia is noted in the anterior abdominal wall, and this appears larger than on the previous examination. Bilateral hip arthroplasties have been performed.    IMPRESSION:    1. Cholelithiasis.  2. A fat-containing hernia is noted in the anterior abdominal wall.  3. Left nephrocalcinosis.  4. Renal cysts.    Electronically signed by:  Jj Rice MD  3/28/2023 7:05 PM CDT Workstation: [id number]                             US Lower Extremity Veins Bilateral (Final result)  Result time 03/28/23 14:37:50    Final result by Marv Wheat MD (03/28/23 14:37:50)                 Narrative:    VENOUS DOPPLER ULTRASOUND BILATERAL LOWER EXTREMITIES    CLINICAL DATA: Cellulitis    FINDINGS: Color and duplex Doppler sonographic assessment with spectral analysis of the bilateral lower extremities demonstrates no evidence of deep vein thrombosis. Normal color Doppler flow, augmentation, and compressibility are demonstrated at all levels.    There is mild nonspecific subcutaneous edema in the bilateral popliteal regions.    IMPRESSION:  1. No evidence of DVT in the bilateral lower extremities.  2. Mild bilateral nonspecific subcutaneous edema.    Electronically signed by:  Marv Wheat MD  3/28/2023 2:37 PM CDT Workstation: [id number]                               Lab Results   Component Value Date    WBC 11.57 03/31/2023    HGB 15.2 03/31/2023    HCT 47.0 03/31/2023    MCV 90 03/31/2023     03/31/2023     BMP  Lab Results   Component Value Date     03/31/2023    K 4.7 03/31/2023     03/31/2023    CO2 26 03/31/2023    BUN 27 (H) 03/31/2023    CREATININE 1.2 03/31/2023    CALCIUM 8.8 03/31/2023    ANIONGAP 10 03/31/2023    GLU  131 (H) 03/31/2023     (H) 03/30/2023     (H) 03/29/2023       Results for orders placed during the hospital encounter of 03/28/23    Echo Saline Bubble? No    Interpretation Summary  · The left ventricle is normal in size with concentric remodeling and normal systolic function.  · Normal left ventricular diastolic function.  · Atrial fibrillation not observed.  · Normal right ventricular size with normal right ventricular systolic function.  · Normal central venous pressure (3 mmHg).  · No pulmonary hypertension and normal diastolic function            Pre-op Assessment    I have reviewed the Patient Summary Reports.     I have reviewed the Nursing Notes. I have reviewed the NPO Status.   I have reviewed the Medications.     Review of Systems  Anesthesia Hx:  No problems with previous Anesthesia  Denies Family Hx of Anesthesia complications.   Denies Personal Hx of Anesthesia complications.   Social:  Non-Smoker    Hematology/Oncology:  Hematology Normal        EENT/Dental:EENT/Dental Normal   Cardiovascular:   Hypertension Dysrhythmias (Paroxysmal AFib. On blood thinners.  Stop 2 days ago.) atrial fibrillation    Pulmonary:  Pulmonary Normal    Renal/:   Chronic Renal Disease (stage 2), CKD    Hepatic/GI:  Hepatic/GI Normal Umbilical hernia   Musculoskeletal:  Musculoskeletal Normal    Neurological:  Neurology Normal    Endocrine:  Endocrine Normal    Psych:  Psychiatric Normal           Physical Exam  General: Well nourished    Airway:  Mallampati: II   Mouth Opening: Normal  TM Distance: Normal  Tongue: Normal  Neck ROM: Normal ROM    Dental:  Intact    Chest/Lungs:  Clear to auscultation, Normal Respiratory Rate    Heart:  Rate: Normal  Rhythm: Regular Rhythm        Anesthesia Plan  Type of Anesthesia, risks & benefits discussed:    Anesthesia Type: Gen ETT  Intra-op Monitoring Plan: Standard ASA Monitors  Post Op Pain Control Plan: IV/PO Opioids PRN and multimodal analgesia  Induction:   IV  Airway Plan: Video  Informed Consent: Informed consent signed with the Patient and all parties understand the risks and agree with anesthesia plan.  All questions answered.   ASA Score: 3  Anesthesia Plan Notes: GETA.  RSI  Multimodal analgesia with ofirmev 1000 mg, decadron 8 mg, ketamine 50 mg.  PONV prophylaxis with Pepcid 20 mg IV, and Zofran 4 mg IV.      Ready For Surgery From Anesthesia Perspective.     .

## 2023-03-31 NOTE — ANESTHESIA PROCEDURE NOTES
Intubation    Date/Time: 3/31/2023 12:12 PM  Performed by: Terrie Kinsey CRNA  Authorized by: Andry Whiting MD     Intubation:     Induction:  Intravenous    Intubated:  Postinduction    Mask Ventilation:  Easy mask    Attempts:  1    Attempted By:  CRNA    Method of Intubation:  Video laryngoscopy    Blade:  [place] 4    Laryngeal View Grade: Grade IIb - only the arytenoids and epiglottis seen      Difficult Airway Encountered?: No      Complications:  None    Airway Device:  Oral endotracheal tube    Airway Device Size:  7.5    Style/Cuff Inflation:  Cuffed (inflated to minimal occlusive pressure)    Tube secured:  24    Secured at:  The lips    Placement Verified By:  Capnometry    Complicating Factors:  None    Findings Post-Intubation:  BS equal bilateral

## 2023-03-31 NOTE — OP NOTE
DATE OF PROCEDURE: 03/31/2023    PREOPERATIVE DIAGNOSIS: Incarcerated umbilical hernia    POSTOPERATIVE DIAGNOSIS: Incarcerated fat containing umbilical hernia with 5 x 5 cm fascial defect    PROCEDURE: Open incarcerated umbilical hernia repair with mesh, 5 x 5 cm    SURGEON: Gino Randhawa M.D    ASSISTANT: None    ANESTHESIA: General    ESTIMATED BLOOD LOSS: Minimal    SPECIMEN: Cultures    CONDITION: Stable    COMPLICATIONS: None    FINDINGS:   1. Umbilicus with ulcerative pressure wound from herniated omentum and preperitoneal fat, excised in totality and culture taken underneath the eschar   2. 5 x 5 cm fascial defect present  3. Repaired with 8.6 cm coated circular mesh and primary closure of the defect     INDICATIONS: The patient is a 72-year-old male admitted for chronic bilateral lower extremity wounds he also had a central abdominal wound. CT imaging showed a large fat containing hernia at the umbilicus which was consistent with the area on his abdominal wall with the wound. It appeared that the pressure from the herniating tissue cause ulcerative changes to the skin.  Counseled on options agreed proceed with open repair.    PROCEDURE IN DETAIL: Patient taken operating room placed in supine position where general anesthesia was induced and the patient was intubated. He received perioperative antibiotics.  His umbilicus had an ulcerative lesion measuring approximally 1 x 1 cm with localized redness along the edges but no obvious cellulitis or fluctuance. This area was prepped with Betadine and draped in sterile fashion.  Time-out performed by members of the operative team.  Elliptical incision around the umbilicus was planned and then the skin was incised sharply. Bovie electrocautery was used to dissect through the subcutaneous tissue and then the hernia sac was identified and circumferentially dissected to the level of the fascia. The hernia sac was entered and found to contain preperitoneal fat and  omentum. We elected to transect the omentum at the level of the fascial defect.  This was done between Andie clamps and the omentum was tied off with silk sutures. Once the herniated tissue, sac and overlying skin were freed it was brought to the back table. Away from the operative field the eschar was removed and culture was taken of the underlying wound. Gloves were then changed. We returned to the operating table.  The fascial defect measured 5 x 5 cm. We created a preperitoneal space circumferentially to allow 8.6 cm circular coated mesh to be placed.  Once this was placed it was secured to fascia with interrupted 0 Ethibond along its tabs. We then closed the defect primarily with interrupted 0 Ethibond horizontal mattress sutures.  The closure was found to be adequate.  A mixture of Marcaine and Exparel was injected at the fascial level and at the skin level.  Wound was irrigated.  Hemostasis was assured.  Deep dermal Vicryl sutures were placed then a running 4-0 Monocryl subcuticular stitch was placed.  I felt there was some tension at the skin closure therefore interrupted 2-0 nylon horizontal mattress sutures were placed to reduce the tension.  Sterile bandage was applied then a pressure dressing was applied.  Patient was aroused from sedation, extubated taken to recovery room stable condition having suffered no complications.  All counts were correct x2 at the end of the case.  I was present scrubbed throughout all operative portions of the case.    DISPO: PACU then return to floor

## 2023-03-31 NOTE — PT/OT/SLP DISCHARGE
Physical Therapy Discharge Summary    Name: Ezequiel Escudero  MRN: [id number]   Principal Problem: Wound infection     Patient Discharged from acute Physical Therapy on 3/31/23.  Please refer to prior PT noted date on 3/29/23 for functional status.     Assessment:     Pt having Umbilical repair surgery today. Will need a new order to resume therapy when appropriate to participate after surgery.     Objective:     GOALS:   Multidisciplinary Problems       Physical Therapy Goals          Problem: Physical Therapy    Goal Priority Disciplines Outcome Goal Variances Interventions   Physical Therapy Goal     PT, PT/OT      Description: Goals to be met by: 23     Patient will increase functional independence with mobility by performin. Supine to sit with Supervision  2. Sit to stand transfer with Supervision  3. Bed to chair transfer with Supervision using Rolling Walker  4. Gait  x 150 feet with Supervision using Rolling Walker.                              Reasons for Discontinuation of Therapy Services  Pt having Umbilical repair surgery today. Will need a new order to resume therapy when appropriate to participate after surgery.       Plan:     Patient Discharged to:  Surgery .      3/31/2023

## 2023-03-31 NOTE — PLAN OF CARE
SW sent patient information to [facility] via careport for resumption of home health.       03/31/23 1037   Post-Acute Status   Post-Acute Authorization Home Health   Home Health Status Referrals Sent   Patient choice form signed by patient/caregiver List with quality metrics by geographic area provided

## 2023-04-01 VITALS
HEART RATE: 72 BPM | WEIGHT: 289.88 LBS | DIASTOLIC BLOOD PRESSURE: 75 MMHG | OXYGEN SATURATION: 96 % | HEIGHT: 72 IN | RESPIRATION RATE: 18 BRPM | TEMPERATURE: 99 F | BODY MASS INDEX: 39.26 KG/M2 | SYSTOLIC BLOOD PRESSURE: 152 MMHG

## 2023-04-01 LAB
ALBUMIN SERPL BCP-MCNC: 2.8 G/DL (ref 3.5–5.2)
ANION GAP SERPL CALC-SCNC: 8 MMOL/L (ref 8–16)
BASOPHILS # BLD AUTO: 0.07 K/UL (ref 0–0.2)
BASOPHILS NFR BLD: 0.6 % (ref 0–1.9)
BUN SERPL-MCNC: 26 MG/DL (ref 8–23)
CALCIUM SERPL-MCNC: 8.8 MG/DL (ref 8.7–10.5)
CHLORIDE SERPL-SCNC: 104 MMOL/L (ref 95–110)
CO2 SERPL-SCNC: 25 MMOL/L (ref 23–29)
CREAT SERPL-MCNC: 1 MG/DL (ref 0.5–1.4)
DIFFERENTIAL METHOD: ABNORMAL
EOSINOPHIL # BLD AUTO: 0.3 K/UL (ref 0–0.5)
EOSINOPHIL NFR BLD: 2.5 % (ref 0–8)
ERYTHROCYTE [DISTWIDTH] IN BLOOD BY AUTOMATED COUNT: 14.9 % (ref 11.5–14.5)
EST. GFR  (NO RACE VARIABLE): >60 ML/MIN/1.73 M^2
GLUCOSE SERPL-MCNC: 108 MG/DL (ref 70–110)
HCT VFR BLD AUTO: 45.9 % (ref 40–54)
HGB BLD-MCNC: 15 G/DL (ref 14–18)
IMM GRANULOCYTES # BLD AUTO: 0.18 K/UL (ref 0–0.04)
IMM GRANULOCYTES NFR BLD AUTO: 1.4 % (ref 0–0.5)
LYMPHOCYTES # BLD AUTO: 0.6 K/UL (ref 1–4.8)
LYMPHOCYTES NFR BLD: 4.8 % (ref 18–48)
MAGNESIUM SERPL-MCNC: 1.9 MG/DL (ref 1.6–2.6)
MCH RBC QN AUTO: 29.2 PG (ref 27–31)
MCHC RBC AUTO-ENTMCNC: 32.7 G/DL (ref 32–36)
MCV RBC AUTO: 89 FL (ref 82–98)
MONOCYTES # BLD AUTO: 0.9 K/UL (ref 0.3–1)
MONOCYTES NFR BLD: 6.8 % (ref 4–15)
NEUTROPHILS # BLD AUTO: 10.6 K/UL (ref 1.8–7.7)
NEUTROPHILS NFR BLD: 83.9 % (ref 38–73)
NRBC BLD-RTO: 0 /100 WBC
PHOSPHATE SERPL-MCNC: 3 MG/DL (ref 2.7–4.5)
PLATELET # BLD AUTO: 329 K/UL (ref 150–450)
PMV BLD AUTO: 9.5 FL (ref 9.2–12.9)
POTASSIUM SERPL-SCNC: 4.2 MMOL/L (ref 3.5–5.1)
RBC # BLD AUTO: 5.14 M/UL (ref 4.6–6.2)
SODIUM SERPL-SCNC: 137 MMOL/L (ref 136–145)
WBC # BLD AUTO: 12.58 K/UL (ref 3.9–12.7)

## 2023-04-01 PROCEDURE — 80069 RENAL FUNCTION PANEL: CPT | Performed by: SURGERY

## 2023-04-01 PROCEDURE — 25000003 PHARM REV CODE 250: Performed by: SURGERY

## 2023-04-01 PROCEDURE — 36415 COLL VENOUS BLD VENIPUNCTURE: CPT | Performed by: SURGERY

## 2023-04-01 PROCEDURE — 85025 COMPLETE CBC W/AUTO DIFF WBC: CPT | Performed by: SURGERY

## 2023-04-01 PROCEDURE — 83735 ASSAY OF MAGNESIUM: CPT | Performed by: SURGERY

## 2023-04-01 PROCEDURE — 25000003 PHARM REV CODE 250: Performed by: INTERNAL MEDICINE

## 2023-04-01 RX ADMIN — TAMSULOSIN HYDROCHLORIDE 0.8 MG: 0.4 CAPSULE ORAL at 09:04

## 2023-04-01 RX ADMIN — HYDRALAZINE HYDROCHLORIDE 100 MG: 25 TABLET, FILM COATED ORAL at 09:04

## 2023-04-01 RX ADMIN — NITROFURANTOIN (MONOHYDRATE/MACROCRYSTALS) 100 MG: 75; 25 CAPSULE ORAL at 10:04

## 2023-04-01 RX ADMIN — FINASTERIDE 5 MG: 5 TABLET, FILM COATED ORAL at 10:04

## 2023-04-01 RX ADMIN — DOXYCYCLINE HYCLATE 100 MG: 100 CAPSULE ORAL at 09:04

## 2023-04-01 RX ADMIN — MICONAZOLE NITRATE 2 % TOPICAL POWDER: at 10:04

## 2023-04-01 RX ADMIN — GABAPENTIN 300 MG: 300 CAPSULE ORAL at 09:04

## 2023-04-01 RX ADMIN — SOTALOL HYDROCHLORIDE 80 MG: 80 TABLET ORAL at 10:04

## 2023-04-01 RX ADMIN — OXYBUTYNIN CHLORIDE 5 MG: 5 TABLET ORAL at 10:04

## 2023-04-01 NOTE — PLAN OF CARE
04/01/23 1008   Final Note   Assessment Type Final Discharge Note   Anticipated Discharge Disposition Home-Health   What phone number can be called within the next 1-3 days to see how you are doing after discharge? [phone]   Hospital Resources/Appts/Education Provided Appointments scheduled by Navigator/Coordinator;Appointments scheduled and added to AVS;Post-Acute resouces added to AVS   Post-Acute Status   Post-Acute Authorization Home Health   Home Health Status Set-up Complete/Auth obtained   Discharge Delays None known at this time     Patient originally refusing discharge to home with home health services d/t lack of support at home during the day. Dr Mcbride met with patient at bedside - patient now agreeable to discharge home with resumption of home health services for close hospital follow up with Dr. Chino. Patient v/u and thanked me for meeting at bedside to discuss.     Discharge orders and chart reviewed. No other discharge needs noted at this time. Pt is clear for discharge from case management. Pt is discharging to home with [facility].     Patient requested transportation services for discharge - information provided at bedside.     Follow ups scheduled and added to patient's AVS by previous .

## 2023-04-01 NOTE — SUBJECTIVE & OBJECTIVE
Interval History:     Review of Systems   Unable to perform ROS: Other (pt was sleepy when saw in recovery room)   Objective:     Vital Signs (Most Recent):  Temp: 97.8 °F (36.6 °C) (03/31/23 1732)  Pulse: 61 (03/31/23 1732)  Resp: 18 (03/31/23 1732)  BP: (!) 180/89 (nurse Poonam notified) (03/31/23 1732)  SpO2: 97 % (03/31/23 1732)   Vital Signs (24h Range):  Temp:  [97.7 °F (36.5 °C)-98.6 °F (37 °C)] 97.8 °F (36.6 °C)  Pulse:  [61-78] 61  Resp:  [16-20] 18  SpO2:  [95 %-99 %] 97 %  BP: (130-189)/(63-89) 180/89     Weight: 131.5 kg (289 lb 14.5 oz)  Body mass index is 39.32 kg/m².    Intake/Output Summary (Last 24 hours) at 3/31/2023 2001  Last data filed at 3/31/2023 1806  Gross per 24 hour   Intake 600 ml   Output 1900 ml   Net -1300 ml      Physical Exam  Vitals and nursing note reviewed.   Constitutional:       Appearance: He is well-developed.      Comments: Sleepy    HENT:      Head: Atraumatic.      Right Ear: External ear normal.      Left Ear: External ear normal.      Nose: Nose normal.      Mouth/Throat:      Mouth: Mucous membranes are moist.   Cardiovascular:      Rate and Rhythm: Normal rate.   Pulmonary:      Effort: Pulmonary effort is normal.   Musculoskeletal:      Cervical back: Full passive range of motion without pain and normal range of motion.      Right lower leg: No edema.      Left lower leg: No edema.   Skin:     General: Skin is warm.   Neurological:      General: No focal deficit present.       Significant Labs: All pertinent labs within the past 24 hours have been reviewed.  CBC:   Recent Labs   Lab 03/30/23 0326 03/31/23 0515   WBC 9.37 11.57   HGB 14.4 15.2   HCT 45.6 47.0    286     CMP:   Recent Labs   Lab 03/30/23  0325 03/31/23  0515   * 136   K 4.3 4.7    100   CO2 24 26   * 131*   BUN 35* 27*   CREATININE 1.1 1.2   CALCIUM 8.2* 8.8   ALBUMIN 2.5* 2.9*   ANIONGAP 6* 10       Significant Imaging: I have reviewed all pertinent imaging results/findings  within the past 24 hours.

## 2023-04-01 NOTE — ASSESSMENT & PLAN NOTE
Body mass index is 39.32 kg/m². Morbid obesity complicates all aspects of disease management from diagnostic modalities to treatment.

## 2023-04-01 NOTE — DISCHARGE SUMMARY
[facility] Medicine  Discharge Summary      Patient Name: Ezequiel Escudero  MRN: [id number]  [facility]: [id number]  Patient Class: IP- Inpatient  Admission Date: 3/28/2023  Hospital Length of Stay: 4 days  Discharge Date and Time:  04/01/2023 1:19 PM  Attending Physician: Fabián Mcbride MD   Discharging Provider: Fabián Mcbride MD  Primary Care Provider: Santiago Beebe MD    Primary Care Team: Networked reference to record PCT     HPI:   Ezequiel Escudero is a 72-year-old male with chronic medical problems including morbid obesity, atrial fibrillation on Xarelto, BPH with urinary retention, chronic venous stasis and chronic leg wounds who presents to the emergency room for increased lower extremity swelling and weeping.  He says it has been ongoing for several weeks and has gotten progressively worse over time.  He has had an increase in weeping and dressings getting soaked constantly.  He was in the emergency room on 03/25 at [place] they referred him to home health and wound care.  He said home health came yesterday and changed his dressings knee was supposed to see wound care tomorrow.  He reportedly had blisters on the right leg but reports today with a left lateral leg wound that has exposed fat and right ulceration that are both foul smelling with copious weeping fluid.  He also has a hernia in the umbilicus that has gotten red and tender.  He said this last happened a couple of years ago when he was in the hospital for cellulitis.  He said it has been an ongoing problem for several years but the wounds are only there occasionally.  He is having great difficulty walking and getting around and said his children check on him but he lives alone.  He is not currently on any antibiotics.  He is also having urinary retention and incontinence and says he does not know when he has weighed and uses an incontinence brief and has to change them frequently and has rash to his buttocks and groin.  He denies any  systemic symptoms such as fevers or chills, nausea vomiting, diarrhea or constipation, shortness a breath, coughing or chest pain.    In the ED, CTA abdomen and pelvis with contrast showed cholelithiasis, fat containing hernia in the anterior abdominal wall that appears larger than previous scan, left nephrocalcinosis and renal cysts.  The bladder appears distended.  Lower extremity ultrasound with no evidence of DVT and mild bilateral nonspecific subcutaneous edema.  Lab work with WBC 10.080, H&H 17.0/51.9, platelets 362, sed rate 49, CRP 8.68, lactic acid 1.5, sodium 134, CO2 21, BUN elevated at 66, anion gap 9, creatinine 1.3, estimated GFR 59.1, estimated creatinine clearance 74.2, albumin 2.9, albumin decreased at 2.9, BNP 23, urinalysis is pending, blood cultures are pending.  Vital signs with temperature 97.9°, heart rate 68-72, blood pressure 130/62, respiratory rate 18-19 and O2 sat 97-99% on room air.  Was given a tetanus and IV vancomycin.  He will be admitted to the hospitalist service for further evaluation and treatment with a consult to Wound Care and General surgery to evaluate abdominal wall hernia for abscess.      Procedure(s) (LRB):  REPAIR, HERNIA, UMBILICAL (N/A)      Hospital Course:   Pt got admitted with Incarcerated Umbilical hernia and had successful repair   Pt has Long standing history of stasis dermatitis on Both legs  This time pt was treated for enterococcus UTI too  Pt had Hematuria and Urinary retention  Pt was educated that this can be from xarelto and UTI  Oncall Urologist was informed and plan was made to discharge pt , to home with Rojas catheter and pt will follow up with Urologist office soon   Later pt was discharged to Home and pt was on Home health care before which was again reactivated   Pt will follow up with Surgeon/Urologist in Office        Goals of Care Treatment Preferences:  Code Status: Full Code      Consults:   Consults (From admission, onward)        Status  Ordering Provider     IP consult to case management  Once        Provider:  (Not yet assigned)    Completed SUZANNE WASSERMAN     Inpatient consult to Registered Dietitian/Nutritionist  Once        Provider:  (Not yet assigned)    Completed JAN CULLEN     Inpatient consult to General Surgery  Once        Provider:  Mike Frank MD    Completed JAN CULLEN          No new Assessment & Plan notes have been filed under this hospital service since the last note was generated.  Service: Hospital Medicine    Final Active Diagnoses:    Diagnosis Date Noted POA    PRINCIPAL PROBLEM:  Umbilical hernia, incarcerated [K42.0] 03/31/2023 Unknown    UTI (urinary tract infection) [N39.0] 03/31/2023 Unknown    Hypertension [I10] 03/28/2023 Unknown    Obesity (BMI 30-39.9) [E66.9] 03/28/2023 Unknown    Venous stasis dermatitis of both lower extremities [I87.2] 03/04/2021 Yes    A-fib [I48.91]  Yes    BPH (benign prostatic hyperplasia) [N40.0] 10/02/2012 Yes      Problems Resolved During this Admission:    Diagnosis Date Noted Date Resolved POA    Cutaneous candidiasis [B37.2] 03/28/2023 03/31/2023 Unknown    Cellulitis of abdominal wall [L03.311] 03/28/2023 03/31/2023 Unknown    Wound infection [T14.8XXA, L08.9] 03/28/2023 03/31/2023 Unknown    Elevated BUN [R79.9] 03/28/2023 03/31/2023 Unknown    Nephrocalcinosis [E83.59, N29] 03/28/2023 03/31/2023 Unknown    Physical debility [R53.81] 03/28/2023 03/31/2023 Unknown    Elevated glucose [R73.09] 03/28/2023 03/31/2023 Unknown    Chronic ulcer of left leg with fat layer exposed [L97.922] 09/13/2021 03/31/2023 Unknown    Urine retention [R33.9] 10/29/2012 03/31/2023 Yes       Discharged Condition: good    Disposition: Home-Health Care Svc    Follow Up:   Follow-up Information     Santiago Beebe MD Follow up in 1 week(s).    Specialty: Cardiology  Why: office request patient to contact to schedule  Contact information:  [address] [place]  [address]  [phone]             Gino Randhawa Jr, MD Follow up on 4/12/2023.    Specialty: General Surgery  Why: at 10:15am  Contact information:  [address]  [address]  [place] [address] [phone]             [facility] [place] Follow up.    Specialty: Home Health Services  Why: Home Health  Contact information:  [address]  [address]  [place] [address]  [phone]             Gilberto Chaidez DO Follow up on 4/3/2023.    Specialty: Wound Care  Why: at 2:45pm  Contact information:  [address]  [address]  [place] [address]  [phone]                       Patient Instructions:      Ambulatory referral/consult to Urology   Standing Status: Future   Referral Priority: Urgent Referral Type: Consultation   Referral Reason: Specialty Services Required   Referred to Provider: KIRSTY GROVER Requested Specialty: Urology   Number of Visits Requested: 1     Ambulatory referral/consult to Home Health   Standing Status: Future   Referral Priority: Routine Referral Type: Home Health   Referral Reason: Specialty Services Required   Requested Specialty: Home Health Services   Number of Visits Requested: 1       Significant Diagnostic Studies: Labs:   CMP   Recent Labs   Lab 03/31/23  0515 04/01/23  0537    137   K 4.7 4.2    104   CO2 26 25   * 108   BUN 27* 26*   CREATININE 1.2 1.0   CALCIUM 8.8 8.8   ALBUMIN 2.9* 2.8*   ANIONGAP 10 8    and CBC   Recent Labs   Lab 03/31/23  0515 04/01/23  0537   WBC 11.57 12.58   HGB 15.2 15.0   HCT 47.0 45.9    329       Pending Diagnostic Studies:     None         Medications:  Reconciled Home Medications:      Medication List      START taking these medications    doxycycline 100 MG Cap  Commonly known as: VIBRAMYCIN  Take 1 capsule (100 mg total) by mouth every 12 (twelve) hours. for 7 days     HYDROcodone-acetaminophen 5-325 mg per tablet  Commonly known as: NORCO  Take 1 tablet by mouth every 12 (twelve) hours as  needed for Pain.     nitrofurantoin (macrocrystal-monohydrate) 100 MG capsule  Commonly known as: MACROBID  Take 1 capsule (100 mg total) by mouth every 12 (twelve) hours. for 7 days     oxybutynin 5 MG Tab  Commonly known as: DITROPAN  Take 1 tablet (5 mg total) by mouth 3 (three) times daily. for 7 days        CHANGE how you take these medications    finasteride 5 mg tablet  Commonly known as: PROSCAR  Take 1 tablet by mouth once daily  What changed: when to take this        CONTINUE taking these medications    gabapentin 300 MG capsule  Commonly known as: NEURONTIN  Take 300 mg by mouth 2 (two) times daily.     hydrALAZINE 100 MG tablet  Commonly known as: APRESOLINE  Take 100 mg by mouth 2 (two) times daily.     meclizine 25 mg tablet  Commonly known as: ANTIVERT  Take 25 mg by mouth 3 (three) times daily as needed.     OCUVITE PRESERVISION ORAL  Take 1 tablet by mouth 2 (two) times a day.     rivaroxaban 20 mg Tab  Commonly known as: XARELTO  Take 20 mg by mouth once daily.     sotaloL 80 MG tablet  Commonly known as: BETAPACE  Take 80 mg by mouth 2 (two) times daily.     tamsulosin 0.4 mg Cap  Commonly known as: FLOMAX  Take 2 capsules (0.8 mg total) by mouth once daily.            Indwelling Lines/Drains at time of discharge:   Lines/Drains/Airways     Drain  Duration                Urethral Catheter 04/01/23 0602 Double-lumen 16 Fr. <1 day              Physical Exam   Cardiovascular: Normal rate.   Neurological: He is alert.     Time spent on the discharge of patient: 45 minutes         Fabián Mcbride MD  Department of Hospital Medicine  [facility]

## 2023-04-01 NOTE — PROGRESS NOTES
[facility] Medicine  Progress Note    Patient Name: Ezequiel Escudero  MRN: [id number]  Patient Class: IP- Inpatient   Admission Date: 3/28/2023  Length of Stay: 3 days  Attending Physician: Fabián Mcbride MD  Primary Care Provider: Santiago Beebe MD        Subjective:     Principal Problem:Umbilical hernia, incarcerated        HPI:  Ezequiel Escudero is a 72-year-old male with chronic medical problems including morbid obesity, atrial fibrillation on Xarelto, BPH with urinary retention, chronic venous stasis and chronic leg wounds who presents to the emergency room for increased lower extremity swelling and weeping.  He says it has been ongoing for several weeks and has gotten progressively worse over time.  He has had an increase in weeping and dressings getting soaked constantly.  He was in the emergency room on 03/25 at [place] they referred him to home health and wound care.  He said home health came yesterday and changed his dressings knee was supposed to see wound care tomorrow.  He reportedly had blisters on the right leg but reports today with a left lateral leg wound that has exposed fat and right ulceration that are both foul smelling with copious weeping fluid.  He also has a hernia in the umbilicus that has gotten red and tender.  He said this last happened a couple of years ago when he was in the hospital for cellulitis.  He said it has been an ongoing problem for several years but the wounds are only there occasionally.  He is having great difficulty walking and getting around and said his children check on him but he lives alone.  He is not currently on any antibiotics.  He is also having urinary retention and incontinence and says he does not know when he has weighed and uses an incontinence brief and has to change them frequently and has rash to his buttocks and groin.  He denies any systemic symptoms such as fevers or chills, nausea vomiting, diarrhea or constipation, shortness a  breath, coughing or chest pain.    In the ED, CTA abdomen and pelvis with contrast showed cholelithiasis, fat containing hernia in the anterior abdominal wall that appears larger than previous scan, left nephrocalcinosis and renal cysts.  The bladder appears distended.  Lower extremity ultrasound with no evidence of DVT and mild bilateral nonspecific subcutaneous edema.  Lab work with WBC 10.080, H&H 17.0/51.9, platelets 362, sed rate 49, CRP 8.68, lactic acid 1.5, sodium 134, CO2 21, BUN elevated at 66, anion gap 9, creatinine 1.3, estimated GFR 59.1, estimated creatinine clearance 74.2, albumin 2.9, albumin decreased at 2.9, BNP 23, urinalysis is pending, blood cultures are pending.  Vital signs with temperature 97.9°, heart rate 68-72, blood pressure 130/62, respiratory rate 18-19 and O2 sat 97-99% on room air.  Was given a tetanus and IV vancomycin.  He will be admitted to the hospitalist service for further evaluation and treatment with a consult to Wound Care and General surgery to evaluate abdominal wall hernia for abscess.      Overview/Hospital Course:  03/31  Pt had hernia repair  Pt wants to go home tomorrow morning only      Interval History:     Review of Systems   Unable to perform ROS: Other (pt was sleepy when saw in recovery room)   Objective:     Vital Signs (Most Recent):  Temp: 97.8 °F (36.6 °C) (03/31/23 1732)  Pulse: 61 (03/31/23 1732)  Resp: 18 (03/31/23 1732)  BP: (!) 180/89 (nurse Poonam notified) (03/31/23 1732)  SpO2: 97 % (03/31/23 1732)   Vital Signs (24h Range):  Temp:  [97.7 °F (36.5 °C)-98.6 °F (37 °C)] 97.8 °F (36.6 °C)  Pulse:  [61-78] 61  Resp:  [16-20] 18  SpO2:  [95 %-99 %] 97 %  BP: (130-189)/(63-89) 180/89     Weight: 131.5 kg (289 lb 14.5 oz)  Body mass index is 39.32 kg/m².    Intake/Output Summary (Last 24 hours) at 3/31/2023 2001  Last data filed at 3/31/2023 1806  Gross per 24 hour   Intake 600 ml   Output 1900 ml   Net -1300 ml      Physical Exam  Vitals and nursing  note reviewed.   Constitutional:       Appearance: He is well-developed.      Comments: Sleepy    HENT:      Head: Atraumatic.      Right Ear: External ear normal.      Left Ear: External ear normal.      Nose: Nose normal.      Mouth/Throat:      Mouth: Mucous membranes are moist.   Cardiovascular:      Rate and Rhythm: Normal rate.   Pulmonary:      Effort: Pulmonary effort is normal.   Musculoskeletal:      Cervical back: Full passive range of motion without pain and normal range of motion.      Right lower leg: No edema.      Left lower leg: No edema.   Skin:     General: Skin is warm.   Neurological:      General: No focal deficit present.       Significant Labs: All pertinent labs within the past 24 hours have been reviewed.  CBC:   Recent Labs   Lab 03/30/23  0326 03/31/23  0515   WBC 9.37 11.57   HGB 14.4 15.2   HCT 45.6 47.0    286     CMP:   Recent Labs   Lab 03/30/23  0325 03/31/23  0515   * 136   K 4.3 4.7    100   CO2 24 26   * 131*   BUN 35* 27*   CREATININE 1.1 1.2   CALCIUM 8.2* 8.8   ALBUMIN 2.5* 2.9*   ANIONGAP 6* 10       Significant Imaging: I have reviewed all pertinent imaging results/findings within the past 24 hours.      Assessment/Plan:      * Umbilical hernia, incarcerated  POA  S/p repair on 03/31  Home tomorrow       UTI (urinary tract infection)  Enterococcus UTI  Maintain abx      Obesity (BMI 30-39.9)  Body mass index is 39.32 kg/m². Morbid obesity complicates all aspects of disease management from diagnostic modalities to treatment.        Hypertension  Blood pressure currently under control      Venous stasis dermatitis of both lower extremities  Chronic venous stasis dermatitis both lower extremities      A-fib  Currently on Xarelto for AFib,      BPH (benign prostatic hyperplasia)  History of BPH, continue finasteride,         VTE Risk Mitigation (From admission, onward)         Ordered     Reason for No Pharmacological VTE Prophylaxis  Once         Question:  Reasons:  Answer:  Already adequately anticoagulated on oral Anticoagulants    03/28/23 2113     IP VTE HIGH RISK PATIENT  Once         03/28/23 2113                Discharge Planning   GORDON: 4/1/2023     Code Status: Full Code   Is the patient medically ready for discharge?:     Reason for patient still in hospital (select all that apply): Treatment  Discharge Plan A: Home Health                  Fabián Mcbride MD  [facility]   [facility]

## 2023-04-01 NOTE — HOSPITAL COURSE
Pt got admitted with Incarcerated Umbilical hernia and had successful repair   Pt has Long standing history of stasis dermatitis on Both legs  This time pt was treated for enterococcus UTI too  Pt had Hematuria and Urinary retention  Pt was educated that this can be from xarelto and UTI  Oncall Urologist was informed and plan was made to discharge pt , to home with Rojas catheter and pt will follow up with Urologist office soon   Later pt was discharged to Home and pt was on Home health care before which was again reactivated   Pt will follow up with Surgeon/Urologist in Office

## 2023-04-02 LAB
GRAM STN SPEC: NORMAL
GRAM STN SPEC: NORMAL

## 2023-04-03 ENCOUNTER — OFFICE VISIT (OUTPATIENT)
Dept: WOUND CARE | Facility: HOSPITAL | Age: 73
End: 2023-04-03
Attending: FAMILY MEDICINE
Payer: MEDICARE

## 2023-04-03 VITALS
HEART RATE: 73 BPM | TEMPERATURE: 98 F | DIASTOLIC BLOOD PRESSURE: 70 MMHG | RESPIRATION RATE: 18 BRPM | SYSTOLIC BLOOD PRESSURE: 123 MMHG

## 2023-04-03 DIAGNOSIS — L97.919 VENOUS ULCER OF RIGHT LEG: ICD-10-CM

## 2023-04-03 DIAGNOSIS — L97.812 NON-PRESSURE CHRONIC ULCER OF OTHER PART OF RIGHT LOWER LEG WITH FAT LAYER EXPOSED: ICD-10-CM

## 2023-04-03 DIAGNOSIS — I87.8 VENOUS STASIS: Primary | ICD-10-CM

## 2023-04-03 DIAGNOSIS — I87.2 VENOUS STASIS DERMATITIS OF BOTH LOWER EXTREMITIES: ICD-10-CM

## 2023-04-03 DIAGNOSIS — I83.029 VENOUS ULCER OF LEFT LEG: ICD-10-CM

## 2023-04-03 DIAGNOSIS — I83.019 VENOUS ULCER OF RIGHT LEG: ICD-10-CM

## 2023-04-03 DIAGNOSIS — L97.929 VENOUS ULCER OF LEFT LEG: ICD-10-CM

## 2023-04-03 LAB
BACTERIA BLD CULT: NORMAL
BACTERIA BLD CULT: NORMAL
BACTERIA SPEC AEROBE CULT: ABNORMAL
BACTERIA SPEC ANAEROBE CULT: NORMAL

## 2023-04-03 PROCEDURE — 1126F AMNT PAIN NOTED NONE PRSNT: CPT | Mod: CPTII,,, | Performed by: FAMILY MEDICINE

## 2023-04-03 PROCEDURE — 1159F MED LIST DOCD IN RCRD: CPT | Mod: CPTII,,, | Performed by: FAMILY MEDICINE

## 2023-04-03 PROCEDURE — 3078F PR MOST RECENT DIASTOLIC BLOOD PRESSURE < 80 MM HG: ICD-10-PCS | Mod: CPTII,,, | Performed by: FAMILY MEDICINE

## 2023-04-03 PROCEDURE — 99214 PR OFFICE/OUTPT VISIT, EST, LEVL IV, 30-39 MIN: ICD-10-PCS | Mod: ,,, | Performed by: FAMILY MEDICINE

## 2023-04-03 PROCEDURE — 1111F DSCHRG MED/CURRENT MED MERGE: CPT | Mod: CPTII,,, | Performed by: FAMILY MEDICINE

## 2023-04-03 PROCEDURE — 1160F PR REVIEW ALL MEDS BY PRESCRIBER/CLIN PHARMACIST DOCUMENTED: ICD-10-PCS | Mod: CPTII,,, | Performed by: FAMILY MEDICINE

## 2023-04-03 PROCEDURE — 3044F HG A1C LEVEL LT 7.0%: CPT | Mod: CPTII,,, | Performed by: FAMILY MEDICINE

## 2023-04-03 PROCEDURE — 99214 OFFICE O/P EST MOD 30 MIN: CPT | Mod: ,,, | Performed by: FAMILY MEDICINE

## 2023-04-03 PROCEDURE — 1101F PT FALLS ASSESS-DOCD LE1/YR: CPT | Mod: CPTII,,, | Performed by: FAMILY MEDICINE

## 2023-04-03 PROCEDURE — 1159F PR MEDICATION LIST DOCUMENTED IN MEDICAL RECORD: ICD-10-PCS | Mod: CPTII,,, | Performed by: FAMILY MEDICINE

## 2023-04-03 PROCEDURE — 1111F PR DISCHARGE MEDS RECONCILED W/ CURRENT OUTPATIENT MED LIST: ICD-10-PCS | Mod: CPTII,,, | Performed by: FAMILY MEDICINE

## 2023-04-03 PROCEDURE — 3044F PR MOST RECENT HEMOGLOBIN A1C LEVEL <7.0%: ICD-10-PCS | Mod: CPTII,,, | Performed by: FAMILY MEDICINE

## 2023-04-03 PROCEDURE — 1101F PR PT FALLS ASSESS DOC 0-1 FALLS W/OUT INJ PAST YR: ICD-10-PCS | Mod: CPTII,,, | Performed by: FAMILY MEDICINE

## 2023-04-03 PROCEDURE — 3074F PR MOST RECENT SYSTOLIC BLOOD PRESSURE < 130 MM HG: ICD-10-PCS | Mod: CPTII,,, | Performed by: FAMILY MEDICINE

## 2023-04-03 PROCEDURE — 1160F RVW MEDS BY RX/DR IN RCRD: CPT | Mod: CPTII,,, | Performed by: FAMILY MEDICINE

## 2023-04-03 PROCEDURE — 3074F SYST BP LT 130 MM HG: CPT | Mod: CPTII,,, | Performed by: FAMILY MEDICINE

## 2023-04-03 PROCEDURE — 99214 OFFICE O/P EST MOD 30 MIN: CPT | Performed by: FAMILY MEDICINE

## 2023-04-03 PROCEDURE — 3078F DIAST BP <80 MM HG: CPT | Mod: CPTII,,, | Performed by: FAMILY MEDICINE

## 2023-04-03 PROCEDURE — 3288F FALL RISK ASSESSMENT DOCD: CPT | Mod: CPTII,,, | Performed by: FAMILY MEDICINE

## 2023-04-03 PROCEDURE — 3288F PR FALLS RISK ASSESSMENT DOCUMENTED: ICD-10-PCS | Mod: CPTII,,, | Performed by: FAMILY MEDICINE

## 2023-04-03 PROCEDURE — 1126F PR PAIN SEVERITY QUANTIFIED, NO PAIN PRESENT: ICD-10-PCS | Mod: CPTII,,, | Performed by: FAMILY MEDICINE

## 2023-04-04 ENCOUNTER — TELEPHONE (OUTPATIENT)
Dept: WOUND CARE | Facility: CLINIC | Age: 73
End: 2023-04-04
Payer: MEDICARE

## 2023-04-04 ENCOUNTER — PES CALL (OUTPATIENT)
Dept: ADMINISTRATIVE | Facility: CLINIC | Age: 73
End: 2023-04-04
Payer: MEDICARE

## 2023-04-04 PROBLEM — I83.019 VENOUS ULCER OF RIGHT LEG: Status: ACTIVE | Noted: 2023-04-04

## 2023-04-04 PROBLEM — L97.929 VENOUS ULCER OF LEFT LEG: Status: ACTIVE | Noted: 2023-04-04

## 2023-04-04 PROBLEM — L97.919 VENOUS ULCER OF RIGHT LEG: Status: ACTIVE | Noted: 2023-04-04

## 2023-04-04 PROBLEM — I83.029 VENOUS ULCER OF LEFT LEG: Status: ACTIVE | Noted: 2023-04-04

## 2023-04-04 NOTE — PT/OT/SLP DISCHARGE
Occupational Therapy Discharge Summary    Ezequiel Escudero  MRN: [id number]   Principal Problem: Umbilical hernia, incarcerated      Patient Discharged from acute Occupational Therapy on 4/1/2023.  Please refer to prior OT note dated 3/30/2023 for functional status.    Assessment:      Patient has not met goals.    Objective:     GOALS:   Multidisciplinary Problems       Occupational Therapy Goals       Not on file              Multidisciplinary Problems (Resolved)          Problem: Occupational Therapy    Goal Priority Disciplines Outcome Interventions   Occupational Therapy Goal   (Resolved)     OT, PT/OT Met    Description: Goals to be met by: 4/29/23     Patient will increase functional independence with ADLs by performing:    UE Dressing with Supervision.  LE Dressing with Supervision.  Grooming while standing with Supervision.  Toileting from toilet with Supervision for hygiene and clothing management.   Toilet transfer to toilet with Supervision.                         Reasons for Discontinuation of Therapy Services  Patient d/c home.       Plan:     Patient Discharged to:  OT recommended SNF, but patient d/c home. Recommend home health OT services.    4/4/2023

## 2023-04-04 NOTE — TELEPHONE ENCOUNTER
Spoke with patient and he stated he see and provider in [place] for wound care and also have home health.

## 2023-04-05 NOTE — PROGRESS NOTES
Wound Care Progress Note    Subjective:       Patient ID: Ezequiel Escudero is a 72 y.o. male.    Chief Complaint: Wound Care    HPI  Pt is a 73 yo male known to me presenting to the clinic for a FU from the hospital for BLE venous ulcers, venous stasis with dermatitis of the both legs. Pt was seen by me in the hospital and was DC over the weekend and followed up today for ongoing treatment. Pt has had venous problems for years, and he was discharged in 12/21 healed from my service. He has since developed multiple open venous ulcers, and is to a point where he has difficulty ambulating. He has no other complaints at this time.  Review of Systems   Skin:  Positive for rash and wound.        BLE venous ulcers with dermatitis   All other systems reviewed and are negative.      Objective:        Physical Exam  Vitals and nursing note reviewed. Exam conducted with a chaperone present.   Constitutional:       General: He is not in acute distress.     Appearance: Normal appearance. He is not ill-appearing, toxic-appearing or diaphoretic.   Skin:     General: Skin is warm and dry.      Coloration: Skin is not jaundiced or pale.      Findings: Lesion and rash present. No bruising or erythema.      Comments: BLE venous ulcers with stasis dermatitis, see wound care assessment documentation in chart review scanned under the media tab   Neurological:      General: No focal deficit present.      Mental Status: He is oriented to person, place, and time.   Psychiatric:         Mood and Affect: Mood normal.         Behavior: Behavior normal.         Thought Content: Thought content normal.         Judgment: Judgment normal.       Vitals:    04/03/23 1554   BP: 123/70   Pulse: 73   Resp: 18   Temp: 98.4 °F (36.9 °C)       Assessment:           ICD-10-CM ICD-9-CM   1. Venous stasis  I87.8 459.81   2. Venous stasis dermatitis of both lower extremities  I87.2 454.1   3. Venous ulcer of right leg  I83.019 454.0    L97.919    4.  Venous ulcer of left leg  I83.029 454.0    L97.929                 Plan:                  Ezequiel was seen today for wound care.    Diagnoses and all orders for this visit:    Venous stasis    Venous stasis dermatitis of both lower extremities    Venous ulcer of right leg    Venous ulcer of left leg      Much of the documentation for this visit was completed in the Wound Docs system. Please see the attached documentation for further details about this patient's care.

## 2023-04-12 ENCOUNTER — OFFICE VISIT (OUTPATIENT)
Dept: SURGERY | Facility: CLINIC | Age: 73
End: 2023-04-12
Payer: MEDICARE

## 2023-04-12 VITALS — HEART RATE: 80 BPM | TEMPERATURE: 98 F | DIASTOLIC BLOOD PRESSURE: 61 MMHG | SYSTOLIC BLOOD PRESSURE: 113 MMHG

## 2023-04-12 DIAGNOSIS — Z09 STATUS POST UMBILICAL HERNIA REPAIR, FOLLOW-UP EXAM: Primary | ICD-10-CM

## 2023-04-12 PROCEDURE — 1101F PT FALLS ASSESS-DOCD LE1/YR: CPT | Mod: CPTII,S$GLB,, | Performed by: SURGERY

## 2023-04-12 PROCEDURE — 3078F PR MOST RECENT DIASTOLIC BLOOD PRESSURE < 80 MM HG: ICD-10-PCS | Mod: CPTII,S$GLB,, | Performed by: SURGERY

## 2023-04-12 PROCEDURE — 3074F SYST BP LT 130 MM HG: CPT | Mod: CPTII,S$GLB,, | Performed by: SURGERY

## 2023-04-12 PROCEDURE — 1111F PR DISCHARGE MEDS RECONCILED W/ CURRENT OUTPATIENT MED LIST: ICD-10-PCS | Mod: CPTII,S$GLB,, | Performed by: SURGERY

## 2023-04-12 PROCEDURE — 1126F PR PAIN SEVERITY QUANTIFIED, NO PAIN PRESENT: ICD-10-PCS | Mod: CPTII,S$GLB,, | Performed by: SURGERY

## 2023-04-12 PROCEDURE — 3288F PR FALLS RISK ASSESSMENT DOCUMENTED: ICD-10-PCS | Mod: CPTII,S$GLB,, | Performed by: SURGERY

## 2023-04-12 PROCEDURE — 99212 OFFICE O/P EST SF 10 MIN: CPT | Mod: S$GLB,,, | Performed by: SURGERY

## 2023-04-12 PROCEDURE — 3044F HG A1C LEVEL LT 7.0%: CPT | Mod: CPTII,S$GLB,, | Performed by: SURGERY

## 2023-04-12 PROCEDURE — 3288F FALL RISK ASSESSMENT DOCD: CPT | Mod: CPTII,S$GLB,, | Performed by: SURGERY

## 2023-04-12 PROCEDURE — 3044F PR MOST RECENT HEMOGLOBIN A1C LEVEL <7.0%: ICD-10-PCS | Mod: CPTII,S$GLB,, | Performed by: SURGERY

## 2023-04-12 PROCEDURE — 99212 PR OFFICE/OUTPT VISIT, EST, LEVL II, 10-19 MIN: ICD-10-PCS | Mod: S$GLB,,, | Performed by: SURGERY

## 2023-04-12 PROCEDURE — 3078F DIAST BP <80 MM HG: CPT | Mod: CPTII,S$GLB,, | Performed by: SURGERY

## 2023-04-12 PROCEDURE — 3074F PR MOST RECENT SYSTOLIC BLOOD PRESSURE < 130 MM HG: ICD-10-PCS | Mod: CPTII,S$GLB,, | Performed by: SURGERY

## 2023-04-12 PROCEDURE — 1101F PR PT FALLS ASSESS DOC 0-1 FALLS W/OUT INJ PAST YR: ICD-10-PCS | Mod: CPTII,S$GLB,, | Performed by: SURGERY

## 2023-04-12 PROCEDURE — 1111F DSCHRG MED/CURRENT MED MERGE: CPT | Mod: CPTII,S$GLB,, | Performed by: SURGERY

## 2023-04-12 PROCEDURE — 1126F AMNT PAIN NOTED NONE PRSNT: CPT | Mod: CPTII,S$GLB,, | Performed by: SURGERY

## 2023-04-17 ENCOUNTER — OFFICE VISIT (OUTPATIENT)
Dept: WOUND CARE | Facility: HOSPITAL | Age: 73
End: 2023-04-17
Attending: FAMILY MEDICINE
Payer: MEDICARE

## 2023-04-17 VITALS
HEART RATE: 66 BPM | SYSTOLIC BLOOD PRESSURE: 125 MMHG | DIASTOLIC BLOOD PRESSURE: 66 MMHG | TEMPERATURE: 98 F | RESPIRATION RATE: 18 BRPM

## 2023-04-17 DIAGNOSIS — L97.812 NON-PRESSURE CHRONIC ULCER OF OTHER PART OF RIGHT LOWER LEG WITH FAT LAYER EXPOSED: ICD-10-CM

## 2023-04-17 DIAGNOSIS — I87.8 VENOUS STASIS: Primary | ICD-10-CM

## 2023-04-17 DIAGNOSIS — I83.019 VENOUS ULCER OF RIGHT LEG: ICD-10-CM

## 2023-04-17 DIAGNOSIS — L97.919 VENOUS ULCER OF RIGHT LEG: ICD-10-CM

## 2023-04-17 DIAGNOSIS — I83.029 VENOUS ULCER OF LEFT LEG: ICD-10-CM

## 2023-04-17 DIAGNOSIS — L97.929 VENOUS ULCER OF LEFT LEG: ICD-10-CM

## 2023-04-17 PROCEDURE — 3044F PR MOST RECENT HEMOGLOBIN A1C LEVEL <7.0%: ICD-10-PCS | Mod: CPTII,,, | Performed by: FAMILY MEDICINE

## 2023-04-17 PROCEDURE — 11045 DBRDMT SUBQ TISS EACH ADDL: CPT | Mod: ,,, | Performed by: FAMILY MEDICINE

## 2023-04-17 PROCEDURE — 99499 UNLISTED E&M SERVICE: CPT | Mod: ,,, | Performed by: FAMILY MEDICINE

## 2023-04-17 PROCEDURE — 11042 DBRDMT SUBQ TIS 1ST 20SQCM/<: CPT | Performed by: FAMILY MEDICINE

## 2023-04-17 PROCEDURE — 1126F PR PAIN SEVERITY QUANTIFIED, NO PAIN PRESENT: ICD-10-PCS | Mod: CPTII,,, | Performed by: FAMILY MEDICINE

## 2023-04-17 PROCEDURE — 3078F DIAST BP <80 MM HG: CPT | Mod: CPTII,,, | Performed by: FAMILY MEDICINE

## 2023-04-17 PROCEDURE — 3074F SYST BP LT 130 MM HG: CPT | Mod: CPTII,,, | Performed by: FAMILY MEDICINE

## 2023-04-17 PROCEDURE — 99499 NO LOS: ICD-10-PCS | Mod: ,,, | Performed by: FAMILY MEDICINE

## 2023-04-17 PROCEDURE — 11045 PR DEB SUBQ TISSUE ADD-ON: ICD-10-PCS | Mod: ,,, | Performed by: FAMILY MEDICINE

## 2023-04-17 PROCEDURE — 11042 DBRDMT SUBQ TIS 1ST 20SQCM/<: CPT | Mod: ,,, | Performed by: FAMILY MEDICINE

## 2023-04-17 PROCEDURE — 1126F AMNT PAIN NOTED NONE PRSNT: CPT | Mod: CPTII,,, | Performed by: FAMILY MEDICINE

## 2023-04-17 PROCEDURE — 11045 DBRDMT SUBQ TISS EACH ADDL: CPT | Performed by: FAMILY MEDICINE

## 2023-04-17 PROCEDURE — 1159F PR MEDICATION LIST DOCUMENTED IN MEDICAL RECORD: ICD-10-PCS | Mod: CPTII,,, | Performed by: FAMILY MEDICINE

## 2023-04-17 PROCEDURE — 3078F PR MOST RECENT DIASTOLIC BLOOD PRESSURE < 80 MM HG: ICD-10-PCS | Mod: CPTII,,, | Performed by: FAMILY MEDICINE

## 2023-04-17 PROCEDURE — 3074F PR MOST RECENT SYSTOLIC BLOOD PRESSURE < 130 MM HG: ICD-10-PCS | Mod: CPTII,,, | Performed by: FAMILY MEDICINE

## 2023-04-17 PROCEDURE — 1160F PR REVIEW ALL MEDS BY PRESCRIBER/CLIN PHARMACIST DOCUMENTED: ICD-10-PCS | Mod: CPTII,,, | Performed by: FAMILY MEDICINE

## 2023-04-17 PROCEDURE — 3044F HG A1C LEVEL LT 7.0%: CPT | Mod: CPTII,,, | Performed by: FAMILY MEDICINE

## 2023-04-17 PROCEDURE — 11042 PR DEBRIDEMENT, SKIN, SUB-Q TISSUE,=<20 SQ CM: ICD-10-PCS | Mod: ,,, | Performed by: FAMILY MEDICINE

## 2023-04-17 PROCEDURE — 1160F RVW MEDS BY RX/DR IN RCRD: CPT | Mod: CPTII,,, | Performed by: FAMILY MEDICINE

## 2023-04-17 PROCEDURE — 1159F MED LIST DOCD IN RCRD: CPT | Mod: CPTII,,, | Performed by: FAMILY MEDICINE

## 2023-04-23 NOTE — PROGRESS NOTES
Wound Care Progress Note    Subjective:       Patient ID: Ezequiel Escudero is a 72 y.o. male.    Chief Complaint: Wound Care    HPI  Pt seen in clinic for a FU visit for BLE venous ulcers. Pts leg look improved from his first visit. Pt has no new complaints at this visit. He is with his son at the visit.  Review of Systems   Skin:  Positive for wound.        BLE venous ulcers   All other systems reviewed and are negative.      Objective:        Physical Exam  Vitals and nursing note reviewed. Exam conducted with a chaperone present.   Constitutional:       Appearance: Normal appearance.   Skin:     General: Skin is warm and dry.      Findings: Lesion present.      Comments: BLE venous ulcers, see wound care assessment documentation in chart review scanned under the media tab   Neurological:      General: No focal deficit present.      Mental Status: He is oriented to person, place, and time.   Psychiatric:         Mood and Affect: Mood normal.         Behavior: Behavior normal.         Thought Content: Thought content normal.         Judgment: Judgment normal.       Vitals:    04/17/23 1317   BP: 125/66   Pulse: 66   Resp: 18   Temp: 98.3 °F (36.8 °C)       Assessment:           ICD-10-CM ICD-9-CM   1. Venous stasis  I87.8 459.81   2. Venous ulcer of right leg  I83.019 454.0    L97.919    3. Venous ulcer of left leg  I83.029 454.0    L97.929    4. Non-pressure chronic ulcer of other part of right lower leg with fat layer exposed  L97.812 707.19                Plan:                  Ezequiel was seen today for wound care.    Diagnoses and all orders for this visit:    Venous stasis    Venous ulcer of right leg    Venous ulcer of left leg    Non-pressure chronic ulcer of other part of right lower leg with fat layer exposed        Much of the documentation for this visit was completed in wound docs system. Please see the attached documentation for further details about the patients care. Scanned under the media  tab.

## 2023-04-29 ENCOUNTER — HOSPITAL ENCOUNTER (INPATIENT)
Facility: HOSPITAL | Age: 73
LOS: 2 days | Discharge: HOME HEALTH | DRG: 167 | End: 2023-05-02
Attending: EMERGENCY MEDICINE | Admitting: INTERNAL MEDICINE
Payer: MEDICARE

## 2023-04-29 DIAGNOSIS — I87.8 VENOUS STASIS: ICD-10-CM

## 2023-04-29 DIAGNOSIS — I87.2 VENOUS STASIS ULCER OF RIGHT CALF WITH NECROSIS OF MUSCLE WITHOUT VARICOSE VEINS: ICD-10-CM

## 2023-04-29 DIAGNOSIS — R53.81 PHYSICAL DECONDITIONING: ICD-10-CM

## 2023-04-29 DIAGNOSIS — S01.81XA: Primary | ICD-10-CM

## 2023-04-29 DIAGNOSIS — I87.2 VENOUS STASIS DERMATITIS OF BOTH LOWER EXTREMITIES: ICD-10-CM

## 2023-04-29 DIAGNOSIS — L97.213 VENOUS STASIS ULCER OF RIGHT CALF WITH NECROSIS OF MUSCLE WITHOUT VARICOSE VEINS: ICD-10-CM

## 2023-04-29 DIAGNOSIS — I26.99 BILATERAL PULMONARY EMBOLISM: ICD-10-CM

## 2023-04-29 DIAGNOSIS — I26.99 ACUTE PULMONARY EMBOLISM: ICD-10-CM

## 2023-04-29 DIAGNOSIS — R55 SYNCOPE: ICD-10-CM

## 2023-04-29 LAB
ALBUMIN SERPL BCP-MCNC: 3.1 G/DL (ref 3.5–5.2)
ALP SERPL-CCNC: 118 U/L (ref 55–135)
ALT SERPL W/O P-5'-P-CCNC: 14 U/L (ref 10–44)
ANION GAP SERPL CALC-SCNC: 9 MMOL/L (ref 8–16)
APTT PPP: 28.3 SEC (ref 21–32)
AST SERPL-CCNC: 23 U/L (ref 10–40)
BASOPHILS # BLD AUTO: 0.05 K/UL (ref 0–0.2)
BASOPHILS NFR BLD: 0.5 % (ref 0–1.9)
BILIRUB SERPL-MCNC: 1 MG/DL (ref 0.1–1)
BNP SERPL-MCNC: 280 PG/ML (ref 0–99)
BUN SERPL-MCNC: 13 MG/DL (ref 8–23)
CALCIUM SERPL-MCNC: 8.8 MG/DL (ref 8.7–10.5)
CHLORIDE SERPL-SCNC: 105 MMOL/L (ref 95–110)
CO2 SERPL-SCNC: 23 MMOL/L (ref 23–29)
CREAT SERPL-MCNC: 0.9 MG/DL (ref 0.5–1.4)
D DIMER PPP IA.FEU-MCNC: 27.14 MG/L FEU
DIFFERENTIAL METHOD: ABNORMAL
EOSINOPHIL # BLD AUTO: 0.2 K/UL (ref 0–0.5)
EOSINOPHIL NFR BLD: 2.2 % (ref 0–8)
ERYTHROCYTE [DISTWIDTH] IN BLOOD BY AUTOMATED COUNT: 15.8 % (ref 11.5–14.5)
EST. GFR  (NO RACE VARIABLE): >60 ML/MIN/1.73 M^2
GLUCOSE SERPL-MCNC: 130 MG/DL (ref 70–110)
GLUCOSE SERPL-MCNC: 134 MG/DL (ref 70–110)
HCT VFR BLD AUTO: 48.5 % (ref 40–54)
HGB BLD-MCNC: 15.4 G/DL (ref 14–18)
IMM GRANULOCYTES # BLD AUTO: 0.08 K/UL (ref 0–0.04)
IMM GRANULOCYTES NFR BLD AUTO: 0.8 % (ref 0–0.5)
INR PPP: 1 (ref 0.8–1.2)
LYMPHOCYTES # BLD AUTO: 0.6 K/UL (ref 1–4.8)
LYMPHOCYTES NFR BLD: 6.5 % (ref 18–48)
MAGNESIUM SERPL-MCNC: 1.6 MG/DL (ref 1.6–2.6)
MCH RBC QN AUTO: 28.8 PG (ref 27–31)
MCHC RBC AUTO-ENTMCNC: 31.8 G/DL (ref 32–36)
MCV RBC AUTO: 91 FL (ref 82–98)
MONOCYTES # BLD AUTO: 0.8 K/UL (ref 0.3–1)
MONOCYTES NFR BLD: 8.1 % (ref 4–15)
NEUTROPHILS # BLD AUTO: 7.8 K/UL (ref 1.8–7.7)
NEUTROPHILS NFR BLD: 81.9 % (ref 38–73)
NRBC BLD-RTO: 0 /100 WBC
PLATELET # BLD AUTO: 159 K/UL (ref 150–450)
PMV BLD AUTO: 9.4 FL (ref 9.2–12.9)
POTASSIUM SERPL-SCNC: 3.6 MMOL/L (ref 3.5–5.1)
PROT SERPL-MCNC: 7.1 G/DL (ref 6–8.4)
PROTHROMBIN TIME: 11 SEC (ref 9–12.5)
RBC # BLD AUTO: 5.34 M/UL (ref 4.6–6.2)
SODIUM SERPL-SCNC: 137 MMOL/L (ref 136–145)
TROPONIN I SERPL HS-MCNC: 302.6 PG/ML (ref 0–14.9)
TSH SERPL DL<=0.005 MIU/L-ACNC: 1.96 UIU/ML (ref 0.34–5.6)
WBC # BLD AUTO: 9.56 K/UL (ref 3.9–12.7)

## 2023-04-29 PROCEDURE — 83880 ASSAY OF NATRIURETIC PEPTIDE: CPT | Performed by: EMERGENCY MEDICINE

## 2023-04-29 PROCEDURE — 99285 EMERGENCY DEPT VISIT HI MDM: CPT | Mod: 25

## 2023-04-29 PROCEDURE — 63600175 PHARM REV CODE 636 W HCPCS: Performed by: EMERGENCY MEDICINE

## 2023-04-29 PROCEDURE — 93010 ELECTROCARDIOGRAM REPORT: CPT | Mod: ,,, | Performed by: GENERAL PRACTICE

## 2023-04-29 PROCEDURE — G0378 HOSPITAL OBSERVATION PER HR: HCPCS

## 2023-04-29 PROCEDURE — 83735 ASSAY OF MAGNESIUM: CPT | Performed by: EMERGENCY MEDICINE

## 2023-04-29 PROCEDURE — 85379 FIBRIN DEGRADATION QUANT: CPT | Performed by: NURSE PRACTITIONER

## 2023-04-29 PROCEDURE — 84443 ASSAY THYROID STIM HORMONE: CPT | Performed by: EMERGENCY MEDICINE

## 2023-04-29 PROCEDURE — 82962 GLUCOSE BLOOD TEST: CPT

## 2023-04-29 PROCEDURE — 93010 EKG 12-LEAD: ICD-10-PCS | Mod: ,,, | Performed by: GENERAL PRACTICE

## 2023-04-29 PROCEDURE — 93005 ELECTROCARDIOGRAM TRACING: CPT | Performed by: GENERAL PRACTICE

## 2023-04-29 PROCEDURE — 85610 PROTHROMBIN TIME: CPT | Performed by: EMERGENCY MEDICINE

## 2023-04-29 PROCEDURE — 36415 COLL VENOUS BLD VENIPUNCTURE: CPT | Performed by: NURSE PRACTITIONER

## 2023-04-29 PROCEDURE — 12013 RPR F/E/E/N/L/M 2.6-5.0 CM: CPT

## 2023-04-29 PROCEDURE — 85730 THROMBOPLASTIN TIME PARTIAL: CPT | Performed by: EMERGENCY MEDICINE

## 2023-04-29 PROCEDURE — 84484 ASSAY OF TROPONIN QUANT: CPT | Performed by: NURSE PRACTITIONER

## 2023-04-29 PROCEDURE — 96360 HYDRATION IV INFUSION INIT: CPT

## 2023-04-29 PROCEDURE — 80053 COMPREHEN METABOLIC PANEL: CPT | Performed by: EMERGENCY MEDICINE

## 2023-04-29 PROCEDURE — 84484 ASSAY OF TROPONIN QUANT: CPT | Mod: 91 | Performed by: EMERGENCY MEDICINE

## 2023-04-29 PROCEDURE — 85025 COMPLETE CBC W/AUTO DIFF WBC: CPT | Performed by: EMERGENCY MEDICINE

## 2023-04-29 PROCEDURE — 25000003 PHARM REV CODE 250: Performed by: EMERGENCY MEDICINE

## 2023-04-29 RX ORDER — LIDOCAINE HYDROCHLORIDE AND EPINEPHRINE 10; 10 MG/ML; UG/ML
1 INJECTION, SOLUTION INFILTRATION; PERINEURAL ONCE
Status: COMPLETED | OUTPATIENT
Start: 2023-04-29 | End: 2023-04-29

## 2023-04-29 RX ORDER — TAMSULOSIN HYDROCHLORIDE 0.4 MG/1
0.8 CAPSULE ORAL DAILY
Status: DISCONTINUED | OUTPATIENT
Start: 2023-04-30 | End: 2023-05-02 | Stop reason: HOSPADM

## 2023-04-29 RX ORDER — SOTALOL HYDROCHLORIDE 80 MG/1
80 TABLET ORAL 2 TIMES DAILY
Status: DISCONTINUED | OUTPATIENT
Start: 2023-04-30 | End: 2023-05-02 | Stop reason: HOSPADM

## 2023-04-29 RX ORDER — OXYBUTYNIN CHLORIDE 5 MG/1
5 TABLET ORAL 3 TIMES DAILY
Status: DISCONTINUED | OUTPATIENT
Start: 2023-04-30 | End: 2023-04-30

## 2023-04-29 RX ORDER — HYDRALAZINE HYDROCHLORIDE 100 MG/1
1 TABLET, FILM COATED ORAL DAILY
Status: ON HOLD | COMMUNITY
Start: 2023-04-19 | End: 2023-05-02 | Stop reason: CLARIF

## 2023-04-29 RX ORDER — HYDRALAZINE HYDROCHLORIDE 25 MG/1
100 TABLET, FILM COATED ORAL 2 TIMES DAILY
Status: DISCONTINUED | OUTPATIENT
Start: 2023-04-30 | End: 2023-04-30

## 2023-04-29 RX ORDER — GABAPENTIN 300 MG/1
300 CAPSULE ORAL 2 TIMES DAILY
Status: DISCONTINUED | OUTPATIENT
Start: 2023-04-30 | End: 2023-05-02 | Stop reason: HOSPADM

## 2023-04-29 RX ORDER — ACETAMINOPHEN 325 MG/1
650 TABLET ORAL EVERY 8 HOURS PRN
Status: DISCONTINUED | OUTPATIENT
Start: 2023-04-29 | End: 2023-05-02 | Stop reason: HOSPADM

## 2023-04-29 RX ORDER — HYDRALAZINE HYDROCHLORIDE 25 MG/1
100 TABLET, FILM COATED ORAL DAILY
Status: DISCONTINUED | OUTPATIENT
Start: 2023-04-30 | End: 2023-04-29

## 2023-04-29 RX ORDER — FINASTERIDE 5 MG/1
5 TABLET, FILM COATED ORAL
Status: ON HOLD | COMMUNITY
Start: 2023-03-12 | End: 2023-05-02 | Stop reason: CLARIF

## 2023-04-29 RX ORDER — ASPIRIN 325 MG
325 TABLET ORAL
Status: COMPLETED | OUTPATIENT
Start: 2023-04-29 | End: 2023-04-29

## 2023-04-29 RX ORDER — TALC
6 POWDER (GRAM) TOPICAL NIGHTLY PRN
Status: DISCONTINUED | OUTPATIENT
Start: 2023-04-29 | End: 2023-05-02 | Stop reason: HOSPADM

## 2023-04-29 RX ORDER — FINASTERIDE 5 MG/1
5 TABLET, FILM COATED ORAL DAILY
Status: DISCONTINUED | OUTPATIENT
Start: 2023-04-30 | End: 2023-05-02 | Stop reason: HOSPADM

## 2023-04-29 RX ORDER — ONDANSETRON 4 MG/1
8 TABLET, ORALLY DISINTEGRATING ORAL EVERY 8 HOURS PRN
Status: DISCONTINUED | OUTPATIENT
Start: 2023-04-29 | End: 2023-05-02 | Stop reason: HOSPADM

## 2023-04-29 RX ORDER — SODIUM CHLORIDE 0.9 % (FLUSH) 0.9 %
10 SYRINGE (ML) INJECTION
Status: DISCONTINUED | OUTPATIENT
Start: 2023-04-29 | End: 2023-05-02 | Stop reason: HOSPADM

## 2023-04-29 RX ORDER — MUPIROCIN 20 MG/G
1 OINTMENT TOPICAL
Status: COMPLETED | OUTPATIENT
Start: 2023-04-29 | End: 2023-04-29

## 2023-04-29 RX ORDER — OXYBUTYNIN CHLORIDE 5 MG/1
1 TABLET ORAL 3 TIMES DAILY
Status: ON HOLD | COMMUNITY
Start: 2023-03-31 | End: 2023-05-02 | Stop reason: CLARIF

## 2023-04-29 RX ADMIN — SODIUM CHLORIDE, SODIUM LACTATE, POTASSIUM CHLORIDE, AND CALCIUM CHLORIDE 1000 ML: .6; .31; .03; .02 INJECTION, SOLUTION INTRAVENOUS at 09:04

## 2023-04-29 RX ADMIN — ASPIRIN 325 MG ORAL TABLET 325 MG: 325 PILL ORAL at 09:04

## 2023-04-29 RX ADMIN — LIDOCAINE HYDROCHLORIDE AND EPINEPHRINE 1 ML: 10; 10 INJECTION, SOLUTION INFILTRATION; PERINEURAL at 08:04

## 2023-04-29 RX ADMIN — MUPIROCIN 22 G: 20 OINTMENT TOPICAL at 09:04

## 2023-04-29 NOTE — Clinical Note
An angiography was performed of the proximal Main PA via power injection. Injection was performed with 30 mL contrast at 15 mL/s.

## 2023-04-29 NOTE — Clinical Note
vein was performed. A percutaneous stick to the left groin was performed. Ultrasound guidance was used to obtain access.

## 2023-04-29 NOTE — Clinical Note
50 ml of contrast were injected throughout the case. 45 mL of contrast was the total wasted during the case. 95 mL was the total amount used during the case.

## 2023-04-29 NOTE — Clinical Note
An angiography was performed of the proximal PA via power injection. Injection was performed with 30 mL contrast at 15 mL/s.

## 2023-04-30 ENCOUNTER — CLINICAL SUPPORT (OUTPATIENT)
Dept: CARDIOLOGY | Facility: HOSPITAL | Age: 73
DRG: 167 | End: 2023-04-30
Attending: HOSPITALIST
Payer: MEDICARE

## 2023-04-30 VITALS — BODY MASS INDEX: 37.52 KG/M2 | HEIGHT: 72 IN | WEIGHT: 277 LBS

## 2023-04-30 PROBLEM — I26.99 BILATERAL PULMONARY EMBOLISM: Status: ACTIVE | Noted: 2023-04-30

## 2023-04-30 PROBLEM — S22.42XA CLOSED FRACTURE OF MULTIPLE RIBS OF LEFT SIDE: Status: ACTIVE | Noted: 2023-04-30

## 2023-04-30 LAB
APTT PPP: 33.6 SEC (ref 21–32)
APTT PPP: 52.3 SEC (ref 21–32)
APTT PPP: 71.5 SEC (ref 21–32)
BACTERIA #/AREA URNS HPF: NEGATIVE /HPF
BASOPHILS # BLD AUTO: 0.04 K/UL (ref 0–0.2)
BASOPHILS NFR BLD: 0.5 % (ref 0–1.9)
BILIRUB UR QL STRIP: NEGATIVE
CLARITY UR: CLEAR
COLOR UR: YELLOW
DIFFERENTIAL METHOD: ABNORMAL
EOSINOPHIL # BLD AUTO: 0.2 K/UL (ref 0–0.5)
EOSINOPHIL NFR BLD: 2.6 % (ref 0–8)
ERYTHROCYTE [DISTWIDTH] IN BLOOD BY AUTOMATED COUNT: 15.7 % (ref 11.5–14.5)
FUNGUS SPEC CULT: NORMAL
GLUCOSE UR QL STRIP: NEGATIVE
HCT VFR BLD AUTO: 41.3 % (ref 40–54)
HGB BLD-MCNC: 13.2 G/DL (ref 14–18)
HGB UR QL STRIP: ABNORMAL
HYALINE CASTS #/AREA URNS LPF: 1 /LPF
IMM GRANULOCYTES # BLD AUTO: 0.07 K/UL (ref 0–0.04)
IMM GRANULOCYTES NFR BLD AUTO: 1 % (ref 0–0.5)
INR PPP: 1.1 (ref 0.8–1.2)
KETONES UR QL STRIP: NEGATIVE
LEUKOCYTE ESTERASE UR QL STRIP: ABNORMAL
LYMPHOCYTES # BLD AUTO: 0.9 K/UL (ref 1–4.8)
LYMPHOCYTES NFR BLD: 11.9 % (ref 18–48)
MCH RBC QN AUTO: 28.8 PG (ref 27–31)
MCHC RBC AUTO-ENTMCNC: 32 G/DL (ref 32–36)
MCV RBC AUTO: 90 FL (ref 82–98)
MICROSCOPIC COMMENT: ABNORMAL
MONOCYTES # BLD AUTO: 0.7 K/UL (ref 0.3–1)
MONOCYTES NFR BLD: 9.5 % (ref 4–15)
NEUTROPHILS # BLD AUTO: 5.5 K/UL (ref 1.8–7.7)
NEUTROPHILS NFR BLD: 74.5 % (ref 38–73)
NITRITE UR QL STRIP: NEGATIVE
NRBC BLD-RTO: 0 /100 WBC
PH UR STRIP: 6 [PH] (ref 5–8)
PLATELET # BLD AUTO: 151 K/UL (ref 150–450)
PMV BLD AUTO: 9.6 FL (ref 9.2–12.9)
PROT UR QL STRIP: ABNORMAL
PROTHROMBIN TIME: 11.5 SEC (ref 9–12.5)
RBC # BLD AUTO: 4.58 M/UL (ref 4.6–6.2)
RBC #/AREA URNS HPF: >100 /HPF (ref 0–4)
SP GR UR STRIP: >1.03 (ref 1–1.03)
SQUAMOUS #/AREA URNS HPF: 3 /HPF
TROPONIN I SERPL HS-MCNC: 663.7 PG/ML (ref 0–14.9)
URN SPEC COLLECT METH UR: ABNORMAL
UROBILINOGEN UR STRIP-ACNC: NEGATIVE EU/DL
WBC # BLD AUTO: 7.33 K/UL (ref 3.9–12.7)
WBC #/AREA URNS HPF: 21 /HPF (ref 0–5)

## 2023-04-30 PROCEDURE — 99449 NTRPROF PH1/NTRNET/EHR 31/>: CPT | Mod: ,,, | Performed by: UROLOGY

## 2023-04-30 PROCEDURE — 99900031 HC PATIENT EDUCATION (STAT)

## 2023-04-30 PROCEDURE — 93306 ECHO (CUPID ONLY): ICD-10-PCS | Mod: 26,,, | Performed by: INTERNAL MEDICINE

## 2023-04-30 PROCEDURE — 36415 COLL VENOUS BLD VENIPUNCTURE: CPT | Performed by: HOSPITALIST

## 2023-04-30 PROCEDURE — 94799 UNLISTED PULMONARY SVC/PX: CPT

## 2023-04-30 PROCEDURE — 94761 N-INVAS EAR/PLS OXIMETRY MLT: CPT

## 2023-04-30 PROCEDURE — 21000000 HC CCU ICU ROOM CHARGE

## 2023-04-30 PROCEDURE — 85730 THROMBOPLASTIN TIME PARTIAL: CPT | Mod: 91 | Performed by: INTERNAL MEDICINE

## 2023-04-30 PROCEDURE — 63600175 PHARM REV CODE 636 W HCPCS: Performed by: HOSPITALIST

## 2023-04-30 PROCEDURE — 93306 TTE W/DOPPLER COMPLETE: CPT | Mod: 26,,, | Performed by: INTERNAL MEDICINE

## 2023-04-30 PROCEDURE — 36415 COLL VENOUS BLD VENIPUNCTURE: CPT | Performed by: INTERNAL MEDICINE

## 2023-04-30 PROCEDURE — 81001 URINALYSIS AUTO W/SCOPE: CPT | Performed by: NURSE PRACTITIONER

## 2023-04-30 PROCEDURE — 96372 THER/PROPH/DIAG INJ SC/IM: CPT | Performed by: HOSPITALIST

## 2023-04-30 PROCEDURE — 85610 PROTHROMBIN TIME: CPT | Performed by: HOSPITALIST

## 2023-04-30 PROCEDURE — 85730 THROMBOPLASTIN TIME PARTIAL: CPT | Performed by: HOSPITALIST

## 2023-04-30 PROCEDURE — 85025 COMPLETE CBC W/AUTO DIFF WBC: CPT | Performed by: HOSPITALIST

## 2023-04-30 PROCEDURE — 99900035 HC TECH TIME PER 15 MIN (STAT)

## 2023-04-30 PROCEDURE — 99449 PR INTERPROF, PHONE/INTERNET/EHR, CONSULT, >= 31 MINS: ICD-10-PCS | Mod: ,,, | Performed by: UROLOGY

## 2023-04-30 PROCEDURE — 25000003 PHARM REV CODE 250: Performed by: HOSPITALIST

## 2023-04-30 PROCEDURE — 25500020 PHARM REV CODE 255: Performed by: HOSPITALIST

## 2023-04-30 PROCEDURE — 93306 TTE W/DOPPLER COMPLETE: CPT

## 2023-04-30 PROCEDURE — 25000003 PHARM REV CODE 250: Performed by: NURSE PRACTITIONER

## 2023-04-30 PROCEDURE — 51702 INSERT TEMP BLADDER CATH: CPT

## 2023-04-30 RX ORDER — ENOXAPARIN SODIUM 100 MG/ML
1 INJECTION SUBCUTANEOUS
Status: DISCONTINUED | OUTPATIENT
Start: 2023-04-30 | End: 2023-04-30

## 2023-04-30 RX ORDER — HYDRALAZINE HYDROCHLORIDE 25 MG/1
50 TABLET, FILM COATED ORAL 3 TIMES DAILY
Status: DISCONTINUED | OUTPATIENT
Start: 2023-04-30 | End: 2023-05-02 | Stop reason: HOSPADM

## 2023-04-30 RX ORDER — HYDROCODONE BITARTRATE AND ACETAMINOPHEN 5; 325 MG/1; MG/1
1 TABLET ORAL EVERY 6 HOURS PRN
Status: DISCONTINUED | OUTPATIENT
Start: 2023-04-30 | End: 2023-05-02 | Stop reason: HOSPADM

## 2023-04-30 RX ORDER — HEPARIN SODIUM,PORCINE/D5W 25000/250
0-40 INTRAVENOUS SOLUTION INTRAVENOUS CONTINUOUS
Status: DISCONTINUED | OUTPATIENT
Start: 2023-04-30 | End: 2023-05-02

## 2023-04-30 RX ORDER — ALPRAZOLAM 0.5 MG/1
0.5 TABLET ORAL ONCE
Status: COMPLETED | OUTPATIENT
Start: 2023-04-30 | End: 2023-04-30

## 2023-04-30 RX ADMIN — SOTALOL HYDROCHLORIDE 80 MG: 80 TABLET ORAL at 12:04

## 2023-04-30 RX ADMIN — IOHEXOL 100 ML: 350 INJECTION, SOLUTION INTRAVENOUS at 01:04

## 2023-04-30 RX ADMIN — HYDRALAZINE HYDROCHLORIDE 100 MG: 25 TABLET, FILM COATED ORAL at 12:04

## 2023-04-30 RX ADMIN — FINASTERIDE 5 MG: 5 TABLET, FILM COATED ORAL at 09:04

## 2023-04-30 RX ADMIN — SOTALOL HYDROCHLORIDE 80 MG: 80 TABLET ORAL at 09:04

## 2023-04-30 RX ADMIN — HYDROCODONE BITARTRATE AND ACETAMINOPHEN 1 TABLET: 5; 325 TABLET ORAL at 01:04

## 2023-04-30 RX ADMIN — HEPARIN SODIUM 18 UNITS/KG/HR: 10000 INJECTION, SOLUTION INTRAVENOUS at 02:04

## 2023-04-30 RX ADMIN — HEPARIN SODIUM 16 UNITS/KG/HR: 10000 INJECTION, SOLUTION INTRAVENOUS at 11:04

## 2023-04-30 RX ADMIN — TAMSULOSIN HYDROCHLORIDE 0.8 MG: 0.4 CAPSULE ORAL at 09:04

## 2023-04-30 RX ADMIN — HYDRALAZINE HYDROCHLORIDE 100 MG: 25 TABLET, FILM COATED ORAL at 09:04

## 2023-04-30 RX ADMIN — OXYBUTYNIN CHLORIDE 5 MG: 5 TABLET ORAL at 09:04

## 2023-04-30 RX ADMIN — ENOXAPARIN SODIUM 130 MG: 100 INJECTION SUBCUTANEOUS at 12:04

## 2023-04-30 RX ADMIN — ALPRAZOLAM 0.5 MG: 0.5 TABLET ORAL at 01:04

## 2023-04-30 RX ADMIN — SOTALOL HYDROCHLORIDE 80 MG: 80 TABLET ORAL at 08:04

## 2023-04-30 RX ADMIN — Medication 6 MG: at 08:04

## 2023-04-30 NOTE — PROGRESS NOTES
[facility]  Progress Note    Patient name: Ezequiel Escudero  MRN: [id number]  Admit Date: 4/29/2023   LOS: 0 days     SUBJECTIVE:     Principal problem: Bilateral pulmonary embolism    Interval History:  Admitted last night for syncope and fall; workup revealed bilateral pulmonary embolism as well as DVT.  Patient denies shortness of breath or chest pain.  Admits to missing rivaroxaban for a whole month (prescribed for CVA prophylaxis for Afib).  Has chronic lower extremity wounds which has been improving.      Summary:   72-year-old  male with multiple medical comorbidities including but not limited to morbid obesity, paroxysmal atrial fibrillation, BPH complicated by urinary retention, chronic lymphedema and bilateral lower extremity stasis dermatitis admitted after suffering an unwitnessed syncopal episode at home.    Workup here revealed bilateral pulmonary arterial embolism as well as bilateral DVT.  Initially on enoxaparin and later switched to heparin.  He reports stopping rivaroxaban for over a month now for reasons unclear.  Also he had right hip replacement in February 2023 and incarcerated umbilical hernia repair in March 2023.  Vascular surgery consulted to evaluate for possible intervention for PE.    Trauma workup on admission notable for oblique nondisplaced fracture of the left 4th metacarpal and left posterolateral 7th to 9th rib fractures.     Found to have urinary retention during his previous hospitalization and was discharged with indwelling Rojas catheter with recommendations for Urology follow up but was unable to do so. Rojas catheter was exchanged on admission. Seen here by Urology; oxybutynin discontinued.  Recommended voiding trial when able.      Scheduled Meds:   finasteride  5 mg Oral Daily    gabapentin  300 mg Oral BID    hydrALAZINE  100 mg Oral BID    oxybutynin  5 mg Oral TID    sotaloL  80 mg Oral BID    tamsulosin  0.8 mg Oral Daily      Continuous Infusions:   heparin (porcine) in D5W 16 Units/kg/hr (04/30/23 1100)     PRN Meds:acetaminophen, diptheria-tetanus toxoids, heparin (PORCINE), heparin (PORCINE), HYDROcodone-acetaminophen, melatonin, ondansetron, sodium chloride 0.9%    Review of patient's allergies indicates:  No Known Allergies    Review of Systems: As per interval history    OBJECTIVE:     Vital Signs (Most Recent)  Temp: 97.9 °F (36.6 °C) (04/30/23 1100)  Pulse: 77 (04/30/23 1200)  Resp: 19 (04/30/23 1200)  BP: 118/64 (04/30/23 1200)  SpO2: 96 % (04/30/23 1200)    Vital Signs Range (Last 24H):  Temp:  [97.8 °F (36.6 °C)-98.2 °F (36.8 °C)]   Pulse:  []   Resp:  [14-21]   BP: (109-149)/(60-83)   SpO2:  [95 %-96 %]     I & O (Last 24H):  Intake/Output Summary (Last 24 hours) at 4/30/2023 1226  Last data filed at 4/30/2023 0839  Gross per 24 hour   Intake 1613.41 ml   Output 500 ml   Net 1113.41 ml       Physical Exam:  General: Patient resting comfortably in no acute distress. Appears as stated age. Calm  Eyes: No conjunctival injection. No scleral icterus.  ENT: Hearing grossly intact. No discharge from ears. No nasal discharge.   Neck: Supple, trachea midline. No JVD  CVS:  Irregularly irregular.  Rate controlled.  Bilateral 2+ pitting edema noted  Lungs:  No tachypnea or accessory muscle use.  Clear to auscultation bilaterally  Abdomen:  Soft, nontender and nondistended.  No organomegaly  Neuro: Alert. Moves all extremities. Follows commands. Responds appropriately   Skin:  Bilateral lower extremity dressing is clean, dry and intact  :  Rojas catheter with yellow urine    Laboratory:  I have reviewed all pertinent lab results within the past 24 hours.  CBC:   Recent Labs   Lab 04/30/23 0222   WBC 7.33   RBC 4.58*   HGB 13.2*   HCT 41.3      MCV 90   MCH 28.8   MCHC 32.0     CMP:   Recent Labs   Lab 04/29/23  1946   *   CALCIUM 8.8   ALBUMIN 3.1*   PROT 7.1      K 3.6   CO2 23      BUN 13    CREATININE 0.9   ALKPHOS 118   ALT 14   AST 23   BILITOT 1.0     Coagulation:   Recent Labs   Lab 04/30/23  0222 04/30/23  0913   LABPROT 11.5  --    INR 1.1  --    APTT 33.6* 71.5*       Diagnostic Results:  Labs: Reviewed  US: Reviewed  CT: Reviewed    ASSESSMENT/PLAN:       Active Hospital Problems    Diagnosis  POA    *Bilateral pulmonary embolism [I26.99]  Yes    Closed fracture of multiple ribs of left side [S22.42XA]  Yes    Syncope [R55]  Yes    Obesity (BMI 30-39.9) [E66.9]  Yes    Venous stasis dermatitis of both lower extremities [I87.2]  Yes    Urinary retention [R33.9]  Yes    Stage 2 chronic kidney disease [N18.2]  Yes    Paroxysmal atrial fibrillation [I48.0]  Yes    BPH (benign prostatic hyperplasia) [N40.0]  Yes     Dx updated per 2019 IMO Load          Resolved Hospital Problems   No resolved problems to display.         Plan:   Syncope likely from bilateral PE which is likely triggered in the setting of immobilization  Patient is hemodynamically stable   Continue heparin per protocol   He stopped rivaroxaban for over a month now  Vascular surgery consulted on admission to determine if he is a candidate for thrombolytic therapy  Follow echocardiogram    Fall secondary to syncope  Complicated by multiple left-sided rib fractures and left 4th metacarpal fracture   Pain control   Incentive spirometry   Maintain fall and delirium precautions    BPH with urinary retention   Appreciate urology input.  Discontinued oxybutynin   Continue tamsulosin and finasteride   Voiding trial when able     Chronic venous stasis dermatitis and lower extremity wounds  Wound Care consultation requested    Paroxysmal atrial fibrillation   Continue home sotalol  Currently on heparin drip.  Will restart rivaroxaban when able     Essential hypertension  Chronic condition.  Continue hydralazine; dose adjusted to 50 mg TID      VTE Risk Mitigation (From admission, onward)           Ordered     heparin 25,000 units in dextrose  5% (100 units/ml) IV bolus from bag - ADDITIONAL PRN BOLUS - 60 units/kg  As needed (PRN)        Question:  Heparin Infusion Adjustment (DO NOT MODIFY ANSWER)  Answer:  \\[email or web address]\epic\Images\Pharmacy\HeparinInfusions\heparin HIGH INTENSITY nomogram for [facility] [id number].pdf    04/30/23 0209     heparin 25,000 units in dextrose 5% (100 units/ml) IV bolus from bag - ADDITIONAL PRN BOLUS - 30 units/kg  As needed (PRN)        Question:  Heparin Infusion Adjustment (DO NOT MODIFY ANSWER)  Answer:  \\[email or web address]\epic\Images\Pharmacy\HeparinInfusions\heparin HIGH INTENSITY nomogram for [facility] [id number].pdf    04/30/23 0209     heparin 25,000 units in dextrose 5% 250 mL (100 units/mL) infusion HIGH INTENSITY nomogram - [facility]  Continuous        Question Answer Comment   Heparin Infusion Adjustment (DO NOT MODIFY ANSWER) \\[email or web address]\epic\Images\Pharmacy\HeparinInfusions\heparin HIGH INTENSITY nomogram for [facility] [id number].pdf    Begin at (in units/kg/hr) 18        04/30/23 0209     IP VTE HIGH RISK PATIENT  Once         04/29/23 2144                        Department Hospital Medicine  Count includes the [facility]  Johnnie Florse MD  Date of service: 04/30/2023

## 2023-04-30 NOTE — CARE UPDATE
04/30/23 0810   Patient Assessment/Suction   Level of Consciousness (AVPU) alert   Respiratory Effort Normal;Unlabored   Expansion/Accessory Muscles/Retractions no use of accessory muscles;no retractions;expansion symmetric   Rhythm/Pattern, Respiratory unlabored;pattern regular;depth regular   PRE-TX-O2   Device (Oxygen Therapy) room air   $ Pulse Oximetry - Multiple Charge Pulse Oximetry - Multiple   Pulse 108   Resp (!) 21   Education   $ Education 15 min   Respiratory Evaluation   $ Care Plan Tech Time 15 min   $ Eval/Re-eval Charges Re-evaluation

## 2023-04-30 NOTE — HPI
71 yo male presents with report of syncope episode at home. Reports he walked into the kitchen using his walker to take evening meds and to fix his usual nighttime martini and suddenly felt dizzy and the next things he remembers is waking up on the floor in a pool of blood. Bleeding was from a laceration to left side of forehead. Denies having any chest pain, chest discomfort, or shortness of breath prior to feeling dizzy or at the time of the dizziness or when waking up on the floor.  Patient reports he feel fine now and denies dizziness, chest pain, SOB, or nausea. Reports he uses the walker due to weak right leg and it gives extra support. Reports chronic cellulitis in which wound care and home care tend to 3 times a week for dressing changes. Patient have an indwelling urinary catheter for over a month due to urinary retention. Reports he had an umbilical hernia repair 6 weeks ago. Hx of Usama

## 2023-04-30 NOTE — CONSULTS
Date: April 30, 2023        Consult request received appreciated.      The patient was interviewed.  His medical chart and pertinent imaging studies were reviewed.      Impression:  The patient is a 72-year-old sedentary male admitted to [facility] with a syncopal spell and subsequently found to have two large bilateral pulmonary emboli with an element of right ventricular strain on CT scan of the chest.  He has been started on heparin drip and remains hemodynamically stable.  He has a history of atrial fibrillation for which he was on anticoagulation, but has avoided taking for the previous month.  He has a history of what sounds like chronic venous insufficiency of his lower extremities for several years.  Last night, ultrasound of the legs demonstrated thrombus in the right common femoral, superficial femoral and popliteal veins.  There was also thrombus in the left popliteal vein.  Echocardiography has been performed, however I can not view the images and final report is pending.  He is currently in sinus rhythm, making good urine output with a heart rate in the 60s and normotension.  Pulse ox saturations are 96% on room air arrest.      Plan:  Continue anticoagulation.  Given the large thrombus burden in the pulmonary arteries, I will ask Dr. Khoobehi to consider placement of a caval filter.

## 2023-04-30 NOTE — PLAN OF CARE
Problem: Adult Inpatient Plan of Care  Goal: Plan of Care Review  Outcome: Ongoing, Progressing  Goal: Patient-Specific Goal (Individualized)  Outcome: Ongoing, Progressing  Goal: Absence of Hospital-Acquired Illness or Injury  Outcome: Ongoing, Progressing  Goal: Optimal Comfort and Wellbeing  Outcome: Ongoing, Progressing  Goal: Readiness for Transition of Care  Outcome: Ongoing, Progressing     Problem: Fluid and Electrolyte Imbalance (Acute Kidney Injury/Impairment)  Goal: Fluid and Electrolyte Balance  Outcome: Ongoing, Progressing     Problem: Oral Intake Inadequate (Acute Kidney Injury/Impairment)  Goal: Optimal Nutrition Intake  Outcome: Ongoing, Progressing     Problem: Renal Function Impairment (Acute Kidney Injury/Impairment)  Goal: Effective Renal Function  Outcome: Ongoing, Progressing     Problem: Impaired Wound Healing  Goal: Optimal Wound Healing  Outcome: Ongoing, Progressing     Problem: Infection  Goal: Absence of Infection Signs and Symptoms  Outcome: Ongoing, Progressing     Problem: Fall Injury Risk  Goal: Absence of Fall and Fall-Related Injury  Outcome: Ongoing, Progressing     Problem: Skin Injury Risk Increased  Goal: Skin Health and Integrity  Outcome: Ongoing, Progressing

## 2023-04-30 NOTE — PLAN OF CARE
[facility]  Initial Discharge Assessment       Primary Care Provider: Santiago Beebe MD    Admission Diagnosis: Complex laceration of forehead [S01.81XA]  Acute pulmonary embolism [I26.99]    Admission Date: 4/29/2023  Expected Discharge Date:     Assessment completed face-to-face at bedside. Pt has [facility].No needs anticipated. CM will continue to monitor.    Discharge Barriers Identified: None    Payor: BCBS [place] MEDICARE / [facility]: [facility] / Product Type: Medicare Advantage /     Extended Emergency Contact Information  Primary Emergency Contact: Quoc Escudero  Mobile Phone: [phone]  Relation: Son  Preferred language: English   needed? No  Secondary Emergency Contact: PravinSyd  Mobile Phone: [phone]  Relation: Friend  Preferred language: English   needed? No    Discharge Plan A: Home Health  Discharge Plan B: Home      [facility] [address] [place] [address] - [id number] [employer]  [id number] [facility] [address]  Phone: [phone] Fax: [phone]      Initial Assessment (most recent)       Adult Discharge Assessment - 04/30/23 0906          Discharge Assessment    Assessment Type Discharge Planning Assessment     Confirmed/corrected address, phone number and insurance Yes     Confirmed Demographics Correct on Facesheet     Source of Information patient     Reason For Admission Syncope     People in Home alone     Facility Arrived From: Home     Do you expect to return to your current living situation? Yes     Do you have help at home or someone to help you manage your care at home? Yes     Who are your caregiver(s) and their phone number(s)? [facility]     Prior to hospitilization cognitive status: Alert/Oriented     Current cognitive status: Alert/Oriented     Walking or Climbing Stairs ambulation difficulty, requires equipment     Home Accessibility wheelchair accessible     Equipment Currently Used at Home walker,  standard;wheelchair;shower chair     Readmission within 30 days? Yes     Patient currently being followed by outpatient case management? No     Do you currently have service(s) that help you manage your care at home? Yes     Name and Contact number of agency [facility]     Is the pt/caregiver preference to resume services with current agency Yes     Do you take prescription medications? Yes     Do you have prescription coverage? Yes     Coverage Payor:  [employer]BS MGD MEDICARE - [employer] [place]UE [employer] [place]     Do you have any problems affording any of your prescribed medications? No     Is the patient taking medications as prescribed? yes     Who is going to help you get home at discharge? My Escudero/Daughter [phone]     How do you get to doctors appointments? family or friend will provide     Are you on dialysis? No     Do you take coumadin? No     Discharge Plan A Home Health     Discharge Plan B Home     DME Needed Upon Discharge  none     Discharge Plan discussed with: Patient     Discharge Barriers Identified None

## 2023-04-30 NOTE — ED PROVIDER NOTES
Encounter Date: 4/29/2023       History     Chief Complaint   Patient presents with    Loss of Consciousness    Fall     BIBA for syncopal episode while at home. Pt stood up to go to the bathroom, became dizzy and lost consciousness falling and hitting his head on the tile floor. Laceration to L forehead, L side rib pain, and L wrist/FA skin tears.      72-year-old male presenting emergency department after a syncopal episode.  Patient was in his house and was walking into the kitchen to make himself eat drink before dinner and felt dizzy and next thing he remembers is waking up on the floor.  Patient hit his head and has a laceration on the left side of the forehead area and has skin tear of the left hand.  Denies fever chills nausea vomiting or chest pain or shortness of breath.  Patient feels better now.  Patient does have chronic wounds on the legs and getting wound care for that.  Patient does have a indwelling urinary catheter over the past month as patient had hernia repair and ended up having urinary retention after that.  Patient currently feels better    Review of patient's allergies indicates:  No Known Allergies  Past Medical History:   Diagnosis Date    A-fib     Anticoagulant long-term use     BPH (benign prostatic hyperplasia)     Rojas catheter in place     Kidney stone     PONV (postoperative nausea and vomiting)     Thyroid disease     Wears glasses      Past Surgical History:   Procedure Laterality Date    ANKLE DEBRIDEMENT      CARDIOVERSION      CYSTOSCOPY W/ RETROGRADES Bilateral 3/1/2021    Procedure: CYSTOSCOPY, WITH RETROGRADE PYELOGRAM;  Surgeon: Lior Flores MD;  Location: [facility] OR;  Service: Urology;  Laterality: Bilateral;    CYSTOSCOPY WITH BIOPSY OF BLADDER N/A 3/1/2021    Procedure: CYSTOSCOPY, WITH BLADDER BIOPSY, WITH FULGURATION IF INDICATED;  Surgeon: Lior Flores MD;  Location: [facility] OR;  Service: Urology;  Laterality: N/A;    DEBRIDEMENT OF LOWER EXTREMITY Right 2/1/2021     Procedure: DEBRIDEMENT, LOWER EXTREMITY;  Surgeon: Pete Cardenas MD;  Location: [facility] OR;  Service: General;  Laterality: Right;    INCISION AND DRAINAGE Right 2/1/2021    Procedure: Incision and Drainage;  Surgeon: Pete Cardenas MD;  Location: [facility] OR;  Service: General;  Laterality: Right;    THYROIDECTOMY  1970's    TONSILLECTOMY      TONSILLECTOMY, ADENOIDECTOMY      UMBILICAL HERNIA REPAIR N/A 3/31/2023    Procedure: REPAIR, HERNIA, UMBILICAL;  Surgeon: Gino Randhawa Jr., MD;  Location: [facility] OR;  Service: General;  Laterality: N/A;     Family History   Problem Relation Age of Onset    Benign prostatic hyperplasia Brother     Heart disease Mother     Heart disease Father     Kidney cancer Neg Hx     Prostate cancer Neg Hx     Urolithiasis Neg Hx      Social History     Tobacco Use    Smoking status: Never    Smokeless tobacco: Never   Substance Use Topics    Alcohol use: Yes     Comment: OCC    Drug use: No     Review of Systems   Constitutional: Negative.    HENT: Negative.     Eyes: Negative.    Respiratory: Negative.     Cardiovascular: Negative.  Negative for chest pain.   Gastrointestinal: Negative.    Endocrine: Negative.    Genitourinary: Negative.    Musculoskeletal: Negative.    Skin:  Positive for wound.   Allergic/Immunologic: Negative.    Neurological:  Positive for dizziness and syncope.   Hematological: Negative.    Psychiatric/Behavioral: Negative.     All other systems reviewed and are negative.    Physical Exam     Initial Vitals   BP Pulse Resp Temp SpO2   04/29/23 1847 04/29/23 1847 04/29/23 1847 04/29/23 1917 04/29/23 1847   (!) 144/80 92 16 98 °F (36.7 °C) 95 %      MAP       --                Physical Exam    Nursing note and vitals reviewed.  Constitutional: He appears well-developed and well-nourished.   HENT:   Head: Normocephalic.   Nose: Nose normal.   4 cm stellate laceration on the forehead on the left side   Eyes: Conjunctivae and EOM are normal.   Neck: Neck supple.  No tracheal deviation present.   Normal range of motion.  Cardiovascular:  Normal rate, regular rhythm, normal heart sounds and intact distal pulses.     Exam reveals no friction rub.       No murmur heard.  Pulmonary/Chest: Breath sounds normal. No respiratory distress. He has no wheezes. He has no rales.   Abdominal: Abdomen is soft. He exhibits no distension. There is no abdominal tenderness.   Umbilical hernia site healed well.   Genitourinary:    Genitourinary Comments: Indwelling urinary catheter in place     Musculoskeletal:         General: Normal range of motion.      Cervical back: Normal range of motion and neck supple.      Comments: Both legs with dressings for chronic wounds     Neurological: He is alert and oriented to person, place, and time. He has normal strength. GCS score is 15. GCS eye subscore is 4. GCS verbal subscore is 5. GCS motor subscore is 6.   Skin: Skin is warm and dry. Capillary refill takes less than 2 seconds.   Psychiatric: He has a normal mood and affect. Thought content normal.       ED Course   Lac Repair    Date/Time: 4/29/2023 8:23 PM  Performed by: Kirill Early MD  Authorized by: Kirill Early MD     Consent:     Consent obtained:  Verbal    Risks discussed:  Infection, poor cosmetic result and poor wound healing  Universal protocol:     Patient identity confirmed:  Verbally with patient  Anesthesia:     Anesthesia method:  Local infiltration    Local anesthetic:  Lidocaine 1% WITH epi  Laceration details:     Location:  Face    Face location:  Forehead    Length (cm):  4    Depth (mm):  5  Pre-procedure details:     Preparation:  Patient was prepped and draped in usual sterile fashion  Exploration:     Limited defect created (wound extended): yes      Hemostasis achieved with:  Epinephrine    Imaging outcome: foreign body not noted      Wound exploration: entire depth of wound visualized      Contaminated: no    Treatment:     Area cleansed with:  Saline    Amount of cleaning:   Standard    Irrigation solution:  Sterile water    Irrigation volume:  100    Irrigation method:  Syringe    Visualized foreign bodies/material removed: no      Debridement:  None    Undermining:  None    Scar revision: no    Skin repair:     Repair method:  Sutures    Suture size:  4-0    Suture technique:  Simple interrupted    Number of sutures:  8  Approximation:     Approximation:  Close  Repair type:     Repair type:  Complex  Post-procedure details:     Dressing:  Antibiotic ointment    Procedure completion:  Tolerated  Comments:      4 cm stellate laceration after wound irrigation closed with 4-0 Ethilon and Bactroban ointment placed and dressing applied  Labs Reviewed   CBC W/ AUTO DIFFERENTIAL - Abnormal; Notable for the following components:       Result Value    MCHC 31.8 (*)     RDW 15.8 (*)     Immature Granulocytes 0.8 (*)     Gran # (ANC) 7.8 (*)     Immature Grans (Abs) 0.08 (*)     Lymph # 0.6 (*)     Gran % 81.9 (*)     Lymph % 6.5 (*)     All other components within normal limits   COMPREHENSIVE METABOLIC PANEL - Abnormal; Notable for the following components:    Glucose 130 (*)     Albumin 3.1 (*)     All other components within normal limits   B-TYPE NATRIURETIC PEPTIDE - Abnormal; Notable for the following components:     (*)     All other components within normal limits   TROPONIN I HIGH SENSITIVITY - Abnormal; Notable for the following components:    Troponin I High Sensitivity 302.6 (*)     All other components within normal limits    Narrative:      trop critical result(s) repeated. Called and verbal readback   obtained from meli mckenna rn er  by LYLE 04/29/2023 20:21   POCT GLUCOSE - Abnormal; Notable for the following components:    POC Glucose 134 (*)     All other components within normal limits   APTT   MAGNESIUM   PROTIME-INR   TSH   URINALYSIS   POCT GLUCOSE MONITORING CONTINUOUS     EKG Readings: (Independently Interpreted)   Initial Reading: No STEMI. Rhythm: Normal Sinus  Rhythm. Heart Rate: 92. Ectopy: No Ectopy. Conduction: 1st Degree AV Block.     Imaging Results              X-Ray Hand 3 view Left (In process)                      XR Ribs Min 3 views w/PA Chest Left (In process)    Procedure changed from X-Ray Ribs 2 View Left                    CT Cervical Spine Without Contrast (Final result)  Result time 04/29/23 19:28:52      Final result by Oliver Carey Jr., MD (04/29/23 19:28:52)                   Narrative:    CT of the cervical spine without contrast    Indication: Neck trauma (Age >= 65y)    Findings: The vertebral segments are normal alignment. There is mild reversal of the cervical curve. There are degenerative changes at the C5-6 and C6-7 and C7-T1 discs with anterior and posterior osteophytes. There are multilevel degenerative changes of the facet joints. There are multilevel uncovertebral osteophytes with narrowing of the bilateral neural foramina at C3-4, C4-5 and C5-6 and narrowing of the left neural foramen at C6-7.    No fracture, dislocation, subluxation or other acute abnormality identified.    Visualized soft tissue neck anatomy is unremarkable. No thickening of the prevertebral soft tissue shadow can be seen.    There are extensive infiltrative changes in the left lung apex    Impression:  No acute abnormality identified.    Reversal of the cervical curve is probably secondary to the degenerative changes although could indicate cervical muscle spasm.    Multilevel degenerative changes as above.    There are extensive infiltrative changes in the left lung apex.    This exam was performed according to our departmental dose optimization program, which includes automated exposure control, adjustment of the mA and/or kV according to patient size and/or use of iterative reconstruction technique.    Dose Range:  Up-to-date CT equipment and radiation dose reduction techniques were employed. CTDIvol: A 3.40 mGy, DLP:  426.50 mGycm    Electronically signed by:   Oliver Carey Jr., MD  4/29/2023 7:28 PM CDT Workstation: [id number]                                     CT Head Without Contrast (Final result)  Result time 04/29/23 19:17:11      Final result by Oliver Carey Jr., MD (04/29/23 19:17:11)                   Narrative:    CT of the head without contrast. The study was done with multiple axial images from base to vertex. Reconstructed sagittal and coronal images were acquired. Contrast material was not used.    Indication: Head trauma, minor (Age >= 65y)    Findings: Ventricles and basal cisterns and sulci are expanded consistent with cerebral and cerebellar atrophy. There is accentuation of the deep white matter low attenuation consistent with chronic microangiopathy. No other abnormal intracranial attenuation can be seen. No evidence of mass or mass effect. No hemorrhage can be seen.    Visualized cranial bones are intact. There is some irregularity at the anterior walls of the maxillary sinuses. I do not identify overlying soft tissue swelling to suggest acute fractures. These may be old fractures. Otherwise visualized bones are intact. There is considerable soft tissue thickening in the right maxillary sinus and there is mild soft tissue thickening in the right sphenoid sinus. The mastoid air cells are clear..    Impression:  1. No acute abnormality of the brain or skull identified.    There is evidence of cerebral and cerebellar atrophy.    Accentuated hypodensity in the deep white matter is probably in relation to chronic microangiopathy.    Right maxillary sinus disease.    There may be old fractures of the anterior maxillary walls.    This exam was performed according to our departmental dose optimization program, which includes automated exposure control, adjustment of the mA and/or kV according to patient size and/or use of iterative reconstruction technique.    Dose Range:  Up-to-date CT equipment and radiation dose reduction techniques were employed.  CTDIvol: mGy, DLP: mGycm    Electronically signed by:  Oliver Carey Jr., MD  4/29/2023 7:17 PM CDT Workstation: [id number]                                  X-Rays:   Independently Interpreted Readings:   Other Readings:  Chest x-ray unremarkable.  CT brain does not show any acute intracranial hemorrhage  Medications   diptheria-tetanus toxoids 2-2 Lf unit/0.5 mL injection 0.5 mL (has no administration in time range)   LIDOcaine-EPINEPHrine 1%-1:100,000 injection 1 mL (has no administration in time range)   lactated ringers bolus 1,000 mL (has no administration in time range)   mupirocin 2 % ointment 22 g (has no administration in time range)     Medical Decision Making:   Differential Diagnosis:   72-year-old male presenting emergency department after a syncopal episode.  Patient felt dizzy and woke up on the floor.  Patient has a laceration repaired in the emergency department.  CT of the head and C-spine reviewed.  X-rays reviewed and screening labs reviewed.  Patient's troponin is elevated and will evaluate for possible acute coronary syndrome.  Screening labs and cardiac workup reviewed.  Given the syncopal episode hospital medicine consult for evaluation for admission and further management.  Independently Interpreted Test(s):   I have ordered and independently interpreted X-rays - see prior notes.  I have ordered and independently interpreted EKG Reading(s) - see prior notes  Clinical Tests:   Lab Tests: Reviewed  Radiological Study: Reviewed  Medical Tests: Reviewed  Other:   I have discussed this case with another health care provider.       <> Summary of the Discussion: Hospital medicine consult for evaluation for admission and further management.  Patient had syncopal episode and also has elevated troponin and will need further cardiac workup.  No acute intracranial hemorrhage noted.                        Clinical Impression:   Final diagnoses:  [R55] Syncope  [S01.81XA] Complex laceration of forehead  (Primary)        ED Disposition Condition    Admit Stable                Kirill Early MD  04/29/23 2028

## 2023-04-30 NOTE — SUBJECTIVE & OBJECTIVE
Past Medical History:   Diagnosis Date    A-fib     Anticoagulant long-term use     BPH (benign prostatic hyperplasia)     Rojas catheter in place     Kidney stone     PONV (postoperative nausea and vomiting)     Thyroid disease     Wears glasses        Past Surgical History:   Procedure Laterality Date    ANKLE DEBRIDEMENT      CARDIOVERSION      CYSTOSCOPY W/ RETROGRADES Bilateral 3/1/2021    Procedure: CYSTOSCOPY, WITH RETROGRADE PYELOGRAM;  Surgeon: Lior Flores MD;  Location: [facility] OR;  Service: Urology;  Laterality: Bilateral;    CYSTOSCOPY WITH BIOPSY OF BLADDER N/A 3/1/2021    Procedure: CYSTOSCOPY, WITH BLADDER BIOPSY, WITH FULGURATION IF INDICATED;  Surgeon: Lior Flores MD;  Location: [facility] OR;  Service: Urology;  Laterality: N/A;    DEBRIDEMENT OF LOWER EXTREMITY Right 2/1/2021    Procedure: DEBRIDEMENT, LOWER EXTREMITY;  Surgeon: Pete Cardenas MD;  Location: [facility] OR;  Service: General;  Laterality: Right;    INCISION AND DRAINAGE Right 2/1/2021    Procedure: Incision and Drainage;  Surgeon: Pete Cardenas MD;  Location: [facility] OR;  Service: General;  Laterality: Right;    THYROIDECTOMY  1970's    TONSILLECTOMY      TONSILLECTOMY, ADENOIDECTOMY      UMBILICAL HERNIA REPAIR N/A 3/31/2023    Procedure: REPAIR, HERNIA, UMBILICAL;  Surgeon: Gino Radnhawa Jr., MD;  Location: [facility];  Service: General;  Laterality: N/A;       Review of patient's allergies indicates:  No Known Allergies    Current Facility-Administered Medications on File Prior to Encounter   Medication    electrolyte-S (ISOLYTE)    fentaNYL injection 25 mcg    HYDROmorphone (PF) injection 0.2 mg    LIDOcaine (PF) 10 mg/ml (1%) injection 10 mg     Current Outpatient Medications on File Prior to Encounter   Medication Sig    finasteride (PROSCAR) 5 mg tablet Take 1 tablet by mouth once daily (Patient taking differently: Take 5 mg by mouth once daily.)    gabapentin (NEURONTIN) 300 MG capsule Take 300 mg by mouth 2 (two) times  daily.    hydrALAZINE (APRESOLINE) 100 MG tablet Take 100 mg by mouth 2 (two) times daily.    meclizine (ANTIVERT) 25 mg tablet Take 25 mg by mouth 3 (three) times daily as needed.    oxybutynin (DITROPAN) 5 MG Tab Take 1 tablet (5 mg total) by mouth 3 (three) times daily. for 7 days    rivaroxaban (XARELTO) 20 mg Tab Take 20 mg by mouth once daily.     sotalol (BETAPACE) 80 MG tablet Take 80 mg by mouth 2 (two) times daily.    tamsulosin (FLOMAX) 0.4 mg Cap Take 2 capsules (0.8 mg total) by mouth once daily.    vit A/vit C/vit E/zinc/copper (OCUVITE PRESERVISION ORAL) Take 1 tablet by mouth 2 (two) times a day.     Family History       Problem Relation (Age of Onset)    Benign prostatic hyperplasia Brother    Heart disease Mother, Father          Tobacco Use    Smoking status: Never    Smokeless tobacco: Never   Substance and Sexual Activity    Alcohol use: Yes     Comment: OCC    Drug use: No    Sexual activity: Not Currently     Review of Systems   HENT:  Negative for congestion and sore throat.    Eyes:  Negative for pain, discharge, redness and itching.        Wearing glasses   Respiratory:  Negative for cough, chest tightness, shortness of breath and wheezing.    Cardiovascular:  Positive for leg swelling (bilateral). Negative for chest pain and palpitations.   Gastrointestinal:  Negative for abdominal distention and abdominal pain.   Genitourinary:  Negative for decreased urine volume, difficulty urinating, dysuria, flank pain, frequency, hematuria and urgency.        Indwelling catheter in place   Musculoskeletal:  Negative for back pain, gait problem, joint swelling, myalgias and neck pain.   Skin:  Positive for wound (forehead and left hand). Negative for rash.   Neurological:  Positive for syncope. Negative for dizziness, speech difficulty, weakness, light-headedness, numbness and headaches.   Hematological:  Does not bruise/bleed easily.   Psychiatric/Behavioral:  Negative for agitation, confusion,  hallucinations, self-injury, sleep disturbance and suicidal ideas. The patient is not nervous/anxious and is not hyperactive.    Objective:     Vital Signs (Most Recent):  Temp: 98 °F (36.7 °C) (04/29/23 1917)  Pulse: 92 (04/29/23 2113)  Resp: 16 (04/29/23 1847)  BP: 126/69 (04/29/23 2113)  SpO2: 95 % (04/29/23 2113) Vital Signs (24h Range):  Temp:  [98 °F (36.7 °C)] 98 °F (36.7 °C)  Pulse:  [92] 92  Resp:  [16] 16  SpO2:  [95 %] 95 %  BP: (126-144)/(69-80) 126/69     Weight: 131.5 kg (290 lb)  Body mass index is 39.33 kg/m².    Physical Exam  Vitals and nursing note reviewed.   Constitutional:       General: He is not in acute distress.     Appearance: Normal appearance. He is normal weight. He is not toxic-appearing.   HENT:      Head: Normocephalic.      Right Ear: Tympanic membrane, ear canal and external ear normal. There is no impacted cerumen.      Left Ear: Tympanic membrane, ear canal and external ear normal. There is no impacted cerumen.      Nose: Nose normal. No congestion or rhinorrhea.      Mouth/Throat:      Mouth: Mucous membranes are moist.      Pharynx: Oropharynx is clear. No oropharyngeal exudate or posterior oropharyngeal erythema.   Eyes:      General: No scleral icterus.        Right eye: No discharge.         Left eye: No discharge.      Extraocular Movements: Extraocular movements intact.      Conjunctiva/sclera: Conjunctivae normal.      Pupils: Pupils are equal, round, and reactive to light.   Neck:      Vascular: No carotid bruit.   Cardiovascular:      Rate and Rhythm: Normal rate and regular rhythm.      Pulses: Normal pulses.      Heart sounds: No murmur heard.    No friction rub. No gallop.   Pulmonary:      Effort: Pulmonary effort is normal. No respiratory distress.      Breath sounds: Normal breath sounds. No stridor. No wheezing, rhonchi or rales.   Chest:      Chest wall: No tenderness.   Abdominal:      General: Abdomen is flat. Bowel sounds are normal. There is no distension.       Palpations: Abdomen is soft. There is no mass.      Tenderness: There is no abdominal tenderness. There is no right CVA tenderness, left CVA tenderness, guarding or rebound.      Hernia: No hernia is present.   Genitourinary:     Rectum: Normal.      Comments: Indwelling catheter with dark janene urine  Musculoskeletal:         General: No swelling, tenderness, deformity or signs of injury. Normal range of motion.      Cervical back: Normal range of motion and neck supple. No rigidity or tenderness.      Right lower leg: Edema (dressing intact) present.      Left lower leg: Edema (dressing intact) present.   Lymphadenopathy:      Cervical: No cervical adenopathy.   Skin:     General: Skin is warm and dry.      Capillary Refill: Capillary refill takes 2 to 3 seconds.      Coloration: Skin is not jaundiced or pale.      Findings: Bruising (left hand) and lesion (sutured laceration to left forehead. Dressing clean dry intact with no drainage noted. 2 skin tears to left hand wih dressing clean dry and intact.) present. No erythema or rash.   Neurological:      General: No focal deficit present.      Mental Status: He is alert and oriented to person, place, and time. Mental status is at baseline.      Cranial Nerves: No cranial nerve deficit.      Sensory: No sensory deficit.      Motor: No weakness.   Psychiatric:         Mood and Affect: Mood normal.         Behavior: Behavior normal.         Thought Content: Thought content normal.         Judgment: Judgment normal.         CRANIAL NERVES     CN III, IV, VI   Pupils are equal, round, and reactive to light.     Significant Labs: All pertinent labs within the past 24 hours have been reviewed.  Recent Lab Results         04/29/23  1946   04/29/23  1918        Albumin 3.1         Alkaline Phosphatase 118         ALT 14         Anion Gap 9         aPTT 28.3  Comment: aPTT therapeutic range = 39-69 seconds         AST 23         Baso # 0.05         Basophil % 0.5          BILIRUBIN TOTAL 1.0  Comment: For infants and newborns, interpretation of results should be based  on gestational age, weight and in agreement with clinical  observations.    Premature Infant recommended reference ranges:  Up to 24 hours.............<8.0 mg/dL  Up to 48 hours............<12.0 mg/dL  3-5 days..................<15.0 mg/dL  6-29 days.................<15.0 mg/dL             Comment: Values of less than 100 pg/ml are consistent with non-CHF populations.         BUN 13         Calcium 8.8         Chloride 105         CO2 23         Creatinine 0.9         Differential Method Automated         eGFR >60.0         Eos # 0.2         Eosinophil % 2.2         Glucose 130         Gran # (ANC) 7.8         Gran % 81.9         Hematocrit 48.5         Hemoglobin 15.4         Immature Grans (Abs) 0.08  Comment: Mild elevation in immature granulocytes is non specific and   can be seen in a variety of conditions including stress response,   acute inflammation, trauma and pregnancy. Correlation with other   laboratory and clinical findings is essential.           Immature Granulocytes 0.8         INR 1.0  Comment: Coumadin Therapy:  2.0 - 3.0 for INR for all indicators except mechanical heart valves  and antiphospholipid syndromes which should use 2.5 - 3.5.           Lymph # 0.6         Lymph % 6.5         Magnesium 1.6         MCH 28.8         MCHC 31.8         MCV 91         Mono # 0.8         Mono % 8.1         MPV 9.4         nRBC 0         Platelets 159         POC Glucose   134       Potassium 3.6         PROTEIN TOTAL 7.1         Protime 11.0         RBC 5.34         RDW 15.8         Sodium 137         Troponin I High Sensitivity 302.6  Comment: Troponin results differ between methods. Do not use   results between Troponin methods interchangeably.    Alkaline Phospatase levels above 400 U/L may   cause false positive results.    Access hsTnI should not be used for patients taking   Asfotase reginald  (Strensiq).  trop critical result(s) repeated. Called and verbal readback   obtained from meli mckenna rn er  by LYLE 04/29/2023 20:21           TSH 1.960         WBC 9.56                 Significant Imaging: I have reviewed all pertinent imaging results/findings within the past 24 hours.

## 2023-04-30 NOTE — CONSULTS
Interprofessional Telephone Consult Note  Specialty Consulted: Urology  Staff Consulted: Dr. Wood Bradshaw  Date of Service: 04/30/2023      Consulting Physician: Dr. Flores  Reason for Consult: Urinary retention       HPI:    Patient with a several year history of enlarged prostate associated with lower urinary tract symptoms s/p TURP that was previously managed by Dr. Sellers, Dr. Flores and NP Vivian Fulton. He presented to [facility] emergency department on 4/29/23 after falling and hitting his head.     He was found to have an indwelling catheter in place on admission that was present for approximately 1 month after hernia repair. Urology consulted to assist with management.     Labs unremarkable. UA micro with >100 RBC and 21 WBC.     He is on Flomax 0.8 mg PO daily and Finasteride 5 mg. Also on Ditropan 5 mg PO TID. He was scheduled for evaluation with Dr. Chahal in the past, but did not follow up.  mL during his office visit with urology in 8/2021. Repeated in 9/2022 and was 0 mL.     Unclear when he last had a voiding trial.     No fever, chills or history of  malignancy.       PSA  0.9  10/10/22    Urine culture  Enterococcus 3/28/23    Lab Results   Component Value Date    WBC 7.33 04/30/2023    HGB 13.2 (L) 04/30/2023    HCT 41.3 04/30/2023    MCV 90 04/30/2023     04/30/2023       BMP  Lab Results   Component Value Date     04/29/2023    K 3.6 04/29/2023     04/29/2023    CO2 23 04/29/2023    BUN 13 04/29/2023    CREATININE 0.9 04/29/2023    CALCIUM 8.8 04/29/2023    ANIONGAP 9 04/29/2023    EGFRNORACEVR >60.0 04/29/2023         Plan:    Recurrent urinary retention    Continue Flomax 0.8 mg and Finasteride 5 mg PO daily.   Discontinue Ditropan 5 mg PO TID as this will worsen his retention.   The patient can potentially have a voiding trial. If voiding trial attempted the catheter must be removed first thing in the morning and bladder scan must be done post void and recorded in a  nurses note x 3. If residuals >300 then centeno catheter should be inserted and a nurses note should document if the patient had the urge to void prior to centeno catheter and how much drained. The patient can then follow up with urology nurse practitioner (Vivian Mendoza)in 1-2 weeks after discharge for another voiding trial and plan.   If pt is discharged to a skilled nursing facility, should have a residual checked post void with in and out catheter to document if patient is emptying. Evidence of voiding does not guarantee that the patient is emptying.   The patient should have urology f/u 1-2 months post-discharge with a post void residual if patient voids while still inpatient. If no appointment made prior to discharge, patient should contact our office to schedule appointment or with the urologist they have seen previously if they have one.           Time spent reviewing records, making plan and formulating plan: 35 min. More than half the time was devoted to medical consultative verbal/internet discussion. >50% in discussion with provider about recommendations, provided verbally and in wiriting, and pt gave verbal consent to discuss care with subsepcialists    Each patient to whom he or she provides medical services by telemedicine is:    (1)The patient is aware of and consented to the discussion.   (2) informed of the relationship between the physician and patient and the respective role of any other health care provider with respect to management of the patient; and   (3) notified that he or she may decline to receive medical services by telemedicine and may withdraw from such care at any time.    This service was not originating from a related E/M service provided within the previous 7 days nor will  to an E/M service or procedure within the next 24 hours or my soonest available appointment.  Prevailing standard of care was able to be met in this audio-only visit.    Both a written plan and a  verbal/internet discussion were discussed with the consulting physician

## 2023-04-30 NOTE — H&P
[facility] - Emergency Dept  Hospital Medicine  History & Physical    Patient Name: Ezequiel Escudero  MRN: [id number]  Patient Class: OP- Observation  Admission Date: 4/29/2023  Attending Physician: Frieda Sparks MD   Primary Care Provider: Santiago Beebe MD         Patient information was obtained from patient and ER records.     Subjective:     Principal Problem:Syncope    Chief Complaint:   Chief Complaint   Patient presents with    Loss of Consciousness    Fall     BIBA for syncopal episode while at home. Pt stood up to go to the bathroom, became dizzy and lost consciousness falling and hitting his head on the tile floor. Laceration to L forehead, L side rib pain, and L wrist/FA skin tears.         HPI:    71 yo male presents with report of syncope episode at home. Reports he walked into the kitchen using his walker to take evening meds and to fix his usual nighttime martini and suddenly felt dizzy and the next things he remembers is waking up on the floor in a pool of blood. Bleeding was from a laceration to left side of forehead. Denies having any chest pain, chest discomfort, or shortness of breath prior to feeling dizzy or at the time of the dizziness or when waking up on the floor.  Patient reports he feel fine now and denies dizziness, chest pain, SOB, or nausea. Reports he uses the walker due to weak right leg and it gives extra support. Reports chronic cellulitis in which wound care and home care tend to 3 times a week for dressing changes. Patient have an indwelling urinary catheter for over a month due to urinary retention. Reports he had an umbilical hernia repair 6 weeks ago. Hx of [place].      Past Medical History:   Diagnosis Date    A-fib     Anticoagulant long-term use     BPH (benign prostatic hyperplasia)     Rojas catheter in place     Kidney stone     PONV (postoperative nausea and vomiting)     Thyroid disease     Wears glasses        Past Surgical History:   Procedure  Laterality Date    ANKLE DEBRIDEMENT      CARDIOVERSION      CYSTOSCOPY W/ RETROGRADES Bilateral 3/1/2021    Procedure: CYSTOSCOPY, WITH RETROGRADE PYELOGRAM;  Surgeon: Lior Flores MD;  Location: [facility] OR;  Service: Urology;  Laterality: Bilateral;    CYSTOSCOPY WITH BIOPSY OF BLADDER N/A 3/1/2021    Procedure: CYSTOSCOPY, WITH BLADDER BIOPSY, WITH FULGURATION IF INDICATED;  Surgeon: Lior Flores MD;  Location: [facility] OR;  Service: Urology;  Laterality: N/A;    DEBRIDEMENT OF LOWER EXTREMITY Right 2/1/2021    Procedure: DEBRIDEMENT, LOWER EXTREMITY;  Surgeon: Pete Cardenas MD;  Location: [facility] OR;  Service: General;  Laterality: Right;    INCISION AND DRAINAGE Right 2/1/2021    Procedure: Incision and Drainage;  Surgeon: Pete Cardenas MD;  Location: [facility] OR;  Service: General;  Laterality: Right;    THYROIDECTOMY  1970's    TONSILLECTOMY      TONSILLECTOMY, ADENOIDECTOMY      UMBILICAL HERNIA REPAIR N/A 3/31/2023    Procedure: REPAIR, HERNIA, UMBILICAL;  Surgeon: Gino Randhawa Jr., MD;  Location: [facility] OR;  Service: General;  Laterality: N/A;       Review of patient's allergies indicates:  No Known Allergies    Current Facility-Administered Medications on File Prior to Encounter   Medication    electrolyte-S (ISOLYTE)    fentaNYL injection 25 mcg    HYDROmorphone (PF) injection 0.2 mg    LIDOcaine (PF) 10 mg/ml (1%) injection 10 mg     Current Outpatient Medications on File Prior to Encounter   Medication Sig    finasteride (PROSCAR) 5 mg tablet Take 1 tablet by mouth once daily (Patient taking differently: Take 5 mg by mouth once daily.)    gabapentin (NEURONTIN) 300 MG capsule Take 300 mg by mouth 2 (two) times daily.    hydrALAZINE (APRESOLINE) 100 MG tablet Take 100 mg by mouth 2 (two) times daily.    meclizine (ANTIVERT) 25 mg tablet Take 25 mg by mouth 3 (three) times daily as needed.    oxybutynin (DITROPAN) 5 MG Tab Take 1 tablet (5 mg total) by mouth 3 (three) times  daily. for 7 days    rivaroxaban (XARELTO) 20 mg Tab Take 20 mg by mouth once daily.     sotalol (BETAPACE) 80 MG tablet Take 80 mg by mouth 2 (two) times daily.    tamsulosin (FLOMAX) 0.4 mg Cap Take 2 capsules (0.8 mg total) by mouth once daily.    vit A/vit C/vit E/zinc/copper (OCUVITE PRESERVISION ORAL) Take 1 tablet by mouth 2 (two) times a day.     Family History       Problem Relation (Age of Onset)    Benign prostatic hyperplasia Brother    Heart disease Mother, Father          Tobacco Use    Smoking status: Never    Smokeless tobacco: Never   Substance and Sexual Activity    Alcohol use: Yes     Comment: OCC    Drug use: No    Sexual activity: Not Currently     Review of Systems   HENT:  Negative for congestion and sore throat.    Eyes:  Negative for pain, discharge, redness and itching.        Wearing glasses   Respiratory:  Negative for cough, chest tightness, shortness of breath and wheezing.    Cardiovascular:  Positive for leg swelling (bilateral). Negative for chest pain and palpitations.   Gastrointestinal:  Negative for abdominal distention and abdominal pain.   Genitourinary:  Negative for decreased urine volume, difficulty urinating, dysuria, flank pain, frequency, hematuria and urgency.        Indwelling catheter in place   Musculoskeletal:  Negative for back pain, gait problem, joint swelling, myalgias and neck pain.   Skin:  Positive for wound (forehead and left hand). Negative for rash.   Neurological:  Positive for syncope. Negative for dizziness, speech difficulty, weakness, light-headedness, numbness and headaches.   Hematological:  Does not bruise/bleed easily.   Psychiatric/Behavioral:  Negative for agitation, confusion, hallucinations, self-injury, sleep disturbance and suicidal ideas. The patient is not nervous/anxious and is not hyperactive.    Objective:     Vital Signs (Most Recent):  Temp: 98 °F (36.7 °C) (04/29/23 1917)  Pulse: 92 (04/29/23 2113)  Resp: 16 (04/29/23  1847)  BP: 126/69 (04/29/23 2113)  SpO2: 95 % (04/29/23 2113) Vital Signs (24h Range):  Temp:  [98 °F (36.7 °C)] 98 °F (36.7 °C)  Pulse:  [92] 92  Resp:  [16] 16  SpO2:  [95 %] 95 %  BP: (126-144)/(69-80) 126/69     Weight: 131.5 kg (290 lb)  Body mass index is 39.33 kg/m².    Physical Exam  Vitals and nursing note reviewed.   Constitutional:       General: He is not in acute distress.     Appearance: Normal appearance. He is normal weight. He is not toxic-appearing.   HENT:      Head: Normocephalic.      Right Ear: Tympanic membrane, ear canal and external ear normal. There is no impacted cerumen.      Left Ear: Tympanic membrane, ear canal and external ear normal. There is no impacted cerumen.      Nose: Nose normal. No congestion or rhinorrhea.      Mouth/Throat:      Mouth: Mucous membranes are moist.      Pharynx: Oropharynx is clear. No oropharyngeal exudate or posterior oropharyngeal erythema.   Eyes:      General: No scleral icterus.        Right eye: No discharge.         Left eye: No discharge.      Extraocular Movements: Extraocular movements intact.      Conjunctiva/sclera: Conjunctivae normal.      Pupils: Pupils are equal, round, and reactive to light.   Neck:      Vascular: No carotid bruit.   Cardiovascular:      Rate and Rhythm: Normal rate and regular rhythm.      Pulses: Normal pulses.      Heart sounds: No murmur heard.    No friction rub. No gallop.   Pulmonary:      Effort: Pulmonary effort is normal. No respiratory distress.      Breath sounds: Normal breath sounds. No stridor. No wheezing, rhonchi or rales.   Chest:      Chest wall: No tenderness.   Abdominal:      General: Abdomen is flat. Bowel sounds are normal. There is no distension.      Palpations: Abdomen is soft. There is no mass.      Tenderness: There is no abdominal tenderness. There is no right CVA tenderness, left CVA tenderness, guarding or rebound.      Hernia: No hernia is present.   Genitourinary:     Rectum: Normal.       Comments: Indwelling catheter with dark janene urine  Musculoskeletal:         General: No swelling, tenderness, deformity or signs of injury. Normal range of motion.      Cervical back: Normal range of motion and neck supple. No rigidity or tenderness.      Right lower leg: Edema (dressing intact) present.      Left lower leg: Edema (dressing intact) present.   Lymphadenopathy:      Cervical: No cervical adenopathy.   Skin:     General: Skin is warm and dry.      Capillary Refill: Capillary refill takes 2 to 3 seconds.      Coloration: Skin is not jaundiced or pale.      Findings: Bruising (left hand) and lesion (sutured laceration to left forehead. Dressing clean dry intact with no drainage noted. 2 skin tears to left hand wih dressing clean dry and intact.) present. No erythema or rash.   Neurological:      General: No focal deficit present.      Mental Status: He is alert and oriented to person, place, and time. Mental status is at baseline.      Cranial Nerves: No cranial nerve deficit.      Sensory: No sensory deficit.      Motor: No weakness.   Psychiatric:         Mood and Affect: Mood normal.         Behavior: Behavior normal.         Thought Content: Thought content normal.         Judgment: Judgment normal.         CRANIAL NERVES     CN III, IV, VI   Pupils are equal, round, and reactive to light.     Significant Labs: All pertinent labs within the past 24 hours have been reviewed.  Recent Lab Results         04/29/23  1946   04/29/23  1918        Albumin 3.1         Alkaline Phosphatase 118         ALT 14         Anion Gap 9         aPTT 28.3  Comment: aPTT therapeutic range = 39-69 seconds         AST 23         Baso # 0.05         Basophil % 0.5         BILIRUBIN TOTAL 1.0  Comment: For infants and newborns, interpretation of results should be based  on gestational age, weight and in agreement with clinical  observations.    Premature Infant recommended reference ranges:  Up to 24 hours.............<8.0  mg/dL  Up to 48 hours............<12.0 mg/dL  3-5 days..................<15.0 mg/dL  6-29 days.................<15.0 mg/dL             Comment: Values of less than 100 pg/ml are consistent with non-CHF populations.         BUN 13         Calcium 8.8         Chloride 105         CO2 23         Creatinine 0.9         Differential Method Automated         eGFR >60.0         Eos # 0.2         Eosinophil % 2.2         Glucose 130         Gran # (ANC) 7.8         Gran % 81.9         Hematocrit 48.5         Hemoglobin 15.4         Immature Grans (Abs) 0.08  Comment: Mild elevation in immature granulocytes is non specific and   can be seen in a variety of conditions including stress response,   acute inflammation, trauma and pregnancy. Correlation with other   laboratory and clinical findings is essential.           Immature Granulocytes 0.8         INR 1.0  Comment: Coumadin Therapy:  2.0 - 3.0 for INR for all indicators except mechanical heart valves  and antiphospholipid syndromes which should use 2.5 - 3.5.           Lymph # 0.6         Lymph % 6.5         Magnesium 1.6         MCH 28.8         MCHC 31.8         MCV 91         Mono # 0.8         Mono % 8.1         MPV 9.4         nRBC 0         Platelets 159         POC Glucose   134       Potassium 3.6         PROTEIN TOTAL 7.1         Protime 11.0         RBC 5.34         RDW 15.8         Sodium 137         Troponin I High Sensitivity 302.6  Comment: Troponin results differ between methods. Do not use   results between Troponin methods interchangeably.    Alkaline Phospatase levels above 400 U/L may   cause false positive results.    Access hsTnI should not be used for patients taking   Asfotase reginald (Strensiq).  trop critical result(s) repeated. Called and verbal readback   obtained from meli mckenna rn er  by [facility] 04/29/2023 20:21           TSH 1.960         WBC 9.56                 Significant Imaging: I have reviewed all pertinent imaging results/findings  within the past 24 hours.    Assessment/Plan:     * Syncope  Orthostatic VS  EKG  CT head  Carotid ultrasound  Venus ultrasound        Urinary retention  Consult urology  UA/culture  continue indwelling catheter        VTE Risk Mitigation (From admission, onward)         Ordered     IP VTE HIGH RISK PATIENT  Once         04/29/23 2144                     On 04/29/2023, patient should be placed in hospital observation services under my care in collaboration with Noelle.      PIETER GOLD-ISMAEL  Department of Hospital Medicine  [facility] - Emergency Dept

## 2023-05-01 LAB
ANION GAP SERPL CALC-SCNC: 4 MMOL/L (ref 8–16)
APTT PPP: 42.7 SEC (ref 21–32)
APTT PPP: 45.9 SEC (ref 21–32)
BASOPHILS # BLD AUTO: 0.04 K/UL (ref 0–0.2)
BASOPHILS NFR BLD: 0.6 % (ref 0–1.9)
BUN SERPL-MCNC: 17 MG/DL (ref 8–23)
CALCIUM SERPL-MCNC: 8.2 MG/DL (ref 8.7–10.5)
CHLORIDE SERPL-SCNC: 108 MMOL/L (ref 95–110)
CO2 SERPL-SCNC: 28 MMOL/L (ref 23–29)
CREAT SERPL-MCNC: 1.1 MG/DL (ref 0.5–1.4)
DIFFERENTIAL METHOD: ABNORMAL
EOSINOPHIL # BLD AUTO: 0.3 K/UL (ref 0–0.5)
EOSINOPHIL NFR BLD: 4.5 % (ref 0–8)
ERYTHROCYTE [DISTWIDTH] IN BLOOD BY AUTOMATED COUNT: 15.9 % (ref 11.5–14.5)
EST. GFR  (NO RACE VARIABLE): >60 ML/MIN/1.73 M^2
GLUCOSE SERPL-MCNC: 117 MG/DL (ref 70–110)
HCT VFR BLD AUTO: 39.7 % (ref 40–54)
HGB BLD-MCNC: 12.5 G/DL (ref 14–18)
IMM GRANULOCYTES # BLD AUTO: 0.11 K/UL (ref 0–0.04)
IMM GRANULOCYTES NFR BLD AUTO: 1.8 % (ref 0–0.5)
LYMPHOCYTES # BLD AUTO: 1 K/UL (ref 1–4.8)
LYMPHOCYTES NFR BLD: 16.4 % (ref 18–48)
MCH RBC QN AUTO: 28.7 PG (ref 27–31)
MCHC RBC AUTO-ENTMCNC: 31.5 G/DL (ref 32–36)
MCV RBC AUTO: 91 FL (ref 82–98)
MONOCYTES # BLD AUTO: 0.6 K/UL (ref 0.3–1)
MONOCYTES NFR BLD: 9.6 % (ref 4–15)
NEUTROPHILS # BLD AUTO: 4.1 K/UL (ref 1.8–7.7)
NEUTROPHILS NFR BLD: 67.1 % (ref 38–73)
NRBC BLD-RTO: 0 /100 WBC
PLATELET # BLD AUTO: 178 K/UL (ref 150–450)
PMV BLD AUTO: 9.9 FL (ref 9.2–12.9)
POTASSIUM SERPL-SCNC: 3.6 MMOL/L (ref 3.5–5.1)
RBC # BLD AUTO: 4.36 M/UL (ref 4.6–6.2)
SODIUM SERPL-SCNC: 140 MMOL/L (ref 136–145)
WBC # BLD AUTO: 6.17 K/UL (ref 3.9–12.7)

## 2023-05-01 PROCEDURE — 63600175 PHARM REV CODE 636 W HCPCS: Mod: JW,JG | Performed by: SURGERY

## 2023-05-01 PROCEDURE — C1769 GUIDE WIRE: HCPCS | Performed by: SURGERY

## 2023-05-01 PROCEDURE — 25000003 PHARM REV CODE 250: Performed by: SURGERY

## 2023-05-01 PROCEDURE — 85730 THROMBOPLASTIN TIME PARTIAL: CPT | Performed by: HOSPITALIST

## 2023-05-01 PROCEDURE — 36415 COLL VENOUS BLD VENIPUNCTURE: CPT | Performed by: HOSPITALIST

## 2023-05-01 PROCEDURE — 20000000 HC ICU ROOM

## 2023-05-01 PROCEDURE — 97116 GAIT TRAINING THERAPY: CPT

## 2023-05-01 PROCEDURE — 25500020 PHARM REV CODE 255: Performed by: SURGERY

## 2023-05-01 PROCEDURE — 99900031 HC PATIENT EDUCATION (STAT)

## 2023-05-01 PROCEDURE — 94761 N-INVAS EAR/PLS OXIMETRY MLT: CPT

## 2023-05-01 PROCEDURE — 80048 BASIC METABOLIC PNL TOTAL CA: CPT | Performed by: INTERNAL MEDICINE

## 2023-05-01 PROCEDURE — 99223 PR INITIAL HOSPITAL CARE,LEVL III: ICD-10-PCS | Mod: ,,, | Performed by: FAMILY MEDICINE

## 2023-05-01 PROCEDURE — 85025 COMPLETE CBC W/AUTO DIFF WBC: CPT | Performed by: INTERNAL MEDICINE

## 2023-05-01 PROCEDURE — 27201423 OPTIME MED/SURG SUP & DEVICES STERILE SUPPLY: Performed by: SURGERY

## 2023-05-01 PROCEDURE — 75741 ARTERY X-RAYS LUNG: CPT | Mod: XU,LT | Performed by: SURGERY

## 2023-05-01 PROCEDURE — 63600175 PHARM REV CODE 636 W HCPCS: Performed by: SURGERY

## 2023-05-01 PROCEDURE — C1894 INTRO/SHEATH, NON-LASER: HCPCS | Performed by: SURGERY

## 2023-05-01 PROCEDURE — 25000003 PHARM REV CODE 250: Performed by: HOSPITALIST

## 2023-05-01 PROCEDURE — 97535 SELF CARE MNGMENT TRAINING: CPT

## 2023-05-01 PROCEDURE — 25000003 PHARM REV CODE 250: Performed by: INTERNAL MEDICINE

## 2023-05-01 PROCEDURE — 25000003 PHARM REV CODE 250

## 2023-05-01 PROCEDURE — 37212 THROMBOLYTIC VENOUS THERAPY: CPT | Mod: LT | Performed by: SURGERY

## 2023-05-01 PROCEDURE — C1887 CATHETER, GUIDING: HCPCS | Performed by: SURGERY

## 2023-05-01 PROCEDURE — 99223 1ST HOSP IP/OBS HIGH 75: CPT | Mod: ,,, | Performed by: FAMILY MEDICINE

## 2023-05-01 PROCEDURE — 97165 OT EVAL LOW COMPLEX 30 MIN: CPT

## 2023-05-01 PROCEDURE — 63600175 PHARM REV CODE 636 W HCPCS: Performed by: HOSPITALIST

## 2023-05-01 PROCEDURE — 97161 PT EVAL LOW COMPLEX 20 MIN: CPT

## 2023-05-01 PROCEDURE — 36014 PLACE CATHETER IN ARTERY: CPT | Performed by: SURGERY

## 2023-05-01 RX ORDER — CEFAZOLIN SODIUM 1 G/3ML
INJECTION, POWDER, FOR SOLUTION INTRAMUSCULAR; INTRAVENOUS
Status: DISCONTINUED | OUTPATIENT
Start: 2023-05-01 | End: 2023-05-01 | Stop reason: HOSPADM

## 2023-05-01 RX ORDER — MUPIROCIN 20 MG/G
OINTMENT TOPICAL 2 TIMES DAILY
Status: DISCONTINUED | OUTPATIENT
Start: 2023-05-01 | End: 2023-05-02 | Stop reason: HOSPADM

## 2023-05-01 RX ORDER — CHLORHEXIDINE GLUCONATE ORAL RINSE 1.2 MG/ML
15 SOLUTION DENTAL 2 TIMES DAILY
Status: DISCONTINUED | OUTPATIENT
Start: 2023-05-01 | End: 2023-05-02 | Stop reason: HOSPADM

## 2023-05-01 RX ORDER — IODIXANOL 320 MG/ML
INJECTION, SOLUTION INTRAVASCULAR
Status: DISCONTINUED | OUTPATIENT
Start: 2023-05-01 | End: 2023-05-01 | Stop reason: HOSPADM

## 2023-05-01 RX ORDER — LIDOCAINE HYDROCHLORIDE 10 MG/ML
INJECTION INFILTRATION; PERINEURAL
Status: DISCONTINUED | OUTPATIENT
Start: 2023-05-01 | End: 2023-05-01 | Stop reason: HOSPADM

## 2023-05-01 RX ORDER — MIDAZOLAM HYDROCHLORIDE 1 MG/ML
INJECTION INTRAMUSCULAR; INTRAVENOUS
Status: DISCONTINUED | OUTPATIENT
Start: 2023-05-01 | End: 2023-05-01 | Stop reason: HOSPADM

## 2023-05-01 RX ORDER — FENTANYL CITRATE 50 UG/ML
INJECTION, SOLUTION INTRAMUSCULAR; INTRAVENOUS
Status: DISCONTINUED | OUTPATIENT
Start: 2023-05-01 | End: 2023-05-01 | Stop reason: HOSPADM

## 2023-05-01 RX ADMIN — MUPIROCIN 1 G: 20 OINTMENT TOPICAL at 09:05

## 2023-05-01 RX ADMIN — SOTALOL HYDROCHLORIDE 80 MG: 80 TABLET ORAL at 09:05

## 2023-05-01 RX ADMIN — TAMSULOSIN HYDROCHLORIDE 0.8 MG: 0.4 CAPSULE ORAL at 09:05

## 2023-05-01 RX ADMIN — HYDRALAZINE HYDROCHLORIDE 50 MG: 25 TABLET ORAL at 09:05

## 2023-05-01 RX ADMIN — HEPARIN SODIUM 16 UNITS/KG/HR: 10000 INJECTION, SOLUTION INTRAVENOUS at 02:05

## 2023-05-01 RX ADMIN — Medication 6 MG: at 09:05

## 2023-05-01 RX ADMIN — HYDROCODONE BITARTRATE AND ACETAMINOPHEN 1 TABLET: 5; 325 TABLET ORAL at 07:05

## 2023-05-01 RX ADMIN — FINASTERIDE 5 MG: 5 TABLET, FILM COATED ORAL at 09:05

## 2023-05-01 NOTE — CARE UPDATE
04/30/23 2101   Patient Assessment/Suction   Level of Consciousness (AVPU) alert   Respiratory Effort Normal;Unlabored   Expansion/Accessory Muscles/Retractions no retractions;no use of accessory muscles   PRE-TX-O2   Device (Oxygen Therapy) room air   Education   $ Education Other (see comment);15 min   Respiratory Evaluation   $ Care Plan Tech Time 15 min   $ Eval/Re-eval Charges Evaluation   Evaluation For   (CARE PLAN)

## 2023-05-01 NOTE — PT/OT/SLP EVAL
Physical Therapy Evaluation    Patient Name:  Ezequiel Escudero   MRN:  [id number]    Recommendations:     Discharge Recommendations: home health PT   Discharge Equipment Recommendations: none   Barriers to discharge:  medical status, high fall risk,     Assessment:     Ezequiel Escudero is a 72 y.o. male admitted with a medical diagnosis of Bilateral pulmonary embolism.  He presents with the following impairments/functional limitations: weakness, impaired endurance, impaired self care skills, impaired functional mobility, gait instability, impaired balance, decreased lower extremity function, decreased safety awareness, impaired cardiopulmonary response to activity.    Pt found sitting on EOB. Pt agreeable to visit. Pt requires CGA for sit to stand to RW and during ambulation x 30 ft with RW. Pt requires greatest assist with bed mobility for LE assist min A thought patient reports he sleeps in a recliner at home.     Rehab Prognosis: Fair; patient would benefit from acute skilled PT services to address these deficits and reach maximum level of function.    Recent Surgery: Procedure(s) (LRB):  Pulmonary angiography (N/A)      Plan:     During this hospitalization, patient to be seen 5 x/week to address the identified rehab impairments via gait training, therapeutic activities, therapeutic exercises, neuromuscular re-education and progress toward the following goals:    Plan of Care Expires:  06/01/23    Subjective     Chief Complaint: multiple blood clots  Patient/Family Comments/goals: get better  Pain/Comfort:  Pain Rating 1: 0/10    Patients cultural, spiritual, Uatsdin conflicts given the current situation: no    Living Environment:  Pt lives alone in a one story home with no VIDAL. (-)  since last admission 6 weeks ago. Friend assists with transportation.  Prior to admission, patients level of function was MI RW.  Equipment used at home: walker, rolling, shower chair, wheelchair.  DME owned (not currently used):  none.  Upon discharge, patient will have assistance from friend.    Objective:     Communicated with RN prior to session.  Patient found sitting edge of bed with peripheral IV, telemetry, blood pressure cuff, pulse ox (continuous), centeno catheter  upon PT entry to room.    General Precautions: Standard, fall  Orthopedic Precautions:N/A   Braces: N/A  Respiratory Status: Room air    Exams:  Cognitive Exam:  Patient is oriented to Person, Place, Time, and Situation  RLE ROM: WFL  RLE Strength: 4+/5 throughout  LLE ROM: WFL  LLE Strength: 4+/5 throughout    Functional Mobility:  Bed Mobility:     Sit to Supine: minimum assistance  Transfers:     Sit to Stand:  contact guard assistance with rolling walker  Gait: 30 ft with RW and CGA      AM-PAC 6 CLICK MOBILITY  Total Score:18       Treatment & Education:  Pt educated on POC, discharge recommendation, importance of time OOB, falls prevention, need for assist with mobility, use of call bell to seek assistance as needed and fall prevention      Patient left HOB elevated with all lines intact, call button in reach, and bed alarm on.    GOALS:   Multidisciplinary Problems       Physical Therapy Goals          Problem: Physical Therapy    Goal Priority Disciplines Outcome Goal Variances Interventions   Physical Therapy Goal     PT, PT/OT Ongoing, Progressing     Description: Goals to be met by: 23     Patient will increase functional independence with mobility by performin. Supine to sit with Supervision  2. Sit to stand transfer with Supervision  3. Bed to chair transfer with Supervision using Rolling Walker  4. Gait  x 150 feet with Supervision using Rolling Walker.                              History:     Past Medical History:   Diagnosis Date    A-fib     Anticoagulant long-term use     BPH (benign prostatic hyperplasia)     Centeno catheter in place     Kidney stone     PONV (postoperative nausea and vomiting)     Thyroid disease     Wears glasses        Past  Surgical History:   Procedure Laterality Date    ANKLE DEBRIDEMENT      CARDIOVERSION      CYSTOSCOPY W/ RETROGRADES Bilateral 3/1/2021    Procedure: CYSTOSCOPY, WITH RETROGRADE PYELOGRAM;  Surgeon: Lior Flores MD;  [facility]: [facility];  Service: Urology;  Laterality: Bilateral;    CYSTOSCOPY WITH BIOPSY OF BLADDER N/A 3/1/2021    Procedure: CYSTOSCOPY, WITH BLADDER BIOPSY, WITH FULGURATION IF INDICATED;  Surgeon: Lior Flores MD;  Location: [facility];  Service: Urology;  Laterality: N/A;    DEBRIDEMENT OF LOWER EXTREMITY Right 2/1/2021    Procedure: DEBRIDEMENT, LOWER EXTREMITY;  Surgeon: Pete Cardenas MD;  Location: [facility];  Service: General;  Laterality: Right;    INCISION AND DRAINAGE Right 2/1/2021    Procedure: Incision and Drainage;  Surgeon: Ptee Cardenas MD;  Location: [facility];  Service: General;  Laterality: Right;    THYROIDECTOMY  1970's    TONSILLECTOMY      TONSILLECTOMY, ADENOIDECTOMY      UMBILICAL HERNIA REPAIR N/A 3/31/2023    Procedure: REPAIR, HERNIA, UMBILICAL;  Surgeon: Gino Randhawa Jr., MD;  Location: [facility];  Service: General;  Laterality: N/A;       Time Tracking:     PT Received On: 05/01/23  PT Start Time: 0900     PT Stop Time: 0915  PT Total Time (min): 15 min     Billable Minutes: Evaluation 7 and Gait Training 8      05/01/2023

## 2023-05-01 NOTE — PROGRESS NOTES
[facility] Medicine  Progress Note    Patient name: Ezequiel Escudero  MRN: [id number]  Admit Date: 4/29/2023   LOS: 1 day     SUBJECTIVE:     Principal problem: Bilateral pulmonary embolism    Interval History:  No acute overnight events reported.  Denies chest pain.  Shortness of breath is improved when compared to yesterday.      Summary:   72-year-old  male with multiple medical comorbidities including but not limited to morbid obesity, paroxysmal atrial fibrillation, BPH complicated by urinary retention, chronic lymphedema and bilateral lower extremity stasis dermatitis admitted after suffering an unwitnessed syncopal episode at home.    Workup here revealed bilateral pulmonary arterial embolism as well as bilateral DVT.  Initially on enoxaparin and later switched to heparin.  He reports stopping rivaroxaban for over a month now for reasons unclear.  Also he had right hip replacement in February 2023 and incarcerated umbilical hernia repair in March 2023.  Vascular surgery consulted to evaluate for possible intervention for PE.    Trauma workup on admission notable for oblique nondisplaced fracture of the left 4th metacarpal and left posterolateral 7th to 9th rib fractures.     Found to have urinary retention during his previous hospitalization and was discharged with indwelling Rojas catheter with recommendations for Urology follow up but was unable to do so. Rojas catheter was exchanged on admission. Seen here by Urology; oxybutynin discontinued.  Recommended voiding trial when able.      Scheduled Meds:   chlorhexidine  15 mL Mouth/Throat BID    finasteride  5 mg Oral Daily    gabapentin  300 mg Oral BID    hydrALAZINE  50 mg Oral TID    mupirocin   Nasal BID    sotaloL  80 mg Oral BID    tamsulosin  0.8 mg Oral Daily     Continuous Infusions:   heparin (porcine) in D5W 16 Units/kg/hr (05/01/23 0232)     PRN Meds:acetaminophen, diptheria-tetanus toxoids, heparin (PORCINE), heparin  (PORCINE), HYDROcodone-acetaminophen, melatonin, ondansetron, sodium chloride 0.9%    Review of patient's allergies indicates:  No Known Allergies    Review of Systems: As per interval history    OBJECTIVE:     Vital Signs (Most Recent)  Temp: 97.9 °F (36.6 °C) (05/01/23 1110)  Pulse: 67 (05/01/23 1110)  Resp: (!) 31 (05/01/23 1110)  BP: (!) 140/63 (05/01/23 1110)  SpO2: 98 % (05/01/23 1110)    Vital Signs Range (Last 24H):  Temp:  [97.8 °F (36.6 °C)-98.5 °F (36.9 °C)]   Pulse:  [58-68]   Resp:  [17-31]   BP: (105-140)/(55-93)   SpO2:  [93 %-98 %]     I & O (Last 24H):  Intake/Output Summary (Last 24 hours) at 5/1/2023 1338  Last data filed at 5/1/2023 0857  Gross per 24 hour   Intake 150 ml   Output 425 ml   Net -275 ml         Physical Exam:  General: Patient resting comfortably in no acute distress. Appears as stated age. Calm  Eyes: No conjunctival injection. No scleral icterus.  ENT: Hearing grossly intact. No discharge from ears. No nasal discharge.   Neck: Supple, trachea midline. No JVD  CVS:  Irregularly irregular.  Rate controlled.  Bilateral 2+ pitting edema noted  Lungs:  No tachypnea or accessory muscle use.  Clear to auscultation bilaterally  Abdomen:  Soft, nontender and nondistended.  No organomegaly  Neuro: Alert. Moves all extremities. Follows commands. Responds appropriately   Skin:  Bilateral lower extremity dressing is clean, dry and intact  :  Rojas catheter with yellow urine    Laboratory:  I have reviewed all pertinent lab results within the past 24 hours.  CBC:   Recent Labs   Lab 05/01/23  0441   WBC 6.17   RBC 4.36*   HGB 12.5*   HCT 39.7*      MCV 91   MCH 28.7   MCHC 31.5*       CMP:   Recent Labs   Lab 04/29/23  1946 05/01/23  0441   * 117*   CALCIUM 8.8 8.2*   ALBUMIN 3.1*  --    PROT 7.1  --     140   K 3.6 3.6   CO2 23 28    108   BUN 13 17   CREATININE 0.9 1.1   ALKPHOS 118  --    ALT 14  --    AST 23  --    BILITOT 1.0  --        Coagulation:   Recent  Labs   Lab 04/30/23  0222 04/30/23  0913 05/01/23  0441   LABPROT 11.5  --   --    INR 1.1  --   --    APTT 33.6*   < > 42.7*    < > = values in this interval not displayed.         Diagnostic Results:  Labs: Reviewed    ASSESSMENT/PLAN:       Active Hospital Problems    Diagnosis  POA    *Bilateral pulmonary embolism [I26.99]  Yes    Closed fracture of multiple ribs of left side [S22.42XA]  Yes    Syncope [R55]  Yes    Obesity (BMI 30-39.9) [E66.9]  Yes    Venous stasis dermatitis of both lower extremities [I87.2]  Yes    Urinary retention [R33.9]  Yes    Stage 2 chronic kidney disease [N18.2]  Yes    Paroxysmal atrial fibrillation [I48.0]  Yes    BPH (benign prostatic hyperplasia) [N40.0]  Yes     Dx updated per 2019 IMO Load        Resolved Hospital Problems   No resolved problems to display.         Plan:   Syncope likely from bilateral PE which is likely triggered in the setting of immobilization  Patient is hemodynamically stable   Continue heparin per protocol   He stopped rivaroxaban for over a month now  Vascular surgery consulted on admission to determine if he is a candidate for thrombolytic therapy  Follow echocardiogram    Fall secondary to syncope  Complicated by multiple left-sided rib fractures and left 4th metacarpal fracture   Pain control   Incentive spirometry   Maintain fall and delirium precautions  PT/OT eval; recommended home health at discharge    BPH with urinary retention   Appreciate urology input.  Discontinued oxybutynin   Continue tamsulosin and finasteride   Voiding trial when able     Chronic venous stasis dermatitis and lower extremity wounds  Wound Care consultation requested    Paroxysmal atrial fibrillation   Continue home sotalol  Currently on heparin drip.  Will restart rivaroxaban when able     Essential hypertension  Chronic condition.  Continue hydralazine; dose adjusted to 50 mg TID      VTE Risk Mitigation (From admission, onward)           Ordered     heparin 25,000 units  in dextrose 5% (100 units/ml) IV bolus from bag - ADDITIONAL PRN BOLUS - 60 units/kg  As needed (PRN)        Question:  Heparin Infusion Adjustment (DO NOT MODIFY ANSWER)  Answer:  [email or web address]\epic\Images\Pharmacy\HeparinInfusions\heparin HIGH INTENSITY nomogram for [facility] [id number].pdf    04/30/23 0209     heparin 25,000 units in dextrose 5% (100 units/ml) IV bolus from bag - ADDITIONAL PRN BOLUS - 30 units/kg  As needed (PRN)        Question:  Heparin Infusion Adjustment (DO NOT MODIFY ANSWER)  Answer:  [email or web address]\epic\Images\Pharmacy\HeparinInfusions\heparin HIGH INTENSITY nomogram for [facility] [id number].pdf    04/30/23 0209     heparin 25,000 units in dextrose 5% 250 mL (100 units/mL) infusion HIGH INTENSITY nomogram - [facility]  Continuous        Question Answer Comment   Heparin Infusion Adjustment (DO NOT MODIFY ANSWER) [email or web address]\epic\Images\Pharmacy\HeparinInfusions\heparin HIGH INTENSITY nomogram for [facility] [id number].pdf    Begin at (in units/kg/hr) 18        04/30/23 0209     IP VTE HIGH RISK PATIENT  Once         04/29/23 2144                        Department Hospital Medicine  [facility]  Johnnie Flores MD  Date of service: 05/01/2023

## 2023-05-01 NOTE — CONSULTS
Chief complaint:  Loss of Consciousness and Fall (BIBA for syncopal episode while at home. Pt stood up to go to the bathroom, became dizzy and lost consciousness falling and hitting his head on the tile floor. Laceration to L forehead, L side rib pain, and L wrist/FA skin tears. )      HPI:  Ezequiel Escudero is a 72 y.o. male presenting with BLE venous ulcers. Pt was admitted with BL Pe's. Pt is known to me from my clinic outpatient. Pt last had dopplers done on 3/28 in the hospital. He started FU with me in the clinic since then. Pt was admitted to hospital this time with BL Pe's and we were consulted for BLE wound management. No other complaints today.    PMH:  As per HPI and below:  Past Medical History:   Diagnosis Date    A-fib     Anticoagulant long-term use     BPH (benign prostatic hyperplasia)     Rojas catheter in place     Kidney stone     PONV (postoperative nausea and vomiting)     Thyroid disease     Wears glasses        Social History     Socioeconomic History    Marital status:    Tobacco Use    Smoking status: Never    Smokeless tobacco: Never   Substance and Sexual Activity    Alcohol use: Yes     Comment: OCC    Drug use: No    Sexual activity: Not Currently       Past Surgical History:   Procedure Laterality Date    ANKLE DEBRIDEMENT      CARDIOVERSION      CYSTOSCOPY W/ RETROGRADES Bilateral 3/1/2021    Procedure: CYSTOSCOPY, WITH RETROGRADE PYELOGRAM;  Surgeon: Lior Flores MD;  Location: [facility] OR;  Service: Urology;  Laterality: Bilateral;    CYSTOSCOPY WITH BIOPSY OF BLADDER N/A 3/1/2021    Procedure: CYSTOSCOPY, WITH BLADDER BIOPSY, WITH FULGURATION IF INDICATED;  Surgeon: Lior Flores MD;  Location: [facility] OR;  Service: Urology;  Laterality: N/A;    DEBRIDEMENT OF LOWER EXTREMITY Right 2/1/2021    Procedure: DEBRIDEMENT, LOWER EXTREMITY;  Surgeon: Pete Cardenas MD;  Location: [facility] OR;  Service: General;  Laterality: Right;    INCISION AND DRAINAGE Right 2/1/2021    Procedure:  Incision and Drainage;  Surgeon: Pete Cardenas MD;  Location: [facility] OR;  Service: General;  Laterality: Right;    THYROIDECTOMY  1970's    TONSILLECTOMY      TONSILLECTOMY, ADENOIDECTOMY      UMBILICAL HERNIA REPAIR N/A 3/31/2023    Procedure: REPAIR, HERNIA, UMBILICAL;  Surgeon: Gino Randhawa Jr., MD;  Location: [facility] OR;  Service: General;  Laterality: N/A;       Family History   Problem Relation Age of Onset    Benign prostatic hyperplasia Brother     Heart disease Mother     Heart disease Father     Kidney cancer Neg Hx     Prostate cancer Neg Hx     Urolithiasis Neg Hx        Review of patient's allergies indicates:  No Known Allergies    Current Facility-Administered Medications on File Prior to Encounter   Medication Dose Route Frequency Provider Last Rate Last Admin    electrolyte-S (ISOLYTE)   Intravenous Continuous Amada Sher MD 75 mL/hr at 03/01/21 1436 Rate Change at 03/01/21 1436    fentaNYL injection 25 mcg  25 mcg Intravenous Q5 Min PRN Amada Sher MD        HYDROmorphone (PF) injection 0.2 mg  0.2 mg Intravenous Q5 Min PRN Amada Sher MD        LIDOcaine (PF) 10 mg/ml (1%) injection 10 mg  1 mL Intradermal Once Amada Sher MD         Current Outpatient Medications on File Prior to Encounter   Medication Sig Dispense Refill    finasteride (PROSCAR) 5 mg tablet Take 5 mg by mouth.      hydrALAZINE (APRESOLINE) 100 MG tablet Take 1 tablet by mouth once daily.      oxybutynin (DITROPAN) 5 MG Tab Take 1 tablet by mouth 3 (three) times daily.      finasteride (PROSCAR) 5 mg tablet Take 1 tablet by mouth once daily (Patient taking differently: Take 5 mg by mouth once daily.) 30 tablet 0    gabapentin (NEURONTIN) 300 MG capsule Take 300 mg by mouth 2 (two) times daily.      hydrALAZINE (APRESOLINE) 100 MG tablet Take 100 mg by mouth 2 (two) times daily.      meclizine (ANTIVERT) 25 mg tablet Take 25 mg by mouth 3 (three) times daily as needed.      rivaroxaban  (XARELTO) 20 mg Tab Take 20 mg by mouth once daily.       sotalol (BETAPACE) 80 MG tablet Take 80 mg by mouth 2 (two) times daily.      tamsulosin (FLOMAX) 0.4 mg Cap Take 2 capsules (0.8 mg total) by mouth once daily. 180 capsule 4    vit A/vit C/vit E/zinc/copper (OCUVITE PRESERVISION ORAL) Take 1 tablet by mouth 2 (two) times a day.         ROS: As per HPI and below:  Pertinent items are noted in HPI.      Physical Exam:     Vitals:    23 0701 23 0900 23 0922 23 1110   BP: 128/62 139/74 139/74 (!) 140/63   Pulse: 61 65  67   Resp: (!) 21 (!) 23  (!) 31   Temp: 98.1 °F (36.7 °C)   97.9 °F (36.6 °C)   TempSrc:       SpO2: 96% 98%  98%   Weight:       Height:           BP  Min: 98/52  Max: 149/79  Temp  Av.1 °F (36.7 °C)  Min: 97.8 °F (36.6 °C)  Max: 98.5 °F (36.9 °C)  Pulse  Av.9  Min: 58  Max: 108  Resp  Av.9  Min: 14  Max: 31  SpO2  Av.3 %  Min: 93 %  Max: 98 %  Height  Av' (182.9 cm)  Min: 6' (182.9 cm)  Max: 6' (182.9 cm)  Weight  Av.1 kg (280 lb 3.2 oz)  Min: 125.6 kg (276 lb 14.4 oz)  Max: 131.5 kg (290 lb)    Body mass index is 37.55 kg/m².          General:             Well developed, well nourished, no apparent distress  HEENT:              NCAT, no JVD, mucous membranes moist, EOM intact  Cardiovascular:  Regular rate and rhythm, normal S1, normal S2, No murmurs, rubs, or gallops  Respiratory:        Normal breath sounds, no wheezes, no rales, no rhonchi  Abdomen:           Bowel sounds present, non tender, non distended, no masses, no hepatojugular reflux  Extremities:        No clubbing, no cyanosis, no edema  Vascular:            2+ b/l radial.  Peripheral pulses intact.  No carotid bruits.  Neurological:      No focal deficits  Skin:                   No obvious rashes or erythema, skin intact.  US Lower Extremity Veins Bilateral  Order: [id number]  Status: Final result     Visible to patient: No (inaccessible in Patient Portal)     Next appt: None      Dx: Swelling of lower extremity     0 Result Notes  Details    Reading Physician Reading Date Result Priority   Marv Wheat MD  [phone] 3/28/2023      Narrative & Impression  VENOUS DOPPLER ULTRASOUND BILATERAL LOWER EXTREMITIES     CLINICAL DATA: Cellulitis     FINDINGS: Color and duplex Doppler sonographic assessment with spectral analysis of the bilateral lower extremities demonstrates no evidence of deep vein thrombosis. Normal color Doppler flow, augmentation, and compressibility are demonstrated at all levels.     There is mild nonspecific subcutaneous edema in the bilateral popliteal regions.     IMPRESSION:  1. No evidence of DVT in the bilateral lower extremities.  2. Mild bilateral nonspecific subcutaneous edema.     Electronically signed by:  Marv Wheat MD  3/28/2023 2:37 PM CDT Workstation: [id number]           Specimen Collected: 03/28/23 13:39 Last Resulted: 03/28/23 14:37           US Lower Extremity Veins Bilateral  Order: [id number]  Status: Edited Result - FINAL     Visible to patient: No (inaccessible in Patient Portal)     Next appt: None     0 Result Notes  Details    Reading Physician Reading Date Result Priority   Ezequiel Siu MD  [phone] 4/30/2023      Narrative & Impression         ADDENDUM #1         Critical Communication: Findings were discussed by Dr. Siu directly with nurse Mo on  4/30/2023 12:25 AM CDT. Read back obtained.     Electronically signed by:  Ezequiel Siu MD  4/30/2023 12:25 AM CDT Workstation: [id number]      ORIGINAL REPORT         US LOWER EXTREMITY VEINS BILATERAL  RPID: US LOWER EXTREMITY VEINS BILATERAL     CLINICAL HISTORY:  72 years old Male with a history of  syncope     PROCEDURE:  Real-time grayscale, color, and pulsed Doppler ultrasound imaging of the lower extremity deep venous vessels was performed.     COMPARISON: None     FINDINGS:  Right:The common femoral, superficial femoral, and popliteal veins were examined and  demonstrate thrombus in the right common femoral vein which is nonocclusive. Additional occlusive thrombus is seen in the more distal femoral vein and popliteal vein..     Left:  The left common femoral, superficial femoral veins demonstrate normal compressibility, phasic flow, augmentation and gray scale evaluation. Thrombus is seen in the left popliteal vein. There is no evidence of intraluminal thrombus .           IMPRESSION:  Bilateral DVT.        Electronically signed by:  Ezequiel Siu MD  4/30/2023 12:13 AM CDT Workstation: [id number]           Specimen Collected: 04/29/23 23:22 Last Resulted: 04/30/23 00:25                             Lab Results   Component Value Date    WBC 6.17 05/01/2023    HGB 12.5 (L) 05/01/2023    HCT 39.7 (L) 05/01/2023    MCV 91 05/01/2023     05/01/2023     Lab Results   Component Value Date    CHOL 123 01/28/2021    CHOL 171 10/10/2016     Lab Results   Component Value Date    HDL 23 (L) 01/28/2021    HDL 46 10/10/2016     Lab Results   Component Value Date    LDLCALC 72.8 01/28/2021    LDLCALC 94.4 10/10/2016     Lab Results   Component Value Date    TRIG 136 01/28/2021    TRIG 153 (H) 10/10/2016     Lab Results   Component Value Date    CHOLHDL 18.7 (L) 01/28/2021    CHOLHDL 26.9 10/10/2016     CMP  Recent Labs   Lab 05/01/23  0441   *   CALCIUM 8.2*      K 3.6   CO2 28      BUN 17   CREATININE 1.1      Lab Results   Component Value Date    TSH 1.960 04/29/2023         Assessment and Recommendations       Diagnoses:    1. Venous ulcer right leg  2. Venous ulcer of the left leg  3. DVT by ultrasound  4. BLE edema  5. BLE xerosis  6. Venous stasis dermatitis    Plan:  1. Culture left leg open wounds  Xeroform to BLE open wounds wrap lightly  FU at wound center on DC    Complexity:    medium

## 2023-05-01 NOTE — PT/OT/SLP EVAL
Occupational Therapy   Evaluation    Name: Ezequiel Escudero  MRN: [id number]  Admitting Diagnosis: Bilateral pulmonary embolism  Recent Surgery: Procedure(s) (LRB):  Pulmonary angiography (N/A)      Recommendations:     Discharge Recommendations: home health OT  Discharge Equipment Recommendations:  none  Barriers to discharge:  None    Assessment:     Ezequiel Escudero is a 72 y.o. male with a medical diagnosis of Bilateral pulmonary embolism.  He presents with general weakness. Performance deficits affecting function: weakness, impaired endurance, impaired self care skills, impaired functional mobility, gait instability, impaired balance, decreased lower extremity function, decreased safety awareness, impaired cardiopulmonary response to activity.      Rehab Prognosis: Fair; patient would benefit from acute skilled OT services to address these deficits and reach maximum level of function.       Plan:     Patient to be seen 5 x/week to address the above listed problems via self-care/home management, therapeutic activities, therapeutic exercises  Plan of Care Expires: 06/01/23  Plan of Care Reviewed with: patient    Subjective     Chief Complaint: general weakness  Patient/Family Comments/goals: improved functional mobility and ADL independence.    Occupational Profile:  Living Environment: lives alone in a 1 story home. Son lives next door.   Previous level of function: modified independent using rolling walker in the home and wheelchair for community mobility.  Roles and Routines: primary homemaker  Equipment Used at Home: wheelchair, walker, rolling, shower chair, cane, straight  Assistance upon Discharge: Son    Pain/Comfort:  Pain Rating 1: 0/10  Pain Rating Post-Intervention 1: 0/10    Patients cultural, spiritual, Zoroastrian conflicts given the current situation: no    Objective:     Communicated with: nurse prior to session.  Patient found HOB elevated with telemetry, peripheral IV, centeno catheter upon OT entry to  room.    General Precautions: Standard, fall  Orthopedic Precautions: N/A  Braces: N/A  Respiratory Status: Room air    Occupational Performance:    Bed Mobility:    Patient completed Scooting/Bridging with minimum assistance  Patient completed Supine to Sit with minimum assistance  Patient completed Sit to Supine with minimum assistance  Performed unsupported sitting EOB with contact guard assistance.    Functional Mobility/Transfers:  Patient completed Sit <> Stand Transfer with contact guard assistance  with  rolling walker   Functional Mobility: ambulated 5 feet forwards/backwards with contact guard assistance using rolling walker.    Activities of Daily Living:  Grooming: contact guard assistance to wash face sitting EOB.    Cognitive/Visual Perceptual:  Cognitive/Psychosocial Skills:     -       Oriented to: Person, Place, Time, and Situation   -       Follows Commands/attention:Follows multistep  commands  -       Communication: clear/fluent  -       Memory: No Deficits noted  -       Safety awareness/insight to disability: intact   -       Mood/Affect/Coping skills/emotional control: Cooperative and Pleasant  Visual/Perceptual:      -Intact Acuity    Physical Exam:  Balance:    -       Sitting/Standing: Contact Guard  Upper Extremity Range of Motion:     -       Right Upper Extremity: WFL  -       Left Upper Extremity: WFL  Upper Extremity Strength:    -       Right Upper Extremity: 4/5  -       Left Upper Extremity: 4/5   Strength:    -       Right Upper Extremity: WFL  -       Left Upper Extremity: WFL  Fine Motor Coordination:    -       Intact    AMPAC 6 Click ADL:  AMPAC Total Score: 19    Treatment & Education:  Patient educated on the purpose of Occupational Therapy and the importance of getting OOB.    Patient left HOB elevated with all lines intact, call button in reach, and bed alarm on    GOALS:   Multidisciplinary Problems       Occupational Therapy Goals          Problem: Occupational  Therapy    Goal Priority Disciplines Outcome Interventions   Occupational Therapy Goal     OT, PT/OT     Description: Goals to be met by: 6/1/2023     Patient will increase functional independence with ADLs by performing:    UE Dressing with Supervision.  LE Dressing with Supervision.  Grooming while standing at sink with Supervision.  Toileting from toilet with Supervision for hygiene and clothing management.   Toilet transfer to toilet with Supervision.                         History:     Past Medical History:   Diagnosis Date    A-fib     Anticoagulant long-term use     BPH (benign prostatic hyperplasia)     Rojas catheter in place     Kidney stone     PONV (postoperative nausea and vomiting)     Thyroid disease     Wears glasses          Past Surgical History:   Procedure Laterality Date    ANKLE DEBRIDEMENT      CARDIOVERSION      CYSTOSCOPY W/ RETROGRADES Bilateral 3/1/2021    Procedure: CYSTOSCOPY, WITH RETROGRADE PYELOGRAM;  Surgeon: Lior Flores MD;  Location: [facility];  Service: Urology;  Laterality: Bilateral;    CYSTOSCOPY WITH BIOPSY OF BLADDER N/A 3/1/2021    Procedure: CYSTOSCOPY, WITH BLADDER BIOPSY, WITH FULGURATION IF INDICATED;  Surgeon: Lior Flores MD;  Location: [facility];  Service: Urology;  Laterality: N/A;    DEBRIDEMENT OF LOWER EXTREMITY Right 2/1/2021    Procedure: DEBRIDEMENT, LOWER EXTREMITY;  Surgeon: Pete Cardenas MD;  Location: [facility] OR;  Service: General;  Laterality: Right;    INCISION AND DRAINAGE Right 2/1/2021    Procedure: Incision and Drainage;  Surgeon: Pete Cardenas MD;  Location: [facility] OR;  Service: General;  Laterality: Right;    THYROIDECTOMY  1970's    TONSILLECTOMY      TONSILLECTOMY, ADENOIDECTOMY      UMBILICAL HERNIA REPAIR N/A 3/31/2023    Procedure: REPAIR, HERNIA, UMBILICAL;  Surgeon: Gino Randhawa Jr., MD;  Location: [place] OR;  Service: General;  Laterality: N/A;       Time Tracking:     OT Date of Treatment: 05/01/23  OT Start Time: 1048  OT Stop  Time: 1115  OT Total Time (min): 27 min    Billable Minutes:Evaluation 8  Self Care/Home Management 19    5/1/2023

## 2023-05-01 NOTE — CONSULTS
[facility]  Vascular Surgery  Consult Note    Inpatient consult to Vascular Surgery  Consult performed by: Ali Khoobehi, MD  Consult ordered by: Marv Blandon MD      Subjective:     Chief Complaint/Reason for Admission: PE    History of Present Illness:  Patient admitted status post syncope and fall.  He has had a laceration repair to his forehead.  There is large bilateral PE burden in the distal main pulmonary arteries.  Patient also has right leg DVT.  He has a history of chronic venous insufficiency and has bilateral calf ulcers which he has wrapped daily.  He has a history of umbilical hernia repair 6 weeks prior.  He has a history of atrial fibrillation.    Medications Prior to Admission   Medication Sig Dispense Refill Last Dose    finasteride (PROSCAR) 5 mg tablet Take 5 mg by mouth.       hydrALAZINE (APRESOLINE) 100 MG tablet Take 1 tablet by mouth once daily.       oxybutynin (DITROPAN) 5 MG Tab Take 1 tablet by mouth 3 (three) times daily.       finasteride (PROSCAR) 5 mg tablet Take 1 tablet by mouth once daily (Patient taking differently: Take 5 mg by mouth once daily.) 30 tablet 0     gabapentin (NEURONTIN) 300 MG capsule Take 300 mg by mouth 2 (two) times daily.       hydrALAZINE (APRESOLINE) 100 MG tablet Take 100 mg by mouth 2 (two) times daily.       meclizine (ANTIVERT) 25 mg tablet Take 25 mg by mouth 3 (three) times daily as needed.       rivaroxaban (XARELTO) 20 mg Tab Take 20 mg by mouth once daily.        sotalol (BETAPACE) 80 MG tablet Take 80 mg by mouth 2 (two) times daily.       tamsulosin (FLOMAX) 0.4 mg Cap Take 2 capsules (0.8 mg total) by mouth once daily. 180 capsule 4     vit A/vit C/vit E/zinc/copper (OCUVITE PRESERVISION ORAL) Take 1 tablet by mouth 2 (two) times a day.          Review of patient's allergies indicates:  No Known Allergies    Past Medical History:   Diagnosis Date    A-fib     Anticoagulant long-term use     BPH (benign prostatic hyperplasia)      Rojas catheter in place     Kidney stone     PONV (postoperative nausea and vomiting)     Thyroid disease     Wears glasses      Past Surgical History:   Procedure Laterality Date    ANKLE DEBRIDEMENT      CARDIOVERSION      CYSTOSCOPY W/ RETROGRADES Bilateral 3/1/2021    Procedure: CYSTOSCOPY, WITH RETROGRADE PYELOGRAM;  Surgeon: Lior Flores MD;  Location: [facility];  Service: Urology;  Laterality: Bilateral;    CYSTOSCOPY WITH BIOPSY OF BLADDER N/A 3/1/2021    Procedure: CYSTOSCOPY, WITH BLADDER BIOPSY, WITH FULGURATION IF INDICATED;  Surgeon: Lior Flores MD;  Location: [facility] OR;  Service: Urology;  Laterality: N/A;    DEBRIDEMENT OF LOWER EXTREMITY Right 2/1/2021    Procedure: DEBRIDEMENT, LOWER EXTREMITY;  Surgeon: Pete Cardenas MD;  Location: [facility] OR;  Service: General;  Laterality: Right;    INCISION AND DRAINAGE Right 2/1/2021    Procedure: Incision and Drainage;  Surgeon: Pete Cardenas MD;  Location: [facility] OR;  Service: General;  Laterality: Right;    THYROIDECTOMY  1970's    TONSILLECTOMY      TONSILLECTOMY, ADENOIDECTOMY      UMBILICAL HERNIA REPAIR N/A 3/31/2023    Procedure: REPAIR, HERNIA, UMBILICAL;  Surgeon: Gino Randhawa Jr., MD;  Location: [facility] OR;  Service: General;  Laterality: N/A;     Family History       Problem Relation (Age of Onset)    Benign prostatic hyperplasia Brother    Heart disease Mother, Father          Tobacco Use    Smoking status: Never    Smokeless tobacco: Never   Substance and Sexual Activity    Alcohol use: Yes     Comment: OCC    Drug use: No    Sexual activity: Not Currently     Review of Systems   All other systems reviewed and are negative.  Objective:     Vital Signs (Most Recent):  Temp: 98 °F (36.7 °C) (05/01/23 1500)  Pulse: 77 (05/01/23 1500)  Resp: 20 (05/01/23 1500)  BP: (!) 159/80 (05/01/23 1500)  SpO2: 99 % (05/01/23 1500)   Vital Signs (24h Range):  Temp:  [97.9 °F (36.6 °C)-98.5 °F (36.9 °C)] 98 °F (36.7 °C)  Pulse:  [58-77] 77  Resp:   [17-31] 20  SpO2:  [93 %-99 %] 99 %  BP: (105-159)/(55-93) 159/80     Weight: 125.6 kg (276 lb 14.4 oz)  Body mass index is 37.55 kg/m².    Date 05/01/23 0700 - 05/02/23 0659   Shift 2659-2531 5088-7722 9954-3390 24 Hour Total   INTAKE   P.O. 0 0  0   Shift Total(mL/kg) 0(0) 0(0)  0(0)   OUTPUT   Shift Total(mL/kg)       Weight (kg) 125.6 125.6 125.6 125.6       Physical Exam  Vitals reviewed.   Constitutional:       Appearance: Normal appearance.   HENT:      Head: Normocephalic.      Comments: Forehead lac repaired  Cardiovascular:      Rate and Rhythm: Normal rate and regular rhythm.      Heart sounds: Normal heart sounds.      Comments: Mino calves dressed  Pulmonary:      Effort: Pulmonary effort is normal.      Breath sounds: Normal breath sounds.   Abdominal:      Palpations: Abdomen is soft.      Tenderness: There is no abdominal tenderness.   Skin:     General: Skin is warm.      Capillary Refill: Capillary refill takes less than 2 seconds.   Neurological:      Mental Status: He is alert.       Significant Labs:  CBC:   Recent Labs   Lab 05/01/23 0441   WBC 6.17   RBC 4.36*   HGB 12.5*   HCT 39.7*      MCV 91   MCH 28.7   MCHC 31.5*     CMP:   Recent Labs   Lab 04/29/23  1946 05/01/23  0441   * 117*   CALCIUM 8.8 8.2*   ALBUMIN 3.1*  --    PROT 7.1  --     140   K 3.6 3.6   CO2 23 28    108   BUN 13 17   CREATININE 0.9 1.1   ALKPHOS 118  --    ALT 14  --    AST 23  --    BILITOT 1.0  --      Coagulation:   Recent Labs   Lab 04/30/23  0222 04/30/23  0913 05/01/23  0441   LABPROT 11.5  --   --    INR 1.1  --   --    APTT 33.6*   < > 42.7*    < > = values in this interval not displayed.       Significant Diagnostics:  I have reviewed all pertinent imaging results/findings within the past 24 hours.    Assessment/Plan:   Patient has good oxygen saturation and is not having any shortness of breath or chest pain, however CT scan shows bilateral PE and evidence of right heart strain also  confirmed on echocardiogram, which I reviewed with Dr. Bowman.  In my opinion, he may benefit from thrombolysis of the PE.    He appears to be tolerating anticoagulation, and I see no indication for an IVC filter at this time.    I discussed thrombolysis with the patient at length, and he is agreeable to proceeding.      Active Diagnoses:    Diagnosis Date Noted POA    PRINCIPAL PROBLEM:  Bilateral pulmonary embolism [I26.99] 04/30/2023 Yes    Closed fracture of multiple ribs of left side [S22.42XA] 04/30/2023 Yes    Syncope [R55] 04/29/2023 Yes    Obesity (BMI 30-39.9) [E66.9] 03/28/2023 Yes    Venous stasis dermatitis of both lower extremities [I87.2] 03/04/2021 Yes    Urinary retention [R33.9] 03/01/2021 Yes    Stage 2 chronic kidney disease [N18.2] 01/27/2021 Yes    Paroxysmal atrial fibrillation [I48.0]  Yes    BPH (benign prostatic hyperplasia) [N40.0] 10/02/2012 Yes      Problems Resolved During this Admission:       Thank you for your consult. I will follow-up with patient. Please contact us if you have any additional questions.    Ali Khoobehi, MD  Vascular Surgery  Pending sale to [facility]

## 2023-05-01 NOTE — NURSING
Wound care consulted for wounds to bilateral lower legs. Patient with chronic stasis ulcers to bilateral lower legs. Patient is known to Dr. Chaidez as he follows up outpatient with him. Dr. Chaidez at bedside and rounded on this patient. Removed compression wraps as these are contraindicated due to DVT status. Treatment plan for bilateral lower legs as per Dr. Chaidez. Clean bilateral lower legs with CHG and dry. Apply Aquaphor to bilateral lower legs skin that is intact then Xeroform over the open wound areas then cover with an abd pad and loosely wrap with kerlix and secure with an ace wrap and change Mon-Wed-Fri and as needed. Patient encouraged to elevate legs as much as possible to help keep swelling from worsening.   Left hand skin tears x 2 sites from fall at home. Cleaned left hand and wrist areas with CHG and dried. Applied skin barrier prep to the surrounding skin. Applied steri strips to the skin that was remaining from the skin tear area then covered with adaptic and a foam mepilex dressing. Secured left hand foam with kerlix. Patient tolerated all wound care well. Wound care to follow up throughout hospital stay.     Close up left lower leg measures 3cm x 1cm x 0.4cm    Left lower leg venous stasis ulcers     Left hand and wrist skin tears     Right lower leg     Left 5th toe scabbed area    Right lower lateral leg venous stasis ulcer     Bilateral lower legs right greater than left swollen

## 2023-05-01 NOTE — OP NOTE
[facility]  Vascular Surgery  Operative Note    SUMMARY     Date of Procedure: 5/1/2023     Procedure: Procedure(s) (LRB):  Pulmonary angiography (N/A) , initiation of arterial thrombolysis pulmonary arteries    Surgeon(s) and Role:     * Ali Khoobehi, MD - Primary    Assisting Surgeon: None    Pre-Operative Diagnosis: Acute pulmonary embolism [I26.99]    Post-Operative Diagnosis: Post-Op Diagnosis Codes:     * Acute pulmonary embolism [I26.99]    Anesthesia: Local    Operative Findings (including complications, if any): large thrombus primarily in main left PA    Description of Technical Procedures:   Indications, risks, and benefits of  venogram, pulmonary artery thrombolysis were discussed with the patient. Risks discussed included possible bleeding, infection, cardiac arrest, limb loss, renal failure, nerve injury, stroke, and death, among other risks. Patient was agreeable for the procedure and signed consent.    Patient was brought to the procedure room and placed under sedation. Under my direct supervision, moderate sedation was administered with an initial dose of Versed (2 mg) and Fentanyl (150 mcgs). These were readministered during the course of the case. An independent observer was present throughout the procedure, there was continuous monitoring of cardiac telemetry, hemodynamics and pulse oximetry. I was personally present and spent 30 minutes face to face time during sedation administration.    Using ultrasound guidance access was obtained at the right common femoral vein. 6F sheath was placed.     I was able to enter the PA with a pigtail catheter and a glide wire. Angiogram shows thrombus at the distal left PA. 10 cm thrombolysis catheter was placed in the left PA with the proximal end of the catheter past the bifurcation ot allow TPA to flow into the right side as well.    Catheter was secured.  Patient left the procedure room in stable condition.    Significant Surgical Tasks  Conducted by the Assistant(s), if Applicable: n/a    Estimated Blood Loss (EBL): 5 cc           Implants: * No implants in log *    Specimens:   Specimen (24h ago, onward)      None                    Condition: Good    Disposition: PACU - hemodynamically stable.    Attestation: I performed the procedure.

## 2023-05-02 VITALS
HEART RATE: 63 BPM | BODY MASS INDEX: 37.38 KG/M2 | HEIGHT: 72 IN | RESPIRATION RATE: 27 BRPM | SYSTOLIC BLOOD PRESSURE: 148 MMHG | TEMPERATURE: 99 F | OXYGEN SATURATION: 98 % | DIASTOLIC BLOOD PRESSURE: 68 MMHG | WEIGHT: 276 LBS

## 2023-05-02 LAB
ANION GAP SERPL CALC-SCNC: 8 MMOL/L (ref 8–16)
AORTIC ROOT ANNULUS: 3.5 CM
AORTIC VALVE CUSP SEPERATION: 2.7 CM
APTT PPP: 29.7 SEC (ref 21–32)
AV INDEX (PROSTH): 0.51
AV MEAN GRADIENT: 6 MMHG
AV PEAK GRADIENT: 8 MMHG
AV VALVE AREA: 1.93 CM2
AV VELOCITY RATIO: 0.63
BASOPHILS # BLD AUTO: 0.02 K/UL (ref 0–0.2)
BASOPHILS NFR BLD: 0.3 % (ref 0–1.9)
BSA FOR ECHO PROCEDURE: 2.53 M2
BUN SERPL-MCNC: 14 MG/DL (ref 8–23)
CALCIUM SERPL-MCNC: 8.3 MG/DL (ref 8.7–10.5)
CHLORIDE SERPL-SCNC: 107 MMOL/L (ref 95–110)
CO2 SERPL-SCNC: 26 MMOL/L (ref 23–29)
CREAT SERPL-MCNC: 0.9 MG/DL (ref 0.5–1.4)
CV ECHO LV RWT: 0.5 CM
DIFFERENTIAL METHOD: ABNORMAL
DOP CALC AO PEAK VEL: 1.42 M/S
DOP CALC AO VTI: 26.4 CM
DOP CALC LVOT AREA: 3.8 CM2
DOP CALC LVOT DIAMETER: 2.2 CM
DOP CALC LVOT PEAK VEL: 0.9 M/S
DOP CALC LVOT STROKE VOLUME: 50.91 CM3
DOP CALC MV VTI: 21.6 CM
DOP CALCLVOT PEAK VEL VTI: 13.4 CM
E WAVE DECELERATION TIME: 250 MSEC
E/A RATIO: 0.61
E/E' RATIO: 9.83 M/S
ECHO LV POSTERIOR WALL: 1.32 CM (ref 0.6–1.1)
EJECTION FRACTION: 70 %
EOSINOPHIL # BLD AUTO: 0.3 K/UL (ref 0–0.5)
EOSINOPHIL NFR BLD: 4.1 % (ref 0–8)
ERYTHROCYTE [DISTWIDTH] IN BLOOD BY AUTOMATED COUNT: 15.6 % (ref 11.5–14.5)
EST. GFR  (NO RACE VARIABLE): >60 ML/MIN/1.73 M^2
FRACTIONAL SHORTENING: 38 % (ref 28–44)
GLUCOSE SERPL-MCNC: 110 MG/DL (ref 70–110)
HCT VFR BLD AUTO: 42.6 % (ref 40–54)
HGB BLD-MCNC: 13.6 G/DL (ref 14–18)
IMM GRANULOCYTES # BLD AUTO: 0.09 K/UL (ref 0–0.04)
IMM GRANULOCYTES NFR BLD AUTO: 1.3 % (ref 0–0.5)
INTERVENTRICULAR SEPTUM: 1.34 CM (ref 0.6–1.1)
IVRT: 92 MSEC
LEFT ATRIUM VOLUME INDEX MOD: 26.6 ML/M2
LEFT ATRIUM VOLUME MOD: 65.2 CM3
LEFT INTERNAL DIMENSION IN SYSTOLE: 3.23 CM (ref 2.1–4)
LEFT VENTRICLE DIASTOLIC VOLUME INDEX: 53.57 ML/M2
LEFT VENTRICLE DIASTOLIC VOLUME: 131.24 ML
LEFT VENTRICLE MASS INDEX: 118 G/M2
LEFT VENTRICLE SYSTOLIC VOLUME INDEX: 17.1 ML/M2
LEFT VENTRICLE SYSTOLIC VOLUME: 41.9 ML
LEFT VENTRICULAR INTERNAL DIMENSION IN DIASTOLE: 5.23 CM (ref 3.5–6)
LEFT VENTRICULAR MASS: 290.23 G
LV LATERAL E/E' RATIO: 8.43 M/S
LV SEPTAL E/E' RATIO: 11.8 M/S
LVOT MG: 2 MMHG
LVOT MV: 0.64 CM/S
LYMPHOCYTES # BLD AUTO: 0.6 K/UL (ref 1–4.8)
LYMPHOCYTES NFR BLD: 8.8 % (ref 18–48)
MCH RBC QN AUTO: 29.2 PG (ref 27–31)
MCHC RBC AUTO-ENTMCNC: 31.9 G/DL (ref 32–36)
MCV RBC AUTO: 91 FL (ref 82–98)
MONOCYTES # BLD AUTO: 0.6 K/UL (ref 0.3–1)
MONOCYTES NFR BLD: 8.4 % (ref 4–15)
MV MEAN GRADIENT: 2 MMHG
MV PEAK A VEL: 0.97 M/S
MV PEAK E VEL: 0.59 M/S
MV PEAK GRADIENT: 4 MMHG
MV STENOSIS PRESSURE HALF TIME: 82 MS
MV VALVE AREA BY CONTINUITY EQUATION: 2.36 CM2
MV VALVE AREA P 1/2 METHOD: 2.68 CM2
NEUTROPHILS # BLD AUTO: 5.3 K/UL (ref 1.8–7.7)
NEUTROPHILS NFR BLD: 77.1 % (ref 38–73)
NRBC BLD-RTO: 0 /100 WBC
OHS LV EJECTION FRACTION SIMPSONS BIPLANE MOD: 5 %
PISA MRMAX VEL: 3.22 M/S
PISA TR MAX VEL: 2.72 M/S
PLATELET # BLD AUTO: 190 K/UL (ref 150–450)
PMV BLD AUTO: 9.9 FL (ref 9.2–12.9)
POTASSIUM SERPL-SCNC: 3.7 MMOL/L (ref 3.5–5.1)
PV MV: 0.61 M/S
PV PEAK VELOCITY: 0.74 CM/S
RA MAJOR: 5.84 CM
RA WIDTH: 4.04 CM
RBC # BLD AUTO: 4.66 M/UL (ref 4.6–6.2)
RIGHT VENTRICULAR END-DIASTOLIC DIMENSION: 4.04 CM
RIGHT VENTRICULAR LENGTH IN DIASTOLE (APICAL 4-CHAMBER VIEW): 6.47 CM
RV TISSUE DOPPLER FREE WALL SYSTOLIC VELOCITY 1 (APICAL 4 CHAMBER VIEW): 0.01 CM/S
SODIUM SERPL-SCNC: 141 MMOL/L (ref 136–145)
TDI LATERAL: 0.07 M/S
TDI SEPTAL: 0.05 M/S
TDI: 0.06 M/S
TR MAX PG: 30 MMHG
TRICUSPID ANNULAR PLANE SYSTOLIC EXCURSION: 2.65 CM
WBC # BLD AUTO: 6.82 K/UL (ref 3.9–12.7)

## 2023-05-02 PROCEDURE — 85025 COMPLETE CBC W/AUTO DIFF WBC: CPT | Performed by: INTERNAL MEDICINE

## 2023-05-02 PROCEDURE — 82962 GLUCOSE BLOOD TEST: CPT

## 2023-05-02 PROCEDURE — 36415 COLL VENOUS BLD VENIPUNCTURE: CPT | Performed by: HOSPITALIST

## 2023-05-02 PROCEDURE — 80048 BASIC METABOLIC PNL TOTAL CA: CPT | Performed by: INTERNAL MEDICINE

## 2023-05-02 PROCEDURE — 25000003 PHARM REV CODE 250: Performed by: HOSPITALIST

## 2023-05-02 PROCEDURE — 25000003 PHARM REV CODE 250: Performed by: INTERNAL MEDICINE

## 2023-05-02 PROCEDURE — 25000003 PHARM REV CODE 250: Performed by: FAMILY MEDICINE

## 2023-05-02 PROCEDURE — 25000003 PHARM REV CODE 250

## 2023-05-02 PROCEDURE — 85730 THROMBOPLASTIN TIME PARTIAL: CPT | Performed by: HOSPITALIST

## 2023-05-02 PROCEDURE — 25000003 PHARM REV CODE 250: Performed by: STUDENT IN AN ORGANIZED HEALTH CARE EDUCATION/TRAINING PROGRAM

## 2023-05-02 PROCEDURE — 63600175 PHARM REV CODE 636 W HCPCS: Performed by: HOSPITALIST

## 2023-05-02 RX ADMIN — MUPIROCIN 1 G: 20 OINTMENT TOPICAL at 09:05

## 2023-05-02 RX ADMIN — FINASTERIDE 5 MG: 5 TABLET, FILM COATED ORAL at 09:05

## 2023-05-02 RX ADMIN — WHITE PETROLATUM 41 % TOPICAL OINTMENT: at 09:05

## 2023-05-02 RX ADMIN — CHLORHEXIDINE GLUCONATE 15 ML: 1.2 RINSE ORAL at 09:05

## 2023-05-02 RX ADMIN — TAMSULOSIN HYDROCHLORIDE 0.8 MG: 0.4 CAPSULE ORAL at 09:05

## 2023-05-02 RX ADMIN — RIVAROXABAN 15 MG: 15 TABLET, FILM COATED ORAL at 04:05

## 2023-05-02 RX ADMIN — HEPARIN SODIUM 5.17 UNITS/KG/HR: 10000 INJECTION, SOLUTION INTRAVENOUS at 02:05

## 2023-05-02 RX ADMIN — HYDROCODONE BITARTRATE AND ACETAMINOPHEN 1 TABLET: 5; 325 TABLET ORAL at 01:05

## 2023-05-02 RX ADMIN — HYDROCODONE BITARTRATE AND ACETAMINOPHEN 1 TABLET: 5; 325 TABLET ORAL at 02:05

## 2023-05-02 RX ADMIN — HYDRALAZINE HYDROCHLORIDE 50 MG: 25 TABLET ORAL at 03:05

## 2023-05-02 RX ADMIN — SOTALOL HYDROCHLORIDE 80 MG: 80 TABLET ORAL at 09:05

## 2023-05-02 RX ADMIN — HYDRALAZINE HYDROCHLORIDE 50 MG: 25 TABLET ORAL at 09:05

## 2023-05-02 NOTE — DISCHARGE INSTRUCTIONS
Do not take tub bath, or swim for three more days to be sure puncture site is healed.  Keep dressing on left groin until tomorrow am.  Shower, pat groin dry.   Not lifting over ten pounds for five more days.  Take xeralto two times a day per instructions.

## 2023-05-02 NOTE — NURSING
Pt lying in bed in no acute distress. Left groin puncture site with sheath is clean, dry and intact. No hematoma present. IV drip infusing per md order. Pt is neurologically intact. See neuro assessment in epic. Call light within reach and bed alarm on. Will continue to monitor.

## 2023-05-02 NOTE — PT/OT/SLP PROGRESS
Physical Therapy      Patient Name:  Ezequiel Escudero   MRN:  [id number]    Patient not seen today secondary to Nurse/ JACK hold, Other (Comment) (Pt had thrombolytic procedure yesterday afternoon late w/ anticoagulant drips x 24 hours.). Will follow-up 5/3/23.

## 2023-05-02 NOTE — NURSING
Nurses Note -- 4 Eyes      5/2/2023   4:47 AM      Skin assessed during: Transfer      [] No Altered Skin Integrity Present    []Prevention Measures Documented      [x] Yes- Altered Skin Integrity Present or Discovered   [x] LDA Added if Not in Epic (Describe Wound)   [] New Altered Skin Integrity was Present on Admit and Documented in LDA   [x] Wound Image Taken    Wound Care Consulted? Yes    Attending Nurse:  Samia Moya RN     Second RN/Staff Member:  LA NENA Rudd

## 2023-05-02 NOTE — PLAN OF CARE
SW sent patient information to [facility] home [facility] via careport for resumption of care.       05/02/23 1197   Post-Acute Status   Post-Acute Authorization Home [facility]   Home Health Status Referrals Sent   Patient choice form signed by patient/caregiver List with quality metrics by geographic area provided

## 2023-05-02 NOTE — PLAN OF CARE
Patient accepted by [facility] home health with start of care date 05/05/2023.       05/02/23 1630   Post-Acute Status   Post-Acute Authorization Home [facility]   Home Health Status Set-up Complete/Auth obtained

## 2023-05-02 NOTE — PROGRESS NOTES
[facility]  Adult Nutrition   Progress Note (Initial Assessment)     SUMMARY     Recommendations  Recommendation/Intervention:   1. Continue current diet as tolerated.   2. Recommend Javad BID x 14 days to assist with wound healing.  3.  to obtain daily menu choices.    Goals: 1. Pt will continue intake >/= 75% EEN / EPN. 2. Lab values trend to target range.  Nutrition Goal Status: new    Dietitian Rounds Brief  RD screen for new ICU. 72 yr old male admit after LOC with fall at home. Patient with history of bilateral LE stasis ulcers with HH wound care 3 times a week. Pt is s/p pulmonary angiogram with thrombolysis on heprin and tPA secondary to finding of 2 large PE. Patient eating well prior to procedure; less for breakfast today. Now overt signs of malnutrition. Last BM 4/29/23.   RD to monitor intake, labs and status change. RD to order Javad BID for healing.    Diet order:   Current Diet Order: Cardiac diet      Evaluation of Received Nutrient/Fluid Intake  Energy Calories Required: meeting needs  Protein Required: meeting needs  Fluid Required: not meeting needs  Tolerance: tolerating     % Intake of Estimated Energy Needs: 75 - 100 %  % Meal Intake: 50 - 75 %      Intake/Output Summary (Last 24 hours) at 5/2/2023 1417  Last data filed at 5/2/2023 1215  Gross per 24 hour   Intake 1157.02 ml   Output 1250 ml   Net -92.98 ml        Anthropometrics  Temp: 98.4 °F (36.9 °C)  Height Method: Stated  Height: 6' (182.9 cm)  Height (inches): 72 in  Weight Method: Bed Scale  Weight: 125.2 kg (276 lb 0.3 oz)  Weight (lb): 276.02 lb  Ideal Body Weight (IBW), Male: 178 lb  % Ideal Body Weight, Male (lb): 155.56 %  BMI (Calculated): 37.4       Estimated/Assessed Needs  Weight Used For Calorie Calculations: 125.2 kg (276 lb 0.3 oz)  Energy Calorie Requirements (kcal): 2070 kcal/day (16 kcal/kg)  Energy Need Method: [place]-St Jeor (2050 X 1.25 minus 500 kcal)  Protein Requirements: 121-162 (1.5-2.0  gm/kg IBW  Weight Used For Protein Calculations: 81 kg (178 lb 9.2 oz) (IBW)  Fluid Requirements (mL): 3756 mL/day (30 mL/kg)     RDA Method (mL): 2070       Reason for Assessment  Reason For Assessment: identified at risk by screening criteria (New ICU)  Diagnosis: pulmonary disease, trauma  Relevant Medical History: Afib, chronic stasis ulcers    Nutrition/Diet History  Spiritual, Cultural Beliefs, Christianity Practices, Values that Affect Care: no  Food Allergies: NKFA  Factors Affecting Nutritional Intake: None identified at this time    Nutrition Risk Screen  Nutrition Risk Screen: no indicators present     MST Score: 0  Have you recently lost weight without trying?: No  Weight loss score: 0  Have you been eating poorly because of a decreased appetite?: No  Appetite score: 0       Weight History:  Wt Readings from Last 5 Encounters:   05/02/23 125.2 kg (276 lb 0.3 oz)   04/30/23 125.6 kg (277 lb)   03/29/23 131.5 kg (289 lb 14.5 oz)   03/29/23 131.1 kg (289 lb)   03/25/23 109.8 kg (242 lb)        Lab/Procedures/Meds: Pertinent Labs/Meds Reviewed    Medications:Pertinent Medications Reviewed  Scheduled Meds:   chlorhexidine  15 mL Mouth/Throat BID    finasteride  5 mg Oral Daily    gabapentin  300 mg Oral BID    hydrALAZINE  50 mg Oral TID    mupirocin   Nasal BID    sotaloL  80 mg Oral BID    tamsulosin  0.8 mg Oral Daily    white petrolatum   Topical (Top) Daily     Continuous Infusions:   alteplase Stopped (05/02/23 0700)    heparin (porcine) in D5W Stopped (05/02/23 0700)     PRN Meds:.acetaminophen, alteplase, diptheria-tetanus toxoids, heparin (PORCINE), heparin (PORCINE), HYDROcodone-acetaminophen, melatonin, ondansetron, sodium chloride 0.9%    Labs: Pertinent Labs Reviewed  Clinical Chemistry:  Recent Labs   Lab 04/29/23  1946 05/01/23  0441 05/02/23  0346      < > 141   K 3.6   < > 3.7      < > 107   CO2 23   < > 26   *   < > 110   BUN 13   < > 14   CREATININE 0.9   < > 0.9   CALCIUM  8.8   < > 8.3*   PROT 7.1  --   --    ALBUMIN 3.1*  --   --    BILITOT 1.0  --   --    ALKPHOS 118  --   --    AST 23  --   --    ALT 14  --   --    ANIONGAP 9   < > 8   MG 1.6  --   --     < > = values in this interval not displayed.     CBC:   Recent Labs   Lab 05/02/23  0346   WBC 6.82   RBC 4.66   HGB 13.6*   HCT 42.6      MCV 91   MCH 29.2   MCHC 31.9*     Lipid Panel:  No results for input(s): CHOL, HDL, LDLCALC, TRIG, CHOLHDL in the last 168 hours.  Cardiac Profile:  Recent Labs   Lab 04/29/23 1946   *     Inflammatory Labs:  No results for input(s): CRP in the last 168 hours.  Diabetes:  No results for input(s): HGBA1C, POCTGLUCOSE in the last 168 hours.  Thyroid & Parathyroid:  Recent Labs   Lab 04/29/23 1946   TSH 1.960       Monitor and Evaluation  Food and Nutrient Intake: energy intake, food and beverage intake  Food and Nutrient Adminstration: diet order  Knowledge/Beliefs/Attitudes: food and nutrition knowledge/skill  Physical Activity and Function: nutrition-related ADLs and IADLs  Anthropometric Measurements: weight, weight change, body mass index  Biochemical Data, Medical Tests and Procedures: electrolyte and renal panel, gastrointestinal profile, glucose/endocrine profile, inflammatory profile, lipid profile  Nutrition-Focused Physical Findings: overall appearance     Nutrition Risk  Level of Risk/Frequency of Follow-up: moderate - high     Nutrition Follow-Up  RD Follow-up?: Yes      Ana Cristina Damian RD, LDN 05/02/2023 2:17 PM

## 2023-05-02 NOTE — NURSING
Pt presents to the ICU from the medical surgical unit post cath lab. Pt had pulmonary angio with sheath placement to the left groin for Heparin and TPA IV continuous infusion for bilateral pulmonary embolisms. MD order states no nomogram and/or Heparin drip changes. See md nursing communication order in epic. Left sheath infusing with TPA at 50mg or 20 ml/hr. Additionally, there is Heparin infusing at 500 units/hr or 5 ml/hr through the left groin sheath. No Normal Saline infusing. Puncture site dressing is clean, dry and intact. Left sheath is in place with no hematoma present. Spoke to pharmacy concerning pt.'s TPA IV drip. Pharmacist states the the drip can hang for up to 24 hours and that the bag does not need to be changed. Doppler pulses bilateral lower extremities. Lower extremities are ace wrapped for venous stasis ulcers. Wound care has been consulted. See documented pictures of wounds in epic. Pt instructed not to move left leg due to the left sheath. Pt states understanding. Pt is AAOx4. PERRLA. Facial symmetry. Lifts and resists bilateral upper and lower extremities. Limited movement to the left leg due to sheath. Wiggles toes, sticks out tongue and gives thumbs up. No headache present. Pt complains of lower back pain which he feels is caused by limited movement due to his left groin sheath. PRN pain medication given. See neuro assessment in [facility]. Call light within reach and bed alarm on. Will continue to monitor.

## 2023-05-02 NOTE — PLAN OF CARE
Problem: Infection  Goal: Absence of Infection Signs and Symptoms  Outcome: Ongoing, Progressing     Problem: Fall Injury Risk  Goal: Absence of Fall and Fall-Related Injury  Outcome: Ongoing, Progressing     Problem: Skin Injury Risk Increased  Goal: Skin Health and Integrity  Outcome: Ongoing, Progressing     Problem: Oral Intake Inadequate  Goal: Improved Oral Intake  Outcome: Ongoing, Progressing     Problem: VTE (Venous Thromboembolism)  Goal: VTE (Venous Thromboembolism) Symptom Resolution  Outcome: Ongoing, Progressing   Pt w/o drainage to left groin puncture site. Pt states he is ready to go home. Will evaluate his mobility prior to letting him go home for safety. Will instruct pt on groin care of puncture site. Will educate on taking xeralto .

## 2023-05-02 NOTE — NURSING
Patient transferred to room 2201 via bed with belongings and personal wheel chair.  Patient notified family himself of transfer.  VSS and NAD at time of transfer, receiving nurse Samia at BS.

## 2023-05-02 NOTE — PT/OT/SLP PROGRESS
Occupational Therapy      Patient Name:  Ezequiel Escudero   MRN:  [id number]    Patient not seen today secondary to  (hold per RN, bed rest.). Will follow-up tomorrow.    5/2/2023

## 2023-05-02 NOTE — PLAN OF CARE
Patient cleared for discharge from case management standpoint.    SW attempted to contact patient's PCP for hospital follow up.  Per answering service, office is closed for today.  Patient to contact to schedule 1 week hospital follow up       05/02/23 1631   Final Note   Assessment Type Final Discharge Note   Anticipated Discharge Disposition Home-[facility]   Hospital Resources/Appts/Education Provided Post-Acute resouces added to AVS;Community resources provided;Provided patient/caregiver with written discharge plan information

## 2023-05-02 NOTE — NURSING
Pt complaining of lower back pain. Dr. George present at bedside assessing pt. Dr. George feels that pain is musculoskeletal. Will give additional dose of pain medication. See MAR for pain medication in epic. Will continue to monitor.

## 2023-05-02 NOTE — NURSING
Pt lying in bed in no acute distress. Left groin sheath puncture site has no hematoma. IV TPA and Heparin infusing per md order through sheath. Pt resting quietly with no complaints. Neurologically intact. Call light within reach. Will continue to monitor.

## 2023-05-02 NOTE — PLAN OF CARE
Problem: Skin Injury Risk Increased  Goal: Skin Health and Integrity  Intervention: Promote and Optimize Oral Intake  Flowsheets (Taken 5/2/2023 1425)  Oral Nutrition Promotion: calorie-dense liquids provided-- Javad BID X 14 days for wounds.     Problem: Oral Intake Inadequate  Goal: Improved Oral Intake  Intervention: Promote and Optimize Oral Intake  Flowsheets (Taken 5/2/2023 1425)  Oral Nutrition Promotion: calorie-dense liquids provided

## 2023-05-02 NOTE — NURSING
Pt ambulated in room using a walker. He ambulated around bed several times s/o any s/s of dizziness. Pt up in chair.will be dc'd after supper per MD order. Pt calling his ride to come in.

## 2023-05-03 NOTE — DISCHARGE SUMMARY
[facility] Medicine  Discharge Summary      Patient Name: Ezequiel Escudero  MRN: [id number]  Admission Date: 4/29/2023  Hospital Length of Stay: 2 days  Discharge Date and Time: 5/2/2023  6:34 PM  Attending Physician: No att. providers found   Discharging Provider: Soni Escudero MD  Primary Care Provider: Santiago Beebe MD        HPI -  72-year-old  male with multiple medical comorbidities including but not limited to morbid obesity, paroxysmal atrial fibrillation, BPH complicated by urinary retention, chronic lymphedema and bilateral lower extremity stasis dermatitis admitted after suffering an unwitnessed syncopal episode at home.     Workup here revealed bilateral pulmonary arterial embolism as well as bilateral DVT.  Initially on enoxaparin and later switched to heparin.  He reports stopping rivaroxaban for over a month now following right hip replacement in February 2023 and incarcerated umbilical hernia repair in March 2023.  Vascular surgery consulted to evaluate for possible intervention for PE.     Of note -  Trauma workup on admission notable for oblique nondisplaced fracture of the left 4th metacarpal and left posterolateral 7th to 9th rib fractures.    Also with forehead laceration.      Found to have urinary retention during his previous hospitalization and was discharged with indwelling Rojas catheter with recommendations for Urology follow up but was unable to do so. Rojas catheter was exchanged on admission. Seen here by Urology; oxybutynin discontinued.  Recommended voiding trial when able.     Hospital course:  During hospital course, patient was evaluated by vascular surgery and after discussion, taken for arterial thrombolysis of pulmonary arteries. Patient doing well. Agreeable to restarting xarelto. Patient cleared for discharge on treatment dosing of xarelto. Home health also has been arranged. Patient should follow-up this week either with wound  clinic, PCP, or home health to assess for removal of sutures of laceration.    Physical Exam  Constitutional:       Appearance: Normal appearance.   HENT:      Head: Normocephalic.      Comments: Forehead laceration  Eyes:      General: No scleral icterus.     Extraocular Movements: Extraocular movements intact.   Cardiovascular:      Rate and Rhythm: Normal rate and regular rhythm.   Pulmonary:      Breath sounds: No wheezing or rhonchi.   Abdominal:      General: There is no distension.      Palpations: Abdomen is soft.      Tenderness: There is no abdominal tenderness.   Musculoskeletal:      Cervical back: Neck supple. No tenderness.      Right lower leg: No edema.      Left lower leg: No edema.   Neurological:      General: No focal deficit present.      Mental Status: He is alert and oriented to person, place, and time.   Psychiatric:         Mood and Affect: Mood normal.         Thought Content: Thought content normal.         Judgment: Judgment normal.        Procedure(s) (LRB):  Pulmonary angiography (N/A)      Consults:   Consults (From admission, onward)          Status Ordering Provider     Inpatient consult to Vascular Surgery  Once        Provider:  Ezequiel Morfin MD    Completed TRACIE RIDLEY     Inpatient consult to Cardiothoracic Surgery  Once        Provider:  Ezequiel Morfin MD    Completed ROHAN FORTUNE     Inpatient consult to Urology  Once        Provider:  Wood Bradshaw Jr., MD    Completed TRACY PUGA            Final Active Diagnoses:    Diagnosis Date Noted POA    PRINCIPAL PROBLEM:  Bilateral pulmonary embolism [I26.99] 04/30/2023 Yes    Closed fracture of multiple ribs of left side [S22.42XA] 04/30/2023 Yes    Syncope [R55] 04/29/2023 Yes    Obesity (BMI 30-39.9) [E66.9] 03/28/2023 Yes    Venous stasis dermatitis of both lower extremities [I87.2] 03/04/2021 Yes    Urinary retention [R33.9] 03/01/2021 Yes    Stage 2 chronic kidney disease [N18.2] 01/27/2021 Yes     Paroxysmal atrial fibrillation [I48.0]  Yes    BPH (benign prostatic hyperplasia) [N40.0] 10/02/2012 Yes      Problems Resolved During this Admission:      Discharged Condition: stable    Disposition: Home-Health Care [facility]    Follow Up:   Follow-up Information       Santiago Beebe MD. Call on 5/3/2023.    Specialty: Cardiology  Why: Wed to schedule hospital follow up  Contact information:  [address]  [place] [address]  [phone]               [facility] Follow up.    Specialty: Home Health Services  Why: Home Health  Contact information:  [address]  [address]  [place] [address]  [phone]                           Patient Instructions:      Ambulatory referral/consult to Wound Clinic   Standing Status: Future   Referral Priority: Routine Referral Type: Consultation   Referral Reason: Specialty Services Required   Referred to Provider: CHARLY SUMMERS Requested Specialty: Wound Care   Number of Visits Requested: 1     SUBSEQUENT HOME HEALTH ORDERS   Order Comments: RESUME HOME HEALTH PT/OT EVALUATE AND TREAT     Order Specific Question Answer Comments   What Home Health Agency is the patient currently using? Other/External      Activity as tolerated   Order Comments: Evaluate and treat with PT/OT     Medications:  Reconciled Home Medications:      Medication List        CHANGE how you take these medications      finasteride 5 mg tablet  Commonly known as: PROSCAR  Take 1 tablet by mouth once daily  What changed: when to take this     * rivaroxaban 15 mg Tab  Commonly known as: XARELTO  Take 1 tablet (15 mg total) by mouth 2 (two) times daily with meals. for 21 days  What changed: You were already taking a medication with the same name, and this prescription was added. Make sure you understand how and when to take each.     * rivaroxaban 20 mg Tab  Commonly known as: XARELTO  Take 1 tablet (20 mg total) by mouth daily with breakfast.  Start taking on: May 24, 2023  What  changed:   when to take this  These instructions start on May 24, 2023. If you are unsure what to do until then, ask your doctor or other care provider.           * This list has 2 medication(s) that are the same as other medications prescribed for you. Read the directions carefully, and ask your doctor or other care provider to review them with you.                CONTINUE taking these medications      hydrALAZINE 100 MG tablet  Commonly known as: APRESOLINE  Take 100 mg by mouth 2 (two) times daily.     meclizine 25 mg tablet  Commonly known as: ANTIVERT  Take 25 mg by mouth 3 (three) times daily as needed.     OCUVITE PRESERVISION ORAL  Take 1 tablet by mouth 2 (two) times a day.     sotaloL 80 MG tablet  Commonly known as: BETAPACE  Take 80 mg by mouth 2 (two) times daily.     tamsulosin 0.4 mg Cap  Commonly known as: FLOMAX  Take 2 capsules (0.8 mg total) by mouth once daily.            ASK your doctor about these medications      oxybutynin 5 MG Tab  Commonly known as: DITROPAN  Take 1 tablet (5 mg total) by mouth 3 (three) times daily. for 7 days  Ask about: Should I take this medication?              Significant Diagnostic Studies: Labs: All labs within the past 24 hours have been reviewed    Pending Diagnostic Studies:       None          Indwelling Lines/Drains at time of discharge:   Lines/Drains/Airways       None                   Time spent on the discharge of patient:   39 minutes         Soni Escudero MD  Department of Hospital Medicine  [facility]

## 2023-05-08 NOTE — PROGRESS NOTES
GENERAL SURGERY  POST-OP PROGRESS NOTE    HPI: Ezequiel Escudero is a 72 y.o. male status post open repair of incarcerated umbilical hernia with mesh here for follow-up. Overall doing well.  Pain is tolerable. No drainage from incision site.  Some underlying fullness is present however.  No nausea, vomiting, fevers, chills.    VITALS:  Vitals:    04/12/23 1013   BP: 113/61   Pulse: 80   Temp: 98 °F (36.7 °C)       PHYSICAL EXAM:  GEN: No acute distress, alert orient x3  HEENT: Anicteric sclera  CV: Regular rate rhythm  RESP: Nonlabored breathing  ABD: Mid abdomen with umbilicus surgically absent, sutures in place, incision intact, mild-to-moderate underlying fluid collection but no signs of infection, appropriate tenderness palpation        ASSESSMENT & PLAN:  72 y.o. male s/p open repair of incarcerated umbilical hernia with mesh  -overall doing well  -sutures removed  -recommended against straining and heavy lifting for a total 6 weeks from the day of surgery  -okay to shower using soap and water around incision  -return to clinic p.r.n.

## 2023-05-15 ENCOUNTER — OFFICE VISIT (OUTPATIENT)
Dept: WOUND CARE | Facility: HOSPITAL | Age: 73
End: 2023-05-15
Attending: FAMILY MEDICINE
Payer: MEDICARE

## 2023-05-15 VITALS
RESPIRATION RATE: 16 BRPM | SYSTOLIC BLOOD PRESSURE: 128 MMHG | DIASTOLIC BLOOD PRESSURE: 68 MMHG | TEMPERATURE: 98 F | HEART RATE: 57 BPM

## 2023-05-15 DIAGNOSIS — I87.311 IDIOPATHIC CHRONIC VENOUS HYPERTENSION OF RIGHT LOWER EXTREMITY WITH ULCER: ICD-10-CM

## 2023-05-15 DIAGNOSIS — L97.213 VENOUS STASIS ULCER OF RIGHT CALF WITH NECROSIS OF MUSCLE WITHOUT VARICOSE VEINS: ICD-10-CM

## 2023-05-15 DIAGNOSIS — I87.8 VENOUS STASIS: Primary | ICD-10-CM

## 2023-05-15 DIAGNOSIS — L97.919 IDIOPATHIC CHRONIC VENOUS HYPERTENSION OF RIGHT LOWER EXTREMITY WITH ULCER: ICD-10-CM

## 2023-05-15 DIAGNOSIS — L97.919 VENOUS ULCER OF RIGHT LEG: ICD-10-CM

## 2023-05-15 DIAGNOSIS — I83.019 VENOUS ULCER OF RIGHT LEG: ICD-10-CM

## 2023-05-15 DIAGNOSIS — L97.812 NON-PRESSURE CHRONIC ULCER OF OTHER PART OF RIGHT LOWER LEG WITH FAT LAYER EXPOSED: ICD-10-CM

## 2023-05-15 DIAGNOSIS — S01.81XA: ICD-10-CM

## 2023-05-15 DIAGNOSIS — I87.2 VENOUS STASIS ULCER OF RIGHT CALF WITH NECROSIS OF MUSCLE WITHOUT VARICOSE VEINS: ICD-10-CM

## 2023-05-15 DIAGNOSIS — L97.929 VENOUS ULCER OF LEFT LEG: ICD-10-CM

## 2023-05-15 DIAGNOSIS — I87.2 VENOUS STASIS DERMATITIS OF BOTH LOWER EXTREMITIES: ICD-10-CM

## 2023-05-15 DIAGNOSIS — I83.029 VENOUS ULCER OF LEFT LEG: ICD-10-CM

## 2023-05-15 PROCEDURE — 3044F PR MOST RECENT HEMOGLOBIN A1C LEVEL <7.0%: ICD-10-PCS | Mod: CPTII,,, | Performed by: FAMILY MEDICINE

## 2023-05-15 PROCEDURE — 1126F PR PAIN SEVERITY QUANTIFIED, NO PAIN PRESENT: ICD-10-PCS | Mod: CPTII,,, | Performed by: FAMILY MEDICINE

## 2023-05-15 PROCEDURE — 99214 OFFICE O/P EST MOD 30 MIN: CPT | Mod: ,,, | Performed by: FAMILY MEDICINE

## 2023-05-15 PROCEDURE — 1111F PR DISCHARGE MEDS RECONCILED W/ CURRENT OUTPATIENT MED LIST: ICD-10-PCS | Mod: CPTII,,, | Performed by: FAMILY MEDICINE

## 2023-05-15 PROCEDURE — 3288F PR FALLS RISK ASSESSMENT DOCUMENTED: ICD-10-PCS | Mod: CPTII,,, | Performed by: FAMILY MEDICINE

## 2023-05-15 PROCEDURE — 3078F PR MOST RECENT DIASTOLIC BLOOD PRESSURE < 80 MM HG: ICD-10-PCS | Mod: CPTII,,, | Performed by: FAMILY MEDICINE

## 2023-05-15 PROCEDURE — 1160F PR REVIEW ALL MEDS BY PRESCRIBER/CLIN PHARMACIST DOCUMENTED: ICD-10-PCS | Mod: CPTII,,, | Performed by: FAMILY MEDICINE

## 2023-05-15 PROCEDURE — 3074F PR MOST RECENT SYSTOLIC BLOOD PRESSURE < 130 MM HG: ICD-10-PCS | Mod: CPTII,,, | Performed by: FAMILY MEDICINE

## 2023-05-15 PROCEDURE — 1100F PTFALLS ASSESS-DOCD GE2>/YR: CPT | Mod: CPTII,,, | Performed by: FAMILY MEDICINE

## 2023-05-15 PROCEDURE — 3074F SYST BP LT 130 MM HG: CPT | Mod: CPTII,,, | Performed by: FAMILY MEDICINE

## 2023-05-15 PROCEDURE — 3044F HG A1C LEVEL LT 7.0%: CPT | Mod: CPTII,,, | Performed by: FAMILY MEDICINE

## 2023-05-15 PROCEDURE — 99214 PR OFFICE/OUTPT VISIT, EST, LEVL IV, 30-39 MIN: ICD-10-PCS | Mod: ,,, | Performed by: FAMILY MEDICINE

## 2023-05-15 PROCEDURE — 3288F FALL RISK ASSESSMENT DOCD: CPT | Mod: CPTII,,, | Performed by: FAMILY MEDICINE

## 2023-05-15 PROCEDURE — 1160F RVW MEDS BY RX/DR IN RCRD: CPT | Mod: CPTII,,, | Performed by: FAMILY MEDICINE

## 2023-05-15 PROCEDURE — 3078F DIAST BP <80 MM HG: CPT | Mod: CPTII,,, | Performed by: FAMILY MEDICINE

## 2023-05-15 PROCEDURE — 1159F PR MEDICATION LIST DOCUMENTED IN MEDICAL RECORD: ICD-10-PCS | Mod: CPTII,,, | Performed by: FAMILY MEDICINE

## 2023-05-15 PROCEDURE — 97602 WOUND(S) CARE NON-SELECTIVE: CPT | Performed by: FAMILY MEDICINE

## 2023-05-15 PROCEDURE — 1159F MED LIST DOCD IN RCRD: CPT | Mod: CPTII,,, | Performed by: FAMILY MEDICINE

## 2023-05-15 PROCEDURE — 1111F DSCHRG MED/CURRENT MED MERGE: CPT | Mod: CPTII,,, | Performed by: FAMILY MEDICINE

## 2023-05-15 PROCEDURE — 1126F AMNT PAIN NOTED NONE PRSNT: CPT | Mod: CPTII,,, | Performed by: FAMILY MEDICINE

## 2023-05-15 PROCEDURE — 1100F PR PT FALLS ASSESS DOC 2+ FALLS/FALL W/INJURY/YR: ICD-10-PCS | Mod: CPTII,,, | Performed by: FAMILY MEDICINE

## 2023-05-16 LAB
ACID FAST MOD KINY STN SPEC: NORMAL
MYCOBACTERIUM SPEC QL CULT: NORMAL

## 2023-05-17 PROBLEM — L97.919 IDIOPATHIC CHRONIC VENOUS HYPERTENSION OF RIGHT LOWER EXTREMITY WITH ULCER: Status: ACTIVE | Noted: 2023-05-17

## 2023-05-17 PROBLEM — S01.81XA LACERATION OF FOREHEAD, COMPLICATED: Status: ACTIVE | Noted: 2023-05-17

## 2023-05-17 PROBLEM — I87.311 IDIOPATHIC CHRONIC VENOUS HYPERTENSION OF RIGHT LOWER EXTREMITY WITH ULCER: Status: ACTIVE | Noted: 2023-05-17

## 2023-05-18 NOTE — PROGRESS NOTES
Wound Care Progress Note    Subjective:       Patient ID: Ezequiel Escudero is a 72 y.o. male.    Chief Complaint: Venous Stasis, Pressure Ulcer, Venous Ulcer, Wound Care, and Wound Consult    HPI  Pt seen in clinic for a FU visit for BLE venous ulcers, peripheral edema, stasis dermatitis. Pt has a new wound at this visit, a laceration of the forehead. Pt had a fall at home. He was subsequently hospitalized, and released. Here today for a FU visit.  Review of Systems   Skin:  Positive for color change and wound.        BLE venous ulcers, laceration of the forehead   All other systems reviewed and are negative.      Objective:        Physical Exam  Vitals and nursing note reviewed. Exam conducted with a chaperone present.   Constitutional:       General: He is not in acute distress.     Appearance: Normal appearance. He is not ill-appearing, toxic-appearing or diaphoretic.   Musculoskeletal:      Right lower leg: Edema present.      Left lower leg: Edema present.   Skin:     General: Skin is warm and dry.      Coloration: Skin is not jaundiced.      Findings: Bruising, lesion and rash present.      Comments: BLE venous ulcers, stasis dermatitis, and forehead laceration. See wound care assessment documentation in chart review scanned under the media tab   Neurological:      General: No focal deficit present.      Mental Status: He is alert and oriented to person, place, and time.   Psychiatric:         Mood and Affect: Mood normal.         Behavior: Behavior normal.         Thought Content: Thought content normal.         Judgment: Judgment normal.       Vitals:    05/15/23 1351   BP: 128/68   Pulse: (!) 57   Resp: 16   Temp: 98 °F (36.7 °C)       Assessment:           ICD-10-CM ICD-9-CM   1. Venous stasis  I87.8 459.81   2. Venous ulcer of right leg  I83.019 454.0    L97.919    3. Venous ulcer of left leg  I83.029 454.0    L97.929    4. Venous stasis ulcer of right calf with necrosis of muscle without varicose  veins  I87.2 459.81    L97.213 707.12     728.89   5. Venous stasis dermatitis of both lower extremities  I87.2 454.1   6. Non-pressure chronic ulcer of other part of right lower leg with fat layer exposed  L97.812 707.19   7. Idiopathic chronic venous hypertension of right lower extremity with ulcer  I87.311 459.31    L97.919    8. Laceration of forehead, complicated, initial encounter  S01.81XA 873.52                Plan:                  Ezequiel was seen today for venous stasis, pressure ulcer, venous ulcer, wound care and wound consult.    Diagnoses and all orders for this visit:    Venous stasis    Venous ulcer of right leg    Venous ulcer of left leg    Venous stasis ulcer of right calf with necrosis of muscle without varicose veins    Venous stasis dermatitis of both lower extremities    Non-pressure chronic ulcer of other part of right lower leg with fat layer exposed    Idiopathic chronic venous hypertension of right lower extremity with ulcer    Laceration of forehead, complicated, initial encounter        Much of the documentation for this visit was completed in wound docs system. Please see the attached documentation for further details about the patients care. Scanned under the media tab.

## 2023-05-29 ENCOUNTER — DOCUMENTATION ONLY (OUTPATIENT)
Dept: UROLOGY | Facility: CLINIC | Age: 73
End: 2023-05-29
Payer: MEDICARE

## 2023-05-30 ENCOUNTER — TELEPHONE (OUTPATIENT)
Dept: UROLOGY | Facility: CLINIC | Age: 73
End: 2023-05-30
Payer: MEDICARE

## 2023-05-30 NOTE — TELEPHONE ENCOUNTER
----- Message from Brooke Dallas sent at 5/30/2023  3:25 PM CDT -----  Contact: Sofia from [employer]  Type:  Needs Medical Advice    Who Called: Sof[facility] [employer]  Would the patient rather a call back or a response via [facility]? Call  Best Call Back Number:     Additional Information: need to speak to nurse about dropping off a urine specimen of a possible UTI. Please advise and thank you.

## 2023-05-31 DIAGNOSIS — R39.9 UTI SYMPTOMS: Primary | ICD-10-CM

## 2023-06-12 ENCOUNTER — OFFICE VISIT (OUTPATIENT)
Dept: WOUND CARE | Facility: HOSPITAL | Age: 73
End: 2023-06-12
Attending: FAMILY MEDICINE
Payer: MEDICARE

## 2023-06-12 VITALS
WEIGHT: 289 LBS | TEMPERATURE: 99 F | SYSTOLIC BLOOD PRESSURE: 140 MMHG | BODY MASS INDEX: 39.14 KG/M2 | HEART RATE: 76 BPM | RESPIRATION RATE: 18 BRPM | DIASTOLIC BLOOD PRESSURE: 90 MMHG | HEIGHT: 72 IN

## 2023-06-12 DIAGNOSIS — L97.812 NON-PRESSURE CHRONIC ULCER OF OTHER PART OF RIGHT LOWER LEG WITH FAT LAYER EXPOSED: Primary | ICD-10-CM

## 2023-06-12 DIAGNOSIS — I87.8 VENOUS STASIS: ICD-10-CM

## 2023-06-12 DIAGNOSIS — R60.0 PERIPHERAL EDEMA: ICD-10-CM

## 2023-06-12 DIAGNOSIS — L97.213 VENOUS STASIS ULCER OF RIGHT CALF WITH NECROSIS OF MUSCLE WITHOUT VARICOSE VEINS: ICD-10-CM

## 2023-06-12 DIAGNOSIS — I87.2 VENOUS STASIS DERMATITIS OF BOTH LOWER EXTREMITIES: ICD-10-CM

## 2023-06-12 DIAGNOSIS — I87.2 VENOUS STASIS ULCER OF RIGHT CALF WITH NECROSIS OF MUSCLE WITHOUT VARICOSE VEINS: ICD-10-CM

## 2023-06-12 PROCEDURE — 99499 UNLISTED E&M SERVICE: CPT | Mod: ,,, | Performed by: FAMILY MEDICINE

## 2023-06-12 PROCEDURE — 11042 PR DEBRIDEMENT, SKIN, SUB-Q TISSUE,=<20 SQ CM: ICD-10-PCS | Mod: ,,, | Performed by: FAMILY MEDICINE

## 2023-06-12 PROCEDURE — 11042 DBRDMT SUBQ TIS 1ST 20SQCM/<: CPT | Mod: ,,, | Performed by: FAMILY MEDICINE

## 2023-06-12 PROCEDURE — 99499 NO LOS: ICD-10-PCS | Mod: ,,, | Performed by: FAMILY MEDICINE

## 2023-06-12 PROCEDURE — 11042 DBRDMT SUBQ TIS 1ST 20SQCM/<: CPT | Performed by: FAMILY MEDICINE

## 2023-06-13 NOTE — PROGRESS NOTES
Wound Care Progress Note    Subjective:       Patient ID: Ezequiel Escudero is a 72 y.o. male.    Chief Complaint: Wound Care    HPI  Pt seen in clinic for a FU visit for a right leg venous ulcer and venous stasis dermatitis with peripheral edema. Left leg is continuing to stay stable, right leg has a venous ulcer and was actively weeping throughout the examination. No other complaints today.  Review of Systems   Cardiovascular:  Positive for leg swelling.   Skin:  Positive for rash and wound.        Right leg venous ulcer with stasis dermatitis   All other systems reviewed and are negative.      Objective:        Physical Exam  Vitals and nursing note reviewed.   Constitutional:       General: He is not in acute distress.     Appearance: Normal appearance. He is not ill-appearing, toxic-appearing or diaphoretic.   Musculoskeletal:      Right lower leg: Edema present.      Left lower leg: Edema present.   Skin:     General: Skin is warm and dry.      Findings: Lesion present.      Comments: Right leg venous ulcer, see wound care assessment documentation in chart review scanned under the media tab   Neurological:      General: No focal deficit present.      Mental Status: He is alert and oriented to person, place, and time.   Psychiatric:         Mood and Affect: Mood normal.         Behavior: Behavior normal.         Thought Content: Thought content normal.         Judgment: Judgment normal.       Vitals:    06/12/23 1324   BP: (!) 140/90   Pulse: 76   Resp: 18   Temp: 98.5 °F (36.9 °C)       Assessment:           ICD-10-CM ICD-9-CM   1. Non-pressure chronic ulcer of other part of right lower leg with fat layer exposed  L97.812 707.19   2. Venous stasis  I87.8 459.81   3. Venous stasis ulcer of right calf with necrosis of muscle without varicose veins  I87.2 459.81    L97.213 707.12     728.89   4. Venous stasis dermatitis of both lower extremities  I87.2 454.1                Plan:                  Ezequiel was  seen today for wound care.    Diagnoses and all orders for this visit:    Non-pressure chronic ulcer of other part of right lower leg with fat layer exposed    Venous stasis    Venous stasis ulcer of right calf with necrosis of muscle without varicose veins    Venous stasis dermatitis of both lower extremities      See attached wound care documentation

## 2023-07-03 PROBLEM — N39.0 UTI (URINARY TRACT INFECTION): Status: RESOLVED | Noted: 2023-03-31 | Resolved: 2023-07-03

## 2023-07-10 ENCOUNTER — OFFICE VISIT (OUTPATIENT)
Dept: WOUND CARE | Facility: HOSPITAL | Age: 73
End: 2023-07-10
Attending: FAMILY MEDICINE
Payer: MEDICARE

## 2023-07-10 DIAGNOSIS — I87.2 VENOUS STASIS DERMATITIS OF BOTH LOWER EXTREMITIES: ICD-10-CM

## 2023-07-10 DIAGNOSIS — L97.919 VENOUS ULCER OF RIGHT LEG: ICD-10-CM

## 2023-07-10 DIAGNOSIS — I87.311 IDIOPATHIC CHRONIC VENOUS HYPERTENSION OF RIGHT LOWER EXTREMITY WITH ULCER: ICD-10-CM

## 2023-07-10 DIAGNOSIS — R60.0 PERIPHERAL EDEMA: ICD-10-CM

## 2023-07-10 DIAGNOSIS — L97.812 NON-PRESSURE CHRONIC ULCER OF OTHER PART OF RIGHT LOWER LEG WITH FAT LAYER EXPOSED: Primary | ICD-10-CM

## 2023-07-10 DIAGNOSIS — L97.919 IDIOPATHIC CHRONIC VENOUS HYPERTENSION OF RIGHT LOWER EXTREMITY WITH ULCER: ICD-10-CM

## 2023-07-10 DIAGNOSIS — I87.8 VENOUS STASIS: ICD-10-CM

## 2023-07-10 DIAGNOSIS — L97.213 VENOUS STASIS ULCER OF RIGHT CALF WITH NECROSIS OF MUSCLE WITHOUT VARICOSE VEINS: ICD-10-CM

## 2023-07-10 DIAGNOSIS — I83.019 VENOUS ULCER OF RIGHT LEG: ICD-10-CM

## 2023-07-10 DIAGNOSIS — I87.2 VENOUS STASIS ULCER OF RIGHT CALF WITH NECROSIS OF MUSCLE WITHOUT VARICOSE VEINS: ICD-10-CM

## 2023-07-10 PROCEDURE — 99499 UNLISTED E&M SERVICE: CPT | Mod: ,,, | Performed by: FAMILY MEDICINE

## 2023-07-10 PROCEDURE — 99499 NO LOS: ICD-10-PCS | Mod: ,,, | Performed by: FAMILY MEDICINE

## 2023-07-10 PROCEDURE — 11042 PR DEBRIDEMENT, SKIN, SUB-Q TISSUE,=<20 SQ CM: ICD-10-PCS | Mod: ,,, | Performed by: FAMILY MEDICINE

## 2023-07-10 PROCEDURE — 11042 DBRDMT SUBQ TIS 1ST 20SQCM/<: CPT | Performed by: FAMILY MEDICINE

## 2023-07-10 PROCEDURE — 11042 DBRDMT SUBQ TIS 1ST 20SQCM/<: CPT | Mod: ,,, | Performed by: FAMILY MEDICINE

## 2023-07-10 NOTE — PROGRESS NOTES
Wound Care Progress Note    Subjective:       Patient ID: Ezequiel Escudero is a 72 y.o. male.    Chief Complaint: Wound Care, Venous Ulcer, Pressure Ulcer, Venous Stasis, and Wound Consult    HPI  Pt seen in clinic for a FU visit for right leg venous ulcer and stasis with dermatitis. Wound is improving, pt has no new complaints today.  Review of Systems   Skin:  Positive for wound.        Right leg venous ulcer   All other systems reviewed and are negative.      Objective:        Physical Exam  Vitals and nursing note reviewed.   Constitutional:       Appearance: Normal appearance.   Skin:     General: Skin is warm and dry.      Findings: Lesion and rash present.      Comments: Right leg venous ulcer with stasis dermatitis, see wound care assessment documentation in  chart review scanned under the media tab   Neurological:      General: No focal deficit present.      Mental Status: He is alert.   Psychiatric:         Judgment: Judgment normal.       There were no vitals filed for this visit.    Assessment:           ICD-10-CM ICD-9-CM   1. Non-pressure chronic ulcer of other part of right lower leg with fat layer exposed  L97.812 707.19   2. Venous stasis dermatitis of both lower extremities  I87.2 454.1   3. Venous stasis  I87.8 459.81   4. Peripheral edema  R60.9 782.3   5. Idiopathic chronic venous hypertension of right lower extremity with ulcer  I87.311 459.31    L97.919    6. Venous stasis ulcer of right calf with necrosis of muscle without varicose veins  I87.2 459.81    L97.213 707.12     728.89   7. Venous ulcer of right leg  I83.019 454.0    L97.919                 Plan:                  Ezequiel was seen today for wound care, venous ulcer, pressure ulcer, venous stasis and wound consult.    Diagnoses and all orders for this visit:    Non-pressure chronic ulcer of other part of right lower leg with fat layer exposed    Venous stasis dermatitis of both lower extremities    Venous stasis    Peripheral  edema    Idiopathic chronic venous hypertension of right lower extremity with ulcer    Venous stasis ulcer of right calf with necrosis of muscle without varicose veins    Venous ulcer of right leg      Much of the documentation for this visit was completed in wound docs system. Please see the attached documentation for further details about the patients care. Scanned under the media tab.

## 2023-07-20 ENCOUNTER — OFFICE VISIT (OUTPATIENT)
Dept: UROLOGY | Facility: CLINIC | Age: 73
End: 2023-07-20
Payer: MEDICARE

## 2023-07-20 VITALS — WEIGHT: 270 LBS | HEIGHT: 72 IN | BODY MASS INDEX: 36.57 KG/M2

## 2023-07-20 DIAGNOSIS — R33.9 URINARY RETENTION: Primary | ICD-10-CM

## 2023-07-20 DIAGNOSIS — N39.0 URINARY TRACT INFECTION WITHOUT HEMATURIA, SITE UNSPECIFIED: ICD-10-CM

## 2023-07-20 PROCEDURE — 3288F FALL RISK ASSESSMENT DOCD: CPT | Mod: CPTII,S$GLB,, | Performed by: UROLOGY

## 2023-07-20 PROCEDURE — 3044F HG A1C LEVEL LT 7.0%: CPT | Mod: CPTII,S$GLB,, | Performed by: UROLOGY

## 2023-07-20 PROCEDURE — 3288F PR FALLS RISK ASSESSMENT DOCUMENTED: ICD-10-PCS | Mod: CPTII,S$GLB,, | Performed by: UROLOGY

## 2023-07-20 PROCEDURE — 1126F PR PAIN SEVERITY QUANTIFIED, NO PAIN PRESENT: ICD-10-PCS | Mod: CPTII,S$GLB,, | Performed by: UROLOGY

## 2023-07-20 PROCEDURE — 1160F PR REVIEW ALL MEDS BY PRESCRIBER/CLIN PHARMACIST DOCUMENTED: ICD-10-PCS | Mod: CPTII,S$GLB,, | Performed by: UROLOGY

## 2023-07-20 PROCEDURE — 99214 PR OFFICE/OUTPT VISIT, EST, LEVL IV, 30-39 MIN: ICD-10-PCS | Mod: S$GLB,,, | Performed by: UROLOGY

## 2023-07-20 PROCEDURE — 1159F PR MEDICATION LIST DOCUMENTED IN MEDICAL RECORD: ICD-10-PCS | Mod: CPTII,S$GLB,, | Performed by: UROLOGY

## 2023-07-20 PROCEDURE — 1101F PT FALLS ASSESS-DOCD LE1/YR: CPT | Mod: CPTII,S$GLB,, | Performed by: UROLOGY

## 2023-07-20 PROCEDURE — 3008F BODY MASS INDEX DOCD: CPT | Mod: CPTII,S$GLB,, | Performed by: UROLOGY

## 2023-07-20 PROCEDURE — 1160F RVW MEDS BY RX/DR IN RCRD: CPT | Mod: CPTII,S$GLB,, | Performed by: UROLOGY

## 2023-07-20 PROCEDURE — 1126F AMNT PAIN NOTED NONE PRSNT: CPT | Mod: CPTII,S$GLB,, | Performed by: UROLOGY

## 2023-07-20 PROCEDURE — 1159F MED LIST DOCD IN RCRD: CPT | Mod: CPTII,S$GLB,, | Performed by: UROLOGY

## 2023-07-20 PROCEDURE — 1101F PR PT FALLS ASSESS DOC 0-1 FALLS W/OUT INJ PAST YR: ICD-10-PCS | Mod: CPTII,S$GLB,, | Performed by: UROLOGY

## 2023-07-20 PROCEDURE — 3008F PR BODY MASS INDEX (BMI) DOCUMENTED: ICD-10-PCS | Mod: CPTII,S$GLB,, | Performed by: UROLOGY

## 2023-07-20 PROCEDURE — 3044F PR MOST RECENT HEMOGLOBIN A1C LEVEL <7.0%: ICD-10-PCS | Mod: CPTII,S$GLB,, | Performed by: UROLOGY

## 2023-07-20 PROCEDURE — 99214 OFFICE O/P EST MOD 30 MIN: CPT | Mod: S$GLB,,, | Performed by: UROLOGY

## 2023-07-20 PROCEDURE — 99999 PR PBB SHADOW E&M-EST. PATIENT-LVL IV: CPT | Mod: PBBFAC,,, | Performed by: UROLOGY

## 2023-07-20 PROCEDURE — 99999 PR PBB SHADOW E&M-EST. PATIENT-LVL IV: ICD-10-PCS | Mod: PBBFAC,,, | Performed by: UROLOGY

## 2023-07-20 RX ORDER — HYDROCODONE BITARTRATE AND ACETAMINOPHEN 7.5; 325 MG/1; MG/1
1 TABLET ORAL EVERY 4 HOURS PRN
Status: ON HOLD | COMMUNITY
Start: 2023-02-08 | End: 2023-08-09

## 2023-07-20 RX ORDER — MUPIROCIN 20 MG/G
OINTMENT TOPICAL 2 TIMES DAILY
COMMUNITY
Start: 2023-02-01 | End: 2023-08-21 | Stop reason: ALTCHOICE

## 2023-07-20 RX ORDER — MV-MIN/FA/VIT K/LUTEIN/ZEAXANT 200MCG-5MG
1 CAPSULE ORAL 2 TIMES DAILY
COMMUNITY
Start: 2023-04-04

## 2023-07-20 NOTE — PROGRESS NOTES
[facility] Urology Established Patient/H&P:    Ezequiel Escudero is a 72 y.o. male who presents for urinary retention.    Patient with a several year history of enlarged prostate associated with lower urinary tract symptoms s/p TURP that was previously managed by Dr. Sellers, Dr. Flores and NP Vivian Fulton. He presented to [facility] emergency department on 4/29/23 after falling and hitting his head.      He was found to have an indwelling catheter in place on admission that was present for approximately 1 month after hernia repair. Urology consulted to assist with management.      Labs unremarkable. UA micro with >100 RBC and 21 WBC.      He is on Flomax 0.8 mg PO daily and Finasteride 5 mg. Also on Ditropan 5 mg PO TID. He was scheduled for evaluation with Dr. Chahal in the past, but did not follow up.  mL during his office visit with urology in 8/2021. Repeated in 9/2022 and was 0 mL.     Works as a surgical tech at [employer].       Interval History    7/20/23: Here today after hospital discharge. Remains with a centeno catheter in place. States he has failed voiding trials with home health. Here to discuss further management. No new complaints.     CT abdomen pelvis with contrast on 3/28/23 with cholelithiasis, renal cysts and left nephrocalcinosis.     No fever, chills or history of  malignancy.         PSA  0.9                   10/10/22     Urine culture  Enterococcus  3/28/23    Past Medical History:   Diagnosis Date    A-fib     Anticoagulant long-term use     BPH (benign prostatic hyperplasia)     Centeno catheter in place     Kidney stone     PONV (postoperative nausea and vomiting)     Thyroid disease     Wears glasses        Past Surgical History:   Procedure Laterality Date    ANKLE DEBRIDEMENT      CARDIOVERSION      CYSTOSCOPY W/ RETROGRADES Bilateral 3/1/2021    Procedure: CYSTOSCOPY, WITH RETROGRADE PYELOGRAM;  Surgeon: Lior Flores MD;  Location: [facility];  Service: Urology;  Laterality:  Bilateral;    CYSTOSCOPY WITH BIOPSY OF BLADDER N/A 3/1/2021    Procedure: CYSTOSCOPY, WITH BLADDER BIOPSY, WITH FULGURATION IF INDICATED;  Surgeon: Lior Flores MD;  Location: [facility];  Service: Urology;  Laterality: N/A;    DEBRIDEMENT OF LOWER EXTREMITY Right 2/1/2021    Procedure: DEBRIDEMENT, LOWER EXTREMITY;  Surgeon: Pete Cardenas MD;  Location: [facility] OR;  Service: General;  Laterality: Right;    INCISION AND DRAINAGE Right 2/1/2021    Procedure: Incision and Drainage;  Surgeon: Pete Cardenas MD;  Location: [facility] OR;  Service: General;  Laterality: Right;    PULMONARY ANGIOGRAPHY N/A 5/1/2023    Procedure: Pulmonary angiography;  Surgeon: Ali Khoobehi, MD;  Location: [facility] CATH/EP LAB;  Service: Vascular;  Laterality: N/A;    THYROIDECTOMY  1970's    TONSILLECTOMY      TONSILLECTOMY, ADENOIDECTOMY      UMBILICAL HERNIA REPAIR N/A 3/31/2023    Procedure: REPAIR, HERNIA, UMBILICAL;  Surgeon: Gino Randhawa Jr., MD;  Location: [facility] OR;  Service: General;  Laterality: N/A;       Review of patient's allergies indicates:  No Known Allergies    Medications Reviewed: see MAR    FOCUSED PHYSICAL EXAM:    There were no vitals filed for this visit.  Body mass index is 36.62 kg/m². Weight: 122.5 kg (270 lb) Height: 6' (182.9 cm)       General: Alert, cooperative, no distress, appears stated age  Abdomen: Soft, non-tender, no CVA tenderness, non-distended      LABS:    No results found for this or any previous visit (from the past 336 hour(s)).        Assessment/Diagnosis:    1. Urinary retention  SUBSEQUENT HOME HEALTH ORDERS    Place in Outpatient    Case Request Operating Room: CYSTOSCOPY, ULTRASOUND, RECTAL APPROACH      2. Urinary tract infection without hematuria, site unspecified  SUBSEQUENT HOME HEALTH ORDERS    Place in Outpatient    Case Request Operating Room: CYSTOSCOPY, ULTRASOUND, RECTAL APPROACH          Plans:    - I spent 30 minutes of the day of this encounter preparing for, treating and  managing this patient. Extensive discussion with patient regarding the etiology and management of his lower urinary tract symptoms. Explained that LUTS are multifactorial and can be secondary to an enlarged prostate, PO intake of bladder irritants, overactive bladder, constipation, malignancy, trauma, infection, stones or medications. We discussed that there is a potential benefit to further evaluation with cystoscopy and TRUS.   - Scheduled for cystoscopy and TRUS at the [facility] on 8/9/23.   - Home health order placed for centeno exchange every 4 weeks.

## 2023-07-20 NOTE — H&P (VIEW-ONLY)
[facility] Established Patient/H&P:    Ezequiel Escudero is a 72 y.o. male who presents for urinary retention.    Patient with a several year history of enlarged prostate associated with lower urinary tract symptoms s/p TURP that was previously managed by Dr. Sellers, Dr. Flores and NP Vivian Fulton. He presented to [facility] emergency department on 4/29/23 after falling and hitting his head.      He was found to have an indwelling catheter in place on admission that was present for approximately 1 month after hernia repair. Urology consulted to assist with management.      Labs unremarkable. UA micro with >100 RBC and 21 WBC.      He is on Flomax 0.8 mg PO daily and Finasteride 5 mg. Also on Ditropan 5 mg PO TID. He was scheduled for evaluation with Dr. Chahal in the past, but did not follow up.  mL during his office visit with urology in 8/2021. Repeated in 9/2022 and was 0 mL.     Works as a surgical tech at [employer].       Interval History    7/20/23: Here today after hospital discharge. Remains with a centeno catheter in place. States he has failed voiding trials with home health. Here to discuss further management. No new complaints.     CT abdomen pelvis with contrast on 3/28/23 with cholelithiasis, renal cysts and left nephrocalcinosis.     No fever, chills or history of  malignancy.         PSA  0.9                   10/10/22     Urine culture  Enterococcus  3/28/23    Past Medical History:   Diagnosis Date    A-fib     Anticoagulant long-term use     BPH (benign prostatic hyperplasia)     Centeno catheter in place     Kidney stone     PONV (postoperative nausea and vomiting)     Thyroid disease     Wears glasses        Past Surgical History:   Procedure Laterality Date    ANKLE DEBRIDEMENT      CARDIOVERSION      CYSTOSCOPY W/ RETROGRADES Bilateral 3/1/2021    Procedure: CYSTOSCOPY, WITH RETROGRADE PYELOGRAM;  Surgeon: Lior Flores MD;  Location: [facility];  Service: Urology;  Laterality:  Bilateral;    CYSTOSCOPY WITH BIOPSY OF BLADDER N/A 3/1/2021    Procedure: CYSTOSCOPY, WITH BLADDER BIOPSY, WITH FULGURATION IF INDICATED;  Surgeon: Lior Flores MD;  Location: [facility];  Service: Urology;  Laterality: N/A;    DEBRIDEMENT OF LOWER EXTREMITY Right 2/1/2021    Procedure: DEBRIDEMENT, LOWER EXTREMITY;  Surgeon: Pete Cardenas MD;  Location: [facility] OR;  Service: General;  Laterality: Right;    INCISION AND DRAINAGE Right 2/1/2021    Procedure: Incision and Drainage;  Surgeon: Pete Cardenas MD;  Location: [facility] OR;  Service: General;  Laterality: Right;    PULMONARY ANGIOGRAPHY N/A 5/1/2023    Procedure: Pulmonary angiography;  Surgeon: Ali Khoobehi, MD;  Location: [facility] CATH/EP LAB;  Service: Vascular;  Laterality: N/A;    THYROIDECTOMY  1970's    TONSILLECTOMY      TONSILLECTOMY, ADENOIDECTOMY      UMBILICAL HERNIA REPAIR N/A 3/31/2023    Procedure: REPAIR, HERNIA, UMBILICAL;  Surgeon: Gino Randhawa Jr., MD;  Location: [facility] OR;  Service: General;  Laterality: N/A;       Review of patient's allergies indicates:  No Known Allergies    Medications Reviewed: see MAR    FOCUSED PHYSICAL EXAM:    There were no vitals filed for this visit.  Body mass index is 36.62 kg/m². Weight: 122.5 kg (270 lb) Height: 6' (182.9 cm)       General: Alert, cooperative, no distress, appears stated age  Abdomen: Soft, non-tender, no CVA tenderness, non-distended      LABS:    No results found for this or any previous visit (from the past 336 hour(s)).        Assessment/Diagnosis:    1. Urinary retention  SUBSEQUENT HOME HEALTH ORDERS    Place in Outpatient    Case Request Operating Room: CYSTOSCOPY, ULTRASOUND, RECTAL APPROACH      2. Urinary tract infection without hematuria, site unspecified  SUBSEQUENT HOME HEALTH ORDERS    Place in Outpatient    Case Request Operating Room: CYSTOSCOPY, ULTRASOUND, RECTAL APPROACH          Plans:    - I spent 30 minutes of the day of this encounter preparing for, treating and  managing this patient. Extensive discussion with patient regarding the etiology and management of his lower urinary tract symptoms. Explained that LUTS are multifactorial and can be secondary to an enlarged prostate, PO intake of bladder irritants, overactive bladder, constipation, malignancy, trauma, infection, stones or medications. We discussed that there is a potential benefit to further evaluation with cystoscopy and TRUS.   - Scheduled for cystoscopy and TRUS at the [facility] on 8/9/23.   - Home health order placed for centeno exchange every 4 weeks.

## 2023-07-31 PROBLEM — I26.99 BILATERAL PULMONARY EMBOLISM: Status: RESOLVED | Noted: 2023-04-30 | Resolved: 2023-07-31

## 2023-07-31 PROCEDURE — G0179 PR HOME HEALTH MD RECERTIFICATION: ICD-10-PCS | Mod: ,,, | Performed by: FAMILY MEDICINE

## 2023-07-31 PROCEDURE — G0179 MD RECERTIFICATION HHA PT: HCPCS | Mod: ,,, | Performed by: FAMILY MEDICINE

## 2023-08-02 ENCOUNTER — OFFICE VISIT (OUTPATIENT)
Dept: ORTHOPEDICS | Facility: CLINIC | Age: 73
End: 2023-08-02
Payer: MEDICARE

## 2023-08-02 ENCOUNTER — TELEPHONE (OUTPATIENT)
Dept: SPINE | Facility: CLINIC | Age: 73
End: 2023-08-02
Payer: MEDICARE

## 2023-08-02 VITALS — HEIGHT: 72 IN | WEIGHT: 270 LBS | BODY MASS INDEX: 36.57 KG/M2

## 2023-08-02 DIAGNOSIS — M51.36 DISC DEGENERATION, LUMBAR: Primary | ICD-10-CM

## 2023-08-02 DIAGNOSIS — M43.16 SPONDYLOLISTHESIS, LUMBAR REGION: ICD-10-CM

## 2023-08-02 DIAGNOSIS — M47.816 LUMBAR FACET ARTHROPATHY: ICD-10-CM

## 2023-08-02 DIAGNOSIS — M54.16 LUMBAR RADICULOPATHY: ICD-10-CM

## 2023-08-02 PROCEDURE — 1160F PR REVIEW ALL MEDS BY PRESCRIBER/CLIN PHARMACIST DOCUMENTED: ICD-10-PCS | Mod: CPTII,S$GLB,, | Performed by: ORTHOPAEDIC SURGERY

## 2023-08-02 PROCEDURE — 1159F MED LIST DOCD IN RCRD: CPT | Mod: CPTII,S$GLB,, | Performed by: ORTHOPAEDIC SURGERY

## 2023-08-02 PROCEDURE — 99203 OFFICE O/P NEW LOW 30 MIN: CPT | Mod: S$GLB,,, | Performed by: ORTHOPAEDIC SURGERY

## 2023-08-02 PROCEDURE — 1101F PR PT FALLS ASSESS DOC 0-1 FALLS W/OUT INJ PAST YR: ICD-10-PCS | Mod: CPTII,S$GLB,, | Performed by: ORTHOPAEDIC SURGERY

## 2023-08-02 PROCEDURE — 3288F FALL RISK ASSESSMENT DOCD: CPT | Mod: CPTII,S$GLB,, | Performed by: ORTHOPAEDIC SURGERY

## 2023-08-02 PROCEDURE — 1125F AMNT PAIN NOTED PAIN PRSNT: CPT | Mod: CPTII,S$GLB,, | Performed by: ORTHOPAEDIC SURGERY

## 2023-08-02 PROCEDURE — 1159F PR MEDICATION LIST DOCUMENTED IN MEDICAL RECORD: ICD-10-PCS | Mod: CPTII,S$GLB,, | Performed by: ORTHOPAEDIC SURGERY

## 2023-08-02 PROCEDURE — 3288F PR FALLS RISK ASSESSMENT DOCUMENTED: ICD-10-PCS | Mod: CPTII,S$GLB,, | Performed by: ORTHOPAEDIC SURGERY

## 2023-08-02 PROCEDURE — 99203 PR OFFICE/OUTPT VISIT, NEW, LEVL III, 30-44 MIN: ICD-10-PCS | Mod: S$GLB,,, | Performed by: ORTHOPAEDIC SURGERY

## 2023-08-02 PROCEDURE — 3044F PR MOST RECENT HEMOGLOBIN A1C LEVEL <7.0%: ICD-10-PCS | Mod: CPTII,S$GLB,, | Performed by: ORTHOPAEDIC SURGERY

## 2023-08-02 PROCEDURE — 1160F RVW MEDS BY RX/DR IN RCRD: CPT | Mod: CPTII,S$GLB,, | Performed by: ORTHOPAEDIC SURGERY

## 2023-08-02 PROCEDURE — 3008F BODY MASS INDEX DOCD: CPT | Mod: CPTII,S$GLB,, | Performed by: ORTHOPAEDIC SURGERY

## 2023-08-02 PROCEDURE — 1125F PR PAIN SEVERITY QUANTIFIED, PAIN PRESENT: ICD-10-PCS | Mod: CPTII,S$GLB,, | Performed by: ORTHOPAEDIC SURGERY

## 2023-08-02 PROCEDURE — 3008F PR BODY MASS INDEX (BMI) DOCUMENTED: ICD-10-PCS | Mod: CPTII,S$GLB,, | Performed by: ORTHOPAEDIC SURGERY

## 2023-08-02 PROCEDURE — 1101F PT FALLS ASSESS-DOCD LE1/YR: CPT | Mod: CPTII,S$GLB,, | Performed by: ORTHOPAEDIC SURGERY

## 2023-08-02 PROCEDURE — 3044F HG A1C LEVEL LT 7.0%: CPT | Mod: CPTII,S$GLB,, | Performed by: ORTHOPAEDIC SURGERY

## 2023-08-02 RX ORDER — FUROSEMIDE 40 MG/1
40 TABLET ORAL
COMMUNITY
Start: 2023-07-31

## 2023-08-02 NOTE — PROGRESS NOTES
Subjective:       Patient ID: Ezequiel Escudero is a 72 y.o. male.    Chief Complaint: Pain of the Lumbar Spine (Right lumbar pain that has been going on for years. Patient states that this recent flare has been the worse it has ever been. Denies any numbness. Did have a right RYLAND in 02/2022)      History of Present Illness    Prior to meeting with the patient I reviewed the medical chart in [facility]. This included reviewing the previous progress notes from our office, review of the patient's last appointment with their primary care provider, review of any visits to the emergency room, and review of any pain management appointments or procedures.   Patient has a history of chronic low back pain progressively increasing over the past several years.  He has had bilateral hip replacements but this did not help his back pain.  No history of trauma he has difficulty ambulating and uses a walker for stability.  History of chronic venous stasis both lower extremities.    Current Medications  Current Outpatient Medications   Medication Sig Dispense Refill    finasteride (PROSCAR) 5 mg tablet Take 1 tablet by mouth once daily (Patient taking differently: Take 5 mg by mouth once daily.) 30 tablet 0    furosemide (LASIX) 40 MG tablet Take 40 mg by mouth.      hydrALAZINE (APRESOLINE) 100 MG tablet Take 100 mg by mouth 2 (two) times daily.      meclizine (ANTIVERT) 25 mg tablet Take 25 mg by mouth 3 (three) times daily as needed.      PRESERVISION AREDS 2 PLUS  mcg-15 mcg- 5 mg-1 mg Cap Take 1 capsule by mouth 2 (two) times daily.      rivaroxaban (XARELTO) 15 mg Tab TAKE 1 TABLET BY MOUTH TWICE DAILY WITH MEALS FOR 21 DAYS      sotalol (BETAPACE) 80 MG tablet Take 80 mg by mouth 2 (two) times daily.      tamsulosin (FLOMAX) 0.4 mg Cap Take 2 capsules (0.8 mg total) by mouth once daily. 180 capsule 4    vit A/vit C/vit E/zinc/copper (OCUVITE PRESERVISION ORAL) Take 1 tablet by mouth 2 (two) times a day.       HYDROcodone-acetaminophen (NORCO) 7.5-325 mg per tablet Take 1 tablet by mouth every 4 (four) hours as needed.      mupirocin (BACTROBAN) 2 % ointment 2 (two) times daily.       No current facility-administered medications for this visit.     Facility-Administered Medications Ordered in Other Visits   Medication Dose Route Frequency Provider Last Rate Last Admin    electrolyte-S (ISOLYTE)   Intravenous Continuous Amada Sher MD 75 mL/hr at 03/01/21 1436 Rate Change at 03/01/21 1436    fentaNYL injection 25 mcg  25 mcg Intravenous Q5 Min PRN Amada Sher MD        HYDROmorphone (PF) injection 0.2 mg  0.2 mg Intravenous Q5 Min PRN Amada Sher MD        LIDOcaine (PF) 10 mg/ml (1%) injection 10 mg  1 mL Intradermal Once Amada Sher MD           Allergies  Review of patient's allergies indicates:  No Known Allergies    Past Medical History  Past Medical History:   Diagnosis Date    A-fib     Anticoagulant long-term use     BPH (benign prostatic hyperplasia)     Rojas catheter in place     Kidney stone     PONV (postoperative nausea and vomiting)     Thyroid disease     Wears glasses        Surgical History  Past Surgical History:   Procedure Laterality Date    ANKLE DEBRIDEMENT      CARDIOVERSION      CYSTOSCOPY W/ RETROGRADES Bilateral 3/1/2021    Procedure: CYSTOSCOPY, WITH RETROGRADE PYELOGRAM;  Surgeon: Lior Flores MD;  Location: [facility] OR;  Service: Urology;  Laterality: Bilateral;    CYSTOSCOPY WITH BIOPSY OF BLADDER N/A 3/1/2021    Procedure: CYSTOSCOPY, WITH BLADDER BIOPSY, WITH FULGURATION IF INDICATED;  Surgeon: Lior Flores MD;  Location: [facility] OR;  Service: Urology;  Laterality: N/A;    DEBRIDEMENT OF LOWER EXTREMITY Right 2/1/2021    Procedure: DEBRIDEMENT, LOWER EXTREMITY;  Surgeon: Pete Cardenas MD;  Location: [facility] OR;  Service: General;  Laterality: Right;    INCISION AND DRAINAGE Right 2/1/2021    Procedure: Incision and Drainage;  Surgeon: Pete Cardenas  MD;  Location: [facility] OR;  Service: General;  Laterality: Right;    PULMONARY ANGIOGRAPHY N/A 5/1/2023    Procedure: Pulmonary angiography;  Surgeon: Ali Khoobehi, MD;  Location: [facility] CATH/EP LAB;  Service: Vascular;  Laterality: N/A;    THYROIDECTOMY  1970's    TONSILLECTOMY      TONSILLECTOMY, ADENOIDECTOMY      UMBILICAL HERNIA REPAIR N/A 3/31/2023    Procedure: REPAIR, HERNIA, UMBILICAL;  Surgeon: Gino Randhawa Jr., MD;  Location: [facility] OR;  Service: General;  Laterality: N/A;       Family History:   Family History   Problem Relation Age of Onset    Benign prostatic hyperplasia Brother     Heart disease Mother     Heart disease Father     Kidney cancer Neg Hx     Prostate cancer Neg Hx     Urolithiasis Neg Hx        Social History:   Social History     Socioeconomic History    Marital status:    Tobacco Use    Smoking status: Never    Smokeless tobacco: Never   Substance and Sexual Activity    Alcohol use: Yes     Comment: OCC    Drug use: No    Sexual activity: Not Currently       Hospitalization/Major Diagnostic Procedure:     Review of Systems     General/Constitutional:  Chills denies. Fatigue denies. Fever denies. Weight gain denies. Weight loss denies.    Respiratory:  Shortness of breath denies.    Cardiovascular:  Chest pain denies.    Gastrointestinal:  Constipation denies. Diarrhea denies. Nausea denies. Vomiting denies.     Hematology:  Easy bruising denies. Prolonged bleeding denies.     Genitourinary:  Frequent urination denies. Pain in lower back denies. Painful urination denies.     Musculoskeletal:  See HPI for details    Skin:  Rash denies.    Neurologic:  Dizziness denies. Gait abnormalities denies. Seizures denies. Tingling/Numbess denies.    Psychiatric:  Anxiety denies. Depressed mood denies.     Objective:   Vital Signs: There were no vitals filed for this visit.     Physical Exam      General Examination:     Constitutional: The patient is alert and oriented to lace person and  time. Mood is pleasant.     Head/Face: Normal facial features normal eyebrows    Eyes: Normal extraocular motion bilaterally    Lungs: Respirations are equal and unlabored    Gait is coordinated.    Cardiovascular: There are no swelling or varicosities present.    Lymphatic: Negative for adenopathy    Skin: Normal    Neurological: Level of consciousness normal. Oriented to place person and time and situation    Psychiatric: Oriented to time place person and situation    Obviously moderate venous stasis changes both lower extremities.  No signs of infection.  Difficulty standing erect range of motion lumbar spine markedly restricted.  Tender right posterior iliac spine.  No spasm.  Straight leg raising maneuver negative except for back pain.  Hip range of motion nonpainful.    XRAY Report/ Interpretation :  AP pelvis x-ray was taken today. Indications low back pain and/or hip pain. Findings AP pelvis x-ray appears to be normal with no evidence of fractures or significant degenerative disease x-rays appear normal except for bilateral total hip implants with no signs of loosening    AP and lateral x-rays of lumbar spine will performed today. Indications low back pain. Findings:  Grade 2 spondylolisthesis L5-S1 with marked narrowing of the L5-S1 disc space.  No acute changes.      Assessment:       1. Disc degeneration, lumbar    2. Spondylolisthesis, lumbar region    3. Lumbar radiculopathy    4. Lumbar facet arthropathy        Plan:       Ezequiel was seen today for pain.    Diagnoses and all orders for this visit:    Disc degeneration, lumbar  -     X-Ray Lumbar Spine Ap And Lateral  -     Ambulatory referral/consult to Pain Clinic; Future    Spondylolisthesis, lumbar region    Lumbar radiculopathy  -     Ambulatory referral/consult to Pain Clinic; Future    Lumbar facet arthropathy  -     Ambulatory referral/consult to Pain Clinic; Future         Follow up in about 2 months (around 10/2/2023) for MBB/Rhizotomy  f/up.    His main problem is axial back pain I have explained that it could be from facet joint disease and arthritis the disc degeneration noted spondylolisthesis itself.  He is not in the best of health and the only treatable problem cause of his symptoms are either the facet joints and possible did the disc degeneration.  I recommend referral to pain management to be assessed for possible bilateral L4-5 and L5-S1 medial branch blocks and rhizotomy.  If this is unsuccessful I also informed that pain management might recommend an epidural steroid injection.  The epidural steroid injection would be for presumed discogenic pain.  He has no true radicular pain.  He agrees with this treatment plan.    Treatment options were discussed with regards to the nature of the medical condition. Conservative pain intervention and surgical options were discussed in detail. The probability of success of each separate treatment option was discussed. The patient expressed a clear understanding of the treatment options. With regards to surgery, the procedure risk, benefits, complications, and outcomes were discussed. No guarantees were given with regards to surgical outcome.   The risk of complications, morbidity, and mortality of patient management decisions have been made at the time of this visit. These are associated with the patient's problems, diagnostic procedures and treatment options. This includes the possible management options selected and those considered but not selected by the patient after shared medical decision making we discussed with the patient.   This note was created using Dragon voice recognition software that occasionally misinterpreted phrases or words.

## 2023-08-02 NOTE — PATIENT INSTRUCTIONS
[facility]  [facility]      STEP 1: Diagnostic Medial Branch Blocks (Facet Nerve Blocks)    What are medial branch nerves? Why are medial branch blocks helpful?     Medial branch nerves are very small nerve branches that communicate pain caused by the facet joints in the spine. These nerves do not control any muscles or sensation in the arms or legs. They are located along a bony groove in the spine.     If this procedure has been scheduled, there is strong evidence to suspect that the facet joints are the source of your pain. Benefit may be obtained from having these medial branch nerves temporarily blocked with an anesthetic to see if a more permanent way of blocking these nerves would provide pain relief for a longer term. Blocking these medial branch nerves temporarily stops the transmission of pain signals from the facet joints to the brain. If you receive temporary relief of a portion of your pain after these injections you will likely benefit from radiofrequency ablation of the same nerves. Radiofrequency ablation provides the same amount of relief as the diagnostic blocks, but lasts approximately 6-12 months.     What happens during medial branch blocks?    An IV will be started, so that sedation can be given. You will be placed on the x-ray table and positioned in such a way that the physician can best visualize the bony areas where the medial branch nerves pass. The skin is cleansed using sterile scrub (soap). Next the physician numbs a small area of skin with numbing medicine. The medicine stings for several seconds. Using x-ray guidance, your physician directs a small needle, near the specific nerve or nerves being tested. A small mixture of numbing medicine (anesthetic) and anti-inflammatory (cortisone/steroid) is then injected.    What happens after the procedure?  Immediately after the procedure, you will go back to the recovery area where you will be monitored for  30-60 minutes. You will be asked to report the immediate percentage of pain relief and record the relief experienced during that day on a post injection evaluation sheet (pain diary). You must call and set up a follow up appointment for two weeks after the procedure to discuss the results from this block and determine if you will proceed to step 2 of the treatment of your facet nerves.              [facility]  [facility]    STEP 2: Radiofrequency Ablation Medial Branches/Facet Nerves    What is radiofrequency (RF) ablation of the facet nerves? Why is it helpful?    Radiofrequency ablation is accomplished by sending a radiofrequency signal to the medial branch that provides sensation to the facet joint. Radiofrequency signal interrupts the pain signal going from the facet nerve to the brain.  The relief from the radiofrequency ablation should mimic that of the diagnostic medial branch blocks, but last approximately 6-12 months. The purpose of RF ablation of the facet nerves is to decrease pain from the facet joint and improve function. This is done only if pain is temporarily relieved by prior diagnostic facet nerve (medial branch nerve) blocks.     How is it done?   An IV will be started so that short acting sedation can be given during placement of needles near the facet nerves using x-ray guidance. The number of needles that will be placed depends on the number of levels that are being treated. After the needles are placed, you will then be awakened and a controlled radiofrequency signal will be sent to a facet nerve. The needles proper location in proximity of the nerve is confirmed by your identification of a change in sensation as a tightness, squeezing, pain, pressure, tingling, burning, etc. By successfully identifying this change in sensation the doctor confirms that the needle is close enough to the nerve so the best outcome of this procedure can be obtained. Your ability to  identify the change in sensation determines the quality of the block. A radiofrequency signal is then used to decrease the sensation of the facet joints to improve your pain. The procedure will take approximately 40-60 minutes. You will be monitored for an additional hour after the procedure. All measures will be taken to ensure your comfort and safety. After you return home, you may use ice packs to relieve any discomfort. You will not be able to drive the day of your procedure because you received sedation. You must call and set up a follow up appointment for two weeks after the procedure.     General Pre/Post Instructions:    You should not eat or drink anything after 12 oclock the night before the procedure. If you are diabetic, do not take your insulin or oral medication the morning of the procedure because you have had nothing to eat.     If you are taking routine heart or blood pressure medicine, you should take it with a sip of water the morning of the procedure.  You should not take medications that may give you pain relief or lessen you usual pain. These medicines can be restarted after the procedure if they are needed.     If you are on Coumadin, Heparin, Plavix or other blood thinners including aspirin and all medications that contain aspirin, you must notify the office well in advance so the timing of these medications can be coordinated with your primary care physician.     You will be at the hospital for a few hours for your procedure. A  must accompany you and be responsible for getting you home. No driving is allowed the day of the procedure. You may return to your normal activities the day after the procedure, including returning to work.     If you are unable to keep this appointment, please give notice as soon as possible and at least 24 hours in advance during regular office hours.    Thank you.

## 2023-08-02 NOTE — H&P (VIEW-ONLY)
Subjective:       Patient ID: Ezequiel Escudero is a 72 y.o. male.    Chief Complaint: Pain of the Lumbar Spine (Right lumbar pain that has been going on for years. Patient states that this recent flare has been the worse it has ever been. Denies any numbness. Did have a right RYLAND in 02/2022)      History of Present Illness    Prior to meeting with the patient I reviewed the medical chart in [facility]. This included reviewing the previous progress notes from our office, review of the patient's last appointment with their primary care provider, review of any visits to the emergency room, and review of any pain management appointments or procedures.   Patient has a history of chronic low back pain progressively increasing over the past several years.  He has had bilateral hip replacements but this did not help his back pain.  No history of trauma he has difficulty ambulating and uses a walker for stability.  History of chronic venous stasis both lower extremities.    Current Medications  Current Outpatient Medications   Medication Sig Dispense Refill    finasteride (PROSCAR) 5 mg tablet Take 1 tablet by mouth once daily (Patient taking differently: Take 5 mg by mouth once daily.) 30 tablet 0    furosemide (LASIX) 40 MG tablet Take 40 mg by mouth.      hydrALAZINE (APRESOLINE) 100 MG tablet Take 100 mg by mouth 2 (two) times daily.      meclizine (ANTIVERT) 25 mg tablet Take 25 mg by mouth 3 (three) times daily as needed.      PRESERVISION AREDS 2 PLUS  mcg-15 mcg- 5 mg-1 mg Cap Take 1 capsule by mouth 2 (two) times daily.      rivaroxaban (XARELTO) 15 mg Tab TAKE 1 TABLET BY MOUTH TWICE DAILY WITH MEALS FOR 21 DAYS      sotalol (BETAPACE) 80 MG tablet Take 80 mg by mouth 2 (two) times daily.      tamsulosin (FLOMAX) 0.4 mg Cap Take 2 capsules (0.8 mg total) by mouth once daily. 180 capsule 4    vit A/vit C/vit E/zinc/copper (OCUVITE PRESERVISION ORAL) Take 1 tablet by mouth 2 (two) times a day.       HYDROcodone-acetaminophen (NORCO) 7.5-325 mg per tablet Take 1 tablet by mouth every 4 (four) hours as needed.      mupirocin (BACTROBAN) 2 % ointment 2 (two) times daily.       No current facility-administered medications for this visit.     Facility-Administered Medications Ordered in Other Visits   Medication Dose Route Frequency Provider Last Rate Last Admin    electrolyte-S (ISOLYTE)   Intravenous Continuous Amada Sher MD 75 mL/hr at 03/01/21 1436 Rate Change at 03/01/21 1436    fentaNYL injection 25 mcg  25 mcg Intravenous Q5 Min PRN Amada Sher MD        HYDROmorphone (PF) injection 0.2 mg  0.2 mg Intravenous Q5 Min PRN Amada Sher MD        LIDOcaine (PF) 10 mg/ml (1%) injection 10 mg  1 mL Intradermal Once Amada Sher MD           Allergies  Review of patient's allergies indicates:  No Known Allergies    Past Medical History  Past Medical History:   Diagnosis Date    A-fib     Anticoagulant long-term use     BPH (benign prostatic hyperplasia)     Rojas catheter in place     Kidney stone     PONV (postoperative nausea and vomiting)     Thyroid disease     Wears glasses        Surgical History  Past Surgical History:   Procedure Laterality Date    ANKLE DEBRIDEMENT      CARDIOVERSION      CYSTOSCOPY W/ RETROGRADES Bilateral 3/1/2021    Procedure: CYSTOSCOPY, WITH RETROGRADE PYELOGRAM;  Surgeon: Lior Flores MD;  Location: [facility] OR;  Service: Urology;  Laterality: Bilateral;    CYSTOSCOPY WITH BIOPSY OF BLADDER N/A 3/1/2021    Procedure: CYSTOSCOPY, WITH BLADDER BIOPSY, WITH FULGURATION IF INDICATED;  Surgeon: Loir Flores MD;  Location: [facility] OR;  Service: Urology;  Laterality: N/A;    DEBRIDEMENT OF LOWER EXTREMITY Right 2/1/2021    Procedure: DEBRIDEMENT, LOWER EXTREMITY;  Surgeon: Pete Cardenas MD;  Location: [facility] OR;  Service: General;  Laterality: Right;    INCISION AND DRAINAGE Right 2/1/2021    Procedure: Incision and Drainage;  Surgeon: Pete Cardenas  MD;  Location: [facility] OR;  Service: General;  Laterality: Right;    PULMONARY ANGIOGRAPHY N/A 5/1/2023    Procedure: Pulmonary angiography;  Surgeon: Ali Khoobehi, MD;  Location: [facility] CATH/EP LAB;  Service: Vascular;  Laterality: N/A;    THYROIDECTOMY  1970's    TONSILLECTOMY      TONSILLECTOMY, ADENOIDECTOMY      UMBILICAL HERNIA REPAIR N/A 3/31/2023    Procedure: REPAIR, HERNIA, UMBILICAL;  Surgeon: Gino Randhawa Jr., MD;  Location: [facility] OR;  Service: General;  Laterality: N/A;       Family History:   Family History   Problem Relation Age of Onset    Benign prostatic hyperplasia Brother     Heart disease Mother     Heart disease Father     Kidney cancer Neg Hx     Prostate cancer Neg Hx     Urolithiasis Neg Hx        Social History:   Social History     Socioeconomic History    Marital status:    Tobacco Use    Smoking status: Never    Smokeless tobacco: Never   Substance and Sexual Activity    Alcohol use: Yes     Comment: OCC    Drug use: No    Sexual activity: Not Currently       Hospitalization/Major Diagnostic Procedure:     Review of Systems     General/Constitutional:  Chills denies. Fatigue denies. Fever denies. Weight gain denies. Weight loss denies.    Respiratory:  Shortness of breath denies.    Cardiovascular:  Chest pain denies.    Gastrointestinal:  Constipation denies. Diarrhea denies. Nausea denies. Vomiting denies.     Hematology:  Easy bruising denies. Prolonged bleeding denies.     Genitourinary:  Frequent urination denies. Pain in lower back denies. Painful urination denies.     Musculoskeletal:  See HPI for details    Skin:  Rash denies.    Neurologic:  Dizziness denies. Gait abnormalities denies. Seizures denies. Tingling/Numbess denies.    Psychiatric:  Anxiety denies. Depressed mood denies.     Objective:   Vital Signs: There were no vitals filed for this visit.     Physical Exam      General Examination:     Constitutional: The patient is alert and oriented to lace person and  time. Mood is pleasant.     Head/Face: Normal facial features normal eyebrows    Eyes: Normal extraocular motion bilaterally    Lungs: Respirations are equal and unlabored    Gait is coordinated.    Cardiovascular: There are no swelling or varicosities present.    Lymphatic: Negative for adenopathy    Skin: Normal    Neurological: Level of consciousness normal. Oriented to place person and time and situation    Psychiatric: Oriented to time place person and situation    Obviously moderate venous stasis changes both lower extremities.  No signs of infection.  Difficulty standing erect range of motion lumbar spine markedly restricted.  Tender right posterior iliac spine.  No spasm.  Straight leg raising maneuver negative except for back pain.  Hip range of motion nonpainful.    XRAY Report/ Interpretation :  AP pelvis x-ray was taken today. Indications low back pain and/or hip pain. Findings AP pelvis x-ray appears to be normal with no evidence of fractures or significant degenerative disease x-rays appear normal except for bilateral total hip implants with no signs of loosening    AP and lateral x-rays of lumbar spine will performed today. Indications low back pain. Findings:  Grade 2 spondylolisthesis L5-S1 with marked narrowing of the L5-S1 disc space.  No acute changes.      Assessment:       1. Disc degeneration, lumbar    2. Spondylolisthesis, lumbar region    3. Lumbar radiculopathy    4. Lumbar facet arthropathy        Plan:       Ezequiel was seen today for pain.    Diagnoses and all orders for this visit:    Disc degeneration, lumbar  -     X-Ray Lumbar Spine Ap And Lateral  -     Ambulatory referral/consult to Pain Clinic; Future    Spondylolisthesis, lumbar region    Lumbar radiculopathy  -     Ambulatory referral/consult to Pain Clinic; Future    Lumbar facet arthropathy  -     Ambulatory referral/consult to Pain Clinic; Future         Follow up in about 2 months (around 10/2/2023) for MBB/Rhizotomy  f/up.    His main problem is axial back pain I have explained that it could be from facet joint disease and arthritis the disc degeneration noted spondylolisthesis itself.  He is not in the best of health and the only treatable problem cause of his symptoms are either the facet joints and possible did the disc degeneration.  I recommend referral to pain management to be assessed for possible bilateral L4-5 and L5-S1 medial branch blocks and rhizotomy.  If this is unsuccessful I also informed that pain management might recommend an epidural steroid injection.  The epidural steroid injection would be for presumed discogenic pain.  He has no true radicular pain.  He agrees with this treatment plan.    Treatment options were discussed with regards to the nature of the medical condition. Conservative pain intervention and surgical options were discussed in detail. The probability of success of each separate treatment option was discussed. The patient expressed a clear understanding of the treatment options. With regards to surgery, the procedure risk, benefits, complications, and outcomes were discussed. No guarantees were given with regards to surgical outcome.   The risk of complications, morbidity, and mortality of patient management decisions have been made at the time of this visit. These are associated with the patient's problems, diagnostic procedures and treatment options. This includes the possible management options selected and those considered but not selected by the patient after shared medical decision making we discussed with the patient.   This note was created using Dragon voice recognition software that occasionally misinterpreted phrases or words.

## 2023-08-07 ENCOUNTER — TELEPHONE (OUTPATIENT)
Dept: UROLOGY | Facility: CLINIC | Age: 73
End: 2023-08-07
Payer: MEDICARE

## 2023-08-07 NOTE — DISCHARGE INSTRUCTIONS
After the procedure    Drink plenty of fluids.  You may have burning or light bleeding when you urinate--this is normal.  Medications may be prescribed to ease any discomfort or prevent infection. Take these as directed.  Call your doctor if you have heavy bleeding or blood clots, burning that lasts more than a day, a fever over 100°F  (38° C), or trouble urinating.    After Surgery:  Always be aware that any surgery can cause these symptoms:    Pain- Medication can be prescribed for pain to decrease your pain but may not completely take your pain away.  Over the Counter pain medicine my be enough and you can always use Ice and rest to help ease pain.    Bleeding- a little bleeding after a surgery is usually within normal.  If there is a lot of blood you need to notify your MD.  Emergency treatments of bleeding are cold application, elevation of the bleeding site and compression.    Infection- Infection after surgery is NOT a normal occurrence.  Signs of infection are fever, swelling, hot to touch the incision.  If this occurs notify your MD immediately.    Nausea- this can be common after a surgery especially if you have had anesthesia medicine or are taking pain medicine.  Staying on clear liquids, bland foods, gingerale, or over the counter anti nausea medicines can help.  If you vomit more than once, notify your MD.  Anti Nausea medicines can be prescribed.       Transrectal Ultrasound   A transrectal ultrasound is an imaging test. It uses sound waves to create pictures of a mans prostate gland to determine its size.Your prostate gland is in front of your rectum and if too large may become inflamed and impede the flow of urine. For this test, a special probe (transducer) is placed directly into your rectum.     Before leaving, you may need to wait for a short time while the images are reviewed. In most cases, you can go back to your normal routine after the test.       © 8600-8419 [employer] [address] [place] [address]. All rights reserved. This information is not intended as a substitute for professional medical care. Always follow your healthcare professional's instructions.

## 2023-08-07 NOTE — TELEPHONE ENCOUNTER
----- Message from Eren Watt sent at 8/7/2023  8:54 AM CDT -----  Type: Needs Medical Advice  Who Called:  pt   Best Call Back Number: [phone]    Additional Information: pt stated he would like to speak to someone in office regarding upcoming procedure appt due to needing to arrange different factors for appt please call back to advise on time asap, thanks!

## 2023-08-09 ENCOUNTER — HOSPITAL ENCOUNTER (OUTPATIENT)
Facility: HOSPITAL | Age: 73
Discharge: HOME | End: 2023-08-09
Attending: UROLOGY | Admitting: UROLOGY
Payer: MEDICARE

## 2023-08-09 DIAGNOSIS — N30.01 ACUTE CYSTITIS WITH HEMATURIA: ICD-10-CM

## 2023-08-09 DIAGNOSIS — R33.9 URINARY RETENTION: Primary | ICD-10-CM

## 2023-08-09 DIAGNOSIS — N39.0 URINARY TRACT INFECTION WITHOUT HEMATURIA, SITE UNSPECIFIED: ICD-10-CM

## 2023-08-09 PROCEDURE — 87086 URINE CULTURE/COLONY COUNT: CPT | Performed by: UROLOGY

## 2023-08-09 PROCEDURE — 52000 CYSTOURETHROSCOPY: CPT | Mod: ,,, | Performed by: UROLOGY

## 2023-08-09 PROCEDURE — 52000 CYSTOURETHROSCOPY: CPT | Performed by: UROLOGY

## 2023-08-09 PROCEDURE — 76872 PR US TRANSRECTAL: ICD-10-PCS | Mod: 26,,, | Performed by: UROLOGY

## 2023-08-09 PROCEDURE — 76872 US TRANSRECTAL: CPT | Performed by: UROLOGY

## 2023-08-09 PROCEDURE — 76872 US TRANSRECTAL: CPT | Mod: 26,,, | Performed by: UROLOGY

## 2023-08-09 PROCEDURE — 25000003 PHARM REV CODE 250: Performed by: UROLOGY

## 2023-08-09 PROCEDURE — 52000 PR CYSTOURETHROSCOPY: ICD-10-PCS | Mod: ,,, | Performed by: UROLOGY

## 2023-08-09 PROCEDURE — A4217 STERILE WATER/SALINE, 500 ML: HCPCS | Performed by: UROLOGY

## 2023-08-09 RX ORDER — NITROFURANTOIN 25; 75 MG/1; MG/1
100 CAPSULE ORAL 2 TIMES DAILY
Qty: 6 CAPSULE | Refills: 0 | Status: SHIPPED | OUTPATIENT
Start: 2023-08-09 | End: 2023-08-12

## 2023-08-09 RX ORDER — WATER 1 ML/ML
IRRIGANT IRRIGATION
Status: DISCONTINUED | OUTPATIENT
Start: 2023-08-09 | End: 2023-08-09 | Stop reason: HOSPADM

## 2023-08-09 RX ORDER — LIDOCAINE HYDROCHLORIDE 20 MG/ML
JELLY TOPICAL
Status: DISCONTINUED | OUTPATIENT
Start: 2023-08-09 | End: 2023-08-09 | Stop reason: HOSPADM

## 2023-08-09 NOTE — OP NOTE
[facility]  Operative/Discharge Note    Date: 08/09/2023    Pre-Op Diagnosis: Urinary retention    Post-Op Diagnosis: Same    Procedure(s) Performed:   1.  Cystoscopy  2.  Transrectal ultrasound     Specimen(s): Urine culture    Staff Surgeon: Wood Bradshaw MD    Assistant Surgeon: Wood Bradshaw Jr, MD    Anesthesia: Local anesthesia topical 2% lidocaine gel    Indications: Ezequiel Escudero is a 72 y.o. male with urinary retention requiring centeno catheter s/p several failed voiding trials.     Findings: 85 cc obstructing prostate with significant trilobar hypertrophy and moderate trabeculations. Centeno replaced.     Estimated Blood Loss: min    Drains: 16 [place] coude catheter    RAHUL: Anodular    Procedure in Detail:  After risks, benefits and possible complications of cystoscopy and TRUS were explained, the patient elected to undergo the procedure and informed consent was obtained. All questions were answered in the mandy-operative area. The patient was transferred to the cystoscopy suite and placed in the lateral position.     TRUS probe was inserted into the rectum and the prostate measurement was 85 cc.     The patient was placed in the lithotomy position and then prepped and draped in the usual sterile fashion. Lidocaine jelly was administered for local anesthesia. Time out was performed.    A flexible cystoscope was introduced into the bladder per urethra. This passed easily.  The entire urethra was visualized which showed no masses or strictures.  The prostate was 4 cm in length and appeared obstructing secondary to trilobar hypertrophy. The right and left ureteral orifices were identified in the normal anatomic position and were seen effluxing clear urine.  Formal cystoscopy was performed which revealed no masses or lesions suspicious for malignancy, moderate trabeculations, no bladder stones and no bladder diverticula.      16 Bolivian coude placed at the end.     The patient tolerated the procedure well and  was transferred to recovery in stable condition.    Disposition: Home    Discharge home today status post uncomplicated procedure as above  Diet - resume home diet  Follow up: Scheduled for TURP tentatively on 8/29/23. Phone preop. Needs cardiac clearance with Dr. Beebe to hold Xarelto.   Instructions: Home with Macrobid x 3 days for UTI prophylaxis.   Meds:     Medication List        START taking these medications      nitrofurantoin (macrocrystal-monohydrate) 100 MG capsule  Commonly known as: MACROBID  Take 1 capsule (100 mg total) by mouth 2 (two) times daily. for 3 days            CHANGE how you take these medications      finasteride 5 mg tablet  Commonly known as: PROSCAR  Take 1 tablet by mouth once daily  What changed: when to take this            CONTINUE taking these medications      furosemide 40 MG tablet  Commonly known as: LASIX     hydrALAZINE 100 MG tablet  Commonly known as: APRESOLINE     meclizine 25 mg tablet  Commonly known as: ANTIVERT     mupirocin 2 % ointment  Commonly known as: BACTROBAN     OCUVITE PRESERVISION ORAL     PRESERVISION AREDS 2 PLUS  mcg-15 mcg- 5 mg-1 mg Cap  Generic drug: mv-min-FA-vit K-lutein-zeaxant     rivaroxaban 15 mg Tab  Commonly known as: XARELTO     sotaloL 80 MG tablet  Commonly known as: BETAPACE     tamsulosin 0.4 mg Cap  Commonly known as: FLOMAX  Take 2 capsules (0.8 mg total) by mouth once daily.               Where to Get Your Medications        [facility] [address] [place], [place] [facility] [address] [facility]  [address] [facility] [address]      Phone: [phone]   nitrofurantoin (macrocrystal-monohydrate) 100 MG capsule         Wood Bradshaw Jr, MD

## 2023-08-09 NOTE — PLAN OF CARE
Discharge instructions given to pt, verbalized understanding.  Refused fluids.  Rojas cath with leg bag noted.  Prescription sent to pharmacy per dr mcnair.  GREER scheduled for 8/29.  Wheeled out to vehicle per RN in no distress.  Pt has own cane and walker in possession.

## 2023-08-11 VITALS
RESPIRATION RATE: 18 BRPM | HEIGHT: 72 IN | TEMPERATURE: 100 F | HEART RATE: 56 BPM | OXYGEN SATURATION: 95 % | WEIGHT: 270 LBS | BODY MASS INDEX: 36.57 KG/M2 | DIASTOLIC BLOOD PRESSURE: 69 MMHG | SYSTOLIC BLOOD PRESSURE: 154 MMHG

## 2023-08-11 LAB
BACTERIA UR CULT: NORMAL
BACTERIA UR CULT: NORMAL

## 2023-08-14 ENCOUNTER — OFFICE VISIT (OUTPATIENT)
Dept: WOUND CARE | Facility: HOSPITAL | Age: 73
End: 2023-08-14
Attending: FAMILY MEDICINE
Payer: MEDICARE

## 2023-08-14 ENCOUNTER — EXTERNAL HOME HEALTH (OUTPATIENT)
Dept: HOME HEALTH SERVICES | Facility: HOSPITAL | Age: 73
End: 2023-08-14
Payer: MEDICARE

## 2023-08-14 VITALS
HEIGHT: 72 IN | HEART RATE: 55 BPM | DIASTOLIC BLOOD PRESSURE: 67 MMHG | WEIGHT: 270.06 LBS | SYSTOLIC BLOOD PRESSURE: 148 MMHG | TEMPERATURE: 99 F | RESPIRATION RATE: 16 BRPM | BODY MASS INDEX: 36.58 KG/M2

## 2023-08-14 DIAGNOSIS — I87.2 VENOUS STASIS DERMATITIS OF BOTH LOWER EXTREMITIES: Primary | ICD-10-CM

## 2023-08-14 DIAGNOSIS — L97.919 VENOUS ULCER OF RIGHT LEG: ICD-10-CM

## 2023-08-14 DIAGNOSIS — I83.019 VENOUS ULCER OF RIGHT LEG: ICD-10-CM

## 2023-08-14 DIAGNOSIS — L97.812 NON-PRESSURE CHRONIC ULCER OF OTHER PART OF RIGHT LOWER LEG WITH FAT LAYER EXPOSED: ICD-10-CM

## 2023-08-14 PROCEDURE — 99499 NO LOS: ICD-10-PCS | Mod: ,,, | Performed by: FAMILY MEDICINE

## 2023-08-14 PROCEDURE — 11042 DBRDMT SUBQ TIS 1ST 20SQCM/<: CPT | Performed by: FAMILY MEDICINE

## 2023-08-14 PROCEDURE — 99499 UNLISTED E&M SERVICE: CPT | Mod: ,,, | Performed by: FAMILY MEDICINE

## 2023-08-14 PROCEDURE — 11042 PR DEBRIDEMENT, SKIN, SUB-Q TISSUE,=<20 SQ CM: ICD-10-PCS | Mod: ,,, | Performed by: FAMILY MEDICINE

## 2023-08-14 PROCEDURE — 11042 DBRDMT SUBQ TIS 1ST 20SQCM/<: CPT | Mod: ,,, | Performed by: FAMILY MEDICINE

## 2023-08-14 NOTE — PROGRESS NOTES
Wound Care Progress Note    Subjective:       Patient ID: Ezequiel Escudero is a 72 y.o. male.    Chief Complaint: Wound Care and Wound Consult    HPI  Pt seen in clinic for a FU visit for RLE venous ulcer with BLE stasis dermatitis. Wound is stable, but slough covered. Pt has no new complaints today. He is having a TURP done later in the month, and not sure when he can FU in our office.  Review of Systems   Skin:  Positive for wound.        BLE stasis dermatitis with RLE venous ulcer   All other systems reviewed and are negative.      Objective:        Physical Exam  Vitals and nursing note reviewed.   Constitutional:       Appearance: Normal appearance.   Skin:     General: Skin is warm and dry.      Findings: Lesion present.      Comments: BLE venous stasis dermatitis with RLE venous ulcer, see wound care assessment documentation in  chart review scanned under the media tab   Neurological:      General: No focal deficit present.      Mental Status: He is alert and oriented to person, place, and time.   Psychiatric:         Mood and Affect: Mood normal.         Behavior: Behavior normal.         Thought Content: Thought content normal.       Vitals:    08/14/23 1332   BP: (!) 148/67   Pulse: (!) 55   Resp: 16   Temp: 98.7 °F (37.1 °C)       Assessment:           ICD-10-CM ICD-9-CM   1. Venous stasis dermatitis of both lower extremities  I87.2 454.1   2. Non-pressure chronic ulcer of other part of right lower leg with fat layer exposed  L97.812 707.19   3. Venous ulcer of right leg  I83.019 454.0    L97.919                 Plan:                  Ezequiel was seen today for wound care and wound consult.    Diagnoses and all orders for this visit:    Venous stasis dermatitis of both lower extremities    Non-pressure chronic ulcer of other part of right lower leg with fat layer exposed    Venous ulcer of right leg      See wound care instructions which have been provided separately.

## 2023-08-28 ENCOUNTER — ANESTHESIA EVENT (OUTPATIENT)
Dept: SURGERY | Facility: HOSPITAL | Age: 73
End: 2023-08-28
Payer: MEDICARE

## 2023-08-29 ENCOUNTER — ANESTHESIA (OUTPATIENT)
Dept: SURGERY | Facility: HOSPITAL | Age: 73
End: 2023-08-29
Payer: MEDICARE

## 2023-08-29 ENCOUNTER — HOSPITAL ENCOUNTER (OUTPATIENT)
Facility: HOSPITAL | Age: 73
Discharge: HOME | End: 2023-08-30
Attending: UROLOGY | Admitting: UROLOGY
Payer: MEDICARE

## 2023-08-29 DIAGNOSIS — R33.9 URINARY RETENTION: Primary | ICD-10-CM

## 2023-08-29 DIAGNOSIS — N40.0 BENIGN PROSTATIC HYPERPLASIA, UNSPECIFIED WHETHER LOWER URINARY TRACT SYMPTOMS PRESENT: ICD-10-CM

## 2023-08-29 DIAGNOSIS — Z01.818 PREOP TESTING: ICD-10-CM

## 2023-08-29 DIAGNOSIS — N30.01 ACUTE CYSTITIS WITH HEMATURIA: ICD-10-CM

## 2023-08-29 DIAGNOSIS — I48.0 PAROXYSMAL ATRIAL FIBRILLATION: ICD-10-CM

## 2023-08-29 DIAGNOSIS — R00.1 BRADYCARDIA: ICD-10-CM

## 2023-08-29 PROBLEM — I26.99 PULMONARY EMBOLI: Status: RESOLVED | Noted: 2023-04-30 | Resolved: 2023-08-29

## 2023-08-29 LAB
ANION GAP SERPL CALC-SCNC: 14 MMOL/L (ref 8–16)
APTT PPP: 31.5 SEC (ref 21–32)
BASOPHILS # BLD AUTO: 0.06 K/UL (ref 0–0.2)
BASOPHILS NFR BLD: 0.9 % (ref 0–1.9)
BUN SERPL-MCNC: 17 MG/DL (ref 8–23)
CALCIUM SERPL-MCNC: 9.6 MG/DL (ref 8.7–10.5)
CHLORIDE SERPL-SCNC: 100 MMOL/L (ref 95–110)
CO2 SERPL-SCNC: 24 MMOL/L (ref 23–29)
CREAT SERPL-MCNC: 1.1 MG/DL (ref 0.5–1.4)
DIFFERENTIAL METHOD: ABNORMAL
EOSINOPHIL # BLD AUTO: 0.3 K/UL (ref 0–0.5)
EOSINOPHIL NFR BLD: 3.9 % (ref 0–8)
ERYTHROCYTE [DISTWIDTH] IN BLOOD BY AUTOMATED COUNT: 15.9 % (ref 11.5–14.5)
EST. GFR  (NO RACE VARIABLE): >60 ML/MIN/1.73 M^2
GLUCOSE SERPL-MCNC: 115 MG/DL (ref 70–110)
HCT VFR BLD AUTO: 50.2 % (ref 40–54)
HGB BLD-MCNC: 16.4 G/DL (ref 14–18)
IMM GRANULOCYTES # BLD AUTO: 0.03 K/UL (ref 0–0.04)
IMM GRANULOCYTES NFR BLD AUTO: 0.4 % (ref 0–0.5)
INR PPP: 1.1 (ref 0.8–1.2)
LYMPHOCYTES # BLD AUTO: 0.8 K/UL (ref 1–4.8)
LYMPHOCYTES NFR BLD: 12.4 % (ref 18–48)
MCH RBC QN AUTO: 28.8 PG (ref 27–31)
MCHC RBC AUTO-ENTMCNC: 32.7 G/DL (ref 32–36)
MCV RBC AUTO: 88 FL (ref 82–98)
MONOCYTES # BLD AUTO: 0.6 K/UL (ref 0.3–1)
MONOCYTES NFR BLD: 9.6 % (ref 4–15)
NEUTROPHILS # BLD AUTO: 4.9 K/UL (ref 1.8–7.7)
NEUTROPHILS NFR BLD: 72.8 % (ref 38–73)
NRBC BLD-RTO: 0 /100 WBC
PLATELET # BLD AUTO: 260 K/UL (ref 150–450)
PMV BLD AUTO: 9.8 FL (ref 9.2–12.9)
POTASSIUM SERPL-SCNC: 4.9 MMOL/L (ref 3.5–5.1)
PROTHROMBIN TIME: 11.4 SEC (ref 9–12.5)
RBC # BLD AUTO: 5.7 M/UL (ref 4.6–6.2)
SODIUM SERPL-SCNC: 138 MMOL/L (ref 136–145)
WBC # BLD AUTO: 6.67 K/UL (ref 3.9–12.7)

## 2023-08-29 PROCEDURE — 85610 PROTHROMBIN TIME: CPT | Performed by: UROLOGY

## 2023-08-29 PROCEDURE — 25000003 PHARM REV CODE 250: Performed by: NURSE PRACTITIONER

## 2023-08-29 PROCEDURE — 71000016 HC POSTOP RECOV ADDL HR: Performed by: UROLOGY

## 2023-08-29 PROCEDURE — 25000003 PHARM REV CODE 250: Performed by: UROLOGY

## 2023-08-29 PROCEDURE — 94799 UNLISTED PULMONARY SVC/PX: CPT

## 2023-08-29 PROCEDURE — 27201423 OPTIME MED/SURG SUP & DEVICES STERILE SUPPLY: Performed by: UROLOGY

## 2023-08-29 PROCEDURE — 85730 THROMBOPLASTIN TIME PARTIAL: CPT | Performed by: UROLOGY

## 2023-08-29 PROCEDURE — 63600175 PHARM REV CODE 636 W HCPCS: Performed by: UROLOGY

## 2023-08-29 PROCEDURE — 37000008 HC ANESTHESIA 1ST 15 MINUTES: Performed by: UROLOGY

## 2023-08-29 PROCEDURE — D9220A PRA ANESTHESIA: ICD-10-PCS | Mod: ANES,,, | Performed by: ANESTHESIOLOGY

## 2023-08-29 PROCEDURE — 85025 COMPLETE CBC W/AUTO DIFF WBC: CPT | Performed by: UROLOGY

## 2023-08-29 PROCEDURE — 94761 N-INVAS EAR/PLS OXIMETRY MLT: CPT

## 2023-08-29 PROCEDURE — 37000009 HC ANESTHESIA EA ADD 15 MINS: Performed by: UROLOGY

## 2023-08-29 PROCEDURE — 63600175 PHARM REV CODE 636 W HCPCS: Performed by: ANESTHESIOLOGY

## 2023-08-29 PROCEDURE — D9220A PRA ANESTHESIA: Mod: CRNA,,, | Performed by: NURSE ANESTHETIST, CERTIFIED REGISTERED

## 2023-08-29 PROCEDURE — 52601 PROSTATECTOMY (TURP): CPT | Mod: 22,,, | Performed by: UROLOGY

## 2023-08-29 PROCEDURE — 71000015 HC POSTOP RECOV 1ST HR: Performed by: UROLOGY

## 2023-08-29 PROCEDURE — 36000707: Performed by: UROLOGY

## 2023-08-29 PROCEDURE — D9220A PRA ANESTHESIA: ICD-10-PCS | Mod: CRNA,,, | Performed by: NURSE ANESTHETIST, CERTIFIED REGISTERED

## 2023-08-29 PROCEDURE — 88305 TISSUE EXAM BY PATHOLOGIST: CPT | Mod: TC | Performed by: PATHOLOGY

## 2023-08-29 PROCEDURE — 80048 BASIC METABOLIC PNL TOTAL CA: CPT | Performed by: UROLOGY

## 2023-08-29 PROCEDURE — 52601 PR TRANSURETHRAL ELEC-SURG PROSTATECTOM: ICD-10-PCS | Mod: 22,,, | Performed by: UROLOGY

## 2023-08-29 PROCEDURE — D9220A PRA ANESTHESIA: Mod: ANES,,, | Performed by: ANESTHESIOLOGY

## 2023-08-29 PROCEDURE — 36000706: Performed by: UROLOGY

## 2023-08-29 PROCEDURE — 25000003 PHARM REV CODE 250: Performed by: ANESTHESIOLOGY

## 2023-08-29 PROCEDURE — 36415 COLL VENOUS BLD VENIPUNCTURE: CPT | Performed by: UROLOGY

## 2023-08-29 PROCEDURE — 71000033 HC RECOVERY, INTIAL HOUR: Performed by: UROLOGY

## 2023-08-29 PROCEDURE — 71000039 HC RECOVERY, EACH ADD'L HOUR: Performed by: UROLOGY

## 2023-08-29 PROCEDURE — 25000003 PHARM REV CODE 250: Performed by: NURSE ANESTHETIST, CERTIFIED REGISTERED

## 2023-08-29 PROCEDURE — 63600175 PHARM REV CODE 636 W HCPCS: Performed by: NURSE ANESTHETIST, CERTIFIED REGISTERED

## 2023-08-29 PROCEDURE — 99900035 HC TECH TIME PER 15 MIN (STAT)

## 2023-08-29 RX ORDER — TAMSULOSIN HYDROCHLORIDE 0.4 MG/1
0.8 CAPSULE ORAL DAILY
Status: DISCONTINUED | OUTPATIENT
Start: 2023-08-30 | End: 2023-08-30 | Stop reason: HOSPADM

## 2023-08-29 RX ORDER — SOTALOL HYDROCHLORIDE 80 MG/1
80 TABLET ORAL 2 TIMES DAILY
Status: DISCONTINUED | OUTPATIENT
Start: 2023-08-30 | End: 2023-08-30 | Stop reason: HOSPADM

## 2023-08-29 RX ORDER — FUROSEMIDE 40 MG/1
40 TABLET ORAL DAILY
Status: DISCONTINUED | OUTPATIENT
Start: 2023-08-30 | End: 2023-08-30 | Stop reason: HOSPADM

## 2023-08-29 RX ORDER — LIDOCAINE HYDROCHLORIDE 10 MG/ML
1 INJECTION, SOLUTION EPIDURAL; INFILTRATION; INTRACAUDAL; PERINEURAL ONCE
Status: DISCONTINUED | OUTPATIENT
Start: 2023-08-29 | End: 2023-08-29 | Stop reason: HOSPADM

## 2023-08-29 RX ORDER — DEXAMETHASONE SODIUM PHOSPHATE 4 MG/ML
INJECTION, SOLUTION INTRA-ARTICULAR; INTRALESIONAL; INTRAMUSCULAR; INTRAVENOUS; SOFT TISSUE
Status: DISCONTINUED | OUTPATIENT
Start: 2023-08-29 | End: 2023-08-29

## 2023-08-29 RX ORDER — DIPHENHYDRAMINE HYDROCHLORIDE 50 MG/ML
12.5 INJECTION INTRAMUSCULAR; INTRAVENOUS EVERY 6 HOURS PRN
Status: DISCONTINUED | OUTPATIENT
Start: 2023-08-29 | End: 2023-08-29 | Stop reason: HOSPADM

## 2023-08-29 RX ORDER — ONDANSETRON HYDROCHLORIDE 2 MG/ML
INJECTION, SOLUTION INTRAMUSCULAR; INTRAVENOUS
Status: DISCONTINUED | OUTPATIENT
Start: 2023-08-29 | End: 2023-08-29

## 2023-08-29 RX ORDER — HYDROCODONE BITARTRATE AND ACETAMINOPHEN 5; 325 MG/1; MG/1
1 TABLET ORAL EVERY 4 HOURS PRN
Status: DISCONTINUED | OUTPATIENT
Start: 2023-08-29 | End: 2023-08-30 | Stop reason: HOSPADM

## 2023-08-29 RX ORDER — HYDRALAZINE HYDROCHLORIDE 25 MG/1
100 TABLET, FILM COATED ORAL 2 TIMES DAILY
Status: DISCONTINUED | OUTPATIENT
Start: 2023-08-30 | End: 2023-08-30 | Stop reason: HOSPADM

## 2023-08-29 RX ORDER — ROCURONIUM BROMIDE 10 MG/ML
INJECTION, SOLUTION INTRAVENOUS
Status: DISCONTINUED | OUTPATIENT
Start: 2023-08-29 | End: 2023-08-29

## 2023-08-29 RX ORDER — FINASTERIDE 5 MG/1
5 TABLET, FILM COATED ORAL DAILY
Status: DISCONTINUED | OUTPATIENT
Start: 2023-08-30 | End: 2023-08-30 | Stop reason: HOSPADM

## 2023-08-29 RX ORDER — ACETAMINOPHEN 325 MG/1
650 TABLET ORAL EVERY 4 HOURS PRN
Status: DISCONTINUED | OUTPATIENT
Start: 2023-08-29 | End: 2023-08-30 | Stop reason: HOSPADM

## 2023-08-29 RX ORDER — SODIUM CHLORIDE, SODIUM LACTATE, POTASSIUM CHLORIDE, CALCIUM CHLORIDE 600; 310; 30; 20 MG/100ML; MG/100ML; MG/100ML; MG/100ML
INJECTION, SOLUTION INTRAVENOUS CONTINUOUS
Status: DISCONTINUED | OUTPATIENT
Start: 2023-08-29 | End: 2023-08-30

## 2023-08-29 RX ORDER — ONDANSETRON 2 MG/ML
4 INJECTION INTRAMUSCULAR; INTRAVENOUS ONCE AS NEEDED
Status: COMPLETED | OUTPATIENT
Start: 2023-08-29 | End: 2023-08-29

## 2023-08-29 RX ORDER — MIDAZOLAM HYDROCHLORIDE 1 MG/ML
INJECTION INTRAMUSCULAR; INTRAVENOUS
Status: DISCONTINUED | OUTPATIENT
Start: 2023-08-29 | End: 2023-08-29

## 2023-08-29 RX ORDER — ONDANSETRON 2 MG/ML
4 INJECTION INTRAMUSCULAR; INTRAVENOUS EVERY 12 HOURS PRN
Status: DISCONTINUED | OUTPATIENT
Start: 2023-08-29 | End: 2023-08-30 | Stop reason: HOSPADM

## 2023-08-29 RX ORDER — OXYCODONE HYDROCHLORIDE 5 MG/1
5 TABLET ORAL
Status: DISCONTINUED | OUTPATIENT
Start: 2023-08-29 | End: 2023-08-29 | Stop reason: HOSPADM

## 2023-08-29 RX ORDER — PROPOFOL 10 MG/ML
VIAL (ML) INTRAVENOUS
Status: DISCONTINUED | OUTPATIENT
Start: 2023-08-29 | End: 2023-08-29

## 2023-08-29 RX ORDER — MEPERIDINE HYDROCHLORIDE 50 MG/ML
12.5 INJECTION INTRAMUSCULAR; INTRAVENOUS; SUBCUTANEOUS ONCE AS NEEDED
Status: DISCONTINUED | OUTPATIENT
Start: 2023-08-29 | End: 2023-08-29 | Stop reason: HOSPADM

## 2023-08-29 RX ORDER — SUCCINYLCHOLINE CHLORIDE 20 MG/ML
INJECTION INTRAMUSCULAR; INTRAVENOUS
Status: DISCONTINUED | OUTPATIENT
Start: 2023-08-29 | End: 2023-08-29

## 2023-08-29 RX ORDER — SODIUM CHLORIDE, SODIUM LACTATE, POTASSIUM CHLORIDE, CALCIUM CHLORIDE 600; 310; 30; 20 MG/100ML; MG/100ML; MG/100ML; MG/100ML
INJECTION, SOLUTION INTRAVENOUS CONTINUOUS
Status: DISCONTINUED | OUTPATIENT
Start: 2023-08-29 | End: 2023-08-29

## 2023-08-29 RX ORDER — TALC
6 POWDER (GRAM) TOPICAL NIGHTLY PRN
Status: DISCONTINUED | OUTPATIENT
Start: 2023-08-29 | End: 2023-08-30 | Stop reason: HOSPADM

## 2023-08-29 RX ORDER — FENTANYL CITRATE 50 UG/ML
INJECTION, SOLUTION INTRAMUSCULAR; INTRAVENOUS
Status: DISCONTINUED | OUTPATIENT
Start: 2023-08-29 | End: 2023-08-29

## 2023-08-29 RX ORDER — CEFAZOLIN SODIUM 2 G/50ML
2 SOLUTION INTRAVENOUS
Status: COMPLETED | OUTPATIENT
Start: 2023-08-29 | End: 2023-08-30

## 2023-08-29 RX ORDER — PHENYLEPHRINE HYDROCHLORIDE 10 MG/ML
INJECTION INTRAVENOUS
Status: DISCONTINUED | OUTPATIENT
Start: 2023-08-29 | End: 2023-08-29

## 2023-08-29 RX ORDER — EPHEDRINE SULFATE 50 MG/ML
INJECTION, SOLUTION INTRAVENOUS
Status: DISCONTINUED | OUTPATIENT
Start: 2023-08-29 | End: 2023-08-29

## 2023-08-29 RX ORDER — GENTAMICIN SULFATE 80 MG/100ML
160 INJECTION, SOLUTION INTRAVENOUS
Status: COMPLETED | OUTPATIENT
Start: 2023-08-29 | End: 2023-08-29

## 2023-08-29 RX ORDER — OXYBUTYNIN CHLORIDE 5 MG/1
5 TABLET ORAL 3 TIMES DAILY
Status: DISCONTINUED | OUTPATIENT
Start: 2023-08-29 | End: 2023-08-30 | Stop reason: HOSPADM

## 2023-08-29 RX ORDER — LIDOCAINE HYDROCHLORIDE 20 MG/ML
INJECTION INTRAVENOUS
Status: DISCONTINUED | OUTPATIENT
Start: 2023-08-29 | End: 2023-08-29

## 2023-08-29 RX ORDER — HYDROMORPHONE HYDROCHLORIDE 2 MG/ML
0.2 INJECTION, SOLUTION INTRAMUSCULAR; INTRAVENOUS; SUBCUTANEOUS EVERY 5 MIN PRN
Status: DISCONTINUED | OUTPATIENT
Start: 2023-08-29 | End: 2023-08-29 | Stop reason: HOSPADM

## 2023-08-29 RX ORDER — FENTANYL CITRATE 50 UG/ML
25 INJECTION, SOLUTION INTRAMUSCULAR; INTRAVENOUS EVERY 5 MIN PRN
Status: DISCONTINUED | OUTPATIENT
Start: 2023-08-29 | End: 2023-08-29 | Stop reason: HOSPADM

## 2023-08-29 RX ADMIN — OXYCODONE HYDROCHLORIDE 5 MG: 5 TABLET ORAL at 10:08

## 2023-08-29 RX ADMIN — MIDAZOLAM HYDROCHLORIDE 2 MG: 1 INJECTION, SOLUTION INTRAMUSCULAR; INTRAVENOUS at 07:08

## 2023-08-29 RX ADMIN — LIDOCAINE HYDROCHLORIDE 75 MG: 20 INJECTION, SOLUTION INTRAVENOUS at 07:08

## 2023-08-29 RX ADMIN — EPHEDRINE SULFATE 5 MG: 50 INJECTION, SOLUTION INTRAMUSCULAR; INTRAVENOUS; SUBCUTANEOUS at 08:08

## 2023-08-29 RX ADMIN — SODIUM CHLORIDE, POTASSIUM CHLORIDE, SODIUM LACTATE AND CALCIUM CHLORIDE: 600; 310; 30; 20 INJECTION, SOLUTION INTRAVENOUS at 11:08

## 2023-08-29 RX ADMIN — ONDANSETRON 4 MG: 2 INJECTION INTRAMUSCULAR; INTRAVENOUS at 07:08

## 2023-08-29 RX ADMIN — GLYCOPYRROLATE 0.2 MG: 0.2 INJECTION, SOLUTION INTRAMUSCULAR; INTRAVITREAL at 07:08

## 2023-08-29 RX ADMIN — SUCCINYLCHOLINE CHLORIDE 120 MG: 20 INJECTION, SOLUTION INTRAMUSCULAR; INTRAVENOUS at 07:08

## 2023-08-29 RX ADMIN — AMPICILLIN SODIUM 2 G: 2 INJECTION, POWDER, FOR SOLUTION INTRAMUSCULAR; INTRAVENOUS at 07:08

## 2023-08-29 RX ADMIN — SODIUM CHLORIDE, SODIUM GLUCONATE, SODIUM ACETATE, POTASSIUM CHLORIDE, MAGNESIUM CHLORIDE, SODIUM PHOSPHATE, DIBASIC, AND POTASSIUM PHOSPHATE: .53; .5; .37; .037; .03; .012; .00082 INJECTION, SOLUTION INTRAVENOUS at 07:08

## 2023-08-29 RX ADMIN — PHENYLEPHRINE HYDROCHLORIDE 100 MCG: 10 INJECTION INTRAVENOUS at 08:08

## 2023-08-29 RX ADMIN — FENTANYL CITRATE 25 MCG: 50 INJECTION, SOLUTION INTRAMUSCULAR; INTRAVENOUS at 08:08

## 2023-08-29 RX ADMIN — MELATONIN TAB 3 MG 6 MG: 3 TAB at 08:08

## 2023-08-29 RX ADMIN — ROCURONIUM BROMIDE 5 MG: 10 INJECTION, SOLUTION INTRAVENOUS at 07:08

## 2023-08-29 RX ADMIN — OXYBUTYNIN CHLORIDE 5 MG: 5 TABLET ORAL at 08:08

## 2023-08-29 RX ADMIN — GENTAMICIN SULFATE 80 MG: 80 INJECTION, SOLUTION INTRAVENOUS at 07:08

## 2023-08-29 RX ADMIN — FENTANYL CITRATE 50 MCG: 50 INJECTION, SOLUTION INTRAMUSCULAR; INTRAVENOUS at 07:08

## 2023-08-29 RX ADMIN — SODIUM CHLORIDE, SODIUM GLUCONATE, SODIUM ACETATE, POTASSIUM CHLORIDE AND MAGNESIUM CHLORIDE: 526; 502; 368; 37; 30 INJECTION, SOLUTION INTRAVENOUS at 07:08

## 2023-08-29 RX ADMIN — CEFAZOLIN SODIUM 2 G: 2 SOLUTION INTRAVENOUS at 03:08

## 2023-08-29 RX ADMIN — SODIUM CHLORIDE, SODIUM GLUCONATE, SODIUM ACETATE, POTASSIUM CHLORIDE, MAGNESIUM CHLORIDE, SODIUM PHOSPHATE, DIBASIC, AND POTASSIUM PHOSPHATE: .53; .5; .37; .037; .03; .012; .00082 INJECTION, SOLUTION INTRAVENOUS at 08:08

## 2023-08-29 RX ADMIN — ONDANSETRON 4 MG: 2 INJECTION INTRAMUSCULAR; INTRAVENOUS at 10:08

## 2023-08-29 RX ADMIN — SODIUM CHLORIDE, POTASSIUM CHLORIDE, SODIUM LACTATE AND CALCIUM CHLORIDE: 600; 310; 30; 20 INJECTION, SOLUTION INTRAVENOUS at 02:08

## 2023-08-29 RX ADMIN — CEFAZOLIN SODIUM 2 G: 2 SOLUTION INTRAVENOUS at 11:08

## 2023-08-29 RX ADMIN — DEXAMETHASONE SODIUM PHOSPHATE 4 MG: 4 INJECTION, SOLUTION INTRA-ARTICULAR; INTRALESIONAL; INTRAMUSCULAR; INTRAVENOUS; SOFT TISSUE at 07:08

## 2023-08-29 RX ADMIN — PROPOFOL 150 MG: 10 INJECTION, EMULSION INTRAVENOUS at 07:08

## 2023-08-29 RX ADMIN — PHENYLEPHRINE HYDROCHLORIDE 100 MCG: 10 INJECTION INTRAVENOUS at 07:08

## 2023-08-29 RX ADMIN — OXYBUTYNIN CHLORIDE 5 MG: 5 TABLET ORAL at 02:08

## 2023-08-29 NOTE — ASSESSMENT & PLAN NOTE
Patient with Paroxysmal (<7 days) atrial fibrillation which is controlled currently with Beta Blocker. Patient is currently in sinus rhythm.[id number] Score: 1.  Anticoagulation indicated. Anticoagulation done with Xarelto.  On hold 2/2 to TURP.  Resume once cleared by Urology. .  Cardiac Monitoring.

## 2023-08-29 NOTE — ASSESSMENT & PLAN NOTE
S/P TURP with Dr. Bradshaw  Continue to follow Urology recommendations  3 way centeno with CBI  Follow H/H closely  Pain control with oral narcotics prn  Cardiac diet  Continue to hold Xarelto until cleared by Urology  SCD's for DVT prevention

## 2023-08-29 NOTE — H&P
[facility] Medicine  History & Physical    Patient Name: Ezequiel Escudero  MRN: [id number]  Patient Class: OP- Outpatient Recovery  Admission Date: 8/29/2023  Attending Physician: Jean Farris MD   Primary Care Provider: Santiago Beebe MD         Patient information was obtained from patient and past medical records.     Subjective:     Principal Problem:BPH (benign prostatic hyperplasia)    Chief Complaint:   Chief Complaint   Patient presents with    Procedure        HPI: Ezequiel Escudero is a 72-year-old male with PMHx significant for atrial fibrillation on Xarelto, thyroid disease, and BPH.  He is S/P TURP with Dr. Bradshaw.  Postoperatively, patient is doing well.  He reports mild burning sensation from his 3 way catheter, but is otherwise without pain.  He denies shortness of breath, chest pain, nausea, vomiting, or any other symptoms at this time.      Past Medical History:   Diagnosis Date    A-fib     Anticoagulant long-term use     BPH (benign prostatic hyperplasia)     Rojas catheter in place     Kidney stone     PONV (postoperative nausea and vomiting)     Thyroid disease     Wears glasses        Past Surgical History:   Procedure Laterality Date    ANKLE DEBRIDEMENT      CARDIOVERSION      CYSTOSCOPY N/A 8/9/2023    Procedure: CYSTOSCOPY;  Surgeon: Wood Bradshaw Jr., MD;  Location: [facility] OR;  Service: Urology;  Laterality: N/A;    CYSTOSCOPY W/ RETROGRADES Bilateral 3/1/2021    Procedure: CYSTOSCOPY, WITH RETROGRADE PYELOGRAM;  Surgeon: Lior Flores MD;  Location: [facility] OR;  Service: Urology;  Laterality: Bilateral;    CYSTOSCOPY WITH BIOPSY OF BLADDER N/A 3/1/2021    Procedure: CYSTOSCOPY, WITH BLADDER BIOPSY, WITH FULGURATION IF INDICATED;  Surgeon: Lior Flores MD;  Location: [facility] OR;  Service: Urology;  Laterality: N/A;    DEBRIDEMENT OF LOWER EXTREMITY Right 2/1/2021    Procedure: DEBRIDEMENT, LOWER EXTREMITY;  Surgeon: Pete MAURICE  MD Ronald;  Location: [facility] OR;  Service: General;  Laterality: Right;    INCISION AND DRAINAGE Right 2/1/2021    Procedure: Incision and Drainage;  Surgeon: Pete Cardenas MD;  Location: [facility] OR;  Service: General;  Laterality: Right;    PULMONARY ANGIOGRAPHY N/A 5/1/2023    Procedure: Pulmonary angiography;  Surgeon: Ali Khoobehi, MD;  Location: [facility] CATH/EP LAB;  Service: Vascular;  Laterality: N/A;    THYROIDECTOMY  1970's    TONSILLECTOMY      TONSILLECTOMY, ADENOIDECTOMY      TRANSRECTAL ULTRASOUND EXAMINATION N/A 8/9/2023    Procedure: ULTRASOUND, RECTAL APPROACH;  Surgeon: Wood Bradshaw Jr., MD;  Location: [facility] OR;  Service: Urology;  Laterality: N/A;    UMBILICAL HERNIA REPAIR N/A 3/31/2023    Procedure: REPAIR, HERNIA, UMBILICAL;  Surgeon: Gino Randhawa Jr., MD;  Location: [facility] OR;  Service: General;  Laterality: N/A;       Review of patient's allergies indicates:  No Known Allergies    Current Facility-Administered Medications on File Prior to Encounter   Medication    electrolyte-S (ISOLYTE)    fentaNYL injection 25 mcg    HYDROmorphone (PF) injection 0.2 mg    LIDOcaine (PF) 10 mg/ml (1%) injection 10 mg     Current Outpatient Medications on File Prior to Encounter   Medication Sig    finasteride (PROSCAR) 5 mg tablet Take 1 tablet by mouth once daily (Patient taking differently: Take 5 mg by mouth once daily.)    furosemide (LASIX) 40 MG tablet Take 40 mg by mouth.    hydrALAZINE (APRESOLINE) 100 MG tablet Take 100 mg by mouth 2 (two) times daily.    PRESERVISION AREDS 2 PLUS  mcg-15 mcg- 5 mg-1 mg Cap Take 1 capsule by mouth 2 (two) times daily.    sotalol (BETAPACE) 80 MG tablet Take 80 mg by mouth 2 (two) times daily.    tamsulosin (FLOMAX) 0.4 mg Cap Take 2 capsules (0.8 mg total) by mouth once daily.    meclizine (ANTIVERT) 25 mg tablet Take 25 mg by mouth 3 (three) times daily as needed.    rivaroxaban (XARELTO) 15 mg Tab TAKE 1 TABLET BY MOUTH TWICE DAILY  WITH MEALS FOR 21 DAYS     Family History       Problem Relation (Age of Onset)    Benign prostatic hyperplasia Brother    Heart disease Mother, Father          Tobacco Use    Smoking status: Never    Smokeless tobacco: Never   Substance and Sexual Activity    Alcohol use: Yes     Comment: OCC    Drug use: No    Sexual activity: Not Currently     Review of Systems   Genitourinary:  Positive for difficulty urinating and hematuria.   All other systems reviewed and are negative.    Objective:     Vital Signs (Most Recent):  Temp: 98.4 °F (36.9 °C) (08/29/23 0910)  Pulse: (!) 52 (08/29/23 1107)  Resp: 18 (08/29/23 1107)  BP: (!) 156/66 (08/29/23 1107)  SpO2: 97 % (08/29/23 1107) Vital Signs (24h Range):  Temp:  [98.4 °F (36.9 °C)-98.5 °F (36.9 °C)] 98.4 °F (36.9 °C)  Pulse:  [48-66] 52  Resp:  [10-19] 18  SpO2:  [93 %-100 %] 97 %  BP: (108-156)/(53-70) 156/66        There is no height or weight on file to calculate BMI.     Physical Exam  Constitutional:       General: He is not in acute distress.     Appearance: He is not ill-appearing, toxic-appearing or diaphoretic.   HENT:      Head: Normocephalic and atraumatic.      Mouth/Throat:      Mouth: Mucous membranes are dry.   Eyes:      Extraocular Movements: Extraocular movements intact.      Conjunctiva/sclera: Conjunctivae normal.   Cardiovascular:      Rate and Rhythm: Normal rate and regular rhythm.      Pulses: Normal pulses.      Heart sounds: Normal heart sounds.   Pulmonary:      Effort: Pulmonary effort is normal. No respiratory distress.      Breath sounds: Normal breath sounds.   Abdominal:      General: Bowel sounds are normal. There is no distension.      Palpations: Abdomen is soft.      Tenderness: There is no abdominal tenderness. There is no guarding.   Genitourinary:     Comments: Three way Rojas with CBI, hematuria noted.  Musculoskeletal:         General: Normal range of motion.      Cervical back: Normal range of motion and neck supple.    Skin:     General: Skin is warm and dry.      Capillary Refill: Capillary refill takes less than 2 seconds.      Coloration: Skin is not jaundiced or pale.   Neurological:      Mental Status: He is alert and oriented to person, place, and time. Mental status is at baseline.      Coordination: Coordination normal.   Psychiatric:         Mood and Affect: Mood normal.         Behavior: Behavior normal.         Thought Content: Thought content normal.         Judgment: Judgment normal.                Significant Labs: All pertinent labs within the past 24 hours have been reviewed.  CBC:   Recent Labs   Lab 08/29/23  0702   WBC 6.67   HGB 16.4   HCT 50.2        CMP:   Recent Labs   Lab 08/29/23  0702      K 4.9      CO2 24   *   BUN 17   CREATININE 1.1   CALCIUM 9.6   ANIONGAP 14       Significant Imaging: I have reviewed all pertinent imaging results/findings within the past 24 hours.    Assessment/Plan:     * BPH (benign prostatic hyperplasia)  S/P TURP with Dr. Bradshaw  Continue to follow Urology recommendations  3 way centeno with CBI  Follow H/H closely  Pain control with oral narcotics prn  Cardiac diet  Continue to hold Xarelto until cleared by Urology  SCD's for DVT prevention      Paroxysmal atrial fibrillation  Patient with Paroxysmal (<7 days) atrial fibrillation which is controlled currently with Beta Blocker. Patient is currently in sinus rhythm.[id number] Score: 1.  Anticoagulation indicated. Anticoagulation done with Xarelto.  On hold 2/2 to TURP.  Resume once cleared by Urology. .  Cardiac Monitoring.       VTE Risk Mitigation (From admission, onward)         Ordered     Place sequential compression device  Until discontinued         08/29/23 0614                           Rosi Nicolas NP  Department of Hospital Medicine  [facility]

## 2023-08-29 NOTE — PLAN OF CARE
Pt prepped for surgery, ABX x2 at BS. Pt belongings, including clothes, black bag, phone, glasses in glasses case, and walker, are in personal belongings bag/recovery area. Son, Quoc Nieves, signed up for text message procedure alerts. All questions answered, POC explained, v/u, call bell in reach.

## 2023-08-29 NOTE — PLAN OF CARE
Pt noted to be bleeding from the centeno insertion site, there are some clots noted outside of the insertion site, re applied new gauze. Notified Dr. Bradshaw.

## 2023-08-29 NOTE — TRANSFER OF CARE
Anesthesia Transfer of Care Note    Patient: Ezequiel Escudero    Procedure(s) Performed: Procedure(s) (LRB):  TURP (TRANSURETHRAL RESECTION OF PROSTATE) (N/A)    Patient location: PACU    Anesthesia Type: general    Transport from OR: Transported from OR on 2-3 L/min O2 by NC with adequate spontaneous ventilation    Post pain: adequate analgesia    Post assessment: no apparent anesthetic complications and tolerated procedure well    Post vital signs: stable    Level of consciousness: awake, alert and oriented    Nausea/Vomiting: no nausea/vomiting    Complications: none    Transfer of care protocol was followed      Last vitals:   Visit Vitals  /61 (BP Location: Right arm, Patient Position: Sitting)   Pulse (!) 57   Temp 36.9 °C (98.5 °F) (Oral)   Resp 17   SpO2 (!) 93%

## 2023-08-29 NOTE — ANESTHESIA PROCEDURE NOTES
Intubation    Date/Time: 8/29/2023 7:40 AM    Performed by: Brendan Blackburn CRNA  Authorized by: Vik Griffith MD    Intubation:     Induction:  Intravenous    Intubated:  Postinduction    Mask Ventilation:  Easy mask    Attempts:  1    Attempted By:  CRNA    Method of Intubation:  Video laryngoscopy    Blade:  [place] 3    Laryngeal View Grade: Grade I - full view of cords      Difficult Airway Encountered?: No      Complications:  None    Airway Device:  Oral endotracheal tube    Airway Device Size:  7.5    Style/Cuff Inflation:  Cuffed (inflated to minimal occlusive pressure)    Tube secured:  23    Secured at:  The lips    Placement Verified By:  Capnometry    Complicating Factors:  None    Findings Post-Intubation:  BS equal bilateral

## 2023-08-29 NOTE — SUBJECTIVE & OBJECTIVE
Past Medical History:   Diagnosis Date    A-fib     Anticoagulant long-term use     BPH (benign prostatic hyperplasia)     Rojas catheter in place     Kidney stone     PONV (postoperative nausea and vomiting)     Thyroid disease     Wears glasses        Past Surgical History:   Procedure Laterality Date    ANKLE DEBRIDEMENT      CARDIOVERSION      CYSTOSCOPY N/A 8/9/2023    Procedure: CYSTOSCOPY;  Surgeon: Wood Bradshaw Jr., MD;  Location: [facility] OR;  Service: Urology;  Laterality: N/A;    CYSTOSCOPY W/ RETROGRADES Bilateral 3/1/2021    Procedure: CYSTOSCOPY, WITH RETROGRADE PYELOGRAM;  Surgeon: Lior Flores MD;  Location: [facility] OR;  Service: Urology;  Laterality: Bilateral;    CYSTOSCOPY WITH BIOPSY OF BLADDER N/A 3/1/2021    Procedure: CYSTOSCOPY, WITH BLADDER BIOPSY, WITH FULGURATION IF INDICATED;  Surgeon: Lior Flores MD;  Location: [facility] OR;  Service: Urology;  Laterality: N/A;    DEBRIDEMENT OF LOWER EXTREMITY Right 2/1/2021    Procedure: DEBRIDEMENT, LOWER EXTREMITY;  Surgeon: Pete Cardenas MD;  Location: [facility] OR;  Service: General;  Laterality: Right;    INCISION AND DRAINAGE Right 2/1/2021    Procedure: Incision and Drainage;  Surgeon: Pete Cardenas MD;  Location: [facility] OR;  Service: General;  Laterality: Right;    PULMONARY ANGIOGRAPHY N/A 5/1/2023    Procedure: Pulmonary angiography;  Surgeon: Ali Khoobehi, MD;  Location: [facility] CATH/EP LAB;  Service: Vascular;  Laterality: N/A;    THYROIDECTOMY  1970's    TONSILLECTOMY      TONSILLECTOMY, ADENOIDECTOMY      TRANSRECTAL ULTRASOUND EXAMINATION N/A 8/9/2023    Procedure: ULTRASOUND, RECTAL APPROACH;  Surgeon: Wood Bradshaw Jr., MD;  Location: [facility] OR;  Service: Urology;  Laterality: N/A;    UMBILICAL HERNIA REPAIR N/A 3/31/2023    Procedure: REPAIR, HERNIA, UMBILICAL;  Surgeon: Gino Randhawa Jr., MD;  Location: [facility] OR;  Service: General;  Laterality: N/A;       Review of patient's allergies indicates:  No Known  Allergies    Current Facility-Administered Medications on File Prior to Encounter   Medication    electrolyte-S (ISOLYTE)    fentaNYL injection 25 mcg    HYDROmorphone (PF) injection 0.2 mg    LIDOcaine (PF) 10 mg/ml (1%) injection 10 mg     Current Outpatient Medications on File Prior to Encounter   Medication Sig    finasteride (PROSCAR) 5 mg tablet Take 1 tablet by mouth once daily (Patient taking differently: Take 5 mg by mouth once daily.)    furosemide (LASIX) 40 MG tablet Take 40 mg by mouth.    hydrALAZINE (APRESOLINE) 100 MG tablet Take 100 mg by mouth 2 (two) times daily.    PRESERVISION AREDS 2 PLUS  mcg-15 mcg- 5 mg-1 mg Cap Take 1 capsule by mouth 2 (two) times daily.    sotalol (BETAPACE) 80 MG tablet Take 80 mg by mouth 2 (two) times daily.    tamsulosin (FLOMAX) 0.4 mg Cap Take 2 capsules (0.8 mg total) by mouth once daily.    meclizine (ANTIVERT) 25 mg tablet Take 25 mg by mouth 3 (three) times daily as needed.    rivaroxaban (XARELTO) 15 mg Tab TAKE 1 TABLET BY MOUTH TWICE DAILY WITH MEALS FOR 21 DAYS     Family History       Problem Relation (Age of Onset)    Benign prostatic hyperplasia Brother    Heart disease Mother, Father          Tobacco Use    Smoking status: Never    Smokeless tobacco: Never   Substance and Sexual Activity    Alcohol use: Yes     Comment: OCC    Drug use: No    Sexual activity: Not Currently     Review of Systems   Genitourinary:  Positive for difficulty urinating and hematuria.   All other systems reviewed and are negative.    Objective:     Vital Signs (Most Recent):  Temp: 98.4 °F (36.9 °C) (08/29/23 0910)  Pulse: (!) 52 (08/29/23 1107)  Resp: 18 (08/29/23 1107)  BP: (!) 156/66 (08/29/23 1107)  SpO2: 97 % (08/29/23 1107) Vital Signs (24h Range):  Temp:  [98.4 °F (36.9 °C)-98.5 °F (36.9 °C)] 98.4 °F (36.9 °C)  Pulse:  [48-66] 52  Resp:  [10-19] 18  SpO2:  [93 %-100 %] 97 %  BP: (108-156)/(53-70) 156/66        There is no height or weight on file to calculate BMI.      Physical Exam  Constitutional:       General: He is not in acute distress.     Appearance: He is not ill-appearing, toxic-appearing or diaphoretic.   HENT:      Head: Normocephalic and atraumatic.      Mouth/Throat:      Mouth: Mucous membranes are dry.   Eyes:      Extraocular Movements: Extraocular movements intact.      Conjunctiva/sclera: Conjunctivae normal.   Cardiovascular:      Rate and Rhythm: Normal rate and regular rhythm.      Pulses: Normal pulses.      Heart sounds: Normal heart sounds.   Pulmonary:      Effort: Pulmonary effort is normal. No respiratory distress.      Breath sounds: Normal breath sounds.   Abdominal:      General: Bowel sounds are normal. There is no distension.      Palpations: Abdomen is soft.      Tenderness: There is no abdominal tenderness. There is no guarding.   Genitourinary:     Comments: Three way Rojas with CBI, hematuria noted.  Musculoskeletal:         General: Normal range of motion.      Cervical back: Normal range of motion and neck supple.   Skin:     General: Skin is warm and dry.      Capillary Refill: Capillary refill takes less than 2 seconds.      Coloration: Skin is not jaundiced or pale.   Neurological:      Mental Status: He is alert and oriented to person, place, and time. Mental status is at baseline.      Coordination: Coordination normal.   Psychiatric:         Mood and Affect: Mood normal.         Behavior: Behavior normal.         Thought Content: Thought content normal.         Judgment: Judgment normal.                Significant Labs: All pertinent labs within the past 24 hours have been reviewed.  CBC:   Recent Labs   Lab 08/29/23  0702   WBC 6.67   HGB 16.4   HCT 50.2        CMP:   Recent Labs   Lab 08/29/23  0702      K 4.9      CO2 24   *   BUN 17   CREATININE 1.1   CALCIUM 9.6   ANIONGAP 14       Significant Imaging: I have reviewed all pertinent imaging results/findings within the past 24 hours.

## 2023-08-29 NOTE — HPI
Ezequiel Escudero is a 72-year-old male with PMHx significant for atrial fibrillation on Xarelto, thyroid disease, and BPH.  He is S/P TURP with Dr. Bradshaw.  Postoperatively, patient is doing well.  He reports mild burning sensation from his 3 way catheter, but is otherwise without pain.  He denies shortness of breath, chest pain, nausea, vomiting, or any other symptoms at this time.

## 2023-08-29 NOTE — ANESTHESIA POSTPROCEDURE EVALUATION
Anesthesia Post Evaluation    Patient: Ezequiel Escudero    Procedure(s) Performed: Procedure(s) (LRB):  TURP (TRANSURETHRAL RESECTION OF PROSTATE) (N/A)    Final Anesthesia Type: general      Patient location during evaluation: PACU  Patient participation: Yes- Able to Participate  Level of consciousness: awake and alert and oriented  Post-procedure vital signs: reviewed and stable  Pain management: adequate  Airway patency: patent  ARCHANA mitigation strategies: Multimodal analgesia and Extubation while patient is awake  PONV status at discharge: No PONV  Anesthetic complications: no      Cardiovascular status: blood pressure returned to baseline  Respiratory status: unassisted, spontaneous ventilation and room air  Hydration status: euvolemic  Follow-up not needed.          Vitals Value Taken Time   /67 08/29/23 1050   Temp 36.9 °C (98.4 °F) 08/29/23 0910   Pulse 46 08/29/23 1102   Resp 12 08/29/23 1102   SpO2 98 % 08/29/23 1102   Vitals shown include unvalidated device data.      Event Time   Out of Recovery 11:07:00         Pain/Daly Score: Pain Rating Prior to Med Admin: 2 (8/29/2023 10:05 AM)  Pain Rating Post Med Admin: 0 (8/29/2023 10:15 AM)  Daly Score: 10 (8/29/2023 10:30 AM)

## 2023-08-29 NOTE — PLAN OF CARE
[facility] - Med/Surg  Initial Discharge Assessment       Primary Care Provider: Santiago Beebe MD    Admission Diagnosis: Urinary retention [R33.9]    Admission Date: 8/29/2023  Expected Discharge Date: 8/30/2023    DC assessment completed with pt at bedside. Verified info on facesheet as correct. Lives alone.  PCP Abiel. [facility]. DME listed. [facility]. Denies hd/dm Pt does not take coumadin. Pt without recent admission. Family to provide ride home. CM to follow for discharge needs.      Transition of Care Barriers: None    Payor: [employer] [place] MEDICARE / [facility] / Product Type: Medicare Advantage /     Extended Emergency Contact Information  Primary Emergency Contact: Quoc Escudero  Mobile Phone: [phone]  Relation: Son  Preferred language: English   needed? No  Secondary Emergency Contact: PravinSyd  Mobile Phone: [phone]  Relation: Friend  Preferred language: English   needed? No    Discharge Plan A: Home Health  Discharge Plan B: Home      [facility] - [place] [facility] [address] [address]  [address] [facility] [address]  Phone: [phone] Fax: [phone]      Initial Assessment (most recent)       Adult Discharge Assessment - 08/29/23 1505          Discharge Assessment    Assessment Type Discharge Planning Assessment     Confirmed/corrected address, phone number and insurance Yes     Confirmed Demographics Correct on Facesheet     Source of Information patient     People in Home alone     Do you expect to return to your current living situation? Yes     Prior to hospitilization cognitive status: Alert/Oriented     Current cognitive status: Alert/Oriented     Walking or Climbing Stairs ambulation difficulty, requires equipment     Dressing/Bathing bathing difficulty, requires equipment     Equipment Currently Used at Home wheelchair;walker, rolling;bedside commode;shower chair      Readmission within 30 days? No     Patient currently being followed by outpatient case management? No     Do you currently have service(s) that help you manage your care at home? Yes     Name and Contact number of agency [facility]     Is the pt/caregiver preference to resume services with current agency Yes     Do you take prescription medications? Yes     Do you have prescription coverage? Yes     Do you have any problems affording any of your prescribed medications? No     Is the patient taking medications as prescribed? yes     How do you get to doctors appointments? family or friend will provide;car, drives self     Are you on dialysis? No     Do you take coumadin? No     DME Needed Upon Discharge  none     Discharge Plan discussed with: Patient     Transition of Care Barriers None     Discharge Plan A Home Health     Discharge Plan B Home

## 2023-08-29 NOTE — ANESTHESIA PREPROCEDURE EVALUATION
08/29/2023  Ezequiel Escudero is a 72 y.o., male.    Patient Active Problem List   Diagnosis    Acute cystitis with hematuria    BPH (benign prostatic hyperplasia)    Prostatitis    Nocturia    Hematuria of undiagnosed cause    Hyponatremia    Paroxysmal atrial fibrillation    Acute renal failure    Stage 2 chronic kidney disease    Cellulitis, bilateral legs with superficial sores    Hyperkalemia    Metabolic acidosis    Idiopathic hypotension    Goals of care, counseling/discussion    Urinary retention    Venous stasis ulcer of right calf with necrosis of muscle without varicose veins    Venous stasis    Venous stasis dermatitis of both lower extremities    Non-pressure chronic ulcer of other part of right lower leg with fat layer exposed    Hypertension    Obesity (BMI 30-39.9)    Umbilical hernia, incarcerated    Venous ulcer of right leg    Syncope    Closed fracture of multiple ribs of left side    Laceration of forehead, complicated    Idiopathic chronic venous hypertension of right lower extremity with ulcer    Peripheral edema       Past Surgical History:   Procedure Laterality Date    ANKLE DEBRIDEMENT      CARDIOVERSION      CYSTOSCOPY N/A 8/9/2023    Procedure: CYSTOSCOPY;  Surgeon: Wood Bradshaw Jr., MD;  Location: [facility] OR;  Service: Urology;  Laterality: N/A;    CYSTOSCOPY W/ RETROGRADES Bilateral 3/1/2021    Procedure: CYSTOSCOPY, WITH RETROGRADE PYELOGRAM;  Surgeon: Lior Flores MD;  Location: [facility] OR;  Service: Urology;  Laterality: Bilateral;    CYSTOSCOPY WITH BIOPSY OF BLADDER N/A 3/1/2021    Procedure: CYSTOSCOPY, WITH BLADDER BIOPSY, WITH FULGURATION IF INDICATED;  Surgeon: Lior Flores MD;  Location: [facility] OR;  Service: Urology;  Laterality: N/A;    DEBRIDEMENT OF LOWER EXTREMITY Right 2/1/2021    Procedure: DEBRIDEMENT, LOWER EXTREMITY;   Surgeon: Pete Cardenas MD;  Location: [facility] OR;  Service: General;  Laterality: Right;    INCISION AND DRAINAGE Right 2/1/2021    Procedure: Incision and Drainage;  Surgeon: Pete Cardenas MD;  Location: [facility] OR;  Service: General;  Laterality: Right;    PULMONARY ANGIOGRAPHY N/A 5/1/2023    Procedure: Pulmonary angiography;  Surgeon: Ali Khoobehi, MD;  Location: [facility] CATH/EP LAB;  Service: Vascular;  Laterality: N/A;    THYROIDECTOMY  1970's    TONSILLECTOMY      TONSILLECTOMY, ADENOIDECTOMY      TRANSRECTAL ULTRASOUND EXAMINATION N/A 8/9/2023    Procedure: ULTRASOUND, RECTAL APPROACH;  Surgeon: Wood Bradshaw Jr., MD;  Location: [facility] OR;  Service: Urology;  Laterality: N/A;    UMBILICAL HERNIA REPAIR N/A 3/31/2023    Procedure: REPAIR, HERNIA, UMBILICAL;  Surgeon: Gino Randhawa Jr., MD;  Location: [facility] OR;  Service: General;  Laterality: N/A;        Tobacco Use:  The patient  reports that he has never smoked. He has never used smokeless tobacco.     Results for orders placed or performed during the hospital encounter of 04/29/23   EKG 12-lead    Collection Time: 04/29/23  6:39 PM    Narrative    Test Reason : R55,    Vent. Rate : 092 BPM     Atrial Rate : 092 BPM     P-R Int : 256 ms          QRS Dur : 088 ms      QT Int : 350 ms       P-R-T Axes : 040 064 024 degrees     QTc Int : 432 ms    Sinus rhythm with 1st degree A-V block with Premature ventricular complexes   or Fusion complexes  Nonspecific ST abnormality  Abnormal ECG  When compared with ECG of 23-FEB-2021 10:26,  Fusion complexes are now Present  Premature ventricular complexes are now Present  Confirmed by Sergo DELGADO, Jeff CALVILLO ([id number]) on 5/11/2023 11:19:13 PM    Referred By: [email or web address]   SELF           Confirmed By:Jeff Trotter MD             Lab Results   Component Value Date    WBC 6.82 05/02/2023    HGB 13.6 (L) 05/02/2023    HCT 42.6 05/02/2023    MCV 91 05/02/2023     05/02/2023     BMP  Lab Results   Component  Value Date     05/02/2023    K 3.7 05/02/2023     05/02/2023    CO2 26 05/02/2023    BUN 14 05/02/2023    CREATININE 0.9 05/02/2023    CALCIUM 8.3 (L) 05/02/2023    ANIONGAP 8 05/02/2023     05/02/2023     (H) 05/01/2023     (H) 04/29/2023       Results for orders placed during the hospital encounter of 03/28/23    Echo Saline Bubble? No    Interpretation Summary  · The left ventricle is normal in size with concentric remodeling and normal systolic function.  · Normal left ventricular diastolic function.  · Atrial fibrillation not observed.  · Normal right ventricular size with normal right ventricular systolic function.  · Normal central venous pressure (3 mmHg).  · No pulmonary hypertension and normal diastolic function            Pre-op Assessment    I have reviewed the Patient Summary Reports.     I have reviewed the Nursing Notes. I have reviewed the NPO Status.   I have reviewed the Medications.     Review of Systems  Anesthesia Hx:  No problems with previous Anesthesia  Denies Family Hx of Anesthesia complications.   Denies Personal Hx of Anesthesia complications.   Social:  Non-Smoker    Hematology/Oncology:  Hematology Normal        EENT/Dental:EENT/Dental Normal   Cardiovascular:   Hypertension Dysrhythmias (Paroxysmal AFib. On blood thinners.  Stop 2 days ago.) atrial fibrillation    Pulmonary:  Pulmonary Normal    Renal/:   Chronic Renal Disease (stage 2), CKD    Hepatic/GI:  Hepatic/GI Normal    Musculoskeletal:  Musculoskeletal Normal    Neurological:  Neurology Normal    Endocrine:  Obesity / BMI > 30  Psych:  Psychiatric Normal           Physical Exam  General: Well nourished    Airway:  Mallampati: II   Mouth Opening: Normal  TM Distance: Normal  Tongue: Normal  Neck ROM: Normal ROM    Dental:  Intact    Chest/Lungs:  Clear to auscultation, Normal Respiratory Rate    Heart:  Rate: Normal  Rhythm: Regular Rhythm        Anesthesia Plan  Type of Anesthesia, risks &  benefits discussed:    Anesthesia Type: Gen ETT  Intra-op Monitoring Plan: Standard ASA Monitors  Post Op Pain Control Plan: IV/PO Opioids PRN and multimodal analgesia  Induction:  IV  Airway Plan: Video  Informed Consent: Informed consent signed with the Patient and all parties understand the risks and agree with anesthesia plan.  All questions answered.   ASA Score: 3  Anesthesia Plan Notes:       Ready For Surgery From Anesthesia Perspective.     .

## 2023-08-29 NOTE — OP NOTE
[facility]  [place]  Operative Note    Date: 08/29/2023    Pre-Op Diagnosis: BPH with bladder outlet obstruction    Post-Op Diagnosis: same    Procedure(s) Performed:   TURP, Bipolar - modifier 22*    *Patient with a significantly enlarged prostate with resultant inflammation and hematuria which obscured visualization and added an additional 30-45 minutes to usual case length.     Specimen(s): Prostate chips    Staff Surgeon: Wood Bradshaw Jr, MD    Anesthesia: General endotracheal anesthesia    Indications: Ezequiel Escudero is a 72 y.o. male with an enlarged prostate with retention requiring a centeno catheter.     Findings: Enlarged prostate with inflammation and hematuria that was resected without complication. UO's in normal anatomic position effluxing clear urine.     Estimated Blood Loss: 20 cc    Drains: 24 Fr 3-way centeno catheter    Procedure in detail: After the risks and benefits of the procedure were explained, consent was obtained, and the patient was taken to the operating suite and placed in the supine position.  General anesthesia was administered.  When adequately sedated, the patient was placed in dorsal lithotomy position and prepped and draped in normal sterile fashion.  Timeout was performed and preoperative ABx were confirmed.          A 26-Yakut sheath resectoscope was placed into the bladder using a visual obturator. The visual obturator was exchanged for the resecting mechanism. The ureteral orifices were identified. The median lobe was first resected with care to avoid the ureteral orifices. Once the median lobe was resected, I then turned my attention to the left lateral lobe of the prostate.The left lateral lobe was then resected in a stepwise fashion from 7 o'clock to 12 o'clock with care to leave the verumontanum and sphincter intact. Once this was performed I then directed my attention to the right lobe of the prostate and again the lateral lobe was resected from the 5 o'clock  to the 12 o'clock position. Hemostasis was then achieved.    Patient with a significantly enlarged prostate with resultant inflammation and hematuria which obscured visualization and added an additional 30-45 minutes to usual case length.     The ureteral orifices, verumontanum and sphincter were intact. The [email or web address] evacuator was used to remove all prostate chips and again hemostasis was obtained using the button.      Once the bladder was drained, I could see that he had an open channel and the scope was removed.  A 24 Fr 3-way centeno catheter was placed in the bladder with 30 mL of water in the balloon. I then irrigated with normal saline to clear. This irrigated easily and quickly. The patient was placed on traction and on CBI. The patient was transferred to recovery in stable condition.       Disposition:   To floor with hospital medicine for overnight observation on CBI.    Wood Bradshaw Jr, MD

## 2023-08-30 PROBLEM — R00.1 BRADYCARDIA: Status: ACTIVE | Noted: 2023-08-30

## 2023-08-30 PROCEDURE — 25000003 PHARM REV CODE 250: Performed by: UROLOGY

## 2023-08-30 PROCEDURE — 94760 N-INVAS EAR/PLS OXIMETRY 1: CPT | Mod: GZ

## 2023-08-30 PROCEDURE — 99214 PR OFFICE/OUTPT VISIT, EST, LEVL IV, 30-39 MIN: ICD-10-PCS | Mod: 25,,, | Performed by: SPECIALIST

## 2023-08-30 PROCEDURE — 93010 EKG 12-LEAD: ICD-10-PCS | Mod: ,,, | Performed by: SPECIALIST

## 2023-08-30 PROCEDURE — 93010 ELECTROCARDIOGRAM REPORT: CPT | Mod: ,,, | Performed by: SPECIALIST

## 2023-08-30 PROCEDURE — 99214 OFFICE O/P EST MOD 30 MIN: CPT | Mod: 25,,, | Performed by: SPECIALIST

## 2023-08-30 PROCEDURE — 63600175 PHARM REV CODE 636 W HCPCS: Performed by: UROLOGY

## 2023-08-30 PROCEDURE — 93005 ELECTROCARDIOGRAM TRACING: CPT

## 2023-08-30 PROCEDURE — 94799 UNLISTED PULMONARY SVC/PX: CPT

## 2023-08-30 RX ORDER — NITROFURANTOIN 25; 75 MG/1; MG/1
100 CAPSULE ORAL 2 TIMES DAILY
Qty: 10 CAPSULE | Refills: 0 | Status: SHIPPED | OUTPATIENT
Start: 2023-08-30 | End: 2023-09-04

## 2023-08-30 RX ADMIN — HYDRALAZINE HYDROCHLORIDE 100 MG: 25 TABLET, FILM COATED ORAL at 08:08

## 2023-08-30 RX ADMIN — OXYBUTYNIN CHLORIDE 5 MG: 5 TABLET ORAL at 08:08

## 2023-08-30 RX ADMIN — CEFAZOLIN SODIUM 2 G: 2 SOLUTION INTRAVENOUS at 08:08

## 2023-08-30 RX ADMIN — TAMSULOSIN HYDROCHLORIDE 0.8 MG: 0.4 CAPSULE ORAL at 08:08

## 2023-08-30 RX ADMIN — FUROSEMIDE 40 MG: 40 TABLET ORAL at 08:08

## 2023-08-30 RX ADMIN — FINASTERIDE 5 MG: 5 TABLET, FILM COATED ORAL at 08:08

## 2023-08-30 NOTE — NURSING
Notified Danika Sibley NP of patient and situation, ISBAR provided, explained to provider that patient's heart rate has been dropping down to 40, asymptomatic. Informed provider that all other vitals have been stable, and only received oxybutynin and melatonin tonight.Provider voiced understanding, no new orders obtained, will continue to monitor.

## 2023-08-30 NOTE — RESPIRATORY THERAPY
08/30/23 0647   Patient Assessment/Suction   Level of Consciousness (AVPU) alert   Respiratory Effort Normal;Unlabored   Expansion/Accessory Muscles/Retractions expansion symmetric;no retractions;no use of accessory muscles   Rhythm/Pattern, Respiratory depth regular;pattern regular;unlabored   Cough Frequency no cough   PRE-TX-O2   Device (Oxygen Therapy) room air   SpO2 99 %   Pulse Oximetry Type Intermittent   $ Pulse Oximetry - Single Charge Pulse Oximetry - Single   Pulse (!) 49   Resp 20   Incentive Spirometer   $ Incentive Spirometer Charges done with encouragement   Incentive Spirometer Predicted Level (mL) 2200   Administration (IS) self-administered   Number of Repetitions (IS) 10   Level Incentive Spirometer (mL) 1250   Patient Tolerance (IS) good;no adverse signs/symptoms present

## 2023-08-30 NOTE — CONSULTS
[facility]  Consult Note    Patient Name: Ezequiel Escudero  MRN: [id number]  Admission Date: 8/29/2023  Hospital Length of Stay: 0 days  Code Status: Prior   Attending Provider: Eloy Boyd MD   Consulting Provider: Tye Agarwal MD  Primary Care Physician: Santiago Beebe MD  Principal Problem:BPH (benign prostatic hyperplasia)    Patient information was obtained from patient, past medical records, and ER records.     Consults  Subjective:     Chief Complaint:   Bradycardia    HPI:  Problem 1  Patient has had urologic procedures and is doing well from this   2. In April he had saddle pulmonary embolus after getting off Xarelto-he has been on Xarelto for atrial fibrillation as well as hypercoagulation syndrome  He is not having shortness of breath currently  3. He is had paroxysmal atrial fibrillation and is on sotalol 80 b.i.d. -he has bradycardia secondary to sotalol and is in sinus rhythm  He denies any chest    Past Medical History:   Diagnosis Date    A-fib     Anticoagulant long-term use     BPH (benign prostatic hyperplasia)     DVT (deep venous thrombosis)     Rojas catheter in place     Kidney stone     PONV (postoperative nausea and vomiting)     Pulmonary emboli     Thyroid disease     Wears glasses        Past Surgical History:   Procedure Laterality Date    ANKLE DEBRIDEMENT      CARDIOVERSION      CYSTOSCOPY N/A 8/9/2023    Procedure: CYSTOSCOPY;  Surgeon: Wood Bradshaw Jr., MD;  Location: [facility] OR;  Service: Urology;  Laterality: N/A;    CYSTOSCOPY W/ RETROGRADES Bilateral 3/1/2021    Procedure: CYSTOSCOPY, WITH RETROGRADE PYELOGRAM;  Surgeon: Lior Flores MD;  Location: [facility] OR;  Service: Urology;  Laterality: Bilateral;    CYSTOSCOPY WITH BIOPSY OF BLADDER N/A 3/1/2021    Procedure: CYSTOSCOPY, WITH BLADDER BIOPSY, WITH FULGURATION IF INDICATED;  Surgeon: Lior Flores MD;  Location: [facility] OR;  Service: Urology;  Laterality: N/A;    DEBRIDEMENT OF  LOWER EXTREMITY Right 2/1/2021    Procedure: DEBRIDEMENT, LOWER EXTREMITY;  Surgeon: Pete Cardenas MD;  [facility];  Service: General;  Laterality: Right;    INCISION AND DRAINAGE Right 2/1/2021    Procedure: Incision and Drainage;  Surgeon: Pete Cardenas MD;  Location: [facility] OR;  Service: General;  Laterality: Right;    PULMONARY ANGIOGRAPHY N/A 5/1/2023    Procedure: Pulmonary angiography;  Surgeon: Ali Khoobehi, MD;  Location: [facility] CATH/EP LAB;  Service: Vascular;  Laterality: N/A;    THYROIDECTOMY  1970's    TONSILLECTOMY      TONSILLECTOMY, ADENOIDECTOMY      TRANSRECTAL ULTRASOUND EXAMINATION N/A 8/9/2023    Procedure: ULTRASOUND, RECTAL APPROACH;  Surgeon: Wood Bradshaw Jr., MD;  Location: [facility] OR;  Service: Urology;  Laterality: N/A;    UMBILICAL HERNIA REPAIR N/A 3/31/2023    Procedure: REPAIR, HERNIA, UMBILICAL;  Surgeon: Gino Randhawa Jr., MD;  Location: [facility] OR;  Service: General;  Laterality: N/A;       Review of patient's allergies indicates:  No Known Allergies    Current Facility-Administered Medications on File Prior to Encounter   Medication    electrolyte-S (ISOLYTE)    fentaNYL injection 25 mcg    HYDROmorphone (PF) injection 0.2 mg    LIDOcaine (PF) 10 mg/ml (1%) injection 10 mg     Current Outpatient Medications on File Prior to Encounter   Medication Sig    finasteride (PROSCAR) 5 mg tablet Take 1 tablet by mouth once daily (Patient taking differently: Take 5 mg by mouth once daily.)    furosemide (LASIX) 40 MG tablet Take 40 mg by mouth.    hydrALAZINE (APRESOLINE) 100 MG tablet Take 100 mg by mouth 2 (two) times daily.    PRESERVISION AREDS 2 PLUS  mcg-15 mcg- 5 mg-1 mg Cap Take 1 capsule by mouth 2 (two) times daily.    sotalol (BETAPACE) 80 MG tablet Take 80 mg by mouth 2 (two) times daily.    tamsulosin (FLOMAX) 0.4 mg Cap Take 2 capsules (0.8 mg total) by mouth once daily.    meclizine (ANTIVERT) 25 mg tablet Take 25 mg by mouth 3 (three) times daily as  needed.    rivaroxaban (XARELTO) 15 mg Tab TAKE 1 TABLET BY MOUTH TWICE DAILY WITH MEALS FOR 21 DAYS     Family History       Problem Relation (Age of Onset)    Benign prostatic hyperplasia Brother    Heart disease Mother, Father          Tobacco Use    Smoking status: Never    Smokeless tobacco: Never   Substance and Sexual Activity    Alcohol use: Yes     Comment: OCC    Drug use: No    Sexual activity: Not Currently     ROS  Objective:     Vital Signs (Most Recent):  Temp: 96.5 °F (35.8 °C) (08/30/23 0741)  Pulse: (!) 49 (08/30/23 0741)  Resp: 20 (08/30/23 0741)  BP: (!) 147/64 (08/30/23 0741)  SpO2: 99 % (08/30/23 0741) Vital Signs (24h Range):  Temp:  [96.5 °F (35.8 °C)-98 °F (36.7 °C)] 96.5 °F (35.8 °C)  Pulse:  [42-58] 49  Resp:  [14-20] 20  SpO2:  [95 %-99 %] 99 %  BP: (134-160)/(53-68) 147/64     Weight: 123 kg (271 lb 2.7 oz)  Body mass index is 36.78 kg/m².    SpO2: 99 %         Intake/Output Summary (Last 24 hours) at 8/30/2023 1027  Last data filed at 8/30/2023 0651  Gross per 24 hour   Intake 1730 ml   Output -1700 ml   Net 3430 ml       Lines/Drains/Airways       Drain  Duration                  Urethral Catheter 08/29/23 0853 Latex 24 Fr. 1 day              Peripheral Intravenous Line  Duration                  Peripheral IV - Single Lumen 08/29/23 0706 20 G Anterior;Right Forearm 1 day                    Physical Exam blood pressure 147/64  Pulse is 50  Lungs are clear  Cardiac regular no S3  Abdomen no masses-obese  Extremities chronic venous stasis changes and hyperpigmentation    Significant Labs: CMP   Recent Labs   Lab 08/29/23  0702      K 4.9      CO2 24   *   BUN 17   CREATININE 1.1   CALCIUM 9.6   ANIONGAP 14    and CBC   Recent Labs   Lab 08/29/23 0702   WBC 6.67   HGB 16.4   HCT 50.2          Significant Imaging:   Assessment and Plan:   Stable cardiovascular status on Xarelto and rhythm on sotalol  This is accounting for his bradycardia  Continue current  Rx  Can be discharged from cardiovascular standpoint  Active Diagnoses:    Diagnosis Date Noted POA    PRINCIPAL PROBLEM:  BPH (benign prostatic hyperplasia) [N40.0] 10/02/2012 Yes    Hypertension [I10] 03/28/2023 Yes    Paroxysmal atrial fibrillation [I48.0]  Yes      Problems Resolved During this Admission:    Diagnosis Date Noted Date Resolved POA    Pulmonary emboli [I26.99] 04/30/2023 08/29/2023 Yes       VTE Risk Mitigation (From admission, onward)           Ordered     IP VTE HIGH RISK PATIENT  Once         08/29/23 1355     Place sequential compression device  Until discontinued         08/29/23 1355                I substantially and  personally reviewed old and new ecg's, lab reports,, xray reports  and  other cardiovascular studies including  echo's, stress tests, angiogram reports, holters,and vascular studies .  In addition I evaluated original cardiac cath  ___echo  ____cxr ______ct ____scan on [employer] or [email or web address] or other viewing platforms and  ____EKG's .   I reviewed  office and hospital notes Yes ____ of  referring providers and other providers.  I reviewed personally old hospital and office notes   Time spent evaluating and managing this patient:________min.        Thank you for your consult.     Tye Agarwal MD  Cardiology   [facility]/Surg

## 2023-08-30 NOTE — DISCHARGE INSTRUCTIONS
Get ultrasound of the leg at [facility] at 11:30 today (arrive 15 minutes early). If any chest pains or shortness of breath before, go directly to the ER. If negative, start the Keflex and take until gone. If positive, will send to ER to anticoagulation therapy. Follow up if not improving or worsens over the next couple of days. You have been prescribed antibiotics -  Macrobid x 5 days - please complete course.   - Ok to restart blood thinners on 9/4/23.   - RTC next week for nurse visit centeno removal.   - RTC in 6 weeks Dr. Bradshaw for post op appt with symptom score and PVR.

## 2023-08-30 NOTE — NURSING
Notified Olivia Del Angel NP of patient and situation, ISBAR provided, explained to provider that patient is requesting a sleep aid. Provider placed orders for melatonin, see MAR.

## 2023-08-30 NOTE — PLAN OF CARE
packet sent to [facility] for [facility]       08/30/23 0919   Post-Acute Status   Post-Acute Authorization Home Health   Home Health Status Set-up Complete/Auth obtained

## 2023-08-30 NOTE — HOSPITAL COURSE
Patient was admitted S/P TURP with Dr. Bradshaw.  Postoperatively 3 way catheter was placed and CBI was performed overnight.  He was noted to be bradycardic on telemetry monitoring.  EKG was obtained.  His sotalol was held and Cardiology was consulted.  He was cleared from Urology standpoint for discharge with oral antibiotics and Rojas catheter.  He was furthermore cleared from Cardiology standpoint for discharge on his current sotalol dosing.  Discharge instructions as well as return precautions were discussed with patient with good understanding.  Patient was seen and evaluated on day of discharge and deemed appropriate.

## 2023-08-30 NOTE — PLAN OF CARE
Spoke with Dr. Bradshaw who states pt can d/c as long as urine remains light pink to clear. Pt can start taking blood thinner on Monday 9/4/23.

## 2023-08-30 NOTE — PLAN OF CARE
Appts on AVS  Home health resumed with [facility] hh. Next visit 8/31    Pt is clear for DC from        08/30/23 9679   Final Note   Assessment Type Final Discharge Note   Anticipated Discharge Disposition Home-Health   Hospital Resources/Appts/Education Provided Appointments scheduled and added to AVS;Post-Acute resouces added to AVS   Post-Acute Status   Post-Acute Authorization Home Health   Home Health Status Set-up Complete/Auth obtained

## 2023-08-30 NOTE — PLAN OF CARE
Problem: Fluid and Electrolyte Imbalance (Acute Kidney Injury/Impairment)  Goal: Fluid and Electrolyte Balance  Outcome: Ongoing, Progressing     Problem: Oral Intake Inadequate (Acute Kidney Injury/Impairment)  Goal: Optimal Nutrition Intake  Outcome: Ongoing, Progressing     Problem: Renal Function Impairment (Acute Kidney Injury/Impairment)  Goal: Effective Renal Function  Outcome: Ongoing, Progressing     Problem: Adult Inpatient Plan of Care  Goal: Plan of Care Review  Outcome: Ongoing, Progressing  Goal: Patient-Specific Goal (Individualized)  Outcome: Ongoing, Progressing  Goal: Absence of Hospital-Acquired Illness or Injury  Outcome: Ongoing, Progressing  Goal: Optimal Comfort and Wellbeing  Outcome: Ongoing, Progressing  Goal: Readiness for Transition of Care  Outcome: Ongoing, Progressing     Problem: Impaired Wound Healing  Goal: Optimal Wound Healing  Outcome: Ongoing, Progressing     Problem: Infection  Goal: Absence of Infection Signs and Symptoms  Outcome: Ongoing, Progressing     Problem: Urinary Retention  Goal: Effective Urinary Elimination  Outcome: Ongoing, Progressing     Patient progressing well on CBI, urine is light clear pink. Will continue to closely monitor. VSS.

## 2023-08-30 NOTE — NURSING
Notified Danika Sibley NP of patient and situation, ISBAR provided, explained to provider that patient's heart rate has gone down as low as 38, ranging between 38-46, asymptomatic. Provider voiced understanding and stated that she will put in for a cardiology consult.

## 2023-08-30 NOTE — NURSING
Notified Danika Sibley NP that patient's heart rate dropping down to 35, ranging 35-42. Informed provider that patient does not currently have a cardiology consult. Provider voiced understanding and ordered a cardiology consult.

## 2023-08-30 NOTE — PROGRESS NOTES
Urology Progress Note    Patient s/p uncomplicated TURP on 8/29/23.     No acute evens overnight on CBI.     NAD, AAOx3  3 way catheter in place with light pink urine    Plan:  - Ok to discharge with centeno in place if urine remains light pink and catheter draining well. Will need to connect to stat lock and plug in-flow port.   - Home with Macrobid x 5 days.   - Ok to restart blood thinners on 9/4/23.   - RTC next week for nurse visit centeno removal.   - RTC in 6 weeks with me for post op appt with symptom score and PVR.

## 2023-08-30 NOTE — DISCHARGE SUMMARY
[facility] Medicine  Discharge Summary      Patient Name: Ezequiel Escudero  MRN: [id number]  TROY: [id number]  Patient Class: OP- Outpatient Recovery  Admission Date: 8/29/2023  Hospital Length of Stay: 0 days  Discharge Date and Time: 8/30/2023  4:02 PM  Attending Physician: Eloy Boyd MD   Discharging Provider: Rosi Nicolas NP  Primary Care Provider: Santiago Beebe MD    Primary Care Team: Networked reference to record PCT     HPI:   Ezequiel Escudero is a 72-year-old male with PMHx significant for atrial fibrillation on Xarelto, thyroid disease, and BPH.  He is S/P TURP with Dr. Bradshaw.  Postoperatively, patient is doing well.  He reports mild burning sensation from his 3 way catheter, but is otherwise without pain.  He denies shortness of breath, chest pain, nausea, vomiting, or any other symptoms at this time.      Procedure(s) (LRB):  TURP (TRANSURETHRAL RESECTION OF PROSTATE) (N/A)      Hospital Course:   Patient was admitted S/P TURP with Dr. Bradshaw.  Postoperatively 3 way catheter was placed and CBI was performed overnight.  He was noted to be bradycardic on telemetry monitoring.  EKG was obtained.  His sotalol was held and Cardiology was consulted.  He was cleared from Urology standpoint for discharge with oral antibiotics and Rojas catheter.  He was furthermore cleared from Cardiology standpoint for discharge on his current sotalol dosing.  Discharge instructions as well as return precautions were discussed with patient with good understanding.  Patient was seen and evaluated on day of discharge and deemed appropriate.       Goals of Care Treatment Preferences:  Code Status: Full Code      Consults:   Consults (From admission, onward)          Status Ordering Provider     Inpatient consult to Cardiology  Once        Provider:  Tye Agarwal MD    Acknowledged STEFANIA BOBO            No new Assessment & Plan notes have been filed under this hospital service  since the last note was generated.  Service: Hospital Medicine    Final Active Diagnoses:    Diagnosis Date Noted POA    PRINCIPAL PROBLEM:  BPH (benign prostatic hyperplasia) [N40.0] 10/02/2012 Yes    Bradycardia [R00.1] 08/30/2023 Unknown    Hypertension [I10] 03/28/2023 Yes    Paroxysmal atrial fibrillation [I48.0]  Yes      Problems Resolved During this Admission:    Diagnosis Date Noted Date Resolved POA    Pulmonary emboli [I26.99] 04/30/2023 08/29/2023 Yes       Discharged Condition: good    Disposition: Home or Self Care    Follow Up:   Follow-up Information       Wood Bradshaw Jr., MD Follow up in 6 week(s).    Specialty: Urology  Why: 10/12 @ 10:15 am  Contact information:  [address] [place] [address]   [address]  [place] [address]  [phone]               Wood Bradshaw Jr., MD Follow up in 1 week(s).    Specialty: Urology  Why: nurse visit 9/7 @ 8 am  Contact information:  [address] [place] [address]   [address]  [place] [address]  [phone]               [facility] Follow up.    Specialty: Home Health Services  Why: next visit 8/31  Contact information:  [address]  [address]  [place] [address]  [phone]               Santiago Beebe MD .    Specialty: Cardiology  Why: 9/14 @ 8:40 am  Contact information:  [address]  [place] [address]  [phone]                           Patient Instructions:      Diet Cardiac     Lifting restrictions     No driving until:     Notify your health care provider if you experience any of the following:  temperature >100.4     Notify your health care provider if you experience any of the following:  persistent nausea and vomiting or diarrhea     Notify your health care provider if you experience any of the following:  severe uncontrolled pain     Notify your health care provider if you experience any of the following:  redness, tenderness, or signs of infection (pain, swelling, redness, odor or green/yellow discharge around  incision site)     SUBSEQUENT HOME HEALTH ORDERS   Order Comments: Resume previous home health orders     Order Specific Question Answer Comments   What Home Health Agency is the patient currently using? Other/External      Activity as tolerated       Significant Diagnostic Studies: Labs:   CMP   Recent Labs   Lab 08/29/23  0702      K 4.9      CO2 24   *   BUN 17   CREATININE 1.1   CALCIUM 9.6   ANIONGAP 14    and CBC   Recent Labs   Lab 08/29/23  0702   WBC 6.67   HGB 16.4   HCT 50.2          Pending Diagnostic Studies:       Procedure Component Value Units Date/Time    EKG 12-lead [[id number]]     Order Status: Sent Lab Status: No result     Specimen to Pathology - Surgery [[id number]] Collected: 08/29/23 0832    Order Status: Sent Lab Status: No result     Specimen: Tissue            Medications:  Reconciled Home Medications:      Medication List        START taking these medications      nitrofurantoin (macrocrystal-monohydrate) 100 MG capsule  Commonly known as: MACROBID  Take 1 capsule (100 mg total) by mouth 2 (two) times daily. for 5 days            CHANGE how you take these medications      finasteride 5 mg tablet  Commonly known as: PROSCAR  Take 1 tablet by mouth once daily  What changed: when to take this            CONTINUE taking these medications      furosemide 40 MG tablet  Commonly known as: LASIX  Take 40 mg by mouth.     hydrALAZINE 100 MG tablet  Commonly known as: APRESOLINE  Take 100 mg by mouth 2 (two) times daily.     meclizine 25 mg tablet  Commonly known as: ANTIVERT  Take 25 mg by mouth 3 (three) times daily as needed.     PRESERVISION AREDS 2 PLUS  mcg-15 mcg- 5 mg-1 mg Cap  Generic drug: mv-min-FA-vit K-lutein-zeaxant  Take 1 capsule by mouth 2 (two) times daily.     rivaroxaban 15 mg Tab  Commonly known as: XARELTO  TAKE 1 TABLET BY MOUTH TWICE DAILY WITH MEALS FOR 21 DAYS     sotaloL 80 MG tablet  Commonly known as: BETAPACE  Take 80 mg by mouth 2 (two)  times daily.     tamsulosin 0.4 mg Cap  Commonly known as: FLOMAX  Take 2 capsules (0.8 mg total) by mouth once daily.              Indwelling Lines/Drains at time of discharge:   Lines/Drains/Airways       Drain  Duration                  Urethral Catheter 08/29/23 0853 Latex 24 Fr. 1 day                    Time spent on the discharge of patient: 38 minutes         Rosi Nicolas NP  Department of Hospital Medicine  [facility]/Surg

## 2023-08-31 ENCOUNTER — TELEPHONE (OUTPATIENT)
Dept: UROLOGY | Facility: CLINIC | Age: 73
End: 2023-08-31
Payer: MEDICARE

## 2023-08-31 NOTE — TELEPHONE ENCOUNTER
"Called and spoke to patient. Informed patient per MD notes that "Ok to restart blood thinners on 9/4/23." Patient verbalized understanding.  "

## 2023-08-31 NOTE — TELEPHONE ENCOUNTER
----- Message from Shine Hanson sent at 8/31/2023  1:37 PM CDT -----  Type: Needs Medical Advice  Who Called:  pt  Best Call Back Number: [phone]  Additional Information: pt is calling the office in regards to when he should restart one of his medications, Xeralto after surgery yesterday. Please call back to advise. Thanks!

## 2023-09-01 VITALS
RESPIRATION RATE: 20 BRPM | DIASTOLIC BLOOD PRESSURE: 58 MMHG | SYSTOLIC BLOOD PRESSURE: 115 MMHG | WEIGHT: 271.19 LBS | HEART RATE: 56 BPM | TEMPERATURE: 98 F | BODY MASS INDEX: 36.73 KG/M2 | OXYGEN SATURATION: 97 % | HEIGHT: 72 IN

## 2023-09-01 NOTE — PROGRESS NOTES
Very pleased with care given.  All nurses and staff were excellent and supportive.  Very good care all around from all departments.  States he understands all discharge instructions.

## 2023-09-07 ENCOUNTER — TELEPHONE (OUTPATIENT)
Dept: UROLOGY | Facility: CLINIC | Age: 73
End: 2023-09-07

## 2023-09-07 ENCOUNTER — CLINICAL SUPPORT (OUTPATIENT)
Dept: UROLOGY | Facility: CLINIC | Age: 73
End: 2023-09-07
Payer: MEDICARE

## 2023-09-07 DIAGNOSIS — N40.1 BENIGN PROSTATIC HYPERPLASIA WITH LOWER URINARY TRACT SYMPTOMS, SYMPTOM DETAILS UNSPECIFIED: Primary | ICD-10-CM

## 2023-09-07 PROCEDURE — 99499 NO LOS: ICD-10-PCS | Mod: S$GLB,,, | Performed by: UROLOGY

## 2023-09-07 PROCEDURE — 99499 UNLISTED E&M SERVICE: CPT | Mod: S$GLB,,, | Performed by: UROLOGY

## 2023-09-07 NOTE — PROGRESS NOTES
Patient here today to have his catheter removed.   ?  30 ml saline removed from catheter balloon.   24 FR  Catheter removed .   Catheter bag contains 100 MLs of clear yellow urine   Informed patient to hydrate and return to clinic at 2:30 for bladder scan.   Patient left the office in satisfactory condition.

## 2023-09-07 NOTE — TELEPHONE ENCOUNTER
----- Message from Anna Cameron sent at 9/7/2023 10:21 AM CDT -----  Contact: Patient  Type:  Needs Medical Advice    Who Called: Patient     Pharmacy name and phone #:    [id number] - [place] - [address]  [id number]  [place] [address]  Phone: [phone] Fax: [phone]      Would the patient rather a call back or a response via [facility]? Call    Best Call Back Number: [phone] (home)     Additional Information: Patient has post catheter questions. Please call to advise

## 2023-09-07 NOTE — TELEPHONE ENCOUNTER
Patient called and states he is unable to come back to the office this afternoon for a PVR check after his catheter removal this morning. Patient states he is having transportation issues. Informed him it was highly recommended that he comes to the clinic for a bladder scan. Patients states he is urinating with a strong stream. I informed him if he is unable to urinate he will need to report to the ER. Patient verbalized understanding.

## 2023-10-03 ENCOUNTER — EXTERNAL HOME HEALTH (OUTPATIENT)
Dept: HOME HEALTH SERVICES | Facility: HOSPITAL | Age: 73
End: 2023-10-03
Payer: MEDICARE

## 2023-10-11 ENCOUNTER — TELEPHONE (OUTPATIENT)
Dept: UROLOGY | Facility: CLINIC | Age: 73
End: 2023-10-11
Payer: MEDICARE

## 2023-10-11 NOTE — TELEPHONE ENCOUNTER
----- Message from Poonam Davidson sent at 10/11/2023 10:09 AM CDT -----  Contact: Patient  Type:  Sooner Apoointment Request    Caller is requesting a sooner appointment.  Caller declined first available appointment listed below.  Caller will not accept being placed on the waitlist and is requesting a message be sent to doctor.  Name of Caller:Patient   When is the first available appointment? 01/11/2024   Symptoms: post op   Would the patient rather a call back or a response via [facility]? Call back   Best Call Back Number:[phone]  Additional Information: Pt cancelled appt for 10/12/2023 and would like a call back to reschedule appt as soon as possible please assist

## 2023-10-18 ENCOUNTER — OFFICE VISIT (OUTPATIENT)
Dept: WOUND CARE | Facility: HOSPITAL | Age: 73
End: 2023-10-18
Attending: FAMILY MEDICINE
Payer: MEDICARE

## 2023-10-18 VITALS
SYSTOLIC BLOOD PRESSURE: 128 MMHG | HEART RATE: 60 BPM | TEMPERATURE: 98 F | DIASTOLIC BLOOD PRESSURE: 66 MMHG | RESPIRATION RATE: 18 BRPM

## 2023-10-18 DIAGNOSIS — R60.0 PERIPHERAL EDEMA: ICD-10-CM

## 2023-10-18 DIAGNOSIS — L97.919 VENOUS ULCER OF RIGHT LEG: ICD-10-CM

## 2023-10-18 DIAGNOSIS — L97.812 NON-PRESSURE CHRONIC ULCER OF OTHER PART OF RIGHT LOWER LEG WITH FAT LAYER EXPOSED: Primary | ICD-10-CM

## 2023-10-18 DIAGNOSIS — I83.019 VENOUS ULCER OF RIGHT LEG: ICD-10-CM

## 2023-10-18 PROCEDURE — 1126F AMNT PAIN NOTED NONE PRSNT: CPT | Mod: CPTII,,, | Performed by: FAMILY MEDICINE

## 2023-10-18 PROCEDURE — 3078F DIAST BP <80 MM HG: CPT | Mod: CPTII,,, | Performed by: FAMILY MEDICINE

## 2023-10-18 PROCEDURE — 3078F PR MOST RECENT DIASTOLIC BLOOD PRESSURE < 80 MM HG: ICD-10-PCS | Mod: CPTII,,, | Performed by: FAMILY MEDICINE

## 2023-10-18 PROCEDURE — 3044F HG A1C LEVEL LT 7.0%: CPT | Mod: CPTII,,, | Performed by: FAMILY MEDICINE

## 2023-10-18 PROCEDURE — 99213 PR OFFICE/OUTPT VISIT, EST, LEVL III, 20-29 MIN: ICD-10-PCS | Mod: ,,, | Performed by: FAMILY MEDICINE

## 2023-10-18 PROCEDURE — 1101F PT FALLS ASSESS-DOCD LE1/YR: CPT | Mod: CPTII,,, | Performed by: FAMILY MEDICINE

## 2023-10-18 PROCEDURE — 99213 OFFICE O/P EST LOW 20 MIN: CPT | Mod: PN | Performed by: FAMILY MEDICINE

## 2023-10-18 PROCEDURE — 1159F MED LIST DOCD IN RCRD: CPT | Mod: CPTII,,, | Performed by: FAMILY MEDICINE

## 2023-10-18 PROCEDURE — 3044F PR MOST RECENT HEMOGLOBIN A1C LEVEL <7.0%: ICD-10-PCS | Mod: CPTII,,, | Performed by: FAMILY MEDICINE

## 2023-10-18 PROCEDURE — 1159F PR MEDICATION LIST DOCUMENTED IN MEDICAL RECORD: ICD-10-PCS | Mod: CPTII,,, | Performed by: FAMILY MEDICINE

## 2023-10-18 PROCEDURE — 1160F RVW MEDS BY RX/DR IN RCRD: CPT | Mod: CPTII,,, | Performed by: FAMILY MEDICINE

## 2023-10-18 PROCEDURE — 3288F FALL RISK ASSESSMENT DOCD: CPT | Mod: CPTII,,, | Performed by: FAMILY MEDICINE

## 2023-10-18 PROCEDURE — 3288F PR FALLS RISK ASSESSMENT DOCUMENTED: ICD-10-PCS | Mod: CPTII,,, | Performed by: FAMILY MEDICINE

## 2023-10-18 PROCEDURE — 99213 OFFICE O/P EST LOW 20 MIN: CPT | Mod: ,,, | Performed by: FAMILY MEDICINE

## 2023-10-18 PROCEDURE — 1126F PR PAIN SEVERITY QUANTIFIED, NO PAIN PRESENT: ICD-10-PCS | Mod: CPTII,,, | Performed by: FAMILY MEDICINE

## 2023-10-18 PROCEDURE — 1160F PR REVIEW ALL MEDS BY PRESCRIBER/CLIN PHARMACIST DOCUMENTED: ICD-10-PCS | Mod: CPTII,,, | Performed by: FAMILY MEDICINE

## 2023-10-18 PROCEDURE — 3074F SYST BP LT 130 MM HG: CPT | Mod: CPTII,,, | Performed by: FAMILY MEDICINE

## 2023-10-18 PROCEDURE — 3074F PR MOST RECENT SYSTOLIC BLOOD PRESSURE < 130 MM HG: ICD-10-PCS | Mod: CPTII,,, | Performed by: FAMILY MEDICINE

## 2023-10-18 PROCEDURE — 1101F PR PT FALLS ASSESS DOC 0-1 FALLS W/OUT INJ PAST YR: ICD-10-PCS | Mod: CPTII,,, | Performed by: FAMILY MEDICINE

## 2023-10-22 NOTE — PROGRESS NOTES
Wound Care Progress Note    Subjective:       Patient ID: Ezequiel Escudero is a 72 y.o. male.    Chief Complaint: Wound Care    HPI  Pt seen in clinic for a FU visit for peripheral edema with a venous ulcer of the right leg. Pt does not want to come to the office weekly, but agreed to see us once a month. Pt has home health. Pt has no other complaints today.  Review of Systems   Skin:  Positive for wound.        Right leg venous ulcer   All other systems reviewed and are negative.      Objective:        Physical Exam  Vitals and nursing note reviewed. Exam conducted with a chaperone present.   Constitutional:       Appearance: Normal appearance.   Musculoskeletal:      Right lower leg: Edema present.      Left lower leg: Edema present.   Skin:     General: Skin is warm and dry.      Findings: Lesion present.      Comments: Right leg venous ulcer, see wound care assessment  documentation in  chart review scanned under the media tab   Neurological:      General: No focal deficit present.      Mental Status: He is alert and oriented to person, place, and time.   Psychiatric:         Mood and Affect: Mood normal.         Behavior: Behavior normal.         Thought Content: Thought content normal.         Judgment: Judgment normal.       Vitals:    10/18/23 1029   BP: 128/66   Pulse: 60   Resp: 18   Temp: 98.3 °F (36.8 °C)       Assessment:           ICD-10-CM ICD-9-CM   1. Non-pressure chronic ulcer of other part of right lower leg with fat layer exposed  L97.812 707.19   2. Venous ulcer of right leg  I83.019 454.0    L97.919    3. Peripheral edema  R60.9 782.3                Plan:                  Ezequiel was seen today for wound care.    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

## 2023-11-28 PROCEDURE — G0179 MD RECERTIFICATION HHA PT: HCPCS | Mod: ,,, | Performed by: FAMILY MEDICINE

## 2023-11-28 PROCEDURE — G0179 PR HOME HEALTH MD RECERTIFICATION: ICD-10-PCS | Mod: ,,, | Performed by: FAMILY MEDICINE

## 2023-12-02 ENCOUNTER — EXTERNAL HOME HEALTH (OUTPATIENT)
Dept: HOME HEALTH SERVICES | Facility: HOSPITAL | Age: 73
End: 2023-12-02
Payer: MEDICARE

## 2023-12-13 ENCOUNTER — OFFICE VISIT (OUTPATIENT)
Dept: WOUND CARE | Facility: HOSPITAL | Age: 73
End: 2023-12-13
Attending: FAMILY MEDICINE
Payer: MEDICARE

## 2023-12-13 VITALS
BODY MASS INDEX: 36.7 KG/M2 | HEIGHT: 72 IN | HEART RATE: 64 BPM | RESPIRATION RATE: 18 BRPM | WEIGHT: 271 LBS | DIASTOLIC BLOOD PRESSURE: 73 MMHG | SYSTOLIC BLOOD PRESSURE: 146 MMHG | TEMPERATURE: 99 F

## 2023-12-13 DIAGNOSIS — L97.919 VENOUS ULCER OF RIGHT LEG: ICD-10-CM

## 2023-12-13 DIAGNOSIS — I87.2 VENOUS STASIS DERMATITIS OF BOTH LOWER EXTREMITIES: ICD-10-CM

## 2023-12-13 DIAGNOSIS — I87.8 VENOUS STASIS: ICD-10-CM

## 2023-12-13 DIAGNOSIS — I83.019 VENOUS ULCER OF RIGHT LEG: ICD-10-CM

## 2023-12-13 DIAGNOSIS — L97.812 NON-PRESSURE CHRONIC ULCER OF OTHER PART OF RIGHT LOWER LEG WITH FAT LAYER EXPOSED: Primary | ICD-10-CM

## 2023-12-13 PROCEDURE — 99499 UNLISTED E&M SERVICE: CPT | Mod: ,,, | Performed by: FAMILY MEDICINE

## 2023-12-13 PROCEDURE — 11042 DBRDMT SUBQ TIS 1ST 20SQCM/<: CPT | Mod: PN | Performed by: FAMILY MEDICINE

## 2023-12-13 PROCEDURE — 11042 DBRDMT SUBQ TIS 1ST 20SQCM/<: CPT | Mod: ,,, | Performed by: FAMILY MEDICINE

## 2023-12-13 PROCEDURE — 99499 NO LOS: ICD-10-PCS | Mod: ,,, | Performed by: FAMILY MEDICINE

## 2023-12-13 PROCEDURE — 11042 PR DEBRIDEMENT, SKIN, SUB-Q TISSUE,=<20 SQ CM: ICD-10-PCS | Mod: ,,, | Performed by: FAMILY MEDICINE

## 2023-12-13 NOTE — PATIENT INSTRUCTIONS
See attached wound care documentation    Modified Advancement Flap Text: The defect edges were debeveled with a #15 scalpel blade. Given the location of the defect, shape of the defect and the proximity to free margins a modified advancement flap was deemed most appropriate. Using a sterile surgical marker, an appropriate advancement flap was drawn incorporating the defect and placing the expected incisions within the relaxed skin tension lines where possible. The area thus outlined was incised deep to adipose tissue with a #15 scalpel blade. The skin margins were undermined to an appropriate distance in all directions utilizing iris scissors. Following this, the designed flap was advanced and carried over into the primary defect and sutured into place.

## 2023-12-13 NOTE — PROGRESS NOTES
Wound Care Progress Note    Subjective:       Patient ID: Ezequiel Escudero is a 73 y.o. male.    Chief Complaint: Wound Care    HPI  Pt seen in clinic for a FU visit for a right leg venous ulcer. Pt has a new wound on the anterior aspect of the leg. Pt has no other complaints today.  Review of Systems   Skin:  Positive for wound.        Right leg venous ulcer   All other systems reviewed and are negative.      Objective:        Physical Exam  Vitals and nursing note reviewed. Exam conducted with a chaperone present.   Constitutional:       General: He is not in acute distress.     Appearance: Normal appearance. He is not toxic-appearing.   Skin:     General: Skin is warm and dry.      Findings: Lesion present.      Comments: Right leg venous ulcer x 2, see wound care assessment documentation in  chart review scanned under the media tab   Neurological:      General: No focal deficit present.      Mental Status: He is alert and oriented to person, place, and time.   Psychiatric:         Mood and Affect: Mood normal.         Thought Content: Thought content normal.         Judgment: Judgment normal.       Vitals:    12/13/23 1042   BP: (!) 146/73   Pulse: 64   Resp: 18   Temp: 98.5 °F (36.9 °C)       Assessment:           ICD-10-CM ICD-9-CM   1. Non-pressure chronic ulcer of other part of right lower leg with fat layer exposed  L97.812 707.19   2. Venous ulcer of right leg  I83.019 454.0    L97.919    3. Venous stasis dermatitis of both lower extremities  I87.2 454.1   4. Venous stasis  I87.8 459.81                Plan:                  Ezequiel was seen today for wound care.    Diagnoses and all orders for this visit:    Non-pressure chronic ulcer of other part of right lower leg with fat layer exposed    Venous ulcer of right leg    Venous stasis dermatitis of both lower extremities    Venous stasis        Much of the documentation for this visit was completed in wound docs system. Please see the attached  documentation for further details about the patients care. Scanned under the media tab.

## 2023-12-27 ENCOUNTER — DOCUMENT SCAN (OUTPATIENT)
Dept: HOME HEALTH SERVICES | Facility: HOSPITAL | Age: 73
End: 2023-12-27
Payer: MEDICARE

## 2024-01-24 ENCOUNTER — OFFICE VISIT (OUTPATIENT)
Dept: WOUND CARE | Facility: HOSPITAL | Age: 74
End: 2024-01-24
Attending: FAMILY MEDICINE
Payer: MEDICARE

## 2024-01-24 VITALS
HEART RATE: 66 BPM | SYSTOLIC BLOOD PRESSURE: 150 MMHG | TEMPERATURE: 98 F | DIASTOLIC BLOOD PRESSURE: 88 MMHG | RESPIRATION RATE: 20 BRPM

## 2024-01-24 DIAGNOSIS — L97.812 ULCER OF RIGHT PRETIBIAL REGION, WITH FAT LAYER EXPOSED: Primary | ICD-10-CM

## 2024-01-24 DIAGNOSIS — R60.0 PERIPHERAL EDEMA: ICD-10-CM

## 2024-01-24 DIAGNOSIS — I87.8 VENOUS STASIS: ICD-10-CM

## 2024-01-24 DIAGNOSIS — I87.2 VENOUS STASIS DERMATITIS OF BOTH LOWER EXTREMITIES: ICD-10-CM

## 2024-01-24 DIAGNOSIS — I83.029 VENOUS ULCER OF LEFT LEG: ICD-10-CM

## 2024-01-24 DIAGNOSIS — L97.929 VENOUS ULCER OF LEFT LEG: ICD-10-CM

## 2024-01-24 DIAGNOSIS — L97.919 VENOUS ULCER OF RIGHT LEG: ICD-10-CM

## 2024-01-24 DIAGNOSIS — I83.019 VENOUS ULCER OF RIGHT LEG: ICD-10-CM

## 2024-01-24 PROCEDURE — 3077F SYST BP >= 140 MM HG: CPT | Mod: CPTII,,, | Performed by: FAMILY MEDICINE

## 2024-01-24 PROCEDURE — 3079F DIAST BP 80-89 MM HG: CPT | Mod: CPTII,,, | Performed by: FAMILY MEDICINE

## 2024-01-24 PROCEDURE — 1160F RVW MEDS BY RX/DR IN RCRD: CPT | Mod: CPTII,,, | Performed by: FAMILY MEDICINE

## 2024-01-24 PROCEDURE — 97602 WOUND(S) CARE NON-SELECTIVE: CPT | Mod: PN | Performed by: FAMILY MEDICINE

## 2024-01-24 PROCEDURE — 1159F MED LIST DOCD IN RCRD: CPT | Mod: CPTII,,, | Performed by: FAMILY MEDICINE

## 2024-01-24 PROCEDURE — 99213 OFFICE O/P EST LOW 20 MIN: CPT | Mod: ,,, | Performed by: FAMILY MEDICINE

## 2024-01-25 NOTE — PROGRESS NOTES
Wound Care Progress Note    Subjective:       Patient ID: Ezequiel Escudero is a 73 y.o. male.    Chief Complaint: Non-healing Wound (leg)    HPI  Pt seen in clinic for a FU visit for BLE stasis dermatitis with ulcers. Pt legs continue to weep, but he is dealing with multiple hip problems at this time too. No other new complaints today  Review of Systems   Skin:  Positive for rash and wound.        BLE venous ulcers with stasis dermatitis   All other systems reviewed and are negative.      Objective:        Physical Exam  Vitals and nursing note reviewed. Exam conducted with a chaperone present.   Constitutional:       General: He is not in acute distress.     Appearance: Normal appearance.   Musculoskeletal:      Right lower leg: Edema present.      Left lower leg: Edema present.   Skin:     General: Skin is warm and dry.      Findings: Lesion and rash present.      Comments: BLE stasis dermatitis with venous ulcers, multiple, see wound care assessment documentation in chart review scanned under the media tab   Neurological:      General: No focal deficit present.      Mental Status: He is alert.   Psychiatric:         Mood and Affect: Mood normal.         Thought Content: Thought content normal.         Judgment: Judgment normal.       Vitals:    01/24/24 1130   BP: (!) 150/88   Pulse: 66   Resp: 20   Temp: 98.2 °F (36.8 °C)       Assessment:           ICD-10-CM ICD-9-CM   1. Ulcer of right pretibial region, with fat layer exposed  L97.812 707.19   2. Venous ulcer of right leg  I83.019 454.0    L97.919    3. Venous stasis dermatitis of both lower extremities  I87.2 454.1   4. Venous stasis  I87.8 459.81   5. Peripheral edema  R60.0 782.3   6. Venous ulcer of left leg  I83.029 454.0    L97.929                 Plan:                  Ezequiel was seen today for non-healing wound.    Diagnoses and all orders for this visit:    Ulcer of right pretibial region, with fat layer exposed    Venous ulcer of right  leg    Venous stasis dermatitis of both lower extremities    Venous stasis    Peripheral edema    Venous ulcer of left leg      Much of the documentation for this visit was completed in the Wound Docs system. Please see the attached documentation for further details about this patient's care.

## 2024-01-27 PROCEDURE — G0179 MD RECERTIFICATION HHA PT: HCPCS | Mod: ,,, | Performed by: FAMILY MEDICINE

## 2024-02-09 ENCOUNTER — LAB VISIT (OUTPATIENT)
Dept: LAB | Facility: HOSPITAL | Age: 74
End: 2024-02-09
Attending: ORTHOPAEDIC SURGERY
Payer: MEDICARE

## 2024-02-09 DIAGNOSIS — M25.551 RIGHT HIP PAIN: Primary | ICD-10-CM

## 2024-02-09 LAB
BASOPHILS # BLD AUTO: 0.06 K/UL (ref 0–0.2)
BASOPHILS NFR BLD: 0.8 % (ref 0–1.9)
CRP SERPL-MCNC: 28.6 MG/L (ref 0–8.2)
DIFFERENTIAL METHOD BLD: ABNORMAL
EOSINOPHIL # BLD AUTO: 0.3 K/UL (ref 0–0.5)
EOSINOPHIL NFR BLD: 3.5 % (ref 0–8)
ERYTHROCYTE [DISTWIDTH] IN BLOOD BY AUTOMATED COUNT: 17.4 % (ref 11.5–14.5)
ERYTHROCYTE [SEDIMENTATION RATE] IN BLOOD BY WESTERGREN METHOD: 19 MM/HR (ref 0–10)
HCT VFR BLD AUTO: 49 % (ref 40–54)
HGB BLD-MCNC: 15.7 G/DL (ref 14–18)
IMM GRANULOCYTES # BLD AUTO: 0.05 K/UL (ref 0–0.04)
IMM GRANULOCYTES NFR BLD AUTO: 0.7 % (ref 0–0.5)
LYMPHOCYTES # BLD AUTO: 1.2 K/UL (ref 1–4.8)
LYMPHOCYTES NFR BLD: 15.3 % (ref 18–48)
MCH RBC QN AUTO: 27.9 PG (ref 27–31)
MCHC RBC AUTO-ENTMCNC: 32 G/DL (ref 32–36)
MCV RBC AUTO: 87 FL (ref 82–98)
MONOCYTES # BLD AUTO: 0.9 K/UL (ref 0.3–1)
MONOCYTES NFR BLD: 11.6 % (ref 4–15)
NEUTROPHILS # BLD AUTO: 5.2 K/UL (ref 1.8–7.7)
NEUTROPHILS NFR BLD: 68.1 % (ref 38–73)
NRBC BLD-RTO: 0 /100 WBC
PLATELET # BLD AUTO: 301 K/UL (ref 150–450)
PMV BLD AUTO: 9.7 FL (ref 9.2–12.9)
RBC # BLD AUTO: 5.62 M/UL (ref 4.6–6.2)
WBC # BLD AUTO: 7.65 K/UL (ref 3.9–12.7)

## 2024-02-09 PROCEDURE — 83018 HEAVY METAL QUAN EACH NES: CPT | Performed by: ORTHOPAEDIC SURGERY

## 2024-02-09 PROCEDURE — 82495 ASSAY OF CHROMIUM: CPT | Performed by: ORTHOPAEDIC SURGERY

## 2024-02-09 PROCEDURE — 86140 C-REACTIVE PROTEIN: CPT | Performed by: ORTHOPAEDIC SURGERY

## 2024-02-09 PROCEDURE — 85025 COMPLETE CBC W/AUTO DIFF WBC: CPT | Performed by: ORTHOPAEDIC SURGERY

## 2024-02-09 PROCEDURE — 30000890 MAYO MISCELLANEOUS TEST (REFLEX): Performed by: ORTHOPAEDIC SURGERY

## 2024-02-09 PROCEDURE — 36415 COLL VENOUS BLD VENIPUNCTURE: CPT | Performed by: ORTHOPAEDIC SURGERY

## 2024-02-09 PROCEDURE — 85651 RBC SED RATE NONAUTOMATED: CPT | Performed by: ORTHOPAEDIC SURGERY

## 2024-02-13 LAB
COBALT SERPL-MCNC: 0.7 NG/ML
CR SERPL-MCNC: 0.2 NG/ML

## 2024-02-14 LAB — MAYO MISCELLANEOUS RESULT (REF): NORMAL

## 2024-02-19 ENCOUNTER — EXTERNAL HOME HEALTH (OUTPATIENT)
Dept: HOME HEALTH SERVICES | Facility: HOSPITAL | Age: 74
End: 2024-02-19
Payer: MEDICARE

## 2024-02-22 ENCOUNTER — DOCUMENT SCAN (OUTPATIENT)
Dept: HOME HEALTH SERVICES | Facility: HOSPITAL | Age: 74
End: 2024-02-22
Payer: MEDICARE

## 2024-03-05 ENCOUNTER — OFFICE VISIT (OUTPATIENT)
Dept: WOUND CARE | Facility: HOSPITAL | Age: 74
End: 2024-03-05
Attending: FAMILY MEDICINE
Payer: MEDICARE

## 2024-03-05 VITALS
HEART RATE: 90 BPM | DIASTOLIC BLOOD PRESSURE: 88 MMHG | RESPIRATION RATE: 18 BRPM | TEMPERATURE: 98 F | SYSTOLIC BLOOD PRESSURE: 117 MMHG

## 2024-03-05 DIAGNOSIS — L97.919 VENOUS ULCER OF RIGHT LEG: Primary | ICD-10-CM

## 2024-03-05 DIAGNOSIS — R60.0 PERIPHERAL EDEMA: ICD-10-CM

## 2024-03-05 DIAGNOSIS — L97.812 NON-PRESSURE CHRONIC ULCER OF OTHER PART OF RIGHT LOWER LEG WITH FAT LAYER EXPOSED: ICD-10-CM

## 2024-03-05 DIAGNOSIS — I83.019 VENOUS ULCER OF RIGHT LEG: Primary | ICD-10-CM

## 2024-03-05 DIAGNOSIS — I87.2 VENOUS STASIS DERMATITIS OF BOTH LOWER EXTREMITIES: ICD-10-CM

## 2024-03-05 PROCEDURE — 99213 OFFICE O/P EST LOW 20 MIN: CPT | Mod: ,,, | Performed by: FAMILY MEDICINE

## 2024-03-05 PROCEDURE — 1160F RVW MEDS BY RX/DR IN RCRD: CPT | Mod: CPTII,,, | Performed by: FAMILY MEDICINE

## 2024-03-05 PROCEDURE — 3288F FALL RISK ASSESSMENT DOCD: CPT | Mod: CPTII,,, | Performed by: FAMILY MEDICINE

## 2024-03-05 PROCEDURE — 1101F PT FALLS ASSESS-DOCD LE1/YR: CPT | Mod: CPTII,,, | Performed by: FAMILY MEDICINE

## 2024-03-05 PROCEDURE — 1126F AMNT PAIN NOTED NONE PRSNT: CPT | Mod: CPTII,,, | Performed by: FAMILY MEDICINE

## 2024-03-05 PROCEDURE — 99214 OFFICE O/P EST MOD 30 MIN: CPT | Mod: PN | Performed by: FAMILY MEDICINE

## 2024-03-05 PROCEDURE — 3074F SYST BP LT 130 MM HG: CPT | Mod: CPTII,,, | Performed by: FAMILY MEDICINE

## 2024-03-05 PROCEDURE — 1159F MED LIST DOCD IN RCRD: CPT | Mod: CPTII,,, | Performed by: FAMILY MEDICINE

## 2024-03-05 PROCEDURE — 3079F DIAST BP 80-89 MM HG: CPT | Mod: CPTII,,, | Performed by: FAMILY MEDICINE

## 2024-03-05 NOTE — PROGRESS NOTES
Wound Care Progress Note    Subjective:       Patient ID: Ezequiel Ecsudero is a 73 y.o. male.    Chief Complaint: Wound Care    HPI  Pt seen in clinic for a FU visit for BLE venous stasis dermatitis with ulcers. Wounds are stable, scattered areas of weeping. Pt refusing wraps. No new complaints today  Review of Systems   Skin:  Positive for wound.        BLE open wounds   All other systems reviewed and are negative.      Objective:        Physical Exam  Vitals and nursing note reviewed.   Constitutional:       General: He is not in acute distress.     Appearance: Normal appearance.   Musculoskeletal:      Right lower leg: Edema present.      Left lower leg: Edema present.   Skin:     General: Skin is warm and dry.      Findings: Lesion present.      Comments: Ble venous ulcers with stasis dermatitis, see wound care assessment documentation in chart review scanned under the media tab   Neurological:      General: No focal deficit present.      Mental Status: He is alert and oriented to person, place, and time.   Psychiatric:         Mood and Affect: Mood normal.         Thought Content: Thought content normal.         Judgment: Judgment normal.       Vitals:    03/05/24 0940   BP: 117/88   Pulse: 90   Resp: 18   Temp: 98.4 °F (36.9 °C)       Assessment:           ICD-10-CM ICD-9-CM   1. Venous ulcer of right leg  I83.019 454.0    L97.919    2. Venous stasis dermatitis of both lower extremities  I87.2 454.1   3. Peripheral edema  R60.0 782.3   4. Non-pressure chronic ulcer of other part of right lower leg with fat layer exposed  L97.812 707.19                Plan:                  Ezequiel was seen today for wound care.    Diagnoses and all orders for this visit:    Venous ulcer of right leg    Venous stasis dermatitis of both lower extremities    Peripheral edema    Non-pressure chronic ulcer of other part of right lower leg with fat layer exposed      Please see wound care instructions which have been provided  separately.

## 2024-03-11 ENCOUNTER — TELEPHONE (OUTPATIENT)
Dept: INFECTIOUS DISEASES | Facility: CLINIC | Age: 74
End: 2024-03-11
Payer: MEDICARE

## 2024-03-11 NOTE — TELEPHONE ENCOUNTER
Referral received via fax today from Dr. Calabrese, reviewed with Dr. Alba.  Called pt, Scheduled on 3/26/24 10:30 am, discussed date/time/location with patient, pt voiced understanding and appreciation  Records in Tuesday folder

## 2024-03-26 ENCOUNTER — OFFICE VISIT (OUTPATIENT)
Dept: INFECTIOUS DISEASES | Facility: CLINIC | Age: 74
End: 2024-03-26
Payer: MEDICARE

## 2024-03-26 VITALS
WEIGHT: 274.94 LBS | DIASTOLIC BLOOD PRESSURE: 79 MMHG | HEIGHT: 72 IN | HEART RATE: 72 BPM | TEMPERATURE: 98 F | BODY MASS INDEX: 37.24 KG/M2 | SYSTOLIC BLOOD PRESSURE: 121 MMHG

## 2024-03-26 DIAGNOSIS — I87.8 VENOUS STASIS: ICD-10-CM

## 2024-03-26 DIAGNOSIS — I87.2 VENOUS STASIS DERMATITIS OF BOTH LOWER EXTREMITIES: ICD-10-CM

## 2024-03-26 DIAGNOSIS — T84.50XA INFECTION OF PROSTHETIC JOINT, INITIAL ENCOUNTER: Primary | ICD-10-CM

## 2024-03-26 PROCEDURE — 99203 OFFICE O/P NEW LOW 30 MIN: CPT | Mod: S$GLB,,, | Performed by: INTERNAL MEDICINE

## 2024-03-26 PROCEDURE — 99999 PR PBB SHADOW E&M-EST. PATIENT-LVL IV: CPT | Mod: PBBFAC,,, | Performed by: INTERNAL MEDICINE

## 2024-03-26 PROCEDURE — 1160F RVW MEDS BY RX/DR IN RCRD: CPT | Mod: CPTII,S$GLB,, | Performed by: INTERNAL MEDICINE

## 2024-03-26 PROCEDURE — 3008F BODY MASS INDEX DOCD: CPT | Mod: CPTII,S$GLB,, | Performed by: INTERNAL MEDICINE

## 2024-03-26 PROCEDURE — 1126F AMNT PAIN NOTED NONE PRSNT: CPT | Mod: CPTII,S$GLB,, | Performed by: INTERNAL MEDICINE

## 2024-03-26 PROCEDURE — 1159F MED LIST DOCD IN RCRD: CPT | Mod: CPTII,S$GLB,, | Performed by: INTERNAL MEDICINE

## 2024-03-26 PROCEDURE — 3078F DIAST BP <80 MM HG: CPT | Mod: CPTII,S$GLB,, | Performed by: INTERNAL MEDICINE

## 2024-03-26 PROCEDURE — 1101F PT FALLS ASSESS-DOCD LE1/YR: CPT | Mod: CPTII,S$GLB,, | Performed by: INTERNAL MEDICINE

## 2024-03-26 PROCEDURE — 3074F SYST BP LT 130 MM HG: CPT | Mod: CPTII,S$GLB,, | Performed by: INTERNAL MEDICINE

## 2024-03-26 PROCEDURE — 3288F FALL RISK ASSESSMENT DOCD: CPT | Mod: CPTII,S$GLB,, | Performed by: INTERNAL MEDICINE

## 2024-03-26 RX ORDER — MULTIVITAMIN
1 TABLET ORAL DAILY
COMMUNITY

## 2024-03-26 RX ORDER — TRAMADOL HYDROCHLORIDE 25 MG/1
50 TABLET, COATED ORAL DAILY PRN
COMMUNITY
Start: 2024-03-13 | End: 2024-05-28 | Stop reason: CLARIF

## 2024-03-26 NOTE — PROGRESS NOTES
Patient ID: Ezequiel Escudero is a 73 y.o. male.    Subjective:           Chief Complaint: Follow-up      HPI    History of left hip in Feb 2023 with no issues  Right hip performed by Dr. Neumann June 2023 at [place]. Initially was ok but then he started to experienced with pain.   Was seen by another ortho. Referred to pain management  Then saw Dr. Day, Neurology. Then referred back to Dr. Neumann.  Then referred to Dr. Calabrese.     Underwent arthrocentesis on 2/26/24. WBC 51768 96%   16s: Negative  NGS: Corynebacterium    Interval HPI   Patient tells me that he is feeling relatively well.  Hip discomfort but patient can walk.  Denies fever chills or night sweats  Noted chronic lymphedema        Past Medical History:   Diagnosis Date    A-fib     Anticoagulant long-term use     BPH (benign prostatic hyperplasia)     DVT (deep venous thrombosis)     Rojas catheter in place     Kidney stone     PONV (postoperative nausea and vomiting)     Pulmonary emboli     Thyroid disease     Wears glasses        Past Surgical History:   Procedure Laterality Date    ANKLE DEBRIDEMENT      CARDIOVERSION      CYSTOSCOPY N/A 8/9/2023    Procedure: CYSTOSCOPY;  Surgeon: Wood Bradshaw Jr., MD;  Location: [facility] OR;  Service: Urology;  Laterality: N/A;    CYSTOSCOPY W/ RETROGRADES Bilateral 3/1/2021    Procedure: CYSTOSCOPY, WITH RETROGRADE PYELOGRAM;  Surgeon: Lior Flores MD;  Location: [facility] OR;  Service: Urology;  Laterality: Bilateral;    CYSTOSCOPY WITH BIOPSY OF BLADDER N/A 3/1/2021    Procedure: CYSTOSCOPY, WITH BLADDER BIOPSY, WITH FULGURATION IF INDICATED;  Surgeon: Lior Flores MD;  Location: [facility] OR;  Service: Urology;  Laterality: N/A;    DEBRIDEMENT OF LOWER EXTREMITY Right 2/1/2021    Procedure: DEBRIDEMENT, LOWER EXTREMITY;  Surgeon: Pete Cardenas MD;  Location: [facility] OR;  Service: General;  Laterality: Right;    INCISION AND DRAINAGE Right 2/1/2021    Procedure: Incision and Drainage;  Surgeon: Pete MAURICE  MD Ronald;  Location: [facility] OR;  Service: General;  Laterality: Right;    PULMONARY ANGIOGRAPHY N/A 5/1/2023    Procedure: Pulmonary angiography;  Surgeon: Ali Khoobehi, MD;  Location: [facility] CATH/EP LAB;  Service: Vascular;  Laterality: N/A;    THYROIDECTOMY  1970's    TONSILLECTOMY      TONSILLECTOMY, ADENOIDECTOMY      TRANSRECTAL ULTRASOUND EXAMINATION N/A 8/9/2023    Procedure: ULTRASOUND, RECTAL APPROACH;  Surgeon: Wood Bradshaw Jr., MD;  Location: [facility] OR;  Service: Urology;  Laterality: N/A;    TRANSURETHRAL RESECTION OF PROSTATE N/A 8/29/2023    Procedure: TURP (TRANSURETHRAL RESECTION OF PROSTATE);  Surgeon: Wood Bradshaw Jr., MD;  Location: [facility] OR;  Service: Urology;  Laterality: N/A;    UMBILICAL HERNIA REPAIR N/A 3/31/2023    Procedure: REPAIR, HERNIA, UMBILICAL;  Surgeon: Gino Randhawa Jr., MD;  Location: [facility] OR;  Service: General;  Laterality: N/A;       Review of patient's allergies indicates:  No Known Allergies    Family History   Problem Relation Age of Onset    Benign prostatic hyperplasia Brother     Heart disease Mother     Heart disease Father     Kidney cancer Neg Hx     Prostate cancer Neg Hx     Urolithiasis Neg Hx        Social History     Socioeconomic History    Marital status:    Tobacco Use    Smoking status: Never    Smokeless tobacco: Never   Substance and Sexual Activity    Alcohol use: Yes     Comment: OCC    Drug use: No    Sexual activity: Not Currently       Current Outpatient Medications   Medication Instructions    furosemide (LASIX) 40 mg, Oral    hydrALAZINE (APRESOLINE) 100 mg, Oral, 2 times daily    meclizine (ANTIVERT) 25 mg, Oral, 3 times daily PRN    multivitamin (THERAGRAN) per tablet 1 tablet, Oral, Daily    PRESERVISION AREDS 2 PLUS  mcg-15 mcg- 5 mg-1 mg Cap 1 capsule, Oral, 2 times daily    rivaroxaban (XARELTO) 15 mg Tab TAKE 1 TABLET BY MOUTH TWICE DAILY WITH MEALS FOR 21 DAYS    sotaloL (BETAPACE) 80 mg, Oral, 2 times daily    traMADoL  50 mg, Oral, Daily PRN         Review of Systems   Constitutional:  Negative for activity change, chills, diaphoresis, fatigue, fever and unexpected weight change.   HENT:  Negative for sore throat.    Eyes:  Negative for photophobia and visual disturbance.   Respiratory:  Negative for cough and shortness of breath.    Cardiovascular:  Negative for chest pain and leg swelling.   Gastrointestinal:  Negative for abdominal distention, abdominal pain, nausea and vomiting.   Genitourinary:  Negative for difficulty urinating, dysuria and flank pain.   Musculoskeletal:  Positive for arthralgias and gait problem.   Skin:  Negative for color change and rash.   Allergic/Immunologic: Negative for immunocompromised state.   Neurological:  Negative for dizziness and headaches.   Psychiatric/Behavioral:  The patient is not nervous/anxious.            Objective:     Vitals:    03/26/24 1041   BP: 121/79   Pulse: 72   Temp: 98.2 °F (36.8 °C)       Body mass index is 37.29 kg/m².         Physical Exam  Vitals reviewed.   Constitutional:       General: He is not in acute distress.     Appearance: Normal appearance. He is well-developed. He is not ill-appearing.   HENT:      Head: Normocephalic and atraumatic.      Right Ear: External ear normal.      Left Ear: External ear normal.      Nose: Nose normal.   Eyes:      General: No scleral icterus.        Right eye: No discharge.         Left eye: No discharge.      Pupils: Pupils are equal, round, and reactive to light.   Cardiovascular:      Rate and Rhythm: Normal rate and regular rhythm.      Heart sounds: Normal heart sounds. No murmur heard.  Pulmonary:      Effort: Pulmonary effort is normal. No respiratory distress.      Breath sounds: Normal breath sounds. No wheezing.   Abdominal:      General: There is no distension.      Palpations: Abdomen is soft.      Tenderness: There is no abdominal tenderness.   Musculoskeletal:         General: Normal range of motion.      Cervical  back: Normal range of motion and neck supple. No rigidity.      Right lower leg: Edema present.      Left lower leg: Edema present.   Lymphadenopathy:      Cervical: No cervical adenopathy.   Skin:     General: Skin is warm and dry.      Coloration: Skin is not jaundiced.      Findings: Erythema present.      Comments: Chronic dermatitis   Neurological:      Mental Status: He is alert and oriented to person, place, and time.   Psychiatric:         Mood and Affect: Mood normal.         Speech: Speech normal.         Behavior: Behavior normal.         Judgment: Judgment normal.           Assessment:           Ezequiel was seen today for follow-up.    Diagnoses and all orders for this visit:    Infection of prosthetic joint, initial encounter  -     Ambulatory referral/consult to Physical/Occupational Therapy; Future    Venous stasis  -     Ambulatory referral/consult to Physical/Occupational Therapy; Future    Venous stasis dermatitis of both lower extremities  -     Ambulatory referral/consult to Physical/Occupational Therapy; Future  -     US Lower Extremity Veins Bilateral; Future  -     Cancel: US Lower Extremity Arteries Bilateral; Future  -     US Lower Extrem Arteries Bilat with DANIELA (xpd); Future         Plan:     - Corynebacterium can't be suppressed, so patient will need a 2 stage procedure  Patient with poor legs. Chronic lymphema. Will need to be seen by lymphedema clinic and to continue to follow with wound care.   - Timing of surgery is dependant of above.  - Will communicate with Dr. Calabrese       Greater than 30 minutes was spent on this encounter, which included: review of recent encounters, review and interpretation of labs/images, obtaining pertinent history, performing a physical examination, counseling and educating the patient/family/caregiver, ordering medications/tests, documenting in the electronic health record, and coordinating care with necessary providers.    Minh Alba MD  Infectious  Diseases

## 2024-03-27 PROCEDURE — G0179 MD RECERTIFICATION HHA PT: HCPCS | Mod: ,,, | Performed by: FAMILY MEDICINE

## 2024-03-30 ENCOUNTER — DOCUMENT SCAN (OUTPATIENT)
Dept: HOME HEALTH SERVICES | Facility: HOSPITAL | Age: 74
End: 2024-03-30
Payer: MEDICARE

## 2024-04-01 ENCOUNTER — TELEPHONE (OUTPATIENT)
Dept: INFECTIOUS DISEASES | Facility: CLINIC | Age: 74
End: 2024-04-01
Payer: MEDICARE

## 2024-04-01 NOTE — TELEPHONE ENCOUNTER
Faxed orders to [facility] for PT/OT orders, fax confirmation received  Called pt, notified

## 2024-04-01 NOTE — TELEPHONE ENCOUNTER
----- Message from Maryjane Mitchell sent at 4/1/2024  1:55 PM CDT -----  Regarding: home health PT orders  Contact: pt  Type:  Needs Medical Advice    Who Called: pt    Would the patient rather a call back or a response via [facility]? Call back.    Best Call Back Number: [phone] for the pt.    Additional Information: pt sts that doctor ordered outside PT but pt has homehealth.  The home health company needs orders to allow the home health people to do PT.  Home health [facility] [employer]  Fax for [employer] [facility] is     phone is:  [phone]

## 2024-04-02 ENCOUNTER — HOSPITAL ENCOUNTER (OUTPATIENT)
Dept: RADIOLOGY | Facility: HOSPITAL | Age: 74
Discharge: HOME | End: 2024-04-02
Attending: INTERNAL MEDICINE
Payer: MEDICARE

## 2024-04-02 DIAGNOSIS — I87.2 VENOUS STASIS DERMATITIS OF BOTH LOWER EXTREMITIES: ICD-10-CM

## 2024-04-02 PROCEDURE — 93925 LOWER EXTREMITY STUDY: CPT | Mod: TC,PO

## 2024-04-02 PROCEDURE — 93925 LOWER EXTREMITY STUDY: CPT | Mod: 26,,, | Performed by: RADIOLOGY

## 2024-04-02 PROCEDURE — 93970 EXTREMITY STUDY: CPT | Mod: TC,PO

## 2024-04-02 PROCEDURE — 93970 EXTREMITY STUDY: CPT | Mod: 26,,, | Performed by: RADIOLOGY

## 2024-04-05 ENCOUNTER — TELEPHONE (OUTPATIENT)
Dept: INFECTIOUS DISEASES | Facility: CLINIC | Age: 74
End: 2024-04-05
Payer: MEDICARE

## 2024-04-05 NOTE — TELEPHONE ENCOUNTER
----- Message from Minh Alba MD sent at 4/5/2024  8:17 AM CDT -----  Please call patient.  Ultrasound with evidence of persistent DVT.  Is the patient on anticoagulation?  If so patient should continue, if not patient should contact her primary care provider.

## 2024-04-15 ENCOUNTER — OFFICE VISIT (OUTPATIENT)
Dept: WOUND CARE | Facility: HOSPITAL | Age: 74
End: 2024-04-15
Attending: FAMILY MEDICINE
Payer: MEDICARE

## 2024-04-15 VITALS
SYSTOLIC BLOOD PRESSURE: 157 MMHG | RESPIRATION RATE: 18 BRPM | DIASTOLIC BLOOD PRESSURE: 99 MMHG | HEART RATE: 105 BPM | TEMPERATURE: 98 F

## 2024-04-15 DIAGNOSIS — I87.2 VENOUS STASIS ULCER OF RIGHT CALF WITH NECROSIS OF MUSCLE WITHOUT VARICOSE VEINS: ICD-10-CM

## 2024-04-15 DIAGNOSIS — L97.919 IDIOPATHIC CHRONIC VENOUS HYPERTENSION OF RIGHT LOWER EXTREMITY WITH ULCER: ICD-10-CM

## 2024-04-15 DIAGNOSIS — L97.213 VENOUS STASIS ULCER OF RIGHT CALF WITH NECROSIS OF MUSCLE WITHOUT VARICOSE VEINS: ICD-10-CM

## 2024-04-15 DIAGNOSIS — I83.019 VENOUS ULCER OF RIGHT LEG: Primary | ICD-10-CM

## 2024-04-15 DIAGNOSIS — I87.311 IDIOPATHIC CHRONIC VENOUS HYPERTENSION OF RIGHT LOWER EXTREMITY WITH ULCER: ICD-10-CM

## 2024-04-15 DIAGNOSIS — L97.919 VENOUS ULCER OF RIGHT LEG: Primary | ICD-10-CM

## 2024-04-15 DIAGNOSIS — I87.2 VENOUS STASIS DERMATITIS OF BOTH LOWER EXTREMITIES: ICD-10-CM

## 2024-04-15 DIAGNOSIS — R60.0 PERIPHERAL EDEMA: ICD-10-CM

## 2024-04-15 DIAGNOSIS — L97.812 NON-PRESSURE CHRONIC ULCER OF OTHER PART OF RIGHT LOWER LEG WITH FAT LAYER EXPOSED: ICD-10-CM

## 2024-04-15 PROCEDURE — 11042 DBRDMT SUBQ TIS 1ST 20SQCM/<: CPT | Mod: PN | Performed by: FAMILY MEDICINE

## 2024-04-15 PROCEDURE — 11042 DBRDMT SUBQ TIS 1ST 20SQCM/<: CPT | Mod: ,,, | Performed by: FAMILY MEDICINE

## 2024-04-15 PROCEDURE — 99499 UNLISTED E&M SERVICE: CPT | Mod: ,,, | Performed by: FAMILY MEDICINE

## 2024-04-16 NOTE — PROGRESS NOTES
Wound Care Progress Note    Subjective:       Patient ID: Ezequiel Escudero is a 73 y.o. male.    Chief Complaint: Venous Stasis, Pressure Ulcer, Venous Ulcer, Wound Care, Wound Consult, Difficulty Walking, Leg Swelling, Poor Circulation, and Wound Check    Difficulty Walking    Wound Check  Pt seen in clinic for a FU visit for a right leg venous ulcer. Wound is stable, some improvement. No new complaints today  Review of Systems   Skin:  Positive for wound.        Open wound right leg   All other systems reviewed and are negative.      Objective:        Physical Exam  Vitals and nursing note reviewed. Exam conducted with a chaperone present.   Constitutional:       General: He is not in acute distress.     Appearance: Normal appearance.   Skin:     General: Skin is warm and dry.      Findings: Lesion present.      Comments: Right leg venous ulcer, see wound care assessment documentation I chart review scanned under the media tab   Neurological:      General: No focal deficit present.      Mental Status: He is alert.   Psychiatric:         Mood and Affect: Mood normal.         Judgment: Judgment normal.       Vitals:    04/15/24 1217   BP: (!) 157/99   Pulse: 105   Resp: 18   Temp: 98 °F (36.7 °C)       Assessment:           ICD-10-CM ICD-9-CM   1. Venous ulcer of right leg  I83.019 454.0    L97.919    2. Venous stasis dermatitis of both lower extremities  I87.2 454.1   3. Peripheral edema  R60.0 782.3   4. Non-pressure chronic ulcer of other part of right lower leg with fat layer exposed  L97.812 707.19   5. Venous stasis ulcer of right calf with necrosis of muscle without varicose veins  I87.2 459.81    L97.213 707.12     728.89   6. Idiopathic chronic venous hypertension of right lower extremity with ulcer  I87.311 459.31    L97.919                 Plan:                  Ezequiel was seen today for venous stasis, pressure ulcer, venous ulcer, wound care, wound consult, difficulty walking, leg swelling, poor  circulation and wound check.    Diagnoses and all orders for this visit:    Venous ulcer of right leg    Venous stasis dermatitis of both lower extremities    Peripheral edema    Non-pressure chronic ulcer of other part of right lower leg with fat layer exposed    Venous stasis ulcer of right calf with necrosis of muscle without varicose veins    Idiopathic chronic venous hypertension of right lower extremity with ulcer        Much of the documentation for this visit was completed in the [facility] system. Please see the attached documentation for further details about this patient's care.

## 2024-04-22 ENCOUNTER — EXTERNAL HOME HEALTH (OUTPATIENT)
Dept: HOME HEALTH SERVICES | Facility: HOSPITAL | Age: 74
End: 2024-04-22
Payer: MEDICARE

## 2024-05-02 ENCOUNTER — DOCUMENT SCAN (OUTPATIENT)
Dept: HOME HEALTH SERVICES | Facility: HOSPITAL | Age: 74
End: 2024-05-02
Payer: MEDICARE

## 2024-05-29 ENCOUNTER — TELEPHONE (OUTPATIENT)
Dept: INFECTIOUS DISEASES | Facility: CLINIC | Age: 74
End: 2024-05-29
Payer: MEDICARE

## 2024-05-29 NOTE — TELEPHONE ENCOUNTER
----- Message from Justina Hunt sent at 5/29/2024  9:22 AM CDT -----  Type: Needs Medical Advice  Who Called:  pt     Best Call Back Number:[phone]    Additional Information: pt requesting call back ion regards to an upcoming procedure , says his surgical office needs clearance from dr jenkins please advise the procedure is in [place]

## 2024-06-05 ENCOUNTER — TELEPHONE (OUTPATIENT)
Dept: INFECTIOUS DISEASES | Facility: CLINIC | Age: 74
End: 2024-06-05
Payer: MEDICARE

## 2024-06-05 NOTE — TELEPHONE ENCOUNTER
----- Message from Danielle Hammond sent at 6/5/2024  8:22 AM CDT -----  Pt needs to get a hip surgery scheduled but he needs a release from Dr. Jenkins  Please call back to advise [phone]  Dr. Fnoseca is the one who referred him to dr jenkins and they are the ones that need the release  [phone]  Fax  ATTN  [facility]

## 2024-06-10 ENCOUNTER — OFFICE VISIT (OUTPATIENT)
Dept: WOUND CARE | Facility: HOSPITAL | Age: 74
End: 2024-06-10
Attending: FAMILY MEDICINE
Payer: MEDICARE

## 2024-06-10 VITALS
HEART RATE: 100 BPM | RESPIRATION RATE: 20 BRPM | SYSTOLIC BLOOD PRESSURE: 119 MMHG | TEMPERATURE: 99 F | DIASTOLIC BLOOD PRESSURE: 83 MMHG

## 2024-06-10 DIAGNOSIS — I87.2 VENOUS STASIS ULCER OF RIGHT CALF WITH NECROSIS OF MUSCLE WITHOUT VARICOSE VEINS: ICD-10-CM

## 2024-06-10 DIAGNOSIS — L97.213 VENOUS STASIS ULCER OF RIGHT CALF WITH NECROSIS OF MUSCLE WITHOUT VARICOSE VEINS: ICD-10-CM

## 2024-06-10 DIAGNOSIS — R60.0 PERIPHERAL EDEMA: ICD-10-CM

## 2024-06-10 DIAGNOSIS — I83.019 VENOUS ULCER OF RIGHT LEG: Primary | ICD-10-CM

## 2024-06-10 DIAGNOSIS — I87.311 IDIOPATHIC CHRONIC VENOUS HYPERTENSION OF RIGHT LOWER EXTREMITY WITH ULCER: ICD-10-CM

## 2024-06-10 DIAGNOSIS — L97.919 VENOUS ULCER OF RIGHT LEG: Primary | ICD-10-CM

## 2024-06-10 DIAGNOSIS — L97.812 NON-PRESSURE CHRONIC ULCER OF OTHER PART OF RIGHT LOWER LEG WITH FAT LAYER EXPOSED: ICD-10-CM

## 2024-06-10 DIAGNOSIS — I87.2 VENOUS STASIS DERMATITIS OF BOTH LOWER EXTREMITIES: ICD-10-CM

## 2024-06-10 DIAGNOSIS — L97.919 IDIOPATHIC CHRONIC VENOUS HYPERTENSION OF RIGHT LOWER EXTREMITY WITH ULCER: ICD-10-CM

## 2024-06-10 DIAGNOSIS — I87.8 VENOUS STASIS: ICD-10-CM

## 2024-06-10 PROCEDURE — 11045 DBRDMT SUBQ TISS EACH ADDL: CPT | Mod: PN | Performed by: FAMILY MEDICINE

## 2024-06-10 PROCEDURE — 99499 UNLISTED E&M SERVICE: CPT | Mod: ,,, | Performed by: FAMILY MEDICINE

## 2024-06-10 PROCEDURE — 11042 DBRDMT SUBQ TIS 1ST 20SQCM/<: CPT | Mod: ,,, | Performed by: FAMILY MEDICINE

## 2024-06-10 PROCEDURE — 11042 DBRDMT SUBQ TIS 1ST 20SQCM/<: CPT | Mod: PN | Performed by: FAMILY MEDICINE

## 2024-06-10 NOTE — PROGRESS NOTES
Wound Care Progress Note    Subjective:       Patient ID: Ezequiel Escudero is a 73 y.o. male.    Chief Complaint: No chief complaint on file.    HPI  Pt see in clinic for a FU visit for right leg venous ulcers with edema. Wounds are stable, no improvement. Pt has weeping legs. No other complaints today  Review of Systems   Skin:  Positive for wound.        Right leg venous ulcers x 2   All other systems reviewed and are negative.      Objective:        Physical Exam  Vitals and nursing note reviewed.   Constitutional:       General: He is not in acute distress.  Musculoskeletal:      Right lower leg: Edema present.      Left lower leg: Edema present.   Skin:     General: Skin is warm.      Findings: Lesion present.      Comments: Right leg venous ulcers, see wound care assessment documentation in chart review scanned under the media tab   Neurological:      General: No focal deficit present.      Mental Status: He is alert.   Psychiatric:         Judgment: Judgment normal.       There were no vitals filed for this visit.    Assessment:           ICD-10-CM ICD-9-CM   1. Venous ulcer of right leg  I83.019 454.0    L97.919    2. Venous stasis dermatitis of both lower extremities  I87.2 454.1   3. Peripheral edema  R60.0 782.3   4. Non-pressure chronic ulcer of other part of right lower leg with fat layer exposed  L97.812 707.19   5. Venous stasis ulcer of right calf with necrosis of muscle without varicose veins  I87.2 459.81    L97.213 707.12     728.89   6. Idiopathic chronic venous hypertension of right lower extremity with ulcer  I87.311 459.31    L97.919    7. Venous stasis  I87.8 459.81                Plan:                  Diagnoses and all orders for this visit:    Venous ulcer of right leg    Venous stasis dermatitis of both lower extremities    Peripheral edema    Non-pressure chronic ulcer of other part of right lower leg with fat layer exposed    Venous stasis ulcer of right calf with necrosis of  muscle without varicose veins    Idiopathic chronic venous hypertension of right lower extremity with ulcer    Venous stasis      See wound care instructions which have been provided separately.    Pt would benefit from compression, but with DVT, it is contraindicated. Unable to determine if the DVT is acute or chronic

## 2024-06-17 ENCOUNTER — EXTERNAL HOME HEALTH (OUTPATIENT)
Dept: HOME HEALTH SERVICES | Facility: HOSPITAL | Age: 74
End: 2024-06-17
Payer: MEDICARE

## 2024-06-17 ENCOUNTER — OFFICE VISIT (OUTPATIENT)
Dept: WOUND CARE | Facility: HOSPITAL | Age: 74
End: 2024-06-17
Attending: FAMILY MEDICINE
Payer: MEDICARE

## 2024-06-17 VITALS
DIASTOLIC BLOOD PRESSURE: 93 MMHG | SYSTOLIC BLOOD PRESSURE: 140 MMHG | HEART RATE: 106 BPM | TEMPERATURE: 98 F | RESPIRATION RATE: 19 BRPM

## 2024-06-17 DIAGNOSIS — L97.213 VENOUS STASIS ULCER OF RIGHT CALF WITH NECROSIS OF MUSCLE WITHOUT VARICOSE VEINS: ICD-10-CM

## 2024-06-17 DIAGNOSIS — I87.311 IDIOPATHIC CHRONIC VENOUS HYPERTENSION OF RIGHT LOWER EXTREMITY WITH ULCER: ICD-10-CM

## 2024-06-17 DIAGNOSIS — L97.812 NON-PRESSURE CHRONIC ULCER OF OTHER PART OF RIGHT LOWER LEG WITH FAT LAYER EXPOSED: ICD-10-CM

## 2024-06-17 DIAGNOSIS — L97.919 IDIOPATHIC CHRONIC VENOUS HYPERTENSION OF RIGHT LOWER EXTREMITY WITH ULCER: ICD-10-CM

## 2024-06-17 DIAGNOSIS — L97.919 VENOUS ULCER OF RIGHT LEG: Primary | ICD-10-CM

## 2024-06-17 DIAGNOSIS — I83.019 VENOUS ULCER OF RIGHT LEG: Primary | ICD-10-CM

## 2024-06-17 DIAGNOSIS — I87.2 VENOUS STASIS DERMATITIS OF BOTH LOWER EXTREMITIES: ICD-10-CM

## 2024-06-17 DIAGNOSIS — I87.8 VENOUS STASIS: ICD-10-CM

## 2024-06-17 DIAGNOSIS — R60.0 PERIPHERAL EDEMA: ICD-10-CM

## 2024-06-17 DIAGNOSIS — I87.2 VENOUS STASIS ULCER OF RIGHT CALF WITH NECROSIS OF MUSCLE WITHOUT VARICOSE VEINS: ICD-10-CM

## 2024-06-17 PROCEDURE — 11042 DBRDMT SUBQ TIS 1ST 20SQCM/<: CPT | Mod: ,,, | Performed by: FAMILY MEDICINE

## 2024-06-17 PROCEDURE — 87147 CULTURE TYPE IMMUNOLOGIC: CPT | Performed by: FAMILY MEDICINE

## 2024-06-17 PROCEDURE — 87186 SC STD MICRODIL/AGAR DIL: CPT | Performed by: FAMILY MEDICINE

## 2024-06-17 PROCEDURE — 11042 DBRDMT SUBQ TIS 1ST 20SQCM/<: CPT | Mod: PN | Performed by: FAMILY MEDICINE

## 2024-06-17 PROCEDURE — 99499 UNLISTED E&M SERVICE: CPT | Mod: ,,, | Performed by: FAMILY MEDICINE

## 2024-06-17 PROCEDURE — 87077 CULTURE AEROBIC IDENTIFY: CPT | Performed by: FAMILY MEDICINE

## 2024-06-17 PROCEDURE — 87070 CULTURE OTHR SPECIMN AEROBIC: CPT | Performed by: FAMILY MEDICINE

## 2024-06-17 PROCEDURE — 11045 DBRDMT SUBQ TISS EACH ADDL: CPT | Mod: PN | Performed by: FAMILY MEDICINE

## 2024-06-17 PROCEDURE — 11045 DBRDMT SUBQ TISS EACH ADDL: CPT | Mod: ,,, | Performed by: FAMILY MEDICINE

## 2024-06-18 NOTE — PROGRESS NOTES
Wound Care Progress Note    Subjective:       Patient ID: Ezequiel Escudero is a 73 y.o. male.    Chief Complaint: Pressure Ulcer, Venous Ulcer, Venous Stasis, Wound Care, Leg Swelling, Difficulty Walking, Wound Consult, Wound Check, and Poor Circulation    Difficulty Walking    Wound Check  Pt seen in clinic for a FU visit for multiple right leg venous ulcers. Wounds are macerated, pt cannot wear compression without cardiology approval. No other complaints today  Review of Systems   Skin:  Positive for wound.        Multiple open wounds right leg   All other systems reviewed and are negative.      Objective:        Physical Exam  Vitals and nursing note reviewed.   Constitutional:       General: He is not in acute distress.     Appearance: Normal appearance.   Skin:     General: Skin is warm and dry.      Findings: Lesion present.      Comments: Right leg multiple venous ulcers, see wound care assessment documentation in chart review scanned under the media tab   Neurological:      General: No focal deficit present.      Mental Status: He is alert.   Psychiatric:         Mood and Affect: Mood normal.         Judgment: Judgment normal.       Vitals:    06/17/24 1647   BP: (!) 140/93   Pulse: 106   Resp: 19   Temp: 97.9 °F (36.6 °C)       Assessment:           ICD-10-CM ICD-9-CM   1. Venous ulcer of right leg  I83.019 454.0    L97.919    2. Venous stasis dermatitis of both lower extremities  I87.2 454.1   3. Peripheral edema  R60.0 782.3   4. Venous stasis ulcer of right calf with necrosis of muscle without varicose veins  I87.2 459.81    L97.213 707.12     728.89   5. Non-pressure chronic ulcer of other part of right lower leg with fat layer exposed  L97.812 707.19   6. Venous stasis  I87.8 459.81   7. Idiopathic chronic venous hypertension of right lower extremity with ulcer  I87.311 459.31    L97.919                 Plan:                  Ezequiel was seen today for pressure ulcer, venous ulcer, venous stasis,  wound care, leg swelling, difficulty walking, wound consult, wound check and poor circulation.    Diagnoses and all orders for this visit:    Venous ulcer of right leg  -     Aerobic culture    Venous stasis dermatitis of both lower extremities    Peripheral edema    Venous stasis ulcer of right calf with necrosis of muscle without varicose veins    Non-pressure chronic ulcer of other part of right lower leg with fat layer exposed    Venous stasis    Idiopathic chronic venous hypertension of right lower extremity with ulcer      Much of the documentation for this visit was completed in the W[facility] Docs system.  Please see the attached documentation for further details about the patient's care. Scanned under the Media tab.

## 2024-06-19 LAB
BACTERIA SPEC AEROBE CULT: ABNORMAL
BACTERIA SPEC AEROBE CULT: ABNORMAL

## 2024-06-19 RX ORDER — SULFAMETHOXAZOLE AND TRIMETHOPRIM 800; 160 MG/1; MG/1
1 TABLET ORAL 2 TIMES DAILY
Qty: 20 TABLET | Refills: 0 | Status: SHIPPED | OUTPATIENT
Start: 2024-06-19 | End: 2024-06-29

## 2024-06-24 ENCOUNTER — OFFICE VISIT (OUTPATIENT)
Dept: WOUND CARE | Facility: HOSPITAL | Age: 74
End: 2024-06-24
Attending: FAMILY MEDICINE
Payer: MEDICARE

## 2024-06-24 VITALS
SYSTOLIC BLOOD PRESSURE: 138 MMHG | TEMPERATURE: 98 F | HEART RATE: 104 BPM | RESPIRATION RATE: 16 BRPM | DIASTOLIC BLOOD PRESSURE: 91 MMHG

## 2024-06-24 DIAGNOSIS — I87.2 VENOUS STASIS DERMATITIS OF BOTH LOWER EXTREMITIES: ICD-10-CM

## 2024-06-24 DIAGNOSIS — L97.812 NON-PRESSURE CHRONIC ULCER OF OTHER PART OF RIGHT LOWER LEG WITH FAT LAYER EXPOSED: ICD-10-CM

## 2024-06-24 DIAGNOSIS — L97.812 ULCER OF RIGHT PRETIBIAL REGION, WITH FAT LAYER EXPOSED: ICD-10-CM

## 2024-06-24 DIAGNOSIS — I87.8 VENOUS STASIS: ICD-10-CM

## 2024-06-24 DIAGNOSIS — L97.919 IDIOPATHIC CHRONIC VENOUS HYPERTENSION OF RIGHT LOWER EXTREMITY WITH ULCER: ICD-10-CM

## 2024-06-24 DIAGNOSIS — R60.0 PERIPHERAL EDEMA: ICD-10-CM

## 2024-06-24 DIAGNOSIS — I87.311 IDIOPATHIC CHRONIC VENOUS HYPERTENSION OF RIGHT LOWER EXTREMITY WITH ULCER: ICD-10-CM

## 2024-06-24 DIAGNOSIS — L97.919 VENOUS ULCER OF RIGHT LEG: Primary | ICD-10-CM

## 2024-06-24 DIAGNOSIS — I83.019 VENOUS ULCER OF RIGHT LEG: Primary | ICD-10-CM

## 2024-06-24 PROCEDURE — 99499 UNLISTED E&M SERVICE: CPT | Mod: ,,, | Performed by: FAMILY MEDICINE

## 2024-06-24 PROCEDURE — 11042 DBRDMT SUBQ TIS 1ST 20SQCM/<: CPT | Mod: ,,, | Performed by: FAMILY MEDICINE

## 2024-06-24 PROCEDURE — 11042 DBRDMT SUBQ TIS 1ST 20SQCM/<: CPT | Mod: PN | Performed by: FAMILY MEDICINE

## 2024-06-26 NOTE — PROGRESS NOTES
Wound Care Progress Note    Subjective:       Patient ID: Ezequiel Escudero is a 73 y.o. male.    Chief Complaint: Wound Care and Wound Check    Wound Check  Pt seen in clinic for a FU visit for right leg venous ulcers. Wounds are stable, no increase in size. No new complaints today  Review of Systems   Skin:  Positive for wound.        Right leg open wounds   All other systems reviewed and are negative.      Objective:        Physical Exam  Vitals and nursing note reviewed.   Constitutional:       General: He is not in acute distress.  Skin:     General: Skin is warm and dry.      Findings: Lesion present.      Comments: Right leg venous ulcers, see wound care assessment documentation in chart review scanned under the media tab   Neurological:      General: No focal deficit present.      Mental Status: He is alert.   Psychiatric:         Judgment: Judgment normal.       Vitals:    06/24/24 1513   BP: (!) 138/91   Pulse: 104   Resp: 16   Temp: 97.9 °F (36.6 °C)       Assessment:           ICD-10-CM ICD-9-CM   1. Venous ulcer of right leg  I83.019 454.0    L97.919    2. Venous stasis  I87.8 459.81   3. Venous stasis dermatitis of both lower extremities  I87.2 454.1   4. Idiopathic chronic venous hypertension of right lower extremity with ulcer  I87.311 459.31    L97.919    5. Peripheral edema  R60.0 782.3   6. Ulcer of right pretibial region, with fat layer exposed  L97.812 707.19   7. Non-pressure chronic ulcer of other part of right lower leg with fat layer exposed  L97.812 707.19                Plan:                  Ezequiel was seen today for wound care and wound check.    Diagnoses and all orders for this visit:    Venous ulcer of right leg    Venous stasis    Venous stasis dermatitis of both lower extremities    Idiopathic chronic venous hypertension of right lower extremity with ulcer    Peripheral edema    Ulcer of right pretibial region, with fat layer exposed    Non-pressure chronic ulcer of other part  of right lower leg with fat layer exposed      See wound care instructions which have been provided separately.

## 2024-07-01 ENCOUNTER — OFFICE VISIT (OUTPATIENT)
Dept: WOUND CARE | Facility: HOSPITAL | Age: 74
End: 2024-07-01
Attending: FAMILY MEDICINE
Payer: MEDICARE

## 2024-07-01 VITALS
TEMPERATURE: 99 F | DIASTOLIC BLOOD PRESSURE: 85 MMHG | HEART RATE: 92 BPM | RESPIRATION RATE: 18 BRPM | SYSTOLIC BLOOD PRESSURE: 133 MMHG

## 2024-07-01 DIAGNOSIS — R60.0 PERIPHERAL EDEMA: ICD-10-CM

## 2024-07-01 DIAGNOSIS — I87.2 VENOUS STASIS ULCER OF RIGHT CALF WITH NECROSIS OF MUSCLE WITHOUT VARICOSE VEINS: ICD-10-CM

## 2024-07-01 DIAGNOSIS — I83.019 VENOUS ULCER OF RIGHT LEG: Primary | ICD-10-CM

## 2024-07-01 DIAGNOSIS — L97.919 VENOUS ULCER OF RIGHT LEG: Primary | ICD-10-CM

## 2024-07-01 DIAGNOSIS — I83.029 VENOUS ULCER OF LEFT LEG: ICD-10-CM

## 2024-07-01 DIAGNOSIS — I87.2 VENOUS STASIS DERMATITIS OF BOTH LOWER EXTREMITIES: ICD-10-CM

## 2024-07-01 DIAGNOSIS — I87.8 VENOUS STASIS: ICD-10-CM

## 2024-07-01 DIAGNOSIS — L97.213 VENOUS STASIS ULCER OF RIGHT CALF WITH NECROSIS OF MUSCLE WITHOUT VARICOSE VEINS: ICD-10-CM

## 2024-07-01 DIAGNOSIS — L97.929 VENOUS ULCER OF LEFT LEG: ICD-10-CM

## 2024-07-01 DIAGNOSIS — I87.311 IDIOPATHIC CHRONIC VENOUS HYPERTENSION OF RIGHT LOWER EXTREMITY WITH ULCER: ICD-10-CM

## 2024-07-01 DIAGNOSIS — L97.919 IDIOPATHIC CHRONIC VENOUS HYPERTENSION OF RIGHT LOWER EXTREMITY WITH ULCER: ICD-10-CM

## 2024-07-01 PROCEDURE — 11045 DBRDMT SUBQ TISS EACH ADDL: CPT | Mod: PN | Performed by: FAMILY MEDICINE

## 2024-07-01 PROCEDURE — 11042 DBRDMT SUBQ TIS 1ST 20SQCM/<: CPT | Mod: PN | Performed by: FAMILY MEDICINE

## 2024-07-01 PROCEDURE — 11042 DBRDMT SUBQ TIS 1ST 20SQCM/<: CPT | Mod: ,,, | Performed by: FAMILY MEDICINE

## 2024-07-01 PROCEDURE — 99499 UNLISTED E&M SERVICE: CPT | Mod: ,,, | Performed by: FAMILY MEDICINE

## 2024-07-01 NOTE — PROGRESS NOTES
Wound Care Progress Note    Subjective:       Patient ID: Ezequiel Escudero is a 73 y.o. male.    Chief Complaint: No chief complaint on file.    HPI  Pt seen in clinic for a FU visit for right leg venous ulcers. Wounds are stable with some signs of improvement. No new complaints today  Review of Systems   Skin:  Positive for wound.        Multiple open wounds of the right leg   All other systems reviewed and are negative.      Objective:        Physical Exam  Vitals and nursing note reviewed. Exam conducted with a chaperone present.   Constitutional:       General: He is not in acute distress.     Appearance: Normal appearance.   Skin:     General: Skin is warm and dry.      Findings: Lesion present.      Comments: Multiple venous ulcers, stable, see wound care assessment documentation in chart review scanned under the media tab   Neurological:      General: No focal deficit present.      Mental Status: He is alert.   Psychiatric:         Mood and Affect: Mood normal.         Judgment: Judgment normal.       There were no vitals filed for this visit.    Assessment:           ICD-10-CM ICD-9-CM   1. Venous ulcer of right leg  I83.019 454.0    L97.919    2. Venous stasis  I87.8 459.81   3. Venous stasis dermatitis of both lower extremities  I87.2 454.1   4. Idiopathic chronic venous hypertension of right lower extremity with ulcer  I87.311 459.31    L97.919    5. Peripheral edema  R60.0 782.3   6. Venous stasis ulcer of right calf with necrosis of muscle without varicose veins  I87.2 459.81    L97.213 707.12     728.89                Plan:                  Diagnoses and all orders for this visit:    Venous ulcer of right leg    Venous stasis    Venous stasis dermatitis of both lower extremities    Idiopathic chronic venous hypertension of right lower extremity with ulcer    Peripheral edema    Venous stasis ulcer of right calf with necrosis of muscle without varicose veins    Pt is unable to use compression on  the legs, hx of multiple blood clots  Much of the documentation for this visit was completed in the Wound Docs system. Please see the attached documentation for further details about this patient's care.

## 2024-07-08 ENCOUNTER — OFFICE VISIT (OUTPATIENT)
Dept: WOUND CARE | Facility: HOSPITAL | Age: 74
End: 2024-07-08
Attending: FAMILY MEDICINE
Payer: MEDICARE

## 2024-07-08 VITALS
DIASTOLIC BLOOD PRESSURE: 90 MMHG | SYSTOLIC BLOOD PRESSURE: 138 MMHG | HEART RATE: 90 BPM | TEMPERATURE: 98 F | RESPIRATION RATE: 18 BRPM

## 2024-07-08 DIAGNOSIS — L97.919 IDIOPATHIC CHRONIC VENOUS HYPERTENSION OF RIGHT LOWER EXTREMITY WITH ULCER: ICD-10-CM

## 2024-07-08 DIAGNOSIS — L97.919 VENOUS ULCER OF RIGHT LEG: Primary | ICD-10-CM

## 2024-07-08 DIAGNOSIS — R60.0 PERIPHERAL EDEMA: ICD-10-CM

## 2024-07-08 DIAGNOSIS — I87.2 VENOUS STASIS DERMATITIS OF BOTH LOWER EXTREMITIES: ICD-10-CM

## 2024-07-08 DIAGNOSIS — I87.2 VENOUS STASIS ULCER OF RIGHT CALF WITH NECROSIS OF MUSCLE WITHOUT VARICOSE VEINS: ICD-10-CM

## 2024-07-08 DIAGNOSIS — I87.8 VENOUS STASIS: ICD-10-CM

## 2024-07-08 DIAGNOSIS — I87.311 IDIOPATHIC CHRONIC VENOUS HYPERTENSION OF RIGHT LOWER EXTREMITY WITH ULCER: ICD-10-CM

## 2024-07-08 DIAGNOSIS — I83.019 VENOUS ULCER OF RIGHT LEG: Primary | ICD-10-CM

## 2024-07-08 DIAGNOSIS — L97.213 VENOUS STASIS ULCER OF RIGHT CALF WITH NECROSIS OF MUSCLE WITHOUT VARICOSE VEINS: ICD-10-CM

## 2024-07-08 PROCEDURE — 11042 DBRDMT SUBQ TIS 1ST 20SQCM/<: CPT | Mod: PN | Performed by: FAMILY MEDICINE

## 2024-07-08 PROCEDURE — 11042 DBRDMT SUBQ TIS 1ST 20SQCM/<: CPT | Mod: ,,, | Performed by: FAMILY MEDICINE

## 2024-07-08 PROCEDURE — 99499 UNLISTED E&M SERVICE: CPT | Mod: ,,, | Performed by: FAMILY MEDICINE

## 2024-07-08 NOTE — PROGRESS NOTES
Wound Care Progress Note    Subjective:       Patient ID: Ezequiel Escudero is a 73 y.o. male.    Chief Complaint: No chief complaint on file.    HPI  Pt seen in clinic for a FU visit for a right leg multiple venous ulcers. Wounds are showing some improvement No new complaints today  Review of Systems   Skin:  Positive for wound.        Right leg venous ulcers   All other systems reviewed and are negative.      Objective:        Physical Exam  Vitals and nursing note reviewed.   Constitutional:       General: He is not in acute distress.     Appearance: Normal appearance.   Musculoskeletal:      Right lower leg: Edema present.   Skin:     General: Skin is warm and dry.      Findings: Lesion present.      Comments: BLE venous ulcers, multiple, see wound care assessment documentation in chart review scanned under the media tab   Neurological:      General: No focal deficit present.      Mental Status: He is alert and oriented to person, place, and time.   Psychiatric:         Mood and Affect: Mood normal.         Judgment: Judgment normal.       There were no vitals filed for this visit.    Assessment:           ICD-10-CM ICD-9-CM   1. Venous ulcer of right leg  I83.019 454.0    L97.919    2. Venous stasis  I87.8 459.81   3. Venous stasis dermatitis of both lower extremities  I87.2 454.1   4. Idiopathic chronic venous hypertension of right lower extremity with ulcer  I87.311 459.31    L97.919    5. Peripheral edema  R60.0 782.3   6. Venous stasis ulcer of right calf with necrosis of muscle without varicose veins  I87.2 459.81    L97.213 707.12     728.89                Plan:                  Diagnoses and all orders for this visit:    Venous ulcer of right leg  -     Ambulatory referral/consult to Vascular Surgery; Future    Venous stasis    Venous stasis dermatitis of both lower extremities    Idiopathic chronic venous hypertension of right lower extremity with ulcer    Peripheral edema    Venous stasis ulcer  of right calf with necrosis of muscle without varicose veins        See wound care instructions provided separately

## 2024-07-15 ENCOUNTER — OFFICE VISIT (OUTPATIENT)
Dept: WOUND CARE | Facility: HOSPITAL | Age: 74
End: 2024-07-15
Attending: FAMILY MEDICINE
Payer: MEDICARE

## 2024-07-15 VITALS
RESPIRATION RATE: 20 BRPM | HEART RATE: 100 BPM | TEMPERATURE: 98 F | SYSTOLIC BLOOD PRESSURE: 135 MMHG | DIASTOLIC BLOOD PRESSURE: 92 MMHG

## 2024-07-15 DIAGNOSIS — L97.919 IDIOPATHIC CHRONIC VENOUS HYPERTENSION OF RIGHT LOWER EXTREMITY WITH ULCER: ICD-10-CM

## 2024-07-15 DIAGNOSIS — L97.919 VENOUS ULCER OF RIGHT LEG: Primary | ICD-10-CM

## 2024-07-15 DIAGNOSIS — I87.311 IDIOPATHIC CHRONIC VENOUS HYPERTENSION OF RIGHT LOWER EXTREMITY WITH ULCER: ICD-10-CM

## 2024-07-15 DIAGNOSIS — R60.0 PERIPHERAL EDEMA: ICD-10-CM

## 2024-07-15 DIAGNOSIS — I87.2 VENOUS STASIS DERMATITIS OF BOTH LOWER EXTREMITIES: ICD-10-CM

## 2024-07-15 DIAGNOSIS — I87.8 VENOUS STASIS: ICD-10-CM

## 2024-07-15 DIAGNOSIS — I83.019 VENOUS ULCER OF RIGHT LEG: Primary | ICD-10-CM

## 2024-07-15 PROCEDURE — 11045 DBRDMT SUBQ TISS EACH ADDL: CPT | Mod: PN | Performed by: FAMILY MEDICINE

## 2024-07-15 PROCEDURE — 11042 DBRDMT SUBQ TIS 1ST 20SQCM/<: CPT | Mod: ,,, | Performed by: FAMILY MEDICINE

## 2024-07-15 PROCEDURE — 11042 DBRDMT SUBQ TIS 1ST 20SQCM/<: CPT | Mod: PN | Performed by: FAMILY MEDICINE

## 2024-07-15 PROCEDURE — 99499 UNLISTED E&M SERVICE: CPT | Mod: ,,, | Performed by: FAMILY MEDICINE

## 2024-07-15 NOTE — PROGRESS NOTES
Wound Care Progress Note    Subjective:       Patient ID: Ezequiel Escudero is a 73 y.o. male.    Chief Complaint: No chief complaint on file.    HPI  Pt seen in clinic for a FU visit for a right leg multiple venous ulcers. Wounds are showing some improvement, no new complaints today  Review of Systems   Skin:  Positive for wound.        Right leg open wounds   All other systems reviewed and are negative.      Objective:        Physical Exam  Vitals and nursing note reviewed. Exam conducted with a chaperone present.   Constitutional:       General: He is not in acute distress.     Appearance: Normal appearance.   Musculoskeletal:      Right lower leg: Edema present.      Left lower leg: Edema present.   Skin:     General: Skin is warm and dry.      Findings: Lesion present.      Comments: Right leg multiple venous ulcers, + stasis, + dermatitis, see wound care assessment documentation in chart review scanned under the media tab   Neurological:      General: No focal deficit present.      Mental Status: He is alert.   Psychiatric:         Mood and Affect: Mood normal.         Judgment: Judgment normal.       There were no vitals filed for this visit.    Assessment:           ICD-10-CM ICD-9-CM   1. Venous ulcer of right leg  I83.019 454.0    L97.919    2. Venous stasis  I87.8 459.81   3. Venous stasis dermatitis of both lower extremities  I87.2 454.1   4. Idiopathic chronic venous hypertension of right lower extremity with ulcer  I87.311 459.31    L97.919    5. Peripheral edema  R60.0 782.3                Plan:                  Diagnoses and all orders for this visit:    Venous ulcer of right leg    Venous stasis    Venous stasis dermatitis of both lower extremities    Idiopathic chronic venous hypertension of right lower extremity with ulcer    Peripheral edema        See wound care instructions provided separately

## 2024-07-23 ENCOUNTER — DOCUMENT SCAN (OUTPATIENT)
Dept: HOME HEALTH SERVICES | Facility: HOSPITAL | Age: 74
End: 2024-07-23
Payer: MEDICARE

## 2024-07-26 ENCOUNTER — DOCUMENT SCAN (OUTPATIENT)
Dept: HOME HEALTH SERVICES | Facility: HOSPITAL | Age: 74
End: 2024-07-26
Payer: MEDICARE

## 2024-07-31 ENCOUNTER — DOCUMENT SCAN (OUTPATIENT)
Dept: HOME HEALTH SERVICES | Facility: HOSPITAL | Age: 74
End: 2024-07-31
Payer: MEDICARE

## 2024-08-01 ENCOUNTER — DOCUMENT SCAN (OUTPATIENT)
Dept: HOME HEALTH SERVICES | Facility: HOSPITAL | Age: 74
End: 2024-08-01
Payer: MEDICARE

## 2024-08-14 ENCOUNTER — EXTERNAL HOME HEALTH (OUTPATIENT)
Dept: HOME HEALTH SERVICES | Facility: HOSPITAL | Age: 74
End: 2024-08-14
Payer: MEDICARE

## 2024-08-20 ENCOUNTER — TELEPHONE (OUTPATIENT)
Dept: VASCULAR SURGERY | Facility: CLINIC | Age: 74
End: 2024-08-20

## 2024-08-20 ENCOUNTER — INITIAL CONSULT (OUTPATIENT)
Dept: VASCULAR SURGERY | Facility: CLINIC | Age: 74
End: 2024-08-20
Payer: MEDICARE

## 2024-08-20 VITALS
WEIGHT: 270.06 LBS | BODY MASS INDEX: 36.58 KG/M2 | SYSTOLIC BLOOD PRESSURE: 127 MMHG | HEART RATE: 109 BPM | HEIGHT: 72 IN | DIASTOLIC BLOOD PRESSURE: 83 MMHG

## 2024-08-20 DIAGNOSIS — I87.2 VENOUS STASIS DERMATITIS OF BOTH LOWER EXTREMITIES: Primary | ICD-10-CM

## 2024-08-20 DIAGNOSIS — L97.919 VENOUS ULCER OF RIGHT LEG: ICD-10-CM

## 2024-08-20 DIAGNOSIS — I82.409 DVT (DEEP VENOUS THROMBOSIS): ICD-10-CM

## 2024-08-20 DIAGNOSIS — I83.019 VENOUS ULCER OF RIGHT LEG: ICD-10-CM

## 2024-08-20 PROCEDURE — 99999 PR PBB SHADOW E&M-EST. PATIENT-LVL III: CPT | Mod: PBBFAC,,, | Performed by: STUDENT IN AN ORGANIZED HEALTH CARE EDUCATION/TRAINING PROGRAM

## 2024-08-20 RX ORDER — LIDOCAINE HYDROCHLORIDE 10 MG/ML
1 INJECTION, SOLUTION EPIDURAL; INFILTRATION; INTRACAUDAL; PERINEURAL ONCE
Status: CANCELLED | OUTPATIENT
Start: 2024-08-20 | End: 2024-08-20

## 2024-08-20 NOTE — PROGRESS NOTES
[facility] - Cardiovascular Surg  [facility]  History & Physical    SUBJECTIVE:     Chief Complaint:   Chief Complaint   Patient presents with    Venous Ulcer         History of Present Illness:  Ezequiel Escudero is a 73 y.o. male presents with a history of AFib, factor 5 Leiden, lower extremity DVT, and PE on Xarelto.  He has a history a total right knee replacement.  Unfortunately the replacement of failed and he was in need of a revision.  He had plans for surgery but was not able to get in his he was clearance due to concern of him being off of anticoagulation for a prolonged period of time considering his high-risk for DVT    He did have a recent ultrasound done 04/2024 which illustrated decrease in burden of DVT in the right femoral and popliteal vein.    He also has been dealing with right lower extremity edema and venous stasis ulcers.  He was followed by Dr. Chaidez from an has been of the ulcer.  The ulcer has been slowly healing but still active.        Review of patient's allergies indicates:  No Known Allergies    Past Medical History:   Diagnosis Date    A-fib     Anticoagulant long-term use     BPH (benign prostatic hyperplasia)     DVT (deep venous thrombosis)     Factor V Leiden     Rojas catheter in place     Kidney stone     MTHFR (methylene THF reductase) deficiency and homocystinuria     PONV (postoperative nausea and vomiting)     Pulmonary emboli     Thyroid disease     Wears glasses      Past Surgical History:   Procedure Laterality Date    ANKLE DEBRIDEMENT      CARDIOVERSION      CYSTOSCOPY N/A 8/9/2023    Procedure: CYSTOSCOPY;  Surgeon: Wood Bradshaw Jr., MD;  Location: [facility] OR;  Service: Urology;  Laterality: N/A;    CYSTOSCOPY W/ RETROGRADES Bilateral 3/1/2021    Procedure: CYSTOSCOPY, WITH RETROGRADE PYELOGRAM;  Surgeon: Lior Flores MD;  Location: [facility] OR;  Service: Urology;  Laterality: Bilateral;    CYSTOSCOPY WITH BIOPSY OF BLADDER N/A 3/1/2021    Procedure:  CYSTOSCOPY, WITH BLADDER BIOPSY, WITH FULGURATION IF INDICATED;  Surgeon: Lior Flores MD;  Location: [facility] OR;  Service: Urology;  Laterality: N/A;    DEBRIDEMENT OF LOWER EXTREMITY Right 2/1/2021    Procedure: DEBRIDEMENT, LOWER EXTREMITY;  Surgeon: Pete Cardenas MD;  Location: [facility] OR;  Service: General;  Laterality: Right;    INCISION AND DRAINAGE Right 2/1/2021    Procedure: Incision and Drainage;  Surgeon: Pete Cardenas MD;  Location: [facility] OR;  Service: General;  Laterality: Right;    PULMONARY ANGIOGRAPHY N/A 5/1/2023    Procedure: Pulmonary angiography;  Surgeon: Ali Khoobehi, MD;  Location: [facility] CATH/EP LAB;  Service: Vascular;  Laterality: N/A;    THYROIDECTOMY  1970's    TONSILLECTOMY      TONSILLECTOMY, ADENOIDECTOMY      TRANSRECTAL ULTRASOUND EXAMINATION N/A 8/9/2023    Procedure: ULTRASOUND, RECTAL APPROACH;  Surgeon: Wood Bradshaw Jr., MD;  Location: [facility] OR;  Service: Urology;  Laterality: N/A;    TRANSURETHRAL RESECTION OF PROSTATE N/A 8/29/2023    Procedure: TURP (TRANSURETHRAL RESECTION OF PROSTATE);  Surgeon: Wood Bradshaw Jr., MD;  Location: [facility] OR;  Service: Urology;  Laterality: N/A;    UMBILICAL HERNIA REPAIR N/A 3/31/2023    Procedure: REPAIR, HERNIA, UMBILICAL;  Surgeon: Gino Randhawa Jr., MD;  Location: [facility] OR;  Service: General;  Laterality: N/A;     Family History   Problem Relation Name Age of Onset    Benign prostatic hyperplasia Brother      Heart disease Mother      Heart disease Father      Kidney cancer Neg Hx      Prostate cancer Neg Hx      Urolithiasis Neg Hx       Social History     Tobacco Use    Smoking status: Never     Passive exposure: Never    Smokeless tobacco: Never   Substance Use Topics    Alcohol use: Yes     Comment: OCC    Drug use: No        Review of Systems:  Review of Systems   All other systems reviewed and are negative.      OBJECTIVE:     Vital Signs (Most Recent):  Pulse: 109 (08/20/24 1450)  BP: 127/83 (08/20/24 1450)    Physical  Exam:  Physical Exam   Constitutional: He is oriented to person, place, and time.  Non-toxic appearance. No distress.   HENT:   Head: Normocephalic and atraumatic.   Cardiovascular: Normal rate and normal pulses. Pulmonary:      Effort: Pulmonary effort is normal. No respiratory distress.      Breath sounds: No wheezing.     Abdominal: Soft.   Musculoskeletal:      Right lower leg: Edema present.      Left lower leg: Edema present.      Comments: Right lower extremity wound on posterior calf   Neurological: He is alert and oriented to person, place, and time.   Skin: Skin is warm and dry. Capillary refill takes less than 2 seconds.   Psychiatric: His behavior is normal.   Vitals reviewed.      Laboratory:  Lab Results   Component Value Date    WBC 7.85 05/28/2024    HGB 15.7 05/28/2024    HCT 48.5 05/28/2024     05/28/2024    CHOL 123 01/28/2021    TRIG 136 01/28/2021    HDL 23 (L) 01/28/2021    ALT 20 05/28/2024    AST 29 05/28/2024     05/28/2024    K 4.5 05/28/2024     05/28/2024    CREATININE 1.14 05/28/2024    BUN 25 (H) 05/28/2024    CO2 25 05/28/2024    TSH 1.960 04/29/2023    PSA 7.50 (H) 10/11/2012    INR 1.9 05/28/2024    HGBA1C 6.2 (H) 05/28/2024         Diagnostic Results:       FINDINGS:  There is normal compressibility to the right common femoral vein.  The right femoral vein demonstrates occlusive noncompressible thrombus.  In the popliteal vein there is nonocclusive thrombus with some decreased cirrhotic cyst clot burden in this region relative to April 29, 2023.  I do not see evidence of proximal propagation.  There is normal compressibility to the posterior tibial anterior tibial veins.  A single peroneal vein is seen on the right.     On the left there is normal compressibility to the common femoral, femoral and popliteal deep veins.  Anterior tibial and posterior tibials are not seen.  Subcutaneous edema is noted in the right lower extremity.  Subcutaneous also edema is also  seen in the left lower extremity.     Impression:     Mild decrease in overall clot burden with persistent DVT in the right femoral and popliteal veins as is discussed above.    ASSESSMENT/PLAN:     73 year old male who presents with a history of factor 5 laden diseased and DVT with PE on Xarelto.  There are plans for a right total hip revision for which she will need to hold Xarelto in his at high-risk for DVT.    We will plan to place an IVC filter tomorrow in the cath lab so that the patient can safely hold his anticoagulation as needed for surgery.  He was informed that we will remove the filter after filter his longer needed.  Risks and benefits were discussed with the patient in extensive detail.     His creatinine is 1.1 and sufficient enough for contrast administration.      He also has been dealing with a right venous ulcer considering his history of DVT he likely has venous hypertension.  He may potentially also have venous insufficiency in his superficial system.  We can plan for venous reflux ultrasound for further evaluation.    Venous ulcer of right leg  -     Ambulatory referral/consult to Vascular Surgery          Chad Aviles M.D.   [facility]

## 2024-08-20 NOTE — TELEPHONE ENCOUNTER
Spoke w/ pt. Informed of procedure scheduled for 930(tentatively) on 8/21. Informed pt to arrive at hosp around 730. Stated he should receive a pre-op phone call this PM. Advised to continue taking ALL meds as he normally does. Including blood thinners. Directions given. Pt verbalized understanding.

## 2024-08-20 NOTE — H&P (VIEW-ONLY)
[facility] - Cardiovascular Surg  [facility]  History & Physical    SUBJECTIVE:     Chief Complaint:   Chief Complaint   Patient presents with    Venous Ulcer         History of Present Illness:  Ezequiel Escudero is a 73 y.o. male presents with a history of AFib, factor 5 Leiden, lower extremity DVT, and PE on Xarelto.  He has a history a total right knee replacement.  Unfortunately the replacement of failed and he was in need of a revision.  He had plans for surgery but was not able to get in his he was clearance due to concern of him being off of anticoagulation for a prolonged period of time considering his high-risk for DVT    He did have a recent ultrasound done 04/2024 which illustrated decrease in burden of DVT in the right femoral and popliteal vein.    He also has been dealing with right lower extremity edema and venous stasis ulcers.  He was followed by Dr. Chaidez from an has been of the ulcer.  The ulcer has been slowly healing but still active.        Review of patient's allergies indicates:  No Known Allergies    Past Medical History:   Diagnosis Date    A-fib     Anticoagulant long-term use     BPH (benign prostatic hyperplasia)     DVT (deep venous thrombosis)     Factor V Leiden     Rojas catheter in place     Kidney stone     MTHFR (methylene THF reductase) deficiency and homocystinuria     PONV (postoperative nausea and vomiting)     Pulmonary emboli     Thyroid disease     Wears glasses      Past Surgical History:   Procedure Laterality Date    ANKLE DEBRIDEMENT      CARDIOVERSION      CYSTOSCOPY N/A 8/9/2023    Procedure: CYSTOSCOPY;  Surgeon: Wood Bradshaw Jr., MD;  Location: [facility] OR;  Service: Urology;  Laterality: N/A;    CYSTOSCOPY W/ RETROGRADES Bilateral 3/1/2021    Procedure: CYSTOSCOPY, WITH RETROGRADE PYELOGRAM;  Surgeon: Lior Flores MD;  Location: [facility] OR;  Service: Urology;  Laterality: Bilateral;    CYSTOSCOPY WITH BIOPSY OF BLADDER N/A 3/1/2021    Procedure:  CYSTOSCOPY, WITH BLADDER BIOPSY, WITH FULGURATION IF INDICATED;  Surgeon: Lior Flores MD;  Location: [facility] OR;  Service: Urology;  Laterality: N/A;    DEBRIDEMENT OF LOWER EXTREMITY Right 2/1/2021    Procedure: DEBRIDEMENT, LOWER EXTREMITY;  Surgeon: Pete Cardenas MD;  Location: [facility] OR;  Service: General;  Laterality: Right;    INCISION AND DRAINAGE Right 2/1/2021    Procedure: Incision and Drainage;  Surgeon: Pete Cardenas MD;  Location: [facility] OR;  Service: General;  Laterality: Right;    PULMONARY ANGIOGRAPHY N/A 5/1/2023    Procedure: Pulmonary angiography;  Surgeon: Ali Khoobehi, MD;  Location: [facility] CATH/EP LAB;  Service: Vascular;  Laterality: N/A;    THYROIDECTOMY  1970's    TONSILLECTOMY      TONSILLECTOMY, ADENOIDECTOMY      TRANSRECTAL ULTRASOUND EXAMINATION N/A 8/9/2023    Procedure: ULTRASOUND, RECTAL APPROACH;  Surgeon: Wood Bradshaw Jr., MD;  Location: [facility] OR;  Service: Urology;  Laterality: N/A;    TRANSURETHRAL RESECTION OF PROSTATE N/A 8/29/2023    Procedure: TURP (TRANSURETHRAL RESECTION OF PROSTATE);  Surgeon: Wood Bradshaw Jr., MD;  Location: [facility] OR;  Service: Urology;  Laterality: N/A;    UMBILICAL HERNIA REPAIR N/A 3/31/2023    Procedure: REPAIR, HERNIA, UMBILICAL;  Surgeon: Gino Randhawa Jr., MD;  Location: [facility] OR;  Service: General;  Laterality: N/A;     Family History   Problem Relation Name Age of Onset    Benign prostatic hyperplasia Brother      Heart disease Mother      Heart disease Father      Kidney cancer Neg Hx      Prostate cancer Neg Hx      Urolithiasis Neg Hx       Social History     Tobacco Use    Smoking status: Never     Passive exposure: Never    Smokeless tobacco: Never   Substance Use Topics    Alcohol use: Yes     Comment: OCC    Drug use: No        Review of Systems:  Review of Systems   All other systems reviewed and are negative.      OBJECTIVE:     Vital Signs (Most Recent):  Pulse: 109 (08/20/24 1450)  BP: 127/83 (08/20/24 1450)    Physical  Exam:  Physical Exam   Constitutional: He is oriented to person, place, and time.  Non-toxic appearance. No distress.   HENT:   Head: Normocephalic and atraumatic.   Cardiovascular: Normal rate and normal pulses. Pulmonary:      Effort: Pulmonary effort is normal. No respiratory distress.      Breath sounds: No wheezing.     Abdominal: Soft.   Musculoskeletal:      Right lower leg: Edema present.      Left lower leg: Edema present.      Comments: Right lower extremity wound on posterior calf   Neurological: He is alert and oriented to person, place, and time.   Skin: Skin is warm and dry. Capillary refill takes less than 2 seconds.   Psychiatric: His behavior is normal.   Vitals reviewed.      Laboratory:  Lab Results   Component Value Date    WBC 7.85 05/28/2024    HGB 15.7 05/28/2024    HCT 48.5 05/28/2024     05/28/2024    CHOL 123 01/28/2021    TRIG 136 01/28/2021    HDL 23 (L) 01/28/2021    ALT 20 05/28/2024    AST 29 05/28/2024     05/28/2024    K 4.5 05/28/2024     05/28/2024    CREATININE 1.14 05/28/2024    BUN 25 (H) 05/28/2024    CO2 25 05/28/2024    TSH 1.960 04/29/2023    PSA 7.50 (H) 10/11/2012    INR 1.9 05/28/2024    HGBA1C 6.2 (H) 05/28/2024         Diagnostic Results:       FINDINGS:  There is normal compressibility to the right common femoral vein.  The right femoral vein demonstrates occlusive noncompressible thrombus.  In the popliteal vein there is nonocclusive thrombus with some decreased cirrhotic cyst clot burden in this region relative to April 29, 2023.  I do not see evidence of proximal propagation.  There is normal compressibility to the posterior tibial anterior tibial veins.  A single peroneal vein is seen on the right.     On the left there is normal compressibility to the common femoral, femoral and popliteal deep veins.  Anterior tibial and posterior tibials are not seen.  Subcutaneous edema is noted in the right lower extremity.  Subcutaneous also edema is also  seen in the left lower extremity.     Impression:     Mild decrease in overall clot burden with persistent DVT in the right femoral and popliteal veins as is discussed above.    ASSESSMENT/PLAN:     73 year old male who presents with a history of factor 5 laden diseased and DVT with PE on Xarelto.  There are plans for a right total hip revision for which she will need to hold Xarelto in his at high-risk for DVT.    We will plan to place an IVC filter tomorrow in the cath lab so that the patient can safely hold his anticoagulation as needed for surgery.  He was informed that we will remove the filter after filter his longer needed.  Risks and benefits were discussed with the patient in extensive detail.     His creatinine is 1.1 and sufficient enough for contrast administration.      He also has been dealing with a right venous ulcer considering his history of DVT he likely has venous hypertension.  He may potentially also have venous insufficiency in his superficial system.  We can plan for venous reflux ultrasound for further evaluation.    Venous ulcer of right leg  -     Ambulatory referral/consult to Vascular Surgery          Chad Aviles M.D.   [facility]

## 2024-08-21 ENCOUNTER — HOSPITAL ENCOUNTER (OUTPATIENT)
Facility: HOSPITAL | Age: 74
Discharge: HOME | End: 2024-08-21
Attending: STUDENT IN AN ORGANIZED HEALTH CARE EDUCATION/TRAINING PROGRAM | Admitting: STUDENT IN AN ORGANIZED HEALTH CARE EDUCATION/TRAINING PROGRAM
Payer: MEDICARE

## 2024-08-21 VITALS
SYSTOLIC BLOOD PRESSURE: 134 MMHG | OXYGEN SATURATION: 97 % | HEART RATE: 99 BPM | RESPIRATION RATE: 20 BRPM | DIASTOLIC BLOOD PRESSURE: 85 MMHG

## 2024-08-21 DIAGNOSIS — I87.2 VENOUS STASIS DERMATITIS OF BOTH LOWER EXTREMITIES: Primary | ICD-10-CM

## 2024-08-21 DIAGNOSIS — I87.2 VENOUS STASIS DERMATITIS OF BOTH LOWER EXTREMITIES: ICD-10-CM

## 2024-08-21 DIAGNOSIS — I82.409 DVT (DEEP VENOUS THROMBOSIS): ICD-10-CM

## 2024-08-21 LAB
ANION GAP SERPL CALC-SCNC: 7 MMOL/L (ref 8–16)
APTT PPP: 53.5 SEC (ref 21–32)
BUN SERPL-MCNC: 17 MG/DL (ref 8–23)
CALCIUM SERPL-MCNC: 8.5 MG/DL (ref 8.7–10.5)
CHLORIDE SERPL-SCNC: 105 MMOL/L (ref 95–110)
CO2 SERPL-SCNC: 26 MMOL/L (ref 23–29)
CREAT SERPL-MCNC: 1.1 MG/DL (ref 0.5–1.4)
ERYTHROCYTE [DISTWIDTH] IN BLOOD BY AUTOMATED COUNT: 18.6 % (ref 11.5–14.5)
EST. GFR  (NO RACE VARIABLE): >60 ML/MIN/1.73 M^2
GLUCOSE SERPL-MCNC: 106 MG/DL (ref 70–110)
HCT VFR BLD AUTO: 45.3 % (ref 40–54)
HGB BLD-MCNC: 14.5 G/DL (ref 14–18)
INR PPP: 1.6 (ref 0.8–1.2)
MCH RBC QN AUTO: 28.2 PG (ref 27–31)
MCHC RBC AUTO-ENTMCNC: 32 G/DL (ref 32–36)
MCV RBC AUTO: 88 FL (ref 82–98)
PLATELET # BLD AUTO: 286 K/UL (ref 150–450)
PMV BLD AUTO: 9.4 FL (ref 9.2–12.9)
POTASSIUM SERPL-SCNC: 3.6 MMOL/L (ref 3.5–5.1)
PROTHROMBIN TIME: 17.2 SEC (ref 9–12.5)
RBC # BLD AUTO: 5.15 M/UL (ref 4.6–6.2)
SODIUM SERPL-SCNC: 138 MMOL/L (ref 136–145)
WBC # BLD AUTO: 6.64 K/UL (ref 3.9–12.7)

## 2024-08-21 PROCEDURE — 85027 COMPLETE CBC AUTOMATED: CPT | Performed by: STUDENT IN AN ORGANIZED HEALTH CARE EDUCATION/TRAINING PROGRAM

## 2024-08-21 PROCEDURE — 25000003 PHARM REV CODE 250: Performed by: STUDENT IN AN ORGANIZED HEALTH CARE EDUCATION/TRAINING PROGRAM

## 2024-08-21 PROCEDURE — 85730 THROMBOPLASTIN TIME PARTIAL: CPT | Performed by: STUDENT IN AN ORGANIZED HEALTH CARE EDUCATION/TRAINING PROGRAM

## 2024-08-21 PROCEDURE — 80048 BASIC METABOLIC PNL TOTAL CA: CPT | Performed by: STUDENT IN AN ORGANIZED HEALTH CARE EDUCATION/TRAINING PROGRAM

## 2024-08-21 PROCEDURE — C1769 GUIDE WIRE: HCPCS | Performed by: STUDENT IN AN ORGANIZED HEALTH CARE EDUCATION/TRAINING PROGRAM

## 2024-08-21 PROCEDURE — 85610 PROTHROMBIN TIME: CPT | Performed by: STUDENT IN AN ORGANIZED HEALTH CARE EDUCATION/TRAINING PROGRAM

## 2024-08-21 PROCEDURE — C1894 INTRO/SHEATH, NON-LASER: HCPCS | Performed by: STUDENT IN AN ORGANIZED HEALTH CARE EDUCATION/TRAINING PROGRAM

## 2024-08-21 PROCEDURE — 63600175 PHARM REV CODE 636 W HCPCS: Performed by: STUDENT IN AN ORGANIZED HEALTH CARE EDUCATION/TRAINING PROGRAM

## 2024-08-21 PROCEDURE — C1880 VENA CAVA FILTER: HCPCS | Performed by: STUDENT IN AN ORGANIZED HEALTH CARE EDUCATION/TRAINING PROGRAM

## 2024-08-21 DEVICE — GUNTHER TULIP VENA CAVA FILTER SET FOR JUGULAR VEIN APPROACH
Type: IMPLANTABLE DEVICE | Site: NECK | Status: FUNCTIONAL
Brand: GUNTHER TULIP

## 2024-08-21 RX ORDER — FENTANYL CITRATE 50 UG/ML
INJECTION, SOLUTION INTRAMUSCULAR; INTRAVENOUS
Status: DISCONTINUED | OUTPATIENT
Start: 2024-08-21 | End: 2024-08-21 | Stop reason: HOSPADM

## 2024-08-21 RX ORDER — MIDAZOLAM HYDROCHLORIDE 1 MG/ML
INJECTION INTRAMUSCULAR; INTRAVENOUS
Status: DISCONTINUED | OUTPATIENT
Start: 2024-08-21 | End: 2024-08-21 | Stop reason: HOSPADM

## 2024-08-21 RX ORDER — LIDOCAINE HYDROCHLORIDE 10 MG/ML
1 INJECTION, SOLUTION EPIDURAL; INFILTRATION; INTRACAUDAL; PERINEURAL ONCE
Status: DISCONTINUED | OUTPATIENT
Start: 2024-08-21 | End: 2024-08-21 | Stop reason: HOSPADM

## 2024-08-21 RX ORDER — LIDOCAINE HYDROCHLORIDE 10 MG/ML
INJECTION, SOLUTION INFILTRATION; PERINEURAL
Status: DISCONTINUED | OUTPATIENT
Start: 2024-08-21 | End: 2024-08-21 | Stop reason: HOSPADM

## 2024-08-21 NOTE — DISCHARGE INSTRUCTIONS
No driving for 24 hours  Continue medications  Monitor site for bleeding and signs of infection  You can remove dressing in 48hrs.   Follow up with Dr. Aviles

## 2024-08-21 NOTE — Clinical Note
The site was marked. The right neck was prepped. The site was prepped with ChloraPrep. The site was clipped. The patient was draped.

## 2024-08-21 NOTE — Clinical Note
An angiogram of the  right carotid artery was performed. The vessel was injected via multiple hand injections.

## 2024-08-26 ENCOUNTER — TELEPHONE (OUTPATIENT)
Dept: VASCULAR SURGERY | Facility: CLINIC | Age: 74
End: 2024-08-26
Payer: MEDICARE

## 2024-08-26 NOTE — TELEPHONE ENCOUNTER
Spoke to pt about clearance/referral from Orthopedic office for surgery.   LVM at Dr Calabrese's office for his nurse to return call at pts request.     ----- Message from Abiola Ching sent at 8/26/2024  9:05 AM CDT -----  Regarding: advise  Contact: patient  Type: Needs Medical Advice  Who Called:  patient  Symptoms (please be specific):    How long has patient had these symptoms:    Pharmacy name and phone #:    Best Call Back Number: [phone]    Additional Information: needs a call back regarding health

## 2024-08-27 ENCOUNTER — TELEPHONE (OUTPATIENT)
Dept: VASCULAR SURGERY | Facility: CLINIC | Age: 74
End: 2024-08-27
Payer: MEDICARE

## 2024-08-27 NOTE — TELEPHONE ENCOUNTER
Advised pt can fax to [phone]. Pt verbalized understanding.      ----- Message from Kirsten Asencio sent at 8/27/2024  3:54 PM CDT -----  Type: Needs Medical Advice  Who Called:  Ezequiel Mckeon Call Back Number: [phone]   Additional Information:  ordered a ultrasound and pt just had a ultra sound by home health and have those results and would like to know if those results would be ok versus getting a new one

## 2024-08-30 ENCOUNTER — TELEPHONE (OUTPATIENT)
Dept: VASCULAR SURGERY | Facility: CLINIC | Age: 74
End: 2024-08-30
Payer: MEDICARE

## 2024-08-30 NOTE — TELEPHONE ENCOUNTER
Re-faxed to [phone]    ----- Message from Drewailin Diana Watt sent at 8/30/2024  3:54 PM CDT -----  Type: Needs Medical Advice  Who Called:  Marixa with [facility]   Symptoms (please be specific):  needs med clearance   Best Call Back Number: [phone] fax: [phone]  Additional Information: Marixa stated she would like to speak to someone in office in regards to pts surg clearance please ensure to call back to advise asap thanks! Provider just signed and dated but never marked if pt was cleared or not please redo and resubmit thanks!

## 2024-09-11 ENCOUNTER — DOCUMENT SCAN (OUTPATIENT)
Dept: HOME HEALTH SERVICES | Facility: HOSPITAL | Age: 74
End: 2024-09-11
Payer: MEDICARE

## 2024-09-18 ENCOUNTER — DOCUMENT SCAN (OUTPATIENT)
Dept: HOME HEALTH SERVICES | Facility: HOSPITAL | Age: 74
End: 2024-09-18
Payer: MEDICARE

## 2024-10-02 ENCOUNTER — TELEPHONE (OUTPATIENT)
Dept: VASCULAR SURGERY | Facility: CLINIC | Age: 74
End: 2024-10-02
Payer: MEDICARE

## 2024-10-02 NOTE — TELEPHONE ENCOUNTER
LVM at number listed for [facility] asking for callback to discuss.     ----- Message from Maryjane sent at 10/1/2024  3:08 PM CDT -----  Regarding: death certificate  Contact: coron[facility] office  Type:  Needs Medical Advice    Who Called: coroners office    Best Call Back Number:     ramin finney    Additional Information:  is asking for a phone call.

## 2025-05-01 NOTE — RESPIRATORY THERAPY
03/31/23 2000   Patient Assessment/Suction   Level of Consciousness (AVPU) alert   Respiratory Effort Normal;Unlabored   Expansion/Accessory Muscles/Retractions expansion symmetric;no use of accessory muscles   PRE-TX-O2   Device (Oxygen Therapy) room air   SpO2 98 %   Pulse Oximetry Type Intermittent   $ Pulse Oximetry - Multiple Charge Pulse Oximetry - Multiple   Pulse 76   Resp 18        No Vaccines Administered.

## (undated) DEVICE — CATH URETH FOLEY 2W 16FR 5ML

## (undated) DEVICE — NEEDLE SAFETY ECLIPSE 25G 1-1/2IN 305767

## (undated) DEVICE — LEGGING CLEAR POLY 2/PACK

## (undated) DEVICE — SUTURE SILK 3-0 18 A184H

## (undated) DEVICE — SOL WATER STRL IRR 1000ML

## (undated) DEVICE — DRESSING POST OP MEPILEX AG 4X8

## (undated) DEVICE — SPONGE GAUZE 2S 4X4 STERILE NON21424

## (undated) DEVICE — CATHETER ANGIO OMNI FLUSH 10732201

## (undated) DEVICE — ELECTRODE REM PLYHSV RETURN 9

## (undated) DEVICE — INTERPULSE SET

## (undated) DEVICE — SOL STERILE WATER INJ 1000ML

## (undated) DEVICE — SCRUB 10% POVIDONE IODINE 4OZ

## (undated) DEVICE — ELECTRODE RESECTION LOOP LRGE

## (undated) DEVICE — GLOVE BIOGEL PI  GOLD SZ 7

## (undated) DEVICE — STRAP OR TABLE 5IN X 72IN

## (undated) DEVICE — DRESSING TEGADERM 4X4 3/4 TD1004

## (undated) DEVICE — SOL NACL IRR 1000ML BTL

## (undated) DEVICE — BOWL STERILE LARGE 32OZ

## (undated) DEVICE — SYR ONLY LUER LOCK 20CC

## (undated) DEVICE — SEE MEDLINE ITEM 146420

## (undated) DEVICE — DRAPE CYSTOSCOPY LARGE

## (undated) DEVICE — SYR 30CC LUER LOCK

## (undated) DEVICE — DEVICE ANC SW STAT FOLEY 6-24

## (undated) DEVICE — GLOVE SURG ULTRA TOUCH 7.5

## (undated) DEVICE — SLEEVE SCD EXPRESS KNEE MEDIUM

## (undated) DEVICE — GUIDEWIRE REG. ANGLED .035X260 LAUREATE

## (undated) DEVICE — PAD BOVIE ADULT

## (undated) DEVICE — SEE MEDLINE ITEM 157166

## (undated) DEVICE — SEE MEDLINE ITEM 157185

## (undated) DEVICE — SET IRR URLGY 2LINE UNIV SPIKE

## (undated) DEVICE — ELECTRODE RESECTION BUTTON LRG

## (undated) DEVICE — SOLUTION IRRI NS BOTTLE 1000ML R5200-01

## (undated) DEVICE — TUBING ARTHRO IRR 4-LEAD

## (undated) DEVICE — SPONGE GAUZE 16PLY 4X4

## (undated) DEVICE — SPONGE GAUZE 10S 4X4  442214

## (undated) DEVICE — DRAPE PLASTIC U 60X72

## (undated) DEVICE — CONTAINER SPECIMEN OR STER 4OZ

## (undated) DEVICE — INTRODUCER KIT STICK MINI MAX 5F X 10

## (undated) DEVICE — BAG LINGEMAN DRAIN UROLOGY

## (undated) DEVICE — IMPLANTABLE DEVICE: Type: IMPLANTABLE DEVICE | Site: ABDOMEN | Status: NON-FUNCTIONAL

## (undated) DEVICE — SEE MEDLINE ITEM 157131

## (undated) DEVICE — DRAPE LAPAROTOMY 72X100X124 89228

## (undated) DEVICE — SEE MEDLINE ITEM 157117

## (undated) DEVICE — GUIDEWIRE DOUBLE ENDED .035 DIA. 150CML

## (undated) DEVICE — SOL IRR WATER STRL 3000 ML

## (undated) DEVICE — GUIDEWIRE ANGLED GLIDE .035-150 GR3506

## (undated) DEVICE — SOL NACL IRR 3000ML

## (undated) DEVICE — SOL 9P NACL IRR PIC IL

## (undated) DEVICE — SPONGE SUPER KERLIX 6X6.75IN

## (undated) DEVICE — GOWN POLY REINF X-LONG XL

## (undated) DEVICE — DRAPE MINOR FEN 98X77X121IN

## (undated) DEVICE — SEE MEDLINE ITEM 152572

## (undated) DEVICE — KIT MICROINTRODUCE MINI 5X10CM

## (undated) DEVICE — CATH CONE TIP

## (undated) DEVICE — COVER TABLE 44X90 STERILE

## (undated) DEVICE — GUIDEWIRE ADVNTG 035X260CM ANG

## (undated) DEVICE — SCRUB HIBICLENS 4% CHG 4OZ

## (undated) DEVICE — GOWN X-LG STERILE BACK

## (undated) DEVICE — SCRUB DYNA-HEX LIQ 4% CHG 4OZ

## (undated) DEVICE — SOL PVP-I SCRUB 7.5% 4OZ

## (undated) DEVICE — SHEATH PINNACLE 5FRX10CM W/GUIDEWIRE

## (undated) DEVICE — TIP BOVIE TEFLON E1450X

## (undated) DEVICE — SHEATH INTRODUCER 6FR 11CM

## (undated) DEVICE — APPLICATOR CHLORAPREP ORN 26ML

## (undated) DEVICE — SEE MEDLINE ITEM 146313

## (undated) DEVICE — SYR LUER LOCK STERILE 10ML

## (undated) DEVICE — PACK CUSTOM UNIV BASIN SLI

## (undated) DEVICE — SEE MEDLINE ITEM 157171

## (undated) DEVICE — Device

## (undated) DEVICE — BAG DRAIN ANTI REFLUX 2000ML

## (undated) DEVICE — SEE MEDLINE ITEM 157216

## (undated) DEVICE — SUTURE SILK 2-0 18 A185H

## (undated) DEVICE — CATHETER DIAGNOSTIC DXTERITY 5F PIGST

## (undated) DEVICE — PACK BASIC

## (undated) DEVICE — SEE MEDLINE ITEM 152487

## (undated) DEVICE — SUTURE VICRYL 2-0 27 SH VCP417H

## (undated) DEVICE — SUTURE ETHIBOND 0 CT-1 18 CX21D

## (undated) DEVICE — ADHESIVE DERMABOND SKIN DNX12

## (undated) DEVICE — LUBRICANT SURGILUBE 2 OZ

## (undated) DEVICE — SPONGE BULKEE II ABSRB 6X6.75

## (undated) DEVICE — SET CYSTO IRR DRP CHMBR 84IN

## (undated) DEVICE — SYR 50ML CATH TIP

## (undated) DEVICE — CONTAINER SPECIMEN STRL 4OZ

## (undated) DEVICE — SEE MEDLINE ITEM 157116

## (undated) DEVICE — SHEATH PINNACLE 6FRX10CM W/GUIDEWIRE

## (undated) DEVICE — LINER SUCTION 3000CC

## (undated) DEVICE — TRAY GENERAL SURGERY

## (undated) DEVICE — DRAPE MEDIUM SHEET 40X70IN

## (undated) DEVICE — GLOVE SENSICARE PI ALOE 7

## (undated) DEVICE — SUTURE MONOCRYL 4-0 PS-2 Y496G

## (undated) DEVICE — TRAY DRY SPONGE SCRUB W FOAM

## (undated) DEVICE — SUTURE VICRYL 3-0 SH 27 VCP416H

## (undated) DEVICE — SUTURE ETHILON 2-0 PS 18 585H